# Patient Record
Sex: FEMALE | Race: WHITE | Employment: OTHER | URBAN - NONMETROPOLITAN AREA
[De-identification: names, ages, dates, MRNs, and addresses within clinical notes are randomized per-mention and may not be internally consistent; named-entity substitution may affect disease eponyms.]

---

## 2017-01-23 DIAGNOSIS — I10 ESSENTIAL HYPERTENSION: ICD-10-CM

## 2017-01-23 RX ORDER — LISINOPRIL 20 MG/1
20 TABLET ORAL DAILY
Qty: 30 TABLET | Refills: 5 | Status: SHIPPED | OUTPATIENT
Start: 2017-01-23 | End: 2017-07-16 | Stop reason: SDUPTHER

## 2017-01-23 RX ORDER — METOPROLOL TARTRATE 50 MG/1
50 TABLET, FILM COATED ORAL 2 TIMES DAILY
Qty: 60 TABLET | Refills: 5 | Status: SHIPPED | OUTPATIENT
Start: 2017-01-23 | End: 2017-07-16 | Stop reason: SDUPTHER

## 2017-02-01 DIAGNOSIS — R19.7 DIARRHEA OF PRESUMED INFECTIOUS ORIGIN: Primary | ICD-10-CM

## 2017-02-01 RX ORDER — DIPHENOXYLATE HYDROCHLORIDE AND ATROPINE SULFATE 2.5; .025 MG/1; MG/1
1 TABLET ORAL 4 TIMES DAILY PRN
Qty: 20 TABLET | Refills: 0 | Status: SHIPPED | OUTPATIENT
Start: 2017-02-01 | End: 2017-02-11

## 2017-03-08 ENCOUNTER — OFFICE VISIT (OUTPATIENT)
Dept: FAMILY MEDICINE CLINIC | Age: 77
End: 2017-03-08

## 2017-03-08 VITALS
DIASTOLIC BLOOD PRESSURE: 60 MMHG | HEART RATE: 58 BPM | WEIGHT: 181 LBS | SYSTOLIC BLOOD PRESSURE: 124 MMHG | BODY MASS INDEX: 30.9 KG/M2 | HEIGHT: 64 IN

## 2017-03-08 DIAGNOSIS — I10 ESSENTIAL HYPERTENSION: Primary | ICD-10-CM

## 2017-03-08 PROCEDURE — 99213 OFFICE O/P EST LOW 20 MIN: CPT | Performed by: FAMILY MEDICINE

## 2017-03-08 PROCEDURE — G8400 PT W/DXA NO RESULTS DOC: HCPCS | Performed by: FAMILY MEDICINE

## 2017-03-08 PROCEDURE — 1123F ACP DISCUSS/DSCN MKR DOCD: CPT | Performed by: FAMILY MEDICINE

## 2017-03-08 PROCEDURE — G8419 CALC BMI OUT NRM PARAM NOF/U: HCPCS | Performed by: FAMILY MEDICINE

## 2017-03-08 PROCEDURE — 1036F TOBACCO NON-USER: CPT | Performed by: FAMILY MEDICINE

## 2017-03-08 PROCEDURE — 1090F PRES/ABSN URINE INCON ASSESS: CPT | Performed by: FAMILY MEDICINE

## 2017-03-08 PROCEDURE — G8427 DOCREV CUR MEDS BY ELIG CLIN: HCPCS | Performed by: FAMILY MEDICINE

## 2017-03-08 PROCEDURE — 4040F PNEUMOC VAC/ADMIN/RCVD: CPT | Performed by: FAMILY MEDICINE

## 2017-03-08 PROCEDURE — G8484 FLU IMMUNIZE NO ADMIN: HCPCS | Performed by: FAMILY MEDICINE

## 2017-03-08 ASSESSMENT — ENCOUNTER SYMPTOMS
BLURRED VISION: 0
SHORTNESS OF BREATH: 0

## 2017-07-16 DIAGNOSIS — I10 ESSENTIAL HYPERTENSION: ICD-10-CM

## 2017-07-17 RX ORDER — METOPROLOL TARTRATE 50 MG/1
TABLET, FILM COATED ORAL
Qty: 60 TABLET | Refills: 2 | Status: SHIPPED | OUTPATIENT
Start: 2017-07-17 | End: 2017-09-22 | Stop reason: SDUPTHER

## 2017-07-17 RX ORDER — LISINOPRIL 20 MG/1
TABLET ORAL
Qty: 30 TABLET | Refills: 2 | Status: SHIPPED | OUTPATIENT
Start: 2017-07-17 | End: 2017-09-22 | Stop reason: SDUPTHER

## 2017-09-22 DIAGNOSIS — I10 ESSENTIAL HYPERTENSION: ICD-10-CM

## 2017-09-22 RX ORDER — METOPROLOL TARTRATE 50 MG/1
TABLET, FILM COATED ORAL
Qty: 60 TABLET | Refills: 5 | Status: SHIPPED | OUTPATIENT
Start: 2017-09-22 | End: 2018-04-25 | Stop reason: SDUPTHER

## 2017-09-22 RX ORDER — LISINOPRIL 20 MG/1
TABLET ORAL
Qty: 30 TABLET | Refills: 5 | Status: SHIPPED | OUTPATIENT
Start: 2017-09-22 | End: 2018-03-07 | Stop reason: SDUPTHER

## 2017-11-22 ENCOUNTER — HOSPITAL ENCOUNTER (OUTPATIENT)
Dept: MAMMOGRAPHY | Age: 77
Discharge: HOME | End: 2017-11-22
Payer: MEDICARE

## 2017-11-22 DIAGNOSIS — Z12.39 BREAST SCREENING: ICD-10-CM

## 2017-11-22 PROCEDURE — 77063 BREAST TOMOSYNTHESIS BI: CPT

## 2017-12-21 DIAGNOSIS — M25.512 ACUTE PAIN OF LEFT SHOULDER: Primary | ICD-10-CM

## 2017-12-21 DIAGNOSIS — M25.612 DECREASED RANGE OF MOTION OF LEFT SHOULDER: ICD-10-CM

## 2017-12-22 ENCOUNTER — HOSPITAL ENCOUNTER (OUTPATIENT)
Dept: GENERAL RADIOLOGY | Age: 77
Discharge: HOME | End: 2017-12-22
Payer: MEDICARE

## 2017-12-22 ENCOUNTER — HOSPITAL ENCOUNTER (OUTPATIENT)
Age: 77
Discharge: HOME | End: 2017-12-22
Payer: MEDICARE

## 2017-12-22 DIAGNOSIS — R52 BODY ACHES: ICD-10-CM

## 2017-12-22 DIAGNOSIS — M25.512 ACUTE PAIN OF LEFT SHOULDER: ICD-10-CM

## 2017-12-22 DIAGNOSIS — R05.9 COUGH: ICD-10-CM

## 2017-12-22 DIAGNOSIS — R06.2 WHEEZING: ICD-10-CM

## 2017-12-22 DIAGNOSIS — Z20.828 EXPOSURE TO INFLUENZA: ICD-10-CM

## 2017-12-22 DIAGNOSIS — R05.9 COUGH: Primary | ICD-10-CM

## 2017-12-22 DIAGNOSIS — M25.612 DECREASED RANGE OF MOTION OF LEFT SHOULDER: ICD-10-CM

## 2017-12-22 DIAGNOSIS — M25.512 ACUTE PAIN OF LEFT SHOULDER: Primary | ICD-10-CM

## 2017-12-22 LAB
FLU A ANTIGEN: NEGATIVE
FLU B ANTIGEN: NEGATIVE

## 2017-12-22 PROCEDURE — 87804 INFLUENZA ASSAY W/OPTIC: CPT

## 2017-12-22 PROCEDURE — 71020 XR CHEST STANDARD TWO VW: CPT

## 2017-12-22 PROCEDURE — 73030 X-RAY EXAM OF SHOULDER: CPT

## 2017-12-22 RX ORDER — PREDNISONE 20 MG/1
40 TABLET ORAL DAILY
Qty: 10 TABLET | Refills: 0 | Status: SHIPPED | OUTPATIENT
Start: 2017-12-22 | End: 2017-12-27 | Stop reason: ALTCHOICE

## 2017-12-27 ENCOUNTER — OFFICE VISIT (OUTPATIENT)
Dept: CARDIOLOGY CLINIC | Age: 77
End: 2017-12-27
Payer: MEDICARE

## 2017-12-27 VITALS
HEART RATE: 54 BPM | DIASTOLIC BLOOD PRESSURE: 70 MMHG | BODY MASS INDEX: 32.1 KG/M2 | SYSTOLIC BLOOD PRESSURE: 138 MMHG | WEIGHT: 188 LBS | HEIGHT: 64 IN

## 2017-12-27 DIAGNOSIS — I10 ESSENTIAL HYPERTENSION: ICD-10-CM

## 2017-12-27 DIAGNOSIS — R55 VASOVAGAL SYNCOPE: ICD-10-CM

## 2017-12-27 DIAGNOSIS — E78.9 LIPID DISORDER: Primary | ICD-10-CM

## 2017-12-27 PROCEDURE — 1090F PRES/ABSN URINE INCON ASSESS: CPT | Performed by: INTERNAL MEDICINE

## 2017-12-27 PROCEDURE — 1036F TOBACCO NON-USER: CPT | Performed by: INTERNAL MEDICINE

## 2017-12-27 PROCEDURE — G8482 FLU IMMUNIZE ORDER/ADMIN: HCPCS | Performed by: INTERNAL MEDICINE

## 2017-12-27 PROCEDURE — G8400 PT W/DXA NO RESULTS DOC: HCPCS | Performed by: INTERNAL MEDICINE

## 2017-12-27 PROCEDURE — 4040F PNEUMOC VAC/ADMIN/RCVD: CPT | Performed by: INTERNAL MEDICINE

## 2017-12-27 PROCEDURE — G8427 DOCREV CUR MEDS BY ELIG CLIN: HCPCS | Performed by: INTERNAL MEDICINE

## 2017-12-27 PROCEDURE — 1123F ACP DISCUSS/DSCN MKR DOCD: CPT | Performed by: INTERNAL MEDICINE

## 2017-12-27 PROCEDURE — 99213 OFFICE O/P EST LOW 20 MIN: CPT | Performed by: INTERNAL MEDICINE

## 2017-12-27 PROCEDURE — G8417 CALC BMI ABV UP PARAM F/U: HCPCS | Performed by: INTERNAL MEDICINE

## 2017-12-27 NOTE — PROGRESS NOTES
Pt here for 1 year check up     Denies chest pain, SOB, palpitations or FRANKIE    C/O having a cold for the past week. Asking for ATB.     Doing well today

## 2017-12-27 NOTE — PROGRESS NOTES
Desi Toney is a 76 y.o. female is here for  Risk for cad and prior syncopal from  dehydration doing well  and has had no chest pains . Intensity of the pain none , provocation of the pain none , what relieves the pain none  where does  the pain migrates to none and no nausea no fever and chills and no sob with and without activity. No evidence of swelling in legs no palpitations and no syncopal episodes and no dizziness ,no bleeding ,or urination  Problems ,no double visions ,no speech problems and no bowel problems or diarrhea and tolerating medications     Patient Active Problem List   Diagnosis    Hypertension    Syncopal episodes    Primary osteoarthritis of right knee     Past Medical History:   Diagnosis Date    Arthritis     Hypertension      Social History   Substance Use Topics    Smoking status: Never Smoker    Smokeless tobacco: Never Used    Alcohol use Yes      Comment: very very rare     Allergies   Allergen Reactions    Pcn [Penicillins] Itching     redness @ IM site     Current Outpatient Prescriptions   Medication Sig Dispense Refill    lisinopril (PRINIVIL;ZESTRIL) 20 MG tablet TAKE ONE TABLET BY MOUTH ONCE DAILY 30 tablet 5    metoprolol tartrate (LOPRESSOR) 50 MG tablet TAKE ONE TABLET BY MOUTH TWICE DAILY 60 tablet 5    aspirin 81 MG tablet Take 81 mg by mouth daily.  ascorbic acid (VITAMIN C) 500 MG tablet Take 500 mg by mouth daily.  Calcium Carb-Cholecalciferol (CALCIUM PLUS VITAMIN D) 600-100 MG-UNIT CAPS Take  by mouth daily. 2 tablet in morning  /  1 tablets in evening       No current facility-administered medications for this visit.         REVIEW OF SYSTEM  Constitutional  symptoms such as fever chills and malaise and weakness  Are negative   HE ENT negative  HEART all negative  LUNGS all negative   ABDOMEN all negative  GENITAL URINARY all within normal limits  NEUROLOGY all negative  NECK all negative   SKIN all negative  MUSCULOSKELETAL negative  HEMATOLOGICAL negative  PSYCHIATRIC  negative    Vitals:    12/27/17 1628   BP: (!) 142/72   Pulse: 54   Weight: 188 lb (85.3 kg)   Height: 5' 4\" (1.626 m)       EXAMINATION   Gen.  Appearance is reflective of nutritional normal nutrition and well-groomed   Appears  stated age    Carotid the amplitude of  carotid artery  And up stroke are present or diminished and bruits are absent   Thyroid palpation appears to be  Normal    HERMINIA  And evaluated and within normal limits  And size of pupils is normal  HEENT  teeth appears to be symmetrical without poor dentition and gums  and palate appears to be healthy and  head is normal cephalic  Without signs of trauma and eyes are without trauma no conjunctiva and Iids retracting and not retracted ptosis and no ptosis and without  erythematous changes and  Nose is symmetrical  without drainage  and ears are  without tinnitus  and throat is clear   JUGULAR VEINS  are not elevated and tongue is mid line no masses presence and no pride A waves present   LYMPHATICS are without adenopathies at least on exam   CHEST  No biventricular heaves and thrills and no right ventricular heaves and examination without signs of trauma and lesions  HEART not distant and regular rate and rhythm without   gallop signs and and no murmurs and systolic crescendo  Decrescendo  normal s1 and s2 sounds and no s3 and s4 split   Point of maximum intensity of the heartbeat  is at or displaced from the fifth intercostal space  LUNGS no   presence of intercostal retractions and no  use of the accessory muscles with a diaphragmatic movement present and not present pectus and pigeon chest and without wheezes and rhonchi and non diminished in all posterior lungs  and without rales  ABDOMEN abdominal bruit not present  And  No  Presency of  morbid obesity and with no    non distended without organomegaly and no rebound tenderness and bowel sound are present  all quadrants   NEUROLOGY all the cranial nerves are intact and no motor deficits  and no sensory deficits  MUSCULAR SKELETAL without signs of trauma and kyphosis and scoliosis and normal range of motion for all extremities  INTEGUMENTARY without tenting and skin rashes indentation and  dryness  NECK without lymph adenopathy   NEUROPSYCHIATRIC has no anxiety, no mood swings and depression  VASCULAR EXAM for carotid ,radial femoral arteries are  steady  and not diminished   Extremities no evidence of peripheral edema  And pulses are  normal    Assessment and plans  1. Lipid disorder  Lipid Panel    Hepatic Function Panel   2. Vasovagal syncope     3. Essential hypertension       Patient is a advised to have their lipids and liver panel and TSH checked through their family doctors and the therapeutic values that need to be met are also presented to the patient and need to achieve them is emphasized and patient agrees and accepts. patient was advised of the side effects of the medications and watch for any change in medical status if any of those side effects develop to call immediately and may consider  discontinuing the medicine after talking to us and  depending on the clinical scenario      We Re assured  And educated the  patient  On their  medical status  And the treatment plans  And the patient  is willing  to be complaint with care  And follow up and  All their question  answered to their  Satisfaction    Patient was advised to return in 6 to 12  months for follow up or sooner if there is any change in care.     Electronically signed by Zuri Boss DO on 12/27/2017 at 4:40 PM

## 2017-12-30 DIAGNOSIS — B96.89 ACUTE BACTERIAL SINUSITIS: Primary | ICD-10-CM

## 2017-12-30 DIAGNOSIS — J01.90 ACUTE BACTERIAL SINUSITIS: Primary | ICD-10-CM

## 2017-12-30 RX ORDER — AZITHROMYCIN 250 MG/1
TABLET, FILM COATED ORAL
Qty: 6 TABLET | Refills: 0 | Status: SHIPPED | OUTPATIENT
Start: 2017-12-30 | End: 2018-02-13

## 2018-02-13 ENCOUNTER — OFFICE VISIT (OUTPATIENT)
Dept: FAMILY MEDICINE CLINIC | Age: 78
End: 2018-02-13
Payer: MEDICARE

## 2018-02-13 VITALS
SYSTOLIC BLOOD PRESSURE: 134 MMHG | WEIGHT: 187 LBS | DIASTOLIC BLOOD PRESSURE: 62 MMHG | BODY MASS INDEX: 31.92 KG/M2 | HEIGHT: 64 IN | HEART RATE: 70 BPM

## 2018-02-13 DIAGNOSIS — M25.512 LOCALIZED PAIN OF LEFT SHOULDER JOINT: Primary | ICD-10-CM

## 2018-02-13 PROCEDURE — G8417 CALC BMI ABV UP PARAM F/U: HCPCS | Performed by: FAMILY MEDICINE

## 2018-02-13 PROCEDURE — 99213 OFFICE O/P EST LOW 20 MIN: CPT | Performed by: FAMILY MEDICINE

## 2018-02-13 PROCEDURE — 4040F PNEUMOC VAC/ADMIN/RCVD: CPT | Performed by: FAMILY MEDICINE

## 2018-02-13 PROCEDURE — 20610 DRAIN/INJ JOINT/BURSA W/O US: CPT | Performed by: FAMILY MEDICINE

## 2018-02-13 PROCEDURE — G8482 FLU IMMUNIZE ORDER/ADMIN: HCPCS | Performed by: FAMILY MEDICINE

## 2018-02-13 PROCEDURE — G8400 PT W/DXA NO RESULTS DOC: HCPCS | Performed by: FAMILY MEDICINE

## 2018-02-13 PROCEDURE — 1090F PRES/ABSN URINE INCON ASSESS: CPT | Performed by: FAMILY MEDICINE

## 2018-02-13 PROCEDURE — 1036F TOBACCO NON-USER: CPT | Performed by: FAMILY MEDICINE

## 2018-02-13 PROCEDURE — G8427 DOCREV CUR MEDS BY ELIG CLIN: HCPCS | Performed by: FAMILY MEDICINE

## 2018-02-13 PROCEDURE — 1123F ACP DISCUSS/DSCN MKR DOCD: CPT | Performed by: FAMILY MEDICINE

## 2018-02-13 RX ORDER — TRIAMCINOLONE ACETONIDE 40 MG/ML
40 INJECTION, SUSPENSION INTRA-ARTICULAR; INTRAMUSCULAR ONCE
Status: COMPLETED | OUTPATIENT
Start: 2018-02-13 | End: 2018-02-13

## 2018-02-13 RX ORDER — LIDOCAINE HYDROCHLORIDE 10 MG/ML
5 INJECTION, SOLUTION INFILTRATION; PERINEURAL ONCE
Status: COMPLETED | OUTPATIENT
Start: 2018-02-13 | End: 2018-02-13

## 2018-02-13 RX ADMIN — LIDOCAINE HYDROCHLORIDE 5 ML: 10 INJECTION, SOLUTION INFILTRATION; PERINEURAL at 12:01

## 2018-02-13 RX ADMIN — TRIAMCINOLONE ACETONIDE 40 MG: 40 INJECTION, SUSPENSION INTRA-ARTICULAR; INTRAMUSCULAR at 11:54

## 2018-02-13 ASSESSMENT — ENCOUNTER SYMPTOMS
CHEST TIGHTNESS: 0
COLOR CHANGE: 0
SHORTNESS OF BREATH: 0

## 2018-02-13 ASSESSMENT — PATIENT HEALTH QUESTIONNAIRE - PHQ9
1. LITTLE INTEREST OR PLEASURE IN DOING THINGS: 0
SUM OF ALL RESPONSES TO PHQ9 QUESTIONS 1 & 2: 0
SUM OF ALL RESPONSES TO PHQ QUESTIONS 1-9: 0
2. FEELING DOWN, DEPRESSED OR HOPELESS: 0

## 2018-02-13 NOTE — PATIENT INSTRUCTIONS
exercise material, such as surgical tubing or Thera-Band. 1. Put the band around a solid object at about waist level. (A bedpost will work well.) Each hand should hold an end of the band. 2. With your elbows at your sides and bent to 90 degrees, pull the band back. Your shoulder blades should move toward each other. Return to the starting position. 3. Repeat 8 to 12 times. External rotator strengthening exercise    1. Start by tying a piece of elastic exercise material to a doorknob. You can use surgical tubing or Thera-Band. (You may also hold one end of the band in each hand.)  2. Stand or sit with your shoulder relaxed and your elbow bent 90 degrees. Your upper arm should rest comfortably against your side. Squeeze a rolled towel between your elbow and your body for comfort. This will help keep your arm at your side. 3. Hold one end of the elastic band with the hand of the painful arm. 4. Start with your forearm across your belly. Slowly rotate the forearm out away from your body. Keep your elbow and upper arm tucked against the towel roll or the side of your body until you begin to feel tightness in your shoulder. Slowly move your arm back to where you started. 5. Repeat 8 to 12 times. Internal rotator strengthening exercise    1. Start by tying a piece of elastic exercise material to a doorknob. You can use surgical tubing or Thera-Band. 2. Stand or sit with your shoulder relaxed and your elbow bent 90 degrees. Your upper arm should rest comfortably against your side. Squeeze a rolled towel between your elbow and your body for comfort. This will help keep your arm at your side. 3. Hold one end of the elastic band in the hand of the painful arm. 4. Slowly rotate your forearm toward your body until it touches your belly. Slowly move it back to where you started. 5. Keep your elbow and upper arm firmly tucked against the towel roll or at your side. 6. Repeat 8 to 12 times.   Pendulum swing    If you have pain in your back, do not do this exercise. 1. Hold on to a table or the back of a chair with your good arm. Then bend forward a little and let your sore arm hang straight down. This exercise does not use the arm muscles. Rather, use your legs and your hips to create movement that makes your arm swing freely. 2. Use the movement from your hips and legs to guide the slightly swinging arm back and forth like a pendulum (or elephant trunk). Then guide it in circles that start small (about the size of a dinner plate). Make the circles a bit larger each day, as your pain allows. 3. Do this exercise for 5 minutes, 5 to 7 times each day. 4. As you have less pain, try bending over a little farther to do this exercise. This will increase the amount of movement at your shoulder. Follow-up care is a key part of your treatment and safety. Be sure to make and go to all appointments, and call your doctor if you are having problems. It's also a good idea to know your test results and keep a list of the medicines you take. Where can you learn more? Go to https://SelligypepicdINK.bettermarks. org and sign in to your [employer] account. Enter H562 in the Daily Secret box to learn more about \"Shoulder Arthritis: Exercises. \"     If you do not have an account, please click on the \"Sign Up Now\" link. Current as of: March 21, 2017  Content Version: 11.5  © 7997-7421 Healthwise, [employer]. Care instructions adapted under license by [facility] ([facility]). If you have questions about a medical condition or this instruction, always ask your healthcare professional. [place] [address] any warranty or liability for your use of this information.

## 2018-02-13 NOTE — PROGRESS NOTES
was injected with 5 mL 1% lidocaine and 1 mL of Kenalog (40 mg/ml) using a posterior approach to the shoulder joint space. The injection site was cleansed and a dressing was applied. Patient was instructed on post procedure shoulder care. Complications: There were no complications and the patient tolerated the procedure well. Assessment/Plan:     Millie Lam was seen today for shoulder pain. Diagnoses and all orders for this visit:    Localized pain of left shoulder joint  -     triamcinolone acetonide (KENALOG-40) injection 40 mg; Inject 1 mL into the articular space once  -     lidocaine 1 % injection 5 mL; Inject 5 mLs into the articular space once  -     33038 - [place] DRAIN/INJECT LARGE JOINT/BURSA    Left shoulder was injected for symptomatic relief and the patient tolerated the procedure well. She was advised on aftercare instructions and given a handout with shoulder exercises. Return in about 2 months (around 4/13/2018) for hypertension.     Electronically signed by Rossana Byrd MD on 2/13/2018 at 10:45 AM

## 2018-03-07 DIAGNOSIS — I10 ESSENTIAL HYPERTENSION: ICD-10-CM

## 2018-03-07 RX ORDER — LISINOPRIL 20 MG/1
TABLET ORAL
Qty: 90 TABLET | Refills: 1 | Status: SHIPPED | OUTPATIENT
Start: 2018-03-07 | End: 2018-09-24 | Stop reason: SDUPTHER

## 2018-03-08 DIAGNOSIS — J01.90 ACUTE BACTERIAL SINUSITIS: Primary | ICD-10-CM

## 2018-03-08 DIAGNOSIS — B96.89 ACUTE BACTERIAL SINUSITIS: Primary | ICD-10-CM

## 2018-03-08 RX ORDER — AZITHROMYCIN 250 MG/1
TABLET, FILM COATED ORAL
Qty: 6 TABLET | Refills: 0 | Status: SHIPPED | OUTPATIENT
Start: 2018-03-08 | End: 2018-04-25

## 2018-04-25 ENCOUNTER — TELEPHONE (OUTPATIENT)
Dept: FAMILY MEDICINE CLINIC | Age: 78
End: 2018-04-25

## 2018-04-25 ENCOUNTER — OFFICE VISIT (OUTPATIENT)
Dept: FAMILY MEDICINE CLINIC | Age: 78
End: 2018-04-25
Payer: MEDICARE

## 2018-04-25 VITALS
WEIGHT: 180 LBS | HEART RATE: 58 BPM | SYSTOLIC BLOOD PRESSURE: 124 MMHG | HEIGHT: 64 IN | DIASTOLIC BLOOD PRESSURE: 72 MMHG | BODY MASS INDEX: 30.73 KG/M2

## 2018-04-25 DIAGNOSIS — I10 ESSENTIAL HYPERTENSION: ICD-10-CM

## 2018-04-25 DIAGNOSIS — Z13.220 SCREENING FOR HYPERLIPIDEMIA: ICD-10-CM

## 2018-04-25 DIAGNOSIS — M62.542 ATROPHY OF LEFT HAND MUSCLES: Primary | ICD-10-CM

## 2018-04-25 DIAGNOSIS — E78.9 LIPID DISORDER: ICD-10-CM

## 2018-04-25 LAB
ALBUMIN SERPL-MCNC: 4.3 G/DL (ref 3.5–5.1)
ALP BLD-CCNC: 64 U/L (ref 38–126)
ALT SERPL-CCNC: 14 U/L (ref 11–66)
ANION GAP SERPL CALCULATED.3IONS-SCNC: 10 MEQ/L (ref 8–16)
AST SERPL-CCNC: 20 U/L (ref 5–40)
BILIRUB SERPL-MCNC: 0.4 MG/DL (ref 0.3–1.2)
BILIRUBIN DIRECT: < 0.2 MG/DL (ref 0–0.3)
BUN BLDV-MCNC: 19 MG/DL (ref 7–22)
CALCIUM SERPL-MCNC: 9 MG/DL (ref 8.5–10.5)
CHLORIDE BLD-SCNC: 102 MEQ/L (ref 98–111)
CHOLESTEROL, TOTAL: 206 MG/DL (ref 100–199)
CO2: 26 MEQ/L (ref 23–33)
CREAT SERPL-MCNC: 0.7 MG/DL (ref 0.4–1.2)
GFR SERPL CREATININE-BSD FRML MDRD: 81 ML/MIN/1.73M2
GLUCOSE BLD-MCNC: 94 MG/DL (ref 70–108)
HDLC SERPL-MCNC: 65 MG/DL
LDL CHOLESTEROL CALCULATED: 119 MG/DL
POTASSIUM SERPL-SCNC: 4.7 MEQ/L (ref 3.5–5.2)
SODIUM BLD-SCNC: 138 MEQ/L (ref 135–145)
TOTAL PROTEIN: 6.8 G/DL (ref 6.1–8)
TRIGL SERPL-MCNC: 108 MG/DL (ref 0–199)

## 2018-04-25 PROCEDURE — 1036F TOBACCO NON-USER: CPT | Performed by: FAMILY MEDICINE

## 2018-04-25 PROCEDURE — 1123F ACP DISCUSS/DSCN MKR DOCD: CPT | Performed by: FAMILY MEDICINE

## 2018-04-25 PROCEDURE — G8427 DOCREV CUR MEDS BY ELIG CLIN: HCPCS | Performed by: FAMILY MEDICINE

## 2018-04-25 PROCEDURE — 36415 COLL VENOUS BLD VENIPUNCTURE: CPT | Performed by: FAMILY MEDICINE

## 2018-04-25 PROCEDURE — 1090F PRES/ABSN URINE INCON ASSESS: CPT | Performed by: FAMILY MEDICINE

## 2018-04-25 PROCEDURE — 99214 OFFICE O/P EST MOD 30 MIN: CPT | Performed by: FAMILY MEDICINE

## 2018-04-25 PROCEDURE — G8417 CALC BMI ABV UP PARAM F/U: HCPCS | Performed by: FAMILY MEDICINE

## 2018-04-25 PROCEDURE — G8400 PT W/DXA NO RESULTS DOC: HCPCS | Performed by: FAMILY MEDICINE

## 2018-04-25 PROCEDURE — 4040F PNEUMOC VAC/ADMIN/RCVD: CPT | Performed by: FAMILY MEDICINE

## 2018-04-25 RX ORDER — METOPROLOL TARTRATE 50 MG/1
TABLET, FILM COATED ORAL
Qty: 180 TABLET | Refills: 1 | Status: SHIPPED | OUTPATIENT
Start: 2018-04-25 | End: 2018-11-06 | Stop reason: SDUPTHER

## 2018-04-25 ASSESSMENT — ENCOUNTER SYMPTOMS
COLOR CHANGE: 0
EYE REDNESS: 0
EYE DISCHARGE: 0
NAUSEA: 0
CHEST TIGHTNESS: 0
BLOOD IN STOOL: 0
VOMITING: 0
SHORTNESS OF BREATH: 0

## 2018-05-27 ENCOUNTER — HOSPITAL ENCOUNTER (OUTPATIENT)
Age: 78
Discharge: HOME | End: 2018-05-27
Payer: MEDICARE

## 2018-05-27 ENCOUNTER — HOSPITAL ENCOUNTER (OUTPATIENT)
Dept: GENERAL RADIOLOGY | Age: 78
Discharge: HOME | End: 2018-05-27
Payer: MEDICARE

## 2018-05-27 DIAGNOSIS — M25.551 ACUTE RIGHT HIP PAIN: ICD-10-CM

## 2018-05-27 DIAGNOSIS — M25.551 ACUTE RIGHT HIP PAIN: Primary | ICD-10-CM
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
## 2018-05-27 PROCEDURE — 73502 X-RAY EXAM HIP UNI 2-3 VIEWS: CPT

## 2018-06-01 ENCOUNTER — TELEPHONE (OUTPATIENT)
Dept: FAMILY MEDICINE CLINIC | Age: 78
End: 2018-06-01

## 2018-06-01 ENCOUNTER — HOSPITAL ENCOUNTER (OUTPATIENT)
Dept: GENERAL RADIOLOGY | Age: 78
Discharge: HOME | End: 2018-06-01
Payer: MEDICARE

## 2018-06-01 ENCOUNTER — HOSPITAL ENCOUNTER (OUTPATIENT)
Age: 78
Discharge: HOME | End: 2018-06-01
Payer: MEDICARE

## 2018-06-01 ENCOUNTER — OFFICE VISIT (OUTPATIENT)
Dept: FAMILY MEDICINE CLINIC | Age: 78
End: 2018-06-01
Payer: MEDICARE

## 2018-06-01 VITALS
HEIGHT: 64 IN | SYSTOLIC BLOOD PRESSURE: 160 MMHG | DIASTOLIC BLOOD PRESSURE: 70 MMHG | HEART RATE: 68 BPM | BODY MASS INDEX: 30.9 KG/M2 | WEIGHT: 181 LBS
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
## 2018-06-01 DIAGNOSIS — M25.551 RIGHT HIP PAIN: ICD-10-CM

## 2018-06-01 DIAGNOSIS — M25.551 RIGHT HIP PAIN: Primary | ICD-10-CM

## 2018-06-01 DIAGNOSIS — M79.651 RIGHT THIGH PAIN: ICD-10-CM

## 2018-06-01 DIAGNOSIS — M79.604 RIGHT LEG PAIN: Primary | ICD-10-CM

## 2018-06-01 PROCEDURE — 1123F ACP DISCUSS/DSCN MKR DOCD: CPT | Performed by: FAMILY MEDICINE

## 2018-06-01 PROCEDURE — 1036F TOBACCO NON-USER: CPT | Performed by: FAMILY MEDICINE

## 2018-06-01 PROCEDURE — 99213 OFFICE O/P EST LOW 20 MIN: CPT | Performed by: FAMILY MEDICINE

## 2018-06-01 PROCEDURE — G8400 PT W/DXA NO RESULTS DOC: HCPCS | Performed by: FAMILY MEDICINE

## 2018-06-01 PROCEDURE — G8427 DOCREV CUR MEDS BY ELIG CLIN: HCPCS | Performed by: FAMILY MEDICINE

## 2018-06-01 PROCEDURE — 1090F PRES/ABSN URINE INCON ASSESS: CPT | Performed by: FAMILY MEDICINE

## 2018-06-01 PROCEDURE — G8417 CALC BMI ABV UP PARAM F/U: HCPCS | Performed by: FAMILY MEDICINE

## 2018-06-01 PROCEDURE — 4040F PNEUMOC VAC/ADMIN/RCVD: CPT | Performed by: FAMILY MEDICINE

## 2018-06-01 PROCEDURE — 73552 X-RAY EXAM OF FEMUR 2/>: CPT

## 2018-06-01 RX ORDER — TRAMADOL HYDROCHLORIDE 50 MG/1
50 TABLET ORAL EVERY 6 HOURS PRN
Qty: 28 TABLET | Refills: 0 | Status: SHIPPED | OUTPATIENT
Start: 2018-06-01 | End: 2018-06-08

## 2018-06-01 RX ORDER — PREDNISONE 20 MG/1
40 TABLET ORAL DAILY
Qty: 10 TABLET | Refills: 0 | Status: SHIPPED | OUTPATIENT
Start: 2018-06-01 | End: 2018-06-30 | Stop reason: ALTCHOICE

## 2018-06-01 ASSESSMENT — ENCOUNTER SYMPTOMS: COLOR CHANGE: 0

## 2018-06-04 ENCOUNTER — HOSPITAL ENCOUNTER (OUTPATIENT)
Dept: CT IMAGING | Age: 78
Discharge: HOME | End: 2018-06-04
Payer: MEDICARE

## 2018-06-04 DIAGNOSIS — M79.604 RIGHT LEG PAIN: ICD-10-CM

## 2018-06-04 PROCEDURE — 73700 CT LOWER EXTREMITY W/O DYE: CPT

## 2018-06-05 ENCOUNTER — TELEPHONE (OUTPATIENT)
Dept: FAMILY MEDICINE CLINIC | Age: 78
End: 2018-06-05

## 2018-06-05 DIAGNOSIS — M79.651 ACUTE PAIN OF RIGHT THIGH: Primary | ICD-10-CM

## 2018-06-06 ENCOUNTER — TELEPHONE (OUTPATIENT)
Dept: FAMILY MEDICINE CLINIC | Age: 78
End: 2018-06-06

## 2018-06-06 ENCOUNTER — HOSPITAL ENCOUNTER (OUTPATIENT)
Dept: INTERVENTIONAL RADIOLOGY/VASCULAR | Age: 78
Discharge: HOME | End: 2018-06-06
Payer: MEDICARE

## 2018-06-06 DIAGNOSIS — R52 PAIN: ICD-10-CM

## 2018-06-06 PROCEDURE — 93971 EXTREMITY STUDY: CPT

## 2018-06-07 DIAGNOSIS — M25.551 RIGHT HIP PAIN: Primary | ICD-10-CM

## 2018-06-12 ENCOUNTER — HOSPITAL ENCOUNTER (OUTPATIENT)
Dept: PHYSICAL THERAPY | Age: 78
Setting detail: THERAPIES SERIES
Discharge: HOME | End: 2018-06-12
Payer: MEDICARE

## 2018-06-12 PROCEDURE — 97110 THERAPEUTIC EXERCISES: CPT

## 2018-06-12 PROCEDURE — 97035 APP MDLTY 1+ULTRASOUND EA 15: CPT

## 2018-06-12 ASSESSMENT — PAIN SCALES - GENERAL: PAINLEVEL_OUTOF10: 2

## 2018-06-14 ENCOUNTER — HOSPITAL ENCOUNTER (OUTPATIENT)
Dept: PHYSICAL THERAPY | Age: 78
Setting detail: THERAPIES SERIES
Discharge: HOME | End: 2018-06-14
Payer: MEDICARE

## 2018-06-14 PROCEDURE — 97110 THERAPEUTIC EXERCISES: CPT

## 2018-06-14 PROCEDURE — 97035 APP MDLTY 1+ULTRASOUND EA 15: CPT

## 2018-06-14 ASSESSMENT — PAIN DESCRIPTION - LOCATION: LOCATION: LEG

## 2018-06-14 ASSESSMENT — PAIN DESCRIPTION - ORIENTATION: ORIENTATION: RIGHT

## 2018-06-14 ASSESSMENT — PAIN SCALES - GENERAL: PAINLEVEL_OUTOF10: 1

## 2018-06-14 ASSESSMENT — PAIN DESCRIPTION - PAIN TYPE: TYPE: ACUTE PAIN

## 2018-06-15 ENCOUNTER — APPOINTMENT (OUTPATIENT)
Dept: PHYSICAL THERAPY | Age: 78
End: 2018-06-15
Payer: MEDICARE

## 2018-06-18 ENCOUNTER — HOSPITAL ENCOUNTER (OUTPATIENT)
Dept: PHYSICAL THERAPY | Age: 78
Setting detail: THERAPIES SERIES
Discharge: HOME | End: 2018-06-18
Payer: MEDICARE

## 2018-06-18 PROCEDURE — 97110 THERAPEUTIC EXERCISES: CPT

## 2018-06-19 ENCOUNTER — APPOINTMENT (OUTPATIENT)
Dept: PHYSICAL THERAPY | Age: 78
End: 2018-06-19
Payer: MEDICARE

## 2018-06-21 ENCOUNTER — HOSPITAL ENCOUNTER (OUTPATIENT)
Dept: PHYSICAL THERAPY | Age: 78
Setting detail: THERAPIES SERIES
Discharge: HOME | End: 2018-06-21
Payer: MEDICARE

## 2018-06-21 PROCEDURE — 97110 THERAPEUTIC EXERCISES: CPT

## 2018-06-26 ENCOUNTER — HOSPITAL ENCOUNTER (OUTPATIENT)
Dept: PHYSICAL THERAPY | Age: 78
Setting detail: THERAPIES SERIES
Discharge: HOME | End: 2018-06-26
Payer: MEDICARE

## 2018-06-26 PROCEDURE — 97110 THERAPEUTIC EXERCISES: CPT

## 2018-06-29 ENCOUNTER — HOSPITAL ENCOUNTER (OUTPATIENT)
Dept: PHYSICAL THERAPY | Age: 78
Setting detail: THERAPIES SERIES
Discharge: HOME | End: 2018-06-29
Payer: MEDICARE

## 2018-06-29 PROCEDURE — G8992 OTHER PT/OT  D/C STATUS: HCPCS

## 2018-06-29 PROCEDURE — G8991 OTHER PT/OT GOAL STATUS: HCPCS

## 2018-06-29 PROCEDURE — 97530 THERAPEUTIC ACTIVITIES: CPT

## 2018-06-30 ENCOUNTER — HOSPITAL ENCOUNTER (EMERGENCY)
Age: 78
Discharge: HOME | End: 2018-06-30
Attending: EMERGENCY MEDICINE
Payer: MEDICARE

## 2018-06-30 ENCOUNTER — APPOINTMENT (OUTPATIENT)
Dept: GENERAL RADIOLOGY | Age: 78
End: 2018-06-30
Payer: MEDICARE

## 2018-06-30 VITALS
OXYGEN SATURATION: 93 % | DIASTOLIC BLOOD PRESSURE: 79 MMHG | SYSTOLIC BLOOD PRESSURE: 192 MMHG | BODY MASS INDEX: 28.25 KG/M2 | WEIGHT: 180 LBS | RESPIRATION RATE: 18 BRPM | TEMPERATURE: 99.1 F | HEIGHT: 67 IN | HEART RATE: 75 BPM

## 2018-06-30 DIAGNOSIS — S42.402A CLOSED FRACTURE OF LEFT ELBOW, INITIAL ENCOUNTER: ICD-10-CM
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
## 2018-06-30 DIAGNOSIS — S01.01XA LACERATION OF SCALP, INITIAL ENCOUNTER: ICD-10-CM

## 2018-06-30 PROCEDURE — 12001 RPR S/N/AX/GEN/TRNK 2.5CM/<: CPT

## 2018-06-30 PROCEDURE — 90471 IMMUNIZATION ADMIN: CPT | Performed by: EMERGENCY MEDICINE

## 2018-06-30 PROCEDURE — 29105 APPLICATION LONG ARM SPLINT: CPT

## 2018-06-30 PROCEDURE — A4565 SLINGS: HCPCS

## 2018-06-30 PROCEDURE — 2500000003 HC RX 250 WO HCPCS: Performed by: EMERGENCY MEDICINE

## 2018-06-30 PROCEDURE — 6360000002 HC RX W HCPCS: Performed by: EMERGENCY MEDICINE

## 2018-06-30 PROCEDURE — 90715 TDAP VACCINE 7 YRS/> IM: CPT | Performed by: EMERGENCY MEDICINE

## 2018-06-30 PROCEDURE — 99284 EMERGENCY DEPT VISIT MOD MDM: CPT

## 2018-06-30 PROCEDURE — 73080 X-RAY EXAM OF ELBOW: CPT

## 2018-06-30 PROCEDURE — 6370000000 HC RX 637 (ALT 250 FOR IP): Performed by: EMERGENCY MEDICINE

## 2018-06-30 RX ORDER — LIDOCAINE HYDROCHLORIDE 10 MG/ML
5 INJECTION, SOLUTION INFILTRATION; PERINEURAL ONCE
Status: COMPLETED | OUTPATIENT
Start: 2018-06-30 | End: 2018-06-30

## 2018-06-30 RX ORDER — HYDROCODONE BITARTRATE AND ACETAMINOPHEN 5; 325 MG/1; MG/1
1 TABLET ORAL ONCE
Status: COMPLETED | OUTPATIENT
Start: 2018-06-30 | End: 2018-06-30

## 2018-06-30 RX ORDER — HYDROCODONE BITARTRATE AND ACETAMINOPHEN 5; 325 MG/1; MG/1
1 TABLET ORAL EVERY 6 HOURS PRN
Qty: 12 TABLET | Refills: 0 | Status: SHIPPED | OUTPATIENT
Start: 2018-06-30 | End: 2018-07-03

## 2018-06-30 RX ADMIN — HYDROCODONE BITARTRATE AND ACETAMINOPHEN 1 TABLET: 5; 325 TABLET ORAL at 15:40

## 2018-06-30 RX ADMIN — TETANUS TOXOID, REDUCED DIPHTHERIA TOXOID AND ACELLULAR PERTUSSIS VACCINE, ADSORBED 0.5 ML: 5; 2.5; 8; 8; 2.5 SUSPENSION INTRAMUSCULAR at 16:19

## 2018-06-30 RX ADMIN — LIDOCAINE HYDROCHLORIDE 5 ML: 10 INJECTION, SOLUTION INFILTRATION; PERINEURAL at 16:18

## 2018-06-30 ASSESSMENT — PAIN DESCRIPTION - ORIENTATION
ORIENTATION: LEFT
ORIENTATION: LEFT

## 2018-06-30 ASSESSMENT — PAIN DESCRIPTION - LOCATION
LOCATION: ARM
LOCATION_2: HEAD
LOCATION_2: HEAD
LOCATION: ARM

## 2018-06-30 ASSESSMENT — PAIN DESCRIPTION - INTENSITY
RATING_2: 6
RATING_2: 4

## 2018-06-30 ASSESSMENT — PAIN DESCRIPTION - PAIN TYPE
TYPE: ACUTE PAIN
TYPE: ACUTE PAIN

## 2018-06-30 ASSESSMENT — PAIN SCALES - GENERAL
PAINLEVEL_OUTOF10: 4
PAINLEVEL_OUTOF10: 4
PAINLEVEL_OUTOF10: 6

## 2018-06-30 NOTE — ED PROVIDER NOTES
normal. There is no tenderness. Musculoskeletal:   Swollen, tender left elbow in the olecranon area, held in flexion at about 90 degrees . No tenderness hand, wrist, humerus, forearm, shoulder. No injury to other joints. No sine tenderness. Neurological: She is alert and oriented to person, place, and time. She exhibits normal muscle tone. Coordination normal.   Skin: Skin is warm and dry. She is not diaphoretic. Psychiatric: Her behavior is normal.   Nursing note and vitals reviewed. RADIOLOGY:    XR ELBOW LEFT (MIN 3 VIEWS)   Final Result   1. Comminuted intra-articular displaced fracture through the proximal left ulna extending into the ulnar trochlear joint space. 2. There appears to be a comminuted impacted fracture through the trochlea as well, best seen on the oblique projection. **This report has been created using voice recognition software. It may contain minor errors which are inherent in voice recognition technology. **      Final report electronically signed by Dr. Barbi Alcantar on 6/30/2018 4:37 PM          Vitals:    Vitals:    06/30/18 1525 06/30/18 1603   BP: (!) 190/69 (!) 192/79   Pulse: 71 75   Resp: 18    Temp: 99.1 °F (37.3 °C)    TempSrc: Temporal    SpO2: 94% 93%   Weight: 180 lb (81.6 kg)    Height: 5' 7\" (1.702 m)        EMERGENCY DEPARTMENT COURSE:    Boostrix, Norco 5/325 given. Wound of scalp repaired as below. No CT done due to no LOC or neurologic changes, no anticoagulant therapy. Discussed with the patient. Fracture of the left elbow discussed. After call to the on-call orthopedist, well-padded long arm splint applied to the left arm, sling left arm, applied by the nurse, confirmed appropriate by me, normal neurovascular status before and after placement. She plans to go to [facility] Monday. PROCEDURES:    Consent: verbal, patient   Wound Location:  Right posterior parietal area  Wound Description: partial thickness, V-shaped, gaping, oozing.    Wound

## 2018-06-30 NOTE — ED NOTES
6760 Reviewed discharge instructions with patient and family. All verbalize understanding. All needs addressed and questions answered before patient discharged.       Nena Cunha RN  06/30/18 4558

## 2018-06-30 NOTE — ED NOTES
Patient presents with complaint of fall that occurred about 30min ago at home. States she fell down two steps. Patient presents with cold compress and wrap from home to left arm. Upon removal of bandage abrasion noted to left posterior forearm. Laceration of about 1 cm by 1 cm noted to posterior scalp as well. Minor bleeding noted to head laceration. No active bleeding noted to forearm.       Jeff Jackson, RN  06/30/18 [facility] [address], RN  06/30/18 0712

## 2018-07-01 ASSESSMENT — ENCOUNTER SYMPTOMS
BACK PAIN: 0
ABDOMINAL PAIN: 0
NAUSEA: 0
BLURRED VISION: 0
SHORTNESS OF BREATH: 0
DOUBLE VISION: 0

## 2018-07-02 ENCOUNTER — HOSPITAL ENCOUNTER (OUTPATIENT)
Dept: GENERAL RADIOLOGY | Age: 78
Discharge: HOME | End: 2018-07-02
Payer: MEDICARE

## 2018-07-02 ENCOUNTER — HOSPITAL ENCOUNTER (OUTPATIENT)
Age: 78
Discharge: HOME | End: 2018-07-02
Payer: MEDICARE

## 2018-07-02 DIAGNOSIS — Z01.818 PREOPERATIVE CLEARANCE: Primary | ICD-10-CM

## 2018-07-02 DIAGNOSIS — S52.252P: ICD-10-CM

## 2018-07-02 DIAGNOSIS — Z01.818 PREOPERATIVE CLEARANCE: ICD-10-CM

## 2018-07-02 DIAGNOSIS — N30.00 ACUTE CYSTITIS WITHOUT HEMATURIA: Primary | ICD-10-CM

## 2018-07-02 LAB
ANION GAP SERPL CALCULATED.3IONS-SCNC: 14 MEQ/L (ref 8–16)
BACTERIA: ABNORMAL /HPF
BASOPHILS # BLD: 1 %
BASOPHILS ABSOLUTE: 0.1 THOU/MM3 (ref 0–0.1)
BILIRUBIN URINE: NEGATIVE
BLOOD, URINE: NEGATIVE
BUN BLDV-MCNC: 18 MG/DL (ref 7–22)
CALCIUM SERPL-MCNC: 9.2 MG/DL (ref 8.5–10.5)
CASTS 2: ABNORMAL /LPF
CASTS UA: ABNORMAL /LPF
CHARACTER, URINE: CLEAR
CHLORIDE BLD-SCNC: 104 MEQ/L (ref 98–111)
CO2: 22 MEQ/L (ref 23–33)
COLOR: YELLOW
CREAT SERPL-MCNC: 0.9 MG/DL (ref 0.4–1.2)
CRYSTALS, UA: ABNORMAL
EKG ATRIAL RATE: 58 BPM
EKG P AXIS: 41 DEGREES
EKG P-R INTERVAL: 168 MS
EKG Q-T INTERVAL: 456 MS
EKG QRS DURATION: 84 MS
EKG QTC CALCULATION (BAZETT): 447 MS
EKG R AXIS: -26 DEGREES
EKG T AXIS: 8 DEGREES
EKG VENTRICULAR RATE: 58 BPM
EOSINOPHIL # BLD: 4.2 %
EOSINOPHILS ABSOLUTE: 0.2 THOU/MM3 (ref 0–0.4)
EPITHELIAL CELLS, UA: ABNORMAL /HPF
ERYTHROCYTE [DISTWIDTH] IN BLOOD BY AUTOMATED COUNT: 12.5 % (ref 11.5–14.5)
ERYTHROCYTE [DISTWIDTH] IN BLOOD BY AUTOMATED COUNT: 41.3 FL (ref 35–45)
GFR SERPL CREATININE-BSD FRML MDRD: 60 ML/MIN/1.73M2
GLUCOSE BLD-MCNC: 101 MG/DL (ref 70–108)
GLUCOSE URINE: NEGATIVE MG/DL
HCT VFR BLD CALC: 32.6 % (ref 37–47)
HEMOGLOBIN: 10.8 GM/DL (ref 12–16)
IMMATURE GRANS (ABS): 0.01 THOU/MM3 (ref 0–0.07)
IMMATURE GRANULOCYTES: 0.2 %
KETONES, URINE: NEGATIVE
LEUKOCYTE ESTERASE, URINE: ABNORMAL
LYMPHOCYTES # BLD: 23.8 %
LYMPHOCYTES ABSOLUTE: 1.4 THOU/MM3 (ref 1–4.8)
MCH RBC QN AUTO: 29.8 PG (ref 26–33)
MCHC RBC AUTO-ENTMCNC: 33.1 GM/DL (ref 32.2–35.5)
MCV RBC AUTO: 89.8 FL (ref 81–99)
MISCELLANEOUS 2: ABNORMAL
MONOCYTES # BLD: 12.1 %
MONOCYTES ABSOLUTE: 0.7 THOU/MM3 (ref 0.4–1.3)
NITRITE, URINE: POSITIVE
NUCLEATED RED BLOOD CELLS: 0 /100 WBC
PH UA: 5.5
PLATELET # BLD: 248 THOU/MM3 (ref 130–400)
PMV BLD AUTO: 11.5 FL (ref 9.4–12.4)
POTASSIUM SERPL-SCNC: 4.4 MEQ/L (ref 3.5–5.2)
PROTEIN UA: NEGATIVE
RBC # BLD: 3.63 MILL/MM3 (ref 4.2–5.4)
RBC URINE: ABNORMAL /HPF
RENAL EPITHELIAL, UA: ABNORMAL
SEG NEUTROPHILS: 58.7 %
SEGMENTED NEUTROPHILS ABSOLUTE COUNT: 3.3 THOU/MM3 (ref 1.8–7.7)
SODIUM BLD-SCNC: 140 MEQ/L (ref 135–145)
SPECIFIC GRAVITY, URINE: 1.01 (ref 1–1.03)
UROBILINOGEN, URINE: 0.2 EU/DL
WBC # BLD: 5.7 THOU/MM3 (ref 4.8–10.8)
WBC UA: ABNORMAL /HPF
YEAST: ABNORMAL

## 2018-07-02 PROCEDURE — 87077 CULTURE AEROBIC IDENTIFY: CPT

## 2018-07-02 PROCEDURE — 80048 BASIC METABOLIC PNL TOTAL CA: CPT

## 2018-07-02 PROCEDURE — 87081 CULTURE SCREEN ONLY: CPT

## 2018-07-02 PROCEDURE — 85025 COMPLETE CBC W/AUTO DIFF WBC: CPT

## 2018-07-02 PROCEDURE — 87086 URINE CULTURE/COLONY COUNT: CPT

## 2018-07-02 PROCEDURE — 81001 URINALYSIS AUTO W/SCOPE: CPT

## 2018-07-02 PROCEDURE — 93005 ELECTROCARDIOGRAM TRACING: CPT

## 2018-07-02 PROCEDURE — 87184 SC STD DISK METHOD PER PLATE: CPT

## 2018-07-02 PROCEDURE — 36415 COLL VENOUS BLD VENIPUNCTURE: CPT

## 2018-07-02 PROCEDURE — 87186 SC STD MICRODIL/AGAR DIL: CPT

## 2018-07-02 PROCEDURE — 71046 X-RAY EXAM CHEST 2 VIEWS: CPT

## 2018-07-02 PROCEDURE — 87147 CULTURE TYPE IMMUNOLOGIC: CPT

## 2018-07-02 RX ORDER — CEPHALEXIN 500 MG/1
500 CAPSULE ORAL 2 TIMES DAILY
Qty: 6 CAPSULE | Refills: 0 | Status: SHIPPED | OUTPATIENT
Start: 2018-07-02 | End: 2018-07-05

## 2018-07-03 ENCOUNTER — OFFICE VISIT (OUTPATIENT)
Dept: ORTHOPEDIC SURGERY | Age: 78
End: 2018-07-03
Payer: MEDICARE

## 2018-07-03 ENCOUNTER — PROCEDURE VISIT (OUTPATIENT)
Dept: FAMILY MEDICINE CLINIC | Age: 78
End: 2018-07-03
Payer: MEDICARE

## 2018-07-03 VITALS
DIASTOLIC BLOOD PRESSURE: 78 MMHG | HEIGHT: 67 IN | HEART RATE: 62 BPM | BODY MASS INDEX: 28.24 KG/M2 | SYSTOLIC BLOOD PRESSURE: 122 MMHG | WEIGHT: 179.9 LBS

## 2018-07-03 VITALS
WEIGHT: 185 LBS | HEIGHT: 67 IN | HEART RATE: 62 BPM | SYSTOLIC BLOOD PRESSURE: 116 MMHG | DIASTOLIC BLOOD PRESSURE: 80 MMHG | BODY MASS INDEX: 29.03 KG/M2

## 2018-07-03 DIAGNOSIS — Z01.818 PREOPERATIVE CLEARANCE: Primary | ICD-10-CM

## 2018-07-03 DIAGNOSIS — S52.092K: ICD-10-CM

## 2018-07-03 DIAGNOSIS — S52.022A CLOSED FRACTURE OF LEFT OLECRANON PROCESS, INITIAL ENCOUNTER: Primary | ICD-10-CM

## 2018-07-03 PROCEDURE — 4040F PNEUMOC VAC/ADMIN/RCVD: CPT | Performed by: PHYSICIAN ASSISTANT

## 2018-07-03 PROCEDURE — 1123F ACP DISCUSS/DSCN MKR DOCD: CPT | Performed by: PHYSICIAN ASSISTANT

## 2018-07-03 PROCEDURE — 99444 PR PHYSICIAN ONLINE EVALUATION & MANAGEMENT SERVICE: CPT | Performed by: FAMILY MEDICINE

## 2018-07-03 PROCEDURE — G8400 PT W/DXA NO RESULTS DOC: HCPCS | Performed by: PHYSICIAN ASSISTANT

## 2018-07-03 PROCEDURE — G8417 CALC BMI ABV UP PARAM F/U: HCPCS | Performed by: PHYSICIAN ASSISTANT

## 2018-07-03 PROCEDURE — 1036F TOBACCO NON-USER: CPT | Performed by: PHYSICIAN ASSISTANT

## 2018-07-03 PROCEDURE — G8427 DOCREV CUR MEDS BY ELIG CLIN: HCPCS | Performed by: PHYSICIAN ASSISTANT

## 2018-07-03 PROCEDURE — 99203 OFFICE O/P NEW LOW 30 MIN: CPT | Performed by: PHYSICIAN ASSISTANT

## 2018-07-03 PROCEDURE — 1090F PRES/ABSN URINE INCON ASSESS: CPT | Performed by: PHYSICIAN ASSISTANT

## 2018-07-03 ASSESSMENT — ENCOUNTER SYMPTOMS
NAUSEA: 0
VOMITING: 0
SHORTNESS OF BREATH: 0
EYE DISCHARGE: 0
COLOR CHANGE: 1
CHEST TIGHTNESS: 0
EYE REDNESS: 0

## 2018-07-03 NOTE — PROGRESS NOTES
2012    KNEE SURGERY Right 7-9-14    total        Family History:     Family History   Problem Relation Age of Onset   [facility] Cancer Mother         lymphoma    Cancer Father         prostate and bone    Heart Disease Sister     Heart Disease Brother     Atrial Fibrillation Daughter     Hypertension Daughter     Elevated Lipids Daughter        Social History:     Social:    Social History     Social History    Marital status:      Spouse name: N/A    Number of children: 5    Years of education: N/A     Occupational History    Not on file. Social History Main Topics    Smoking status: Never Smoker    Smokeless tobacco: Never Used    Alcohol use Yes      Comment: very very rare    Drug use: No    Sexual activity: Not on file     Other Topics Concern    Not on file     Social History Narrative    No narrative on file       Allergies:        Pcn [penicillins]      Medications:       Current Outpatient Prescriptions   Medication Sig Dispense Refill    cephALEXin (KEFLEX) 500 MG capsule Take 1 capsule by mouth 2 times daily for 3 days 6 capsule 0    metoprolol tartrate (LOPRESSOR) 50 MG tablet TAKE ONE TABLET BY MOUTH TWICE DAILY 180 tablet 1    lisinopril (PRINIVIL;ZESTRIL) 20 MG tablet TAKE ONE TABLET BY MOUTH ONCE DAILY 90 tablet 1    ascorbic acid (VITAMIN C) 500 MG tablet Take 500 mg by mouth daily.  Calcium Carb-Cholecalciferol (CALCIUM PLUS VITAMIN D) 600-100 MG-UNIT CAPS Take  by mouth daily. 2 tablet in morning  /  1 tablets in evening      aspirin 81 MG tablet Take 81 mg by mouth daily. No current facility-administered medications for this visit. Review of Systems:      Review of Systems   Constitutional: Negative for chills and fever. Eyes: Negative for discharge and redness. Respiratory: Negative for chest tightness and shortness of breath. Cardiovascular: Negative for chest pain and palpitations. Gastrointestinal: Negative for nausea and vomiting. Genitourinary: Negative for difficulty urinating, dysuria, frequency and hematuria. Musculoskeletal: Positive for arthralgias and myalgias. Skin: Positive for color change and wound. Neurological: Negative for dizziness and headaches. Hematological: Negative for adenopathy. Does not bruise/bleed easily. Physical Exam:     Vitals:  Blood pressure 116/80, pulse 62, height 5' 7\" (1.702 m), weight 185 lb (83.9 kg). Physical Exam   Constitutional: She is oriented to person, place, and time. She appears well-developed and well-nourished. No distress. HENT:   Head: Normocephalic and atraumatic. Nose: Nose normal.   Eyes: Conjunctivae and EOM are normal.   Neck: Normal range of motion. Neck supple. Cardiovascular: Normal rate and regular rhythm. Pulmonary/Chest: Effort normal and breath sounds normal. No respiratory distress. She has no wheezes. Abdominal: Soft. Bowel sounds are normal.   Musculoskeletal:   Left arm in sling. Neurological: She is alert and oriented to person, place, and time. Skin: Skin is warm and dry. Ecchymosis (large area of ecchymosis on left hip) noted. Psychiatric: She has a normal mood and affect. Her behavior is normal.   Vitals reviewed. Assesment:         Diagnosis Orders   1. Preoperative clearance     2. 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 Preoperative forms were completed and faxed to [facility] as well as a copy provided to the patient. I reviewed the above assessment and plan with Stan Hernández. Questions were answered and it appears that the patient has a good understanding of the visit today. The patient was advised that failure to complete any requested testing in a timely fashion could result in delayed diagnosis and/or treatment.   Thus, any decision to defer recommended testing is done so at [facility]'s own risk. I would like to see Sal Haydee back in Return in about 3 months (around 10/3/2018) for hypertension. or sooner if any new issues occur.     Electronically signed by Lorenzo Lynch MD on 7/3/2018 at 4:11 PM

## 2018-07-04 LAB
MRSA SCREEN: NORMAL
ORGANISM: ABNORMAL
URINE CULTURE REFLEX: ABNORMAL

## 2018-07-05 ENCOUNTER — APPOINTMENT (OUTPATIENT)
Dept: GENERAL RADIOLOGY | Age: 78
End: 2018-07-05
Attending: ORTHOPAEDIC SURGERY
Payer: MEDICARE

## 2018-07-05 ENCOUNTER — HOSPITAL ENCOUNTER (OUTPATIENT)
Age: 78
Setting detail: OUTPATIENT SURGERY
Discharge: HOME | End: 2018-07-05
Attending: ORTHOPAEDIC SURGERY | Admitting: ORTHOPAEDIC SURGERY
Payer: MEDICARE

## 2018-07-05 ENCOUNTER — ANESTHESIA (OUTPATIENT)
Dept: OPERATING ROOM | Age: 78
End: 2018-07-05
Payer: MEDICARE

## 2018-07-05 ENCOUNTER — ANESTHESIA EVENT (OUTPATIENT)
Dept: OPERATING ROOM | Age: 78
End: 2018-07-05
Payer: MEDICARE

## 2018-07-05 VITALS
SYSTOLIC BLOOD PRESSURE: 123 MMHG | DIASTOLIC BLOOD PRESSURE: 62 MMHG | OXYGEN SATURATION: 100 % | RESPIRATION RATE: 17 BRPM

## 2018-07-05 VITALS
HEIGHT: 66 IN | BODY MASS INDEX: 29.51 KG/M2 | HEART RATE: 68 BPM | RESPIRATION RATE: 16 BRPM | DIASTOLIC BLOOD PRESSURE: 60 MMHG | OXYGEN SATURATION: 91 % | WEIGHT: 183.6 LBS | TEMPERATURE: 97.6 F | SYSTOLIC BLOOD PRESSURE: 138 MMHG

## 2018-07-05 DIAGNOSIS — S52.022A: Primary | ICD-10-CM

## 2018-07-05 PROCEDURE — 6360000002 HC RX W HCPCS: Performed by: ANESTHESIOLOGY

## 2018-07-05 PROCEDURE — 3700000000 HC ANESTHESIA ATTENDED CARE: Performed by: ORTHOPAEDIC SURGERY

## 2018-07-05 PROCEDURE — 7100000011 HC PHASE II RECOVERY - ADDTL 15 MIN: Performed by: ORTHOPAEDIC SURGERY

## 2018-07-05 PROCEDURE — 2580000003 HC RX 258: Performed by: ORTHOPAEDIC SURGERY

## 2018-07-05 PROCEDURE — 7100000001 HC PACU RECOVERY - ADDTL 15 MIN: Performed by: ORTHOPAEDIC SURGERY

## 2018-07-05 PROCEDURE — 7100000000 HC PACU RECOVERY - FIRST 15 MIN: Performed by: ORTHOPAEDIC SURGERY

## 2018-07-05 PROCEDURE — 3600000004 HC SURGERY LEVEL 4 BASE: Performed by: ORTHOPAEDIC SURGERY

## 2018-07-05 PROCEDURE — 73070 X-RAY EXAM OF ELBOW: CPT

## 2018-07-05 PROCEDURE — 2500000003 HC RX 250 WO HCPCS: Performed by: NURSE ANESTHETIST, CERTIFIED REGISTERED

## 2018-07-05 PROCEDURE — 6360000002 HC RX W HCPCS: Performed by: NURSE ANESTHETIST, CERTIFIED REGISTERED

## 2018-07-05 PROCEDURE — 3209999900 FLUORO FOR SURGICAL PROCEDURES

## 2018-07-05 PROCEDURE — C1713 ANCHOR/SCREW BN/BN,TIS/BN: HCPCS | Performed by: ORTHOPAEDIC SURGERY

## 2018-07-05 PROCEDURE — 6370000000 HC RX 637 (ALT 250 FOR IP): Performed by: PHYSICIAN ASSISTANT

## 2018-07-05 PROCEDURE — 6360000002 HC RX W HCPCS: Performed by: ORTHOPAEDIC SURGERY

## 2018-07-05 PROCEDURE — 3700000001 HC ADD 15 MINUTES (ANESTHESIA): Performed by: ORTHOPAEDIC SURGERY

## 2018-07-05 PROCEDURE — 2500000003 HC RX 250 WO HCPCS: Performed by: ORTHOPAEDIC SURGERY

## 2018-07-05 PROCEDURE — 3600000014 HC SURGERY LEVEL 4 ADDTL 15MIN: Performed by: ORTHOPAEDIC SURGERY

## 2018-07-05 PROCEDURE — A6454 SELF-ADHER BAND W>=3" <5"/YD: HCPCS | Performed by: ORTHOPAEDIC SURGERY

## 2018-07-05 PROCEDURE — 7100000010 HC PHASE II RECOVERY - FIRST 15 MIN: Performed by: ORTHOPAEDIC SURGERY

## 2018-07-05 DEVICE — PERI-LOC 3.5MM T20 CORTEX SCREW                                    20MM SELF-TAPPING
Type: IMPLANTABLE DEVICE | Site: ELBOW | Status: FUNCTIONAL
Brand: PERI-LOC

## 2018-07-05 DEVICE — PERI-LOC 3.5MM T20 CORTEX SCREW                                    26MM SELF-TAPPING
Type: IMPLANTABLE DEVICE | Site: ELBOW | Status: FUNCTIONAL
Brand: PERI-LOC

## 2018-07-05 DEVICE — PERI-LOC 3.5MM T20 LOCKING SCREW                                    26MM SELF-TAPPING
Type: IMPLANTABLE DEVICE | Site: ELBOW | Status: FUNCTIONAL
Brand: PERI-LOC

## 2018-07-05 DEVICE — PERI-LOC OLECRANON LOCKING PLATE 6                                    HOLE LEFT 81MM
Type: IMPLANTABLE DEVICE | Site: ELBOW | Status: FUNCTIONAL
Brand: PERI-LOC

## 2018-07-05 DEVICE — PERI-LOC 2.7MM T15 LOCKING SCREW                                    50MM SELF-TAPPING
Type: IMPLANTABLE DEVICE | Site: ELBOW | Status: FUNCTIONAL
Brand: PERI-LOC

## 2018-07-05 DEVICE — PERI-LOC 2.7MM T15 LOCKING SCREW                                    44MM SELF-TAPPING
Type: IMPLANTABLE DEVICE | Site: ELBOW | Status: FUNCTIONAL
Brand: PERI-LOC

## 2018-07-05 DEVICE — PERI-LOC 3.5MM T20 LOCKING SCREW                                    18MM SELF-TAPPING
Type: IMPLANTABLE DEVICE | Site: ELBOW | Status: FUNCTIONAL
Brand: PERI-LOC

## 2018-07-05 RX ORDER — MORPHINE SULFATE 2 MG/ML
2 INJECTION, SOLUTION INTRAMUSCULAR; INTRAVENOUS
Status: DISCONTINUED | OUTPATIENT
Start: 2018-07-05 | End: 2018-07-05 | Stop reason: HOSPADM

## 2018-07-05 RX ORDER — ROCURONIUM BROMIDE 10 MG/ML
INJECTION, SOLUTION INTRAVENOUS PRN
Status: DISCONTINUED | OUTPATIENT
Start: 2018-07-05 | End: 2018-07-05 | Stop reason: SDUPTHER

## 2018-07-05 RX ORDER — PROPOFOL 10 MG/ML
INJECTION, EMULSION INTRAVENOUS PRN
Status: DISCONTINUED | OUTPATIENT
Start: 2018-07-05 | End: 2018-07-05 | Stop reason: SDUPTHER

## 2018-07-05 RX ORDER — FENTANYL CITRATE 50 UG/ML
25 INJECTION, SOLUTION INTRAMUSCULAR; INTRAVENOUS EVERY 5 MIN PRN
Status: DISCONTINUED | OUTPATIENT
Start: 2018-07-05 | End: 2018-07-05 | Stop reason: HOSPADM

## 2018-07-05 RX ORDER — FENTANYL CITRATE 50 UG/ML
50 INJECTION, SOLUTION INTRAMUSCULAR; INTRAVENOUS EVERY 5 MIN PRN
Status: DISCONTINUED | OUTPATIENT
Start: 2018-07-05 | End: 2018-07-05 | Stop reason: HOSPADM

## 2018-07-05 RX ORDER — DEXAMETHASONE SODIUM PHOSPHATE 4 MG/ML
INJECTION, SOLUTION INTRA-ARTICULAR; INTRALESIONAL; INTRAMUSCULAR; INTRAVENOUS; SOFT TISSUE PRN
Status: DISCONTINUED | OUTPATIENT
Start: 2018-07-05 | End: 2018-07-05 | Stop reason: SDUPTHER

## 2018-07-05 RX ORDER — HYDROCODONE BITARTRATE AND ACETAMINOPHEN 5; 325 MG/1; MG/1
2 TABLET ORAL EVERY 4 HOURS PRN
Status: DISCONTINUED | OUTPATIENT
Start: 2018-07-05 | End: 2018-07-05 | Stop reason: HOSPADM

## 2018-07-05 RX ORDER — ONDANSETRON 2 MG/ML
4 INJECTION INTRAMUSCULAR; INTRAVENOUS EVERY 6 HOURS PRN
Status: DISCONTINUED | OUTPATIENT
Start: 2018-07-05 | End: 2018-07-05 | Stop reason: HOSPADM

## 2018-07-05 RX ORDER — BUPIVACAINE HYDROCHLORIDE 5 MG/ML
INJECTION, SOLUTION PERINEURAL PRN
Status: DISCONTINUED | OUTPATIENT
Start: 2018-07-05 | End: 2018-07-05 | Stop reason: HOSPADM

## 2018-07-05 RX ORDER — MEPERIDINE HYDROCHLORIDE 25 MG/ML
12.5 INJECTION INTRAMUSCULAR; INTRAVENOUS; SUBCUTANEOUS EVERY 5 MIN PRN
Status: DISCONTINUED | OUTPATIENT
Start: 2018-07-05 | End: 2018-07-05 | Stop reason: HOSPADM

## 2018-07-05 RX ORDER — LABETALOL HYDROCHLORIDE 5 MG/ML
5 INJECTION, SOLUTION INTRAVENOUS EVERY 10 MIN PRN
Status: DISCONTINUED | OUTPATIENT
Start: 2018-07-05 | End: 2018-07-05 | Stop reason: HOSPADM

## 2018-07-05 RX ORDER — ONDANSETRON 2 MG/ML
4 INJECTION INTRAMUSCULAR; INTRAVENOUS
Status: DISCONTINUED | OUTPATIENT
Start: 2018-07-05 | End: 2018-07-05 | Stop reason: HOSPADM

## 2018-07-05 RX ORDER — HYDRALAZINE HYDROCHLORIDE 20 MG/ML
5 INJECTION INTRAMUSCULAR; INTRAVENOUS EVERY 10 MIN PRN
Status: DISCONTINUED | OUTPATIENT
Start: 2018-07-05 | End: 2018-07-05 | Stop reason: HOSPADM

## 2018-07-05 RX ORDER — ONDANSETRON 2 MG/ML
INJECTION INTRAMUSCULAR; INTRAVENOUS PRN
Status: DISCONTINUED | OUTPATIENT
Start: 2018-07-05 | End: 2018-07-05 | Stop reason: SDUPTHER

## 2018-07-05 RX ORDER — PROMETHAZINE HYDROCHLORIDE 25 MG/ML
6.25 INJECTION, SOLUTION INTRAMUSCULAR; INTRAVENOUS
Status: DISCONTINUED | OUTPATIENT
Start: 2018-07-05 | End: 2018-07-05 | Stop reason: HOSPADM

## 2018-07-05 RX ORDER — HYDROCODONE BITARTRATE AND ACETAMINOPHEN 5; 325 MG/1; MG/1
1 TABLET ORAL EVERY 4 HOURS PRN
Status: DISCONTINUED | OUTPATIENT
Start: 2018-07-05 | End: 2018-07-05 | Stop reason: HOSPADM

## 2018-07-05 RX ORDER — SODIUM CHLORIDE 9 MG/ML
INJECTION, SOLUTION INTRAVENOUS CONTINUOUS
Status: DISCONTINUED | OUTPATIENT
Start: 2018-07-05 | End: 2018-07-05 | Stop reason: HOSPADM

## 2018-07-05 RX ORDER — LIDOCAINE HYDROCHLORIDE 20 MG/ML
INJECTION, SOLUTION INFILTRATION; PERINEURAL PRN
Status: DISCONTINUED | OUTPATIENT
Start: 2018-07-05 | End: 2018-07-05 | Stop reason: SDUPTHER

## 2018-07-05 RX ORDER — FENTANYL CITRATE 50 UG/ML
INJECTION, SOLUTION INTRAMUSCULAR; INTRAVENOUS PRN
Status: DISCONTINUED | OUTPATIENT
Start: 2018-07-05 | End: 2018-07-05 | Stop reason: SDUPTHER

## 2018-07-05 RX ORDER — HYDROCODONE BITARTRATE AND ACETAMINOPHEN 5; 325 MG/1; MG/1
1-2 TABLET ORAL EVERY 4 HOURS PRN
Qty: 80 TABLET | Refills: 0 | Status: SHIPPED | OUTPATIENT
Start: 2018-07-05 | End: 2018-07-12

## 2018-07-05 RX ADMIN — SUGAMMADEX 160 MG: 100 INJECTION, SOLUTION INTRAVENOUS at 08:15

## 2018-07-05 RX ADMIN — ROCURONIUM BROMIDE 30 MG: 10 INJECTION, SOLUTION INTRAVENOUS at 07:28

## 2018-07-05 RX ADMIN — HYDROMORPHONE HYDROCHLORIDE 0.5 MG: 1 INJECTION, SOLUTION INTRAMUSCULAR; INTRAVENOUS; SUBCUTANEOUS at 08:42

## 2018-07-05 RX ADMIN — PROPOFOL 150 MG: 10 INJECTION, EMULSION INTRAVENOUS at 07:28

## 2018-07-05 RX ADMIN — LIDOCAINE HYDROCHLORIDE 80 MG: 20 INJECTION, SOLUTION INFILTRATION; PERINEURAL at 07:28

## 2018-07-05 RX ADMIN — DEXAMETHASONE SODIUM PHOSPHATE 8 MG: 4 INJECTION, SOLUTION INTRAMUSCULAR; INTRAVENOUS at 07:50

## 2018-07-05 RX ADMIN — SODIUM CHLORIDE: 9 INJECTION, SOLUTION INTRAVENOUS at 07:18

## 2018-07-05 RX ADMIN — HYDRALAZINE HYDROCHLORIDE 5 MG: 20 INJECTION INTRAMUSCULAR; INTRAVENOUS at 09:00

## 2018-07-05 RX ADMIN — HYDROMORPHONE HYDROCHLORIDE 0.5 MG: 1 INJECTION, SOLUTION INTRAMUSCULAR; INTRAVENOUS; SUBCUTANEOUS at 08:33

## 2018-07-05 RX ADMIN — FENTANYL CITRATE 100 MCG: 50 INJECTION INTRAMUSCULAR; INTRAVENOUS at 07:28

## 2018-07-05 RX ADMIN — HYDROCODONE BITARTRATE AND ACETAMINOPHEN 2 TABLET: 5; 325 TABLET ORAL at 09:56

## 2018-07-05 RX ADMIN — Medication 2 G: at 07:31

## 2018-07-05 RX ADMIN — ONDANSETRON HYDROCHLORIDE 4 MG: 4 INJECTION, SOLUTION INTRAMUSCULAR; INTRAVENOUS at 07:50

## 2018-07-05 RX ADMIN — FENTANYL CITRATE 50 MCG: 50 INJECTION, SOLUTION INTRAMUSCULAR; INTRAVENOUS at 08:59

## 2018-07-05 ASSESSMENT — PULMONARY FUNCTION TESTS
PIF_VALUE: 20
PIF_VALUE: 17
PIF_VALUE: 20
PIF_VALUE: 20
PIF_VALUE: 17
PIF_VALUE: 18
PIF_VALUE: 17
PIF_VALUE: 18
PIF_VALUE: 17
PIF_VALUE: 19
PIF_VALUE: 18
PIF_VALUE: 17
PIF_VALUE: 1
PIF_VALUE: 20
PIF_VALUE: 20
PIF_VALUE: 18
PIF_VALUE: 15
PIF_VALUE: 17
PIF_VALUE: 19
PIF_VALUE: 18
PIF_VALUE: 20
PIF_VALUE: 28
PIF_VALUE: 20
PIF_VALUE: 2
PIF_VALUE: 20
PIF_VALUE: 17
PIF_VALUE: 20
PIF_VALUE: 20
PIF_VALUE: 19
PIF_VALUE: 19
PIF_VALUE: 0
PIF_VALUE: 17
PIF_VALUE: 20
PIF_VALUE: 2
PIF_VALUE: 20
PIF_VALUE: 3
PIF_VALUE: 18
PIF_VALUE: 20
PIF_VALUE: 20
PIF_VALUE: 18
PIF_VALUE: 7
PIF_VALUE: 20
PIF_VALUE: 20
PIF_VALUE: 3
PIF_VALUE: 0
PIF_VALUE: 20
PIF_VALUE: 18
PIF_VALUE: 20
PIF_VALUE: 17
PIF_VALUE: 20
PIF_VALUE: 18
PIF_VALUE: 20
PIF_VALUE: 20

## 2018-07-05 ASSESSMENT — PAIN SCALES - GENERAL
PAINLEVEL_OUTOF10: 8
PAINLEVEL_OUTOF10: 7
PAINLEVEL_OUTOF10: 8
PAINLEVEL_OUTOF10: 10
PAINLEVEL_OUTOF10: 7
PAINLEVEL_OUTOF10: 10
PAINLEVEL_OUTOF10: 0

## 2018-07-05 NOTE — BRIEF OP NOTE
Brief Postoperative Note  ______________________________________________________________    Patient: Leann Obando  YOB: 1940  MRN: [id number]  Date of Procedure: 7/5/2018    Pre-Op Diagnosis: LEFT ELBOW OLECRANON PROCESS FX    Post-Op Diagnosis: Same       Procedure(s):  ORIF LEFT ELBOW    Anesthesia: General    Surgeon(s):  Janita Goltz, MD    Staff:  Scrub Person First: Jack Bryan  Scrub Person Second: Alma Rosa Mancilla  Physician Assistant: Marija Bolaños PA-C     Estimated Blood Loss: 50 mL    Complications: None    Specimens:   * No specimens in log *    Implants:    Implant Name Type Inv.  Item Serial No.  Lot No. LRB No. Used   PLATE OLECRANON LK 6 HL LT 81MM Screw/Plate/Nail/Ronn PLATE OLECRANON LK 6 HL LT 81MM  KEY   Left 1   SCREW 3.5 NON-LK 26MM Screw/Plate/Nail/Ronn SCREW 3.5 NON-LK 26MM  KEY   Left 1   SCREW LK 3.5X18MM Screw/Plate/Nail/Ronn SCREW LK 3.5X18MM  KEY   Left 1   SCREW LK 3.5X26MM Screw/Plate/Nail/Ronn SCREW LK 3.5X26MM  SMITH   Left 1   SCREW LK 2.7X44MM Screw/Plate/Nail/Ronn SCREW LK 2.7X44MM  KEY   Left 1   SCREW NON LK 3.5X20MM Screw/Plate/Nail/Ronn SCREW NON LK 3.5X20MM  KEY   Left 1   SCREW LK 2.7X50MM Screw/Plate/Nail/Ronn SCREW LK 2.7X50MM   KEY    Left 1         Drains:      Findings: as expected    JESUS ALBERTO Gerber  Date: 7/5/2018  Time: 8:35 AM

## 2018-07-05 NOTE — ANESTHESIA PRE PROCEDURE
Department of Anesthesiology  Preprocedure Note       Name:  Debora Costa   Age:  76 y.o.  :  1940                                          MRN:  [id number]         Date:  2018      Surgeon: Janay Cabrera):  Em Guardado MD    Procedure: Procedure(s):  ORIF LEFT ELBOW    Medications prior to admission:   Prior to Admission medications    Medication Sig Start Date End Date Taking? Authorizing Provider   cephALEXin (KEFLEX) 500 MG capsule Take 1 capsule by mouth 2 times daily for 3 days 18  Aj Cortes MD   metoprolol tartrate (LOPRESSOR) 50 MG tablet TAKE ONE TABLET BY MOUTH TWICE DAILY 18   Aj Cortes MD   lisinopril (PRINIVIL;ZESTRIL) 20 MG tablet TAKE ONE TABLET BY MOUTH ONCE DAILY 3/7/18   Aj Cortes MD   aspirin 81 MG tablet Take 81 mg by mouth daily. Historical Provider, MD   ascorbic acid (VITAMIN C) 500 MG tablet Take 500 mg by mouth daily. Historical Provider, MD   Calcium Carb-Cholecalciferol (CALCIUM PLUS VITAMIN D) 600-100 MG-UNIT CAPS Take  by mouth daily. 2 tablet in morning  /  1 tablets in evening    Historical Provider, MD       Current medications:    Current Facility-Administered Medications   Medication Dose Route Frequency Provider Last Rate Last Dose    0.9 % sodium chloride infusion   Intravenous Continuous Em Guardado MD        ceFAZolin (ANCEF) 2 g in dextrose 5 % 50 mL IVPB  2 g Intravenous On Call to Filomena Alicia MD           Allergies:     Allergies   Allergen Reactions    Pcn [Penicillins] Itching     redness @ IM site       Problem List:    Patient Active Problem List   Diagnosis Code    Hypertension I10    Syncopal episodes R55    Primary osteoarthritis of right knee M17.11       Past Medical History:        Diagnosis Date    Arthritis     Hypertension        Past Surgical History:        Procedure Laterality Date    CATHETER REMOVAL Bilateral     FOOT SURGERY Left 2012    KNEE SURGERY Right

## 2018-07-05 NOTE — OP NOTE
[address] [place] [address]                                 OPERATIVE REPORT    PATIENT NAME: Tl Flores                    :        1940  MED REC NO:   [id number]                           ROOM:  ACCOUNT NO:   [de-identified]                           ADMIT DATE: 2018  PROVIDER:     Josefina Cáhvez. NIKOLAI Hoffman Hands:  2018    PREOPERATIVE DIAGNOSIS:  Left olecranon fracture; closed, displaced,  comminuted. POSTOPERATIVE DIAGNOSIS:  Left olecranon fracture; closed, displaced,  comminuted. SURGEON:  Russell Joshua MD    ASSISTANT:  SHELLEY Fritz. ANESTHESIA:  General.    COMPLICATIONS:  None. PROCEDURE:  Open treatment left olecranon fracture. INDICATIONS:  The patient is a 64-year-old with history of fall a few days  ago. Felt she would benefit from operative intervention for early  mobilization with fracture reduction and maintenance. The patient agreed. NARRATIVE:  The patient was taken to the operating room and underwent  general anesthetic. The patient was placed in the prone position. Care  was taken to pad all bony prominences. Time-out was taken. Consent was  confirmed. Did receive preoperative antibiotics in the form of 2 gm of  Ancef. Started with a posterior based incision. Skin knife followed by  electrocautery down to the extensor mechanism. We identified the fracture  very comminuted fracture, lot of bone loss. We removed out some loose bony  fragments to try to get a reasonable reduction . We then took a Smith and  Nephew 5-hole olecranon plates, put in position one nonlocking screw,  compressed the plate to bone and series of locking screws throughout the  construct and then 22.7 mm screws across the fracture site. X-rays  demonstrated the fracture to be in satisfactory alignment, hardware in  satisfactory position.   Wounds were all irrigated, closed with 2-0 Vicryl,  3-0 nylon, Dermabond, dry dressings, and posterior splint. The patient was  then awakened, returned to recovery room in good condition. POSTOPERATIVE PLAN:  To be nonweightbearing on that side. Dry dressings as  necessary. She denies splint. First postop visit will be in three weeks. Removal of sutures and probably back in a splint. Marcell Goins, PAC, assisted throughout the procedure with positioning,  draping, retraction, wound closure, dressing, and splint application.         Kin Mcneal M.D.    D: 07/05/2018 8:20:03       T: 07/05/2018 12:03:26     [email or web address]  Job#: [id number]     Doc#: [id number]    CC:

## 2018-07-26 DIAGNOSIS — S52.022S: Primary | ICD-10-CM

## 2018-07-31 ENCOUNTER — HOSPITAL ENCOUNTER (OUTPATIENT)
Dept: GENERAL RADIOLOGY | Age: 78
Discharge: HOME | End: 2018-08-02
Payer: MEDICARE

## 2018-07-31 ENCOUNTER — OFFICE VISIT (OUTPATIENT)
Dept: ORTHOPEDIC SURGERY | Age: 78
End: 2018-07-31

## 2018-07-31 VITALS
HEIGHT: 67 IN | BODY MASS INDEX: 28.09 KG/M2 | HEART RATE: 60 BPM | SYSTOLIC BLOOD PRESSURE: 134 MMHG | WEIGHT: 179 LBS | DIASTOLIC BLOOD PRESSURE: 72 MMHG

## 2018-07-31 DIAGNOSIS — S52.022S: ICD-10-CM

## 2018-07-31 DIAGNOSIS — S52.022S: Primary | ICD-10-CM

## 2018-07-31 PROCEDURE — 99024 POSTOP FOLLOW-UP VISIT: CPT | Performed by: PHYSICIAN ASSISTANT

## 2018-07-31 PROCEDURE — 73080 X-RAY EXAM OF ELBOW: CPT

## 2018-08-23 DIAGNOSIS — S42.402S CLOSED FRACTURE OF LEFT ELBOW, SEQUELA: Primary | ICD-10-CM

## 2018-08-28 ENCOUNTER — OFFICE VISIT (OUTPATIENT)
Dept: ORTHOPEDIC SURGERY | Age: 78
End: 2018-08-28

## 2018-08-28 ENCOUNTER — HOSPITAL ENCOUNTER (OUTPATIENT)
Dept: GENERAL RADIOLOGY | Age: 78
Discharge: HOME | End: 2018-08-30
Payer: MEDICARE

## 2018-08-28 VITALS
HEART RATE: 53 BPM | WEIGHT: 179 LBS | DIASTOLIC BLOOD PRESSURE: 66 MMHG | HEIGHT: 67 IN | SYSTOLIC BLOOD PRESSURE: 132 MMHG | BODY MASS INDEX: 28.09 KG/M2

## 2018-08-28 DIAGNOSIS — S42.402S CLOSED FRACTURE OF LEFT ELBOW, SEQUELA: ICD-10-CM

## 2018-08-28 DIAGNOSIS — S42.402S CLOSED FRACTURE OF LEFT ELBOW, SEQUELA: Primary | ICD-10-CM

## 2018-08-28 PROCEDURE — 99024 POSTOP FOLLOW-UP VISIT: CPT | Performed by: PHYSICIAN ASSISTANT

## 2018-08-28 PROCEDURE — 73080 X-RAY EXAM OF ELBOW: CPT

## 2018-09-06 ENCOUNTER — HOSPITAL ENCOUNTER (OUTPATIENT)
Dept: OCCUPATIONAL THERAPY | Age: 78
Setting detail: THERAPIES SERIES
Discharge: HOME | End: 2018-09-06
Payer: MEDICARE

## 2018-09-06 PROCEDURE — G8984 CARRY CURRENT STATUS: HCPCS

## 2018-09-06 PROCEDURE — 97110 THERAPEUTIC EXERCISES: CPT

## 2018-09-06 PROCEDURE — 97165 OT EVAL LOW COMPLEX 30 MIN: CPT

## 2018-09-06 PROCEDURE — G8985 CARRY GOAL STATUS: HCPCS

## 2018-09-06 NOTE — FLOWSHEET NOTE
** PLEASE SIGN, DATE AND TIME CERTIFICATION BELOW AND RETURN TO [facility] OUTPATIENT REHABILITATION (FAX #: [phone]). ATTEST/CO-SIGN IF ACCESSING VIA [employer]. THANK YOU.**    I certify that I have examined the patient below and determined that Physical Medicine and Rehabilitation service is necessary and that I approve the established plan of care for up to 90 days or as specifically noted. Attestation, signature or co-signature of physician indicates approval of certification requirements.    ________________________ ____________ __________  Physician Signature   Date   Time     Jakob Bojorquez Md, Dr    Time In: 90  Time Out: 1620  Minutes: 50  Timed Code Treatment Minutes: 15 Minutes     Date: 2018  Patient Name: Simon Isaac        CSN: [id number]     : 1940  (76 y.o.)  Gender: female   Referring Practitioner: Duc Ayala PA-C  Diagnosis: S42.402S Closed fracture of left elbow  Treatment Diagnosis: left elbow stiffness, left shoulder pain  Additional Pertinent Hx: Patient fell on  at home on cement steps in garage. Went to [facility] and was referred to Dr. Roosevelt Santana in [facility]. Had left elbow ORIF on  at [address] [address] as an outpatient. Patient was in half-cast for about 2 weeks and was then in just a sling a few more weeks. Went back to [place] last week and was referred to therapy with no restrictions. Referred medications and allergies with patient - correct in EMR. Comorbidities include HTN that could influence rehab process. Simon Isaac  has a past medical history of Arthritis and Hypertension. Simon Isaac  has a past surgical history that includes Foot surgery (Left, ); knee surgery (Right, 14); Catheter Removal (Bilateral, -); and pr office/outpt visit,procedure only (Left, 2018).     See Medical History Questionnaire for information related to allergies and term goal 1: Be independent with HEP as instructed to increase her ability to reach overhead. Long term goal 2: Increase active left shoulder flexion to 150, abduction to 135, get left thumb 2\" 'above waistband behind back, and ER to 75 to increase her ability to reach into upper cupboard with left hand. Long term goal 3: Report being able to put earrings in without any difficulty. Long term goal 4: Increase active left elbow motion to (-)10 -145 to increase her ability to fasten necklace. OT G-codes  Functional Assessment Tool Used: Upper Extremity Functional Scale  Score: 66  Functional Limitation: Carrying, moving and handling objects  Carrying, Moving and Handling Objects Current Status (): At least 1 percent but less than 20 percent impaired, limited or restricted  Carrying, Moving and Handling Objects Goal Status (): At least 1 percent but less than 20 percent impaired, limited or restricted       Evaluation Complexity: Based on the findings of patient history, examination, clinical presentation, and decision making during this evaluation, this patient is of low complexity.     Alessandro Farfan, OTR/L #[id number]

## 2018-09-12 ENCOUNTER — HOSPITAL ENCOUNTER (OUTPATIENT)
Dept: OCCUPATIONAL THERAPY | Age: 78
Setting detail: THERAPIES SERIES
Discharge: HOME | End: 2018-09-12
Payer: MEDICARE

## 2018-09-12 PROCEDURE — 97110 THERAPEUTIC EXERCISES: CPT

## 2018-09-12 NOTE — PROGRESS NOTES
[facility]  OUTPATIENT OCCUPATIONAL THERAPY  Daily Note  [facility]    Time In: 830  Time Out: 0900  Minutes: 30  Timed Code Treatment Minutes: 30 Minutes     Date: 2018  Patient Name: Rod Steiner        CSN: [id number]   : 1940  (76 y.o.)  Gender: female   Referring Practitioner: Cecilia Guerrero PA-C  Diagnosis: S42.402S Closed fracture of left elbow          General:  OT Visit Information  Onset Date: 18  OT Insurance Information: Medicare - no ionto/no hot or cold packs  Total # of Visits to Date: 2  Certification Period Expiration Date: 10/04/18  Progress Note Counter: 2/10 for PN  Comments: no follow up with referring provider       Restrictions/Precautions:       Position Activity Restriction  Other position/activity restrictions: HTN,          Subjective:  Subjective: States that she has been doing her exercises and has a slight increase in pain after completion. States that she believes that her shoulder is moving a little bit more. Pain:  Patient Currently in Pain: No       Objective:     Upper Extremity Function  UE AROM: scapular retraction x 15 reps; biodex at 120 speed x 3 minutes forward and 3 minutes backward - completed for left shoulder and elbow ROM purposes only  UE AAROM: seated dowel for shoulder flexion x 15 reps; seated saeboglide for left shoulder flexion and scaption x 10 reps each  UE Stretching: seated left scapular mobilizations completed with tightness present along medial border of left scapula                                                Activity Tolerance: Additional Comments:  Tolerated treatment well    Assessment:  Assessment: Progressing toward goals    Patient Education:     No new patient education completed this date          Plan:  Plan Comment: Continue per established POC  Specific instructions for Next Treatment: ROM to left elbow and shoulder                      Nolberto Granado OTR/L #[id number]

## 2018-09-14 ENCOUNTER — HOSPITAL ENCOUNTER (OUTPATIENT)
Dept: OCCUPATIONAL THERAPY | Age: 78
Setting detail: THERAPIES SERIES
Discharge: HOME | End: 2018-09-14
Payer: MEDICARE

## 2018-09-14 PROCEDURE — 97110 THERAPEUTIC EXERCISES: CPT

## 2018-09-14 NOTE — PROGRESS NOTES
[facility]  OUTPATIENT OCCUPATIONAL THERAPY  Daily Note  [facility]    Time In: 09  Time Out: 930  Minutes: 30  Timed Code Treatment Minutes: 30 Minutes     Date: 2018  Patient Name: Ulises Castillo        CSN: [id number]   : 1940  (76 y.o.)  Gender: female   Referring Practitioner: Charmayne Plunk, PA-C  Diagnosis: S42.402S Closed fracture of left elbow          General:  OT Visit Information  Onset Date: 18  OT Insurance Information: Medicare - no ionto/no hot or cold packs  Total # of Visits to Date: 3  Certification Period Expiration Date: 10/04/18  Progress Note Counter: 3/10 for PN  Comments: no follow up with referring provider       Restrictions/Precautions:       Position Activity Restriction  Other position/activity restrictions: HTN,          Subjective:  Subjective: States that she still has tenderness on her left elbow. States that she can sleep on her left shoulder without pain now. Pain:  Patient Currently in Pain: No       Objective:     Upper Extremity Function  UE AROM: scapular retraction x 15 reps; active reaching activity - putting all yellow, red, green, and 3 blue pins onto vertical post with left UE  UE PROM: PROM completed to left elbow for flexion and extension  UE AAROM: seated saeboglide for left shoulder flexion and scaption x 15 reps each  UE Stretching: deep massage to anterior left elbow with tightness present in distal bicep region - redness occurred  UE Strengthing: biodex at 100 speed x 3 minutes forward and 3 minutes backward - positioned the handles at shoulder level to increase demand of shoulder motion as well as elbow motion                                                Activity Tolerance: Additional Comments:  Tolerated treatment well    Assessment:  Assessment: Progressing toward goals    Patient Education:  Patient Education: suggested that patient might need to use ice on anterior left elbow due to deep massage being

## 2018-09-19 ENCOUNTER — HOSPITAL ENCOUNTER (OUTPATIENT)
Dept: OCCUPATIONAL THERAPY | Age: 78
Setting detail: THERAPIES SERIES
Discharge: HOME | End: 2018-09-19
Payer: MEDICARE

## 2018-09-19 PROCEDURE — 97110 THERAPEUTIC EXERCISES: CPT

## 2018-09-21 ENCOUNTER — HOSPITAL ENCOUNTER (OUTPATIENT)
Dept: OCCUPATIONAL THERAPY | Age: 78
Setting detail: THERAPIES SERIES
Discharge: HOME | End: 2018-09-21
Payer: MEDICARE

## 2018-09-21 PROCEDURE — 97110 THERAPEUTIC EXERCISES: CPT

## 2018-09-21 NOTE — PROGRESS NOTES
[address] [address]  OUTPATIENT OCCUPATIONAL THERAPY  Daily Note  [facility]    Time In: 0900  Time Out: 930  Minutes: 30  Timed Code Treatment Minutes: 30 Minutes     Date: 2018  Patient Name: Buzz Muhammad        CSN: [id number]   : 1940  (76 y.o.)  Gender: female   Referring Practitioner: Zan Barragan PA-C  Diagnosis: S42.402S Closed fracture of left elbow          General:  OT Visit Information  Onset Date: 18  OT Insurance Information: Medicare - no ionto/no hot or cold packs  Total # of Visits to Date: 5  Certification Period Expiration Date: 10/04/18  Progress Note Counter: 5/10 for PN  Comments: no follow up with referring provider       Restrictions/Precautions:       Position Activity Restriction  Other position/activity restrictions: HTN,          Subjective:  Subjective: States that she thinks that her arm is quite a bit better. States that she tries to make sure that she is using it throughout the day. Pain:  Patient Currently in Pain: No       Objective:     Upper Extremity Function  UE AROM: scapular retraction x 15 reps  UE PROM: PROM completed to left elbow for flexion and extension  UE AAROM: seated saeboglide for left shoulder flexion and scaption x 15 reps each  UE Strengthing: biodex at 90 speed x 3 minutes forward and 3 minutes backward to address shoulder and elbow ROM                                                Activity Tolerance: Additional Comments:  Tolerated treatment well    Assessment:  Assessment: Progressing toward goals    Patient Education:     No new patient education completed this date          Plan:  Plan Comment: Continue per established POC  Specific instructions for Next Treatment: ROM to left elbow and shoulder                      Ramy Cevallos, OTR/L #[id number]

## 2018-09-24 DIAGNOSIS — I10 ESSENTIAL HYPERTENSION: ICD-10-CM

## 2018-09-24 RX ORDER — LISINOPRIL 20 MG/1
TABLET ORAL
Qty: 90 TABLET | Refills: 1 | Status: SHIPPED | OUTPATIENT
Start: 2018-09-24 | End: 2018-10-01 | Stop reason: SDUPTHER

## 2018-09-26 ENCOUNTER — HOSPITAL ENCOUNTER (OUTPATIENT)
Dept: OCCUPATIONAL THERAPY | Age: 78
Setting detail: THERAPIES SERIES
Discharge: HOME | End: 2018-09-26
Payer: MEDICARE

## 2018-09-26 PROCEDURE — 97110 THERAPEUTIC EXERCISES: CPT

## 2018-09-28 ENCOUNTER — HOSPITAL ENCOUNTER (OUTPATIENT)
Dept: OCCUPATIONAL THERAPY | Age: 78
Setting detail: THERAPIES SERIES
Discharge: HOME | End: 2018-09-28
Payer: MEDICARE

## 2018-09-28 PROCEDURE — 97110 THERAPEUTIC EXERCISES: CPT

## 2018-10-01 DIAGNOSIS — I10 ESSENTIAL HYPERTENSION: ICD-10-CM

## 2018-10-01 RX ORDER — LISINOPRIL 20 MG/1
TABLET ORAL
Qty: 90 TABLET | Refills: 1 | Status: SHIPPED | OUTPATIENT
Start: 2018-10-01 | End: 2019-02-27 | Stop reason: SDUPTHER

## 2018-10-04 ENCOUNTER — HOSPITAL ENCOUNTER (OUTPATIENT)
Dept: OCCUPATIONAL THERAPY | Age: 78
Setting detail: THERAPIES SERIES
Discharge: HOME | End: 2018-10-04
Payer: MEDICARE

## 2018-10-04 PROCEDURE — G8986 CARRY D/C STATUS: HCPCS

## 2018-10-04 PROCEDURE — 97110 THERAPEUTIC EXERCISES: CPT

## 2018-10-04 PROCEDURE — G8985 CARRY GOAL STATUS: HCPCS

## 2018-10-04 NOTE — DISCHARGE SUMMARY
[address] [address] [facility]  Discharge Note  [facility]    Time In: 0800  Time Out: 0830  Minutes: 30  Timed Code Treatment Minutes: 30 Minutes     Date: 10/4/2018  Patient Name: Darylene Blanc        CSN: [id number]   : 1940  (76 y.o.)  Gender: female   Referring Practitioner: Gabriela Oakley PA-C  Diagnosis: S42.402S Closed fracture of left elbow          General:  OT Visit Information  Onset Date: 18  OT Insurance Information: Medicare - no ionto/no hot or cold packs  Total # of Visits to Date: 8  Certification Period Expiration Date: 10/04/18  Comments: no follow up with referring provider       Restrictions/Precautions:       Position Activity Restriction  Other position/activity restrictions: HTN,          Subjective:  Subjective: States that things are going well and her arm is [place] lot better. \" States that she has been able to get back to her normal sleeping position. Pain:  Patient Currently in Pain: No       Objective:     Upper Extremity Function  UE AROM: active left shoulder flexion = 135, abduction = 140, IR = can get left thumb 1\" above waistband, ER = 82, elbow : (-)18 - 142  UE AAROM: seated saeboglide for left shoulder flexion and scaption x 15 reps each  UE Strengthing: reaching activity with left UE - placing clothespins onto vertical post; red theraband - left elbow flexion x 15 reps, chest pull aparts x 15 reps; biodex at 60 speed x 3 minutes forward and 3 minutes backward                                                Activity Tolerance: Additional Comments: Tolerated treatment well    Assessment:  Assessment: Patient has made nice progress toward goals. Patient's AROM continues to be limited slightly, but patient's functional status has improved greatly. Patient is able to complete HEP without difficulty. Patient has been instructed to continue with HEP and was shown additional elbow extension stretching.      Patient

## 2018-11-06 DIAGNOSIS — I10 ESSENTIAL HYPERTENSION: ICD-10-CM

## 2018-11-07 RX ORDER — METOPROLOL TARTRATE 50 MG/1
TABLET, FILM COATED ORAL
Qty: 180 TABLET | Refills: 1 | Status: SHIPPED | OUTPATIENT
Start: 2018-11-07 | End: 2019-02-27 | Stop reason: SDUPTHER

## 2018-12-13 DIAGNOSIS — B96.89 ACUTE BACTERIAL SINUSITIS: Primary | ICD-10-CM

## 2018-12-13 DIAGNOSIS — J01.90 ACUTE BACTERIAL SINUSITIS: Primary | ICD-10-CM

## 2018-12-13 RX ORDER — AZITHROMYCIN 250 MG/1
TABLET, FILM COATED ORAL
Qty: 6 TABLET | Refills: 0 | Status: SHIPPED | OUTPATIENT
Start: 2018-12-13 | End: 2019-01-09

## 2019-01-09 ENCOUNTER — OFFICE VISIT (OUTPATIENT)
Dept: CARDIOLOGY CLINIC | Age: 79
End: 2019-01-09
Payer: MEDICARE

## 2019-01-09 VITALS
HEIGHT: 64 IN | BODY MASS INDEX: 31.58 KG/M2 | SYSTOLIC BLOOD PRESSURE: 138 MMHG | HEART RATE: 80 BPM | WEIGHT: 185 LBS | DIASTOLIC BLOOD PRESSURE: 70 MMHG

## 2019-01-09 DIAGNOSIS — I25.10 ASCVD (ARTERIOSCLEROTIC CARDIOVASCULAR DISEASE): Primary | ICD-10-CM

## 2019-01-09 PROCEDURE — G8417 CALC BMI ABV UP PARAM F/U: HCPCS | Performed by: INTERNAL MEDICINE

## 2019-01-09 PROCEDURE — G8598 ASA/ANTIPLAT THER USED: HCPCS | Performed by: INTERNAL MEDICINE

## 2019-01-09 PROCEDURE — 1123F ACP DISCUSS/DSCN MKR DOCD: CPT | Performed by: INTERNAL MEDICINE

## 2019-01-09 PROCEDURE — 99213 OFFICE O/P EST LOW 20 MIN: CPT | Performed by: INTERNAL MEDICINE

## 2019-01-09 PROCEDURE — 1036F TOBACCO NON-USER: CPT | Performed by: INTERNAL MEDICINE

## 2019-01-09 PROCEDURE — G8400 PT W/DXA NO RESULTS DOC: HCPCS | Performed by: INTERNAL MEDICINE

## 2019-01-09 PROCEDURE — G8427 DOCREV CUR MEDS BY ELIG CLIN: HCPCS | Performed by: INTERNAL MEDICINE

## 2019-01-09 PROCEDURE — 1090F PRES/ABSN URINE INCON ASSESS: CPT | Performed by: INTERNAL MEDICINE

## 2019-01-09 PROCEDURE — 1101F PT FALLS ASSESS-DOCD LE1/YR: CPT | Performed by: INTERNAL MEDICINE

## 2019-01-09 PROCEDURE — G8484 FLU IMMUNIZE NO ADMIN: HCPCS | Performed by: INTERNAL MEDICINE

## 2019-01-09 PROCEDURE — 4040F PNEUMOC VAC/ADMIN/RCVD: CPT | Performed by: INTERNAL MEDICINE

## 2019-01-09 ASSESSMENT — ENCOUNTER SYMPTOMS
EYE DISCHARGE: 0
ORTHOPNEA: 0
SHORTNESS OF BREATH: 0
HEMOPTYSIS: 0
ABDOMINAL PAIN: 0
CHANGE IN BOWEL HABIT: 0
HOARSE VOICE: 0
BLURRED VISION: 0
DOUBLE VISION: 0
BACK PAIN: 0
BOWEL INCONTINENCE: 0
COUGH: 0
CONSTIPATION: 0
EYE PAIN: 0
SPUTUM PRODUCTION: 0
BLOATING: 0
DIARRHEA: 0

## 2019-02-26 ASSESSMENT — ENCOUNTER SYMPTOMS
RECTAL PAIN: 1
EYE DISCHARGE: 0
EYE REDNESS: 0
SHORTNESS OF BREATH: 0
COLOR CHANGE: 0
VOMITING: 0
CONSTIPATION: 0
DIARRHEA: 0
NAUSEA: 0

## 2019-02-27 ENCOUNTER — OFFICE VISIT (OUTPATIENT)
Dept: FAMILY MEDICINE CLINIC | Age: 79
End: 2019-02-27
Payer: MEDICARE

## 2019-02-27 VITALS
WEIGHT: 187 LBS | SYSTOLIC BLOOD PRESSURE: 136 MMHG | BODY MASS INDEX: 30.05 KG/M2 | HEIGHT: 66 IN | DIASTOLIC BLOOD PRESSURE: 76 MMHG | HEART RATE: 60 BPM

## 2019-02-27 DIAGNOSIS — I10 ESSENTIAL HYPERTENSION: Primary | ICD-10-CM

## 2019-02-27 DIAGNOSIS — K64.4 EXTERNAL HEMORRHOIDS: ICD-10-CM

## 2019-02-27 DIAGNOSIS — M72.0 DUPUYTREN'S CONTRACTURE OF LEFT HAND: ICD-10-CM

## 2019-02-27 PROCEDURE — 1101F PT FALLS ASSESS-DOCD LE1/YR: CPT | Performed by: FAMILY MEDICINE

## 2019-02-27 PROCEDURE — 1123F ACP DISCUSS/DSCN MKR DOCD: CPT | Performed by: FAMILY MEDICINE

## 2019-02-27 PROCEDURE — G8417 CALC BMI ABV UP PARAM F/U: HCPCS | Performed by: FAMILY MEDICINE

## 2019-02-27 PROCEDURE — 99214 OFFICE O/P EST MOD 30 MIN: CPT | Performed by: FAMILY MEDICINE

## 2019-02-27 PROCEDURE — G8484 FLU IMMUNIZE NO ADMIN: HCPCS | Performed by: FAMILY MEDICINE

## 2019-02-27 PROCEDURE — 4040F PNEUMOC VAC/ADMIN/RCVD: CPT | Performed by: FAMILY MEDICINE

## 2019-02-27 PROCEDURE — 1036F TOBACCO NON-USER: CPT | Performed by: FAMILY MEDICINE

## 2019-02-27 PROCEDURE — G8400 PT W/DXA NO RESULTS DOC: HCPCS | Performed by: FAMILY MEDICINE

## 2019-02-27 PROCEDURE — G8598 ASA/ANTIPLAT THER USED: HCPCS | Performed by: FAMILY MEDICINE

## 2019-02-27 PROCEDURE — G8427 DOCREV CUR MEDS BY ELIG CLIN: HCPCS | Performed by: FAMILY MEDICINE

## 2019-02-27 PROCEDURE — 1090F PRES/ABSN URINE INCON ASSESS: CPT | Performed by: FAMILY MEDICINE

## 2019-02-27 RX ORDER — LISINOPRIL 20 MG/1
TABLET ORAL
Qty: 90 TABLET | Refills: 1 | Status: SHIPPED | OUTPATIENT
Start: 2019-02-27 | End: 2019-10-16 | Stop reason: SDUPTHER

## 2019-02-27 RX ORDER — METOPROLOL TARTRATE 50 MG/1
TABLET, FILM COATED ORAL
Qty: 180 TABLET | Refills: 1 | Status: SHIPPED | OUTPATIENT
Start: 2019-02-27 | End: 2019-11-04 | Stop reason: SDUPTHER

## 2019-02-27 ASSESSMENT — PATIENT HEALTH QUESTIONNAIRE - PHQ9
2. FEELING DOWN, DEPRESSED OR HOPELESS: 0
SUM OF ALL RESPONSES TO PHQ QUESTIONS 1-9: 0
SUM OF ALL RESPONSES TO PHQ QUESTIONS 1-9: 0
1. LITTLE INTEREST OR PLEASURE IN DOING THINGS: 0
SUM OF ALL RESPONSES TO PHQ9 QUESTIONS 1 & 2: 0

## 2019-04-30 DIAGNOSIS — R11.0 NAUSEA: Primary | ICD-10-CM

## 2019-04-30 RX ORDER — ONDANSETRON 4 MG/1
4 TABLET, ORALLY DISINTEGRATING ORAL 3 TIMES DAILY PRN
Qty: 21 TABLET | Refills: 0 | Status: SHIPPED | OUTPATIENT
Start: 2019-04-30 | End: 2020-03-13 | Stop reason: ALTCHOICE

## 2019-10-16 DIAGNOSIS — I10 ESSENTIAL HYPERTENSION: ICD-10-CM

## 2019-10-16 DIAGNOSIS — Z80.3 FAMILY HISTORY OF BREAST CANCER IN SISTER: Primary | ICD-10-CM

## 2019-10-16 RX ORDER — LISINOPRIL 20 MG/1
TABLET ORAL
Qty: 90 TABLET | Refills: 1 | Status: SHIPPED | OUTPATIENT
Start: 2019-10-16 | End: 2020-03-13 | Stop reason: SDUPTHER

## 2019-10-23 ENCOUNTER — HOSPITAL ENCOUNTER (OUTPATIENT)
Dept: MAMMOGRAPHY | Age: 79
Discharge: HOME | End: 2019-10-23
Payer: MEDICARE

## 2019-10-23 DIAGNOSIS — Z80.3 FAMILY HISTORY OF BREAST CANCER IN SISTER: ICD-10-CM

## 2019-10-23 PROCEDURE — 77063 BREAST TOMOSYNTHESIS BI: CPT

## 2019-11-04 DIAGNOSIS — I10 ESSENTIAL HYPERTENSION: ICD-10-CM

## 2019-11-04 RX ORDER — METOPROLOL TARTRATE 50 MG/1
TABLET, FILM COATED ORAL
Qty: 180 TABLET | Refills: 1 | Status: SHIPPED | OUTPATIENT
Start: 2019-11-04 | End: 2020-05-04 | Stop reason: SDUPTHER

## 2019-11-19 DIAGNOSIS — B96.89 ACUTE BACTERIAL SINUSITIS: Primary | ICD-10-CM

## 2019-11-19 DIAGNOSIS — J01.90 ACUTE BACTERIAL SINUSITIS: Primary | ICD-10-CM

## 2019-11-19 RX ORDER — BENZONATATE 100 MG/1
100 CAPSULE ORAL 3 TIMES DAILY PRN
Qty: 20 CAPSULE | Refills: 0 | Status: SHIPPED | OUTPATIENT
Start: 2019-11-19 | End: 2019-11-26

## 2019-11-19 RX ORDER — AZITHROMYCIN 250 MG/1
TABLET, FILM COATED ORAL
Qty: 6 TABLET | Refills: 0 | Status: SHIPPED | OUTPATIENT
Start: 2019-11-19 | End: 2020-02-11 | Stop reason: SDUPTHER

## 2020-02-11 RX ORDER — AZITHROMYCIN 250 MG/1
TABLET, FILM COATED ORAL
Qty: 6 TABLET | Refills: 0 | Status: SHIPPED | OUTPATIENT
Start: 2020-02-11 | End: 2020-03-13 | Stop reason: ALTCHOICE

## 2020-02-11 RX ORDER — OSELTAMIVIR PHOSPHATE 75 MG/1
75 CAPSULE ORAL 2 TIMES DAILY
Qty: 10 CAPSULE | Refills: 0 | Status: SHIPPED | OUTPATIENT
Start: 2020-02-11 | End: 2020-02-16

## 2020-03-12 ASSESSMENT — ENCOUNTER SYMPTOMS
NAUSEA: 0
EYE REDNESS: 0
SHORTNESS OF BREATH: 0
COLOR CHANGE: 0
CONSTIPATION: 0
VOMITING: 0
EYE DISCHARGE: 0
DIARRHEA: 0

## 2020-03-12 NOTE — PROGRESS NOTES
[address] [place] [address] [address], [address], [place]  Phone:  [phone]  OFN:[phone]       Name: Adryan Crockett  : 1940    Chief Complaint   Patient presents with    Hypertension     BP running high         HPI:     Adryan Crockett is a 78 y.o. female who presents today for follow-up of hypertension. She has checked her BGs a few times recently and it's been elevated to the 005J systolic. The lowest she's seen recently is the 150s. She denies chest pain, palpitations, SOB, stress, pain, etc.    Hypertension   This is a chronic problem. The current episode started more than 1 year ago. The problem has been gradually worsening since onset. The problem is uncontrolled. Pertinent negatives include no chest pain, headaches, palpitations, peripheral edema or shortness of breath. There are no associated agents to hypertension. Risk factors for coronary artery disease include post-menopausal state. Past treatments include beta blockers and ACE inhibitors. The current treatment provides moderate improvement. There are no compliance problems. Current Outpatient Medications:     lisinopril (PRINIVIL;ZESTRIL) 20 MG tablet, Take 2 tablets by mouth daily TAKE ONE TABLET BY MOUTH ONCE DAILY, Disp: 90 tablet, Rfl: 0    metoprolol tartrate (LOPRESSOR) 50 MG tablet, TAKE 1 TABLET BY MOUTH TWICE DAILY, Disp: 180 tablet, Rfl: 1    aspirin 81 MG tablet, Take 81 mg by mouth daily. , Disp: , Rfl:     ascorbic acid (VITAMIN C) 500 MG tablet, Take 500 mg by mouth daily. , Disp: , Rfl:     Calcium Carb-Cholecalciferol (CALCIUM PLUS VITAMIN D) 600-100 MG-UNIT CAPS, Take  by mouth daily. 2 tablet in morning  /  1 tablets in evening, Disp: , Rfl:     Allergies   Allergen Reactions    Pcn [Penicillins] Itching     redness @ IM site       Subjective:      Review of Systems   Constitutional: Negative for chills and fever. Eyes: Negative for discharge and redness.    Respiratory: Negative for shortness of breath. Cardiovascular: Negative for chest pain and palpitations. Gastrointestinal: Negative for constipation, diarrhea, nausea and vomiting. Musculoskeletal: Positive for arthralgias and myalgias. Skin: Negative for color change and rash. Allergic/Immunologic: Negative for environmental allergies and food allergies. Neurological: Negative for dizziness and headaches. Hematological: Negative for adenopathy. Does not bruise/bleed easily. Objective:     BP (!) 170/82 (Site: Left Upper Arm, Position: Sitting, Cuff Size: Large Adult)   Pulse 52   Ht 5' 6\" (1.676 m)   Wt 187 lb (84.8 kg)   SpO2 99%   BMI 30.18 kg/m²     Physical Exam  Vitals signs and nursing note reviewed. Constitutional:       General: She is not in acute distress. Appearance: She is well-developed. HENT:      Head: Normocephalic and atraumatic. Nose: Nose normal.   Eyes:      Conjunctiva/sclera: Conjunctivae normal.   Neck:      Musculoskeletal: Normal range of motion and neck supple. Cardiovascular:      Rate and Rhythm: Normal rate and regular rhythm. Pulmonary:      Effort: Pulmonary effort is normal. No respiratory distress. Breath sounds: Normal breath sounds. No wheezing. Abdominal:      General: Bowel sounds are normal. There is no distension. Palpations: Abdomen is soft. Tenderness: There is no abdominal tenderness. Skin:     General: Skin is warm and dry. Neurological:      Mental Status: She is alert and oriented to person, place, and time. Assessment/Plan:     Cecilia Barry was seen today for hypertension. Diagnoses and all orders for this visit:    Essential hypertension  -     Blood pressure is elevated so will increase Lisinopril from 20 mg to 40 mg daily. She will return for labs and a nurse BP check next week. -     Lipid Panel; Future  -     Basic Metabolic Panel; Future  -     lisinopril (PRINIVIL;ZESTRIL) 20 MG tablet;  Take 2 tablets by mouth daily TAKE ONE TABLET BY MOUTH ONCE DAILY      Return in about 1 week (around 3/20/2020) for labs and nurse BP check.     Electronically signed by Delma Retana MD on 3/13/2020 at 8:51 AM

## 2020-03-13 ENCOUNTER — OFFICE VISIT (OUTPATIENT)
Dept: FAMILY MEDICINE CLINIC | Age: 80
End: 2020-03-13
Payer: MEDICARE

## 2020-03-13 VITALS
WEIGHT: 187 LBS | SYSTOLIC BLOOD PRESSURE: 158 MMHG | HEIGHT: 66 IN | OXYGEN SATURATION: 99 % | HEART RATE: 52 BPM | DIASTOLIC BLOOD PRESSURE: 70 MMHG | BODY MASS INDEX: 30.05 KG/M2

## 2020-03-13 PROCEDURE — 1090F PRES/ABSN URINE INCON ASSESS: CPT | Performed by: FAMILY MEDICINE

## 2020-03-13 PROCEDURE — G8484 FLU IMMUNIZE NO ADMIN: HCPCS | Performed by: FAMILY MEDICINE

## 2020-03-13 PROCEDURE — 1123F ACP DISCUSS/DSCN MKR DOCD: CPT | Performed by: FAMILY MEDICINE

## 2020-03-13 PROCEDURE — 99213 OFFICE O/P EST LOW 20 MIN: CPT | Performed by: FAMILY MEDICINE

## 2020-03-13 PROCEDURE — 3288F FALL RISK ASSESSMENT DOCD: CPT | Performed by: FAMILY MEDICINE

## 2020-03-13 PROCEDURE — G8400 PT W/DXA NO RESULTS DOC: HCPCS | Performed by: FAMILY MEDICINE

## 2020-03-13 PROCEDURE — 1036F TOBACCO NON-USER: CPT | Performed by: FAMILY MEDICINE

## 2020-03-13 PROCEDURE — G8417 CALC BMI ABV UP PARAM F/U: HCPCS | Performed by: FAMILY MEDICINE

## 2020-03-13 PROCEDURE — G8427 DOCREV CUR MEDS BY ELIG CLIN: HCPCS | Performed by: FAMILY MEDICINE

## 2020-03-13 PROCEDURE — G8510 SCR DEP NEG, NO PLAN REQD: HCPCS | Performed by: FAMILY MEDICINE

## 2020-03-13 PROCEDURE — 4040F PNEUMOC VAC/ADMIN/RCVD: CPT | Performed by: FAMILY MEDICINE

## 2020-03-13 RX ORDER — LISINOPRIL 20 MG/1
40 TABLET ORAL DAILY
Qty: 90 TABLET | Refills: 0
Start: 2020-03-13 | End: 2020-03-18 | Stop reason: SDUPTHER

## 2020-03-13 RX ORDER — BLOOD PRESSURE TEST KIT
KIT MISCELLANEOUS
Qty: 1 KIT | Refills: 0 | Status: SHIPPED | OUTPATIENT
Start: 2020-03-13 | End: 2021-04-27

## 2020-03-13 ASSESSMENT — PATIENT HEALTH QUESTIONNAIRE - PHQ9
SUM OF ALL RESPONSES TO PHQ9 QUESTIONS 1 & 2: 0
2. FEELING DOWN, DEPRESSED OR HOPELESS: 0
1. LITTLE INTEREST OR PLEASURE IN DOING THINGS: 0
SUM OF ALL RESPONSES TO PHQ QUESTIONS 1-9: 0
SUM OF ALL RESPONSES TO PHQ QUESTIONS 1-9: 0

## 2020-03-18 RX ORDER — LISINOPRIL 20 MG/1
TABLET ORAL
Qty: 90 TABLET | Refills: 0 | OUTPATIENT
Start: 2020-03-18

## 2020-03-18 RX ORDER — LISINOPRIL 40 MG/1
40 TABLET ORAL DAILY
Qty: 90 TABLET | Refills: 1 | Status: SHIPPED | OUTPATIENT
Start: 2020-03-18 | End: 2020-09-21 | Stop reason: SDUPTHER

## 2020-04-09 ENCOUNTER — VIRTUAL VISIT (OUTPATIENT)
Dept: FAMILY MEDICINE CLINIC | Age: 80
End: 2020-04-09
Payer: MEDICARE

## 2020-04-09 PROCEDURE — 4040F PNEUMOC VAC/ADMIN/RCVD: CPT | Performed by: FAMILY MEDICINE

## 2020-04-09 PROCEDURE — G0438 PPPS, INITIAL VISIT: HCPCS | Performed by: FAMILY MEDICINE

## 2020-04-09 PROCEDURE — 1123F ACP DISCUSS/DSCN MKR DOCD: CPT | Performed by: FAMILY MEDICINE

## 2020-04-09 ASSESSMENT — PATIENT HEALTH QUESTIONNAIRE - PHQ9
SUM OF ALL RESPONSES TO PHQ QUESTIONS 1-9: 0
SUM OF ALL RESPONSES TO PHQ QUESTIONS 1-9: 0

## 2020-04-09 ASSESSMENT — LIFESTYLE VARIABLES
HOW MANY STANDARD DRINKS CONTAINING ALCOHOL DO YOU HAVE ON A TYPICAL DAY: 0
AUDIT-C TOTAL SCORE: 2
HOW OFTEN DO YOU HAVE SIX OR MORE DRINKS ON ONE OCCASION: 0
HOW OFTEN DO YOU HAVE A DRINK CONTAINING ALCOHOL: 2

## 2020-04-09 NOTE — PROGRESS NOTES
Onset    Cancer Mother         lymphoma    Cancer Father         prostate and bone    Heart Disease Sister     Heart Disease Brother     Atrial Fibrillation Daughter     Hypertension Daughter     Elevated Lipids Daughter        CareTeam (Including outside providers/suppliers regularly involved in providing care):   Patient Care Team:  Raine Vallecillo MD as PCP - Wero Mena MD as PCP - [facility] Empaneled Provider    Wt Readings from Last 3 Encounters:   03/13/20 187 lb (84.8 kg)   02/27/19 187 lb (84.8 kg)   01/09/19 185 lb (83.9 kg)     BP 120s/80s. There is no height or weight on file to calculate BMI secondary to patient not having the equipment in her home. Based upon direct observation of the patient, evaluation of cognition reveals recent and remote memory intact. Patient's complete Health Risk Assessment and screening values have been reviewed and are found in Flowsheets. The following problems were reviewed today and where indicated follow up appointments were made and/or referrals ordered. Positive Risk Factor Screenings with Interventions:     General Health:  General  In the past 7 days, have you experienced any of the following? New or Increased Pain, New or Increased Fatigue, Loneliness, Social Isolation, Stress or Anger?: (!) Social Isolation(COVID-19)  Do you get the social and emotional support that you need?: Yes  Do you have a Living Will?: Yes  General Health Risk Interventions:  · No intervention is needed. 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     Health Habits/Nutrition:  Health Habits/Nutrition  Do you exercise for at least 20 minutes 2-3 times per week?: Yes  Have you lost any weight without trying in the past 3 months?: No  Do you eat fewer than 2 meals per day?: No  Have you seen a dentist within the past year?: Yes  There is no height or weight on file to calculate BMI.  Health Habits/Nutrition Interventions:  · No interventions needed. Personalized Preventive Plan   Current Health Maintenance Status  Immunization History   Administered Date(s) Administered    Influenza A (C8E0-56) Vaccine PF IM 01/19/2010    Influenza Virus Vaccine 10/01/2013, 09/22/2017, 10/09/2018, 10/17/2019    Influenza Whole 11/07/2008, 10/16/2009    Influenza, High Dose (Fluzone 65 yrs and older) 09/30/2015, 10/26/2016    Pneumococcal Conjugate 13-valent (Dadsnli83) 10/17/2019    Pneumococcal Polysaccharide (Orayhnlys19) 10/01/2011    Tdap (Boostrix, Adacel) 06/30/2018    Zoster Live (Zostavax) 10/01/2011    Zoster Recombinant (Shingrix) 11/20/2019, 03/13/2020        Health Maintenance   Topic Date Due    DEXA (modify frequency per FRAX score)  10/13/1993    Annual Wellness Visit (AWV)  05/29/2019    Potassium monitoring  07/02/2019    Creatinine monitoring  07/02/2019    DTaP/Tdap/Td vaccine (2 - Td) 06/30/2028    Flu vaccine  Completed    Shingles Vaccine  Completed    Pneumococcal 65+ years Vaccine  Completed    Hepatitis A vaccine  Aged Out    Hepatitis B vaccine  Aged Out    Hib vaccine  Aged Out    Meningococcal (ACWY) vaccine  Aged Out     Recommendations for [employer] Due: see orders and patient instructions/AVS.    Recommended screening schedule for the next 5-10 years is provided to the patient in written form: see Patient Instructions/AVS.    Bertin Miner was seen today for medicare awv. Diagnoses and all orders for this visit:    Encounter for Medicare annual wellness exam        -     Routine health screenings were reviewed as above. Yonathan Chou is a 78 y.o. female being evaluated by a Virtual Visit (video visit) encounter to address concerns as mentioned above. A caregiver was present when appropriate.  Due to this being a TeleHealth encounter (During [facility]- public health emergency), evaluation of the following organ systems was limited: Vitals/Constitutional/EENT/Resp/CV/GI//MS/Neuro/Skin/Heme-Lymph-Imm. Pursuant to the emergency declaration under the [address] [place] [address] and the [employer] Act, this Virtual Visit was conducted with patient's (and/or legal guardian's) consent, to reduce the patient's risk of exposure to COVID-19 and provide necessary medical care. The patient (and/or legal guardian) has also been advised to contact this office for worsening conditions or problems, and seek emergency medical treatment and/or call 911 if deemed necessary. Services were provided through a video synchronous discussion virtually to substitute for in-person clinic visit. Patient and provider were located at their individual homes. --Sander Manjarrez MD on 4/9/2020 at 9:45 AM    An electronic signature was used to authenticate this note.

## 2020-04-09 NOTE — PATIENT INSTRUCTIONS
Personalized Preventive Plan for Qian Padilla - 4/9/2020  Medicare offers a range of preventive health benefits. Some of the tests and screenings are paid in full while other may be subject to a deductible, co-insurance, and/or copay. Some of these benefits include a comprehensive review of your medical history including lifestyle, illnesses that may run in your family, and various assessments and screenings as appropriate. After reviewing your medical record and screening and assessments performed today your provider may have ordered immunizations, labs, imaging, and/or referrals for you. A list of these orders (if applicable) as well as your Preventive Care list are included within your After Visit Summary for your review. Other Preventive Recommendations:    · A preventive eye exam performed by an eye specialist is recommended every 1-2 years to screen for glaucoma; cataracts, macular degeneration, and other eye disorders. · A preventive dental visit is recommended every 6 months. · Try to get at least 150 minutes of exercise per week or 10,000 steps per day on a pedometer . · Order or download the FREE \"Exercise & Physical Activity: Your Everyday Guide\" from [employer]. Call 2-614.291.8588 or search [employer] online. · You need 0852-7030 mg of calcium and 4944-3918 IU of vitamin D per day. It is possible to meet your calcium requirement with diet alone, but a vitamin D supplement is usually necessary to meet this goal.  · When exposed to the sun, use a sunscreen that protects against both UVA and UVB radiation with an SPF of 30 or greater. Reapply every 2 to 3 hours or after sweating, drying off with a towel, or swimming. · Always wear a seat belt when traveling in a car. Always wear a helmet when riding a bicycle or motorcycle.

## 2020-05-04 RX ORDER — METOPROLOL TARTRATE 50 MG/1
TABLET, FILM COATED ORAL
Qty: 180 TABLET | Refills: 1 | Status: SHIPPED | OUTPATIENT
Start: 2020-05-04 | End: 2020-10-08 | Stop reason: SDUPTHER

## 2020-07-02 RX ORDER — HYDROXYZINE HYDROCHLORIDE 25 MG/1
25 TABLET, FILM COATED ORAL EVERY 8 HOURS PRN
Qty: 30 TABLET | Refills: 0 | Status: SHIPPED | OUTPATIENT
Start: 2020-07-02 | End: 2020-07-12

## 2020-09-21 RX ORDER — LISINOPRIL 40 MG/1
40 TABLET ORAL DAILY
Qty: 90 TABLET | Refills: 1 | Status: SHIPPED | OUTPATIENT
Start: 2020-09-21 | End: 2021-03-21 | Stop reason: SDUPTHER

## 2020-10-06 ASSESSMENT — ENCOUNTER SYMPTOMS
SHORTNESS OF BREATH: 0
EYE DISCHARGE: 0
COLOR CHANGE: 0
VOMITING: 0
CONSTIPATION: 0
DIARRHEA: 0
NAUSEA: 0
EYE REDNESS: 0

## 2020-10-06 NOTE — PROGRESS NOTES
Systems   Constitutional: Negative for chills and fever. Eyes: Negative for discharge and redness. Respiratory: Negative for shortness of breath. Cardiovascular: Negative for chest pain and palpitations. Gastrointestinal: Negative for constipation, diarrhea, nausea and vomiting. Musculoskeletal: Positive for arthralgias and myalgias. Skin: Negative for color change and rash. Allergic/Immunologic: Negative for environmental allergies and food allergies. Neurological: Negative for dizziness and headaches. Hematological: Negative for adenopathy. Does not bruise/bleed easily. Objective:     /82 (Site: Left Upper Arm, Position: Sitting, Cuff Size: Large Adult)   Pulse 58   Temp 97.2 °F (36.2 °C) (Temporal)   Resp 14   Wt 185 lb (83.9 kg)   SpO2 98%   BMI 29.86 kg/m²     Physical Exam  Vitals signs and nursing note reviewed. Constitutional:       General: She is not in acute distress. Appearance: She is well-developed. HENT:      Head: Normocephalic and atraumatic. Nose: Nose normal.   Eyes:      Conjunctiva/sclera: Conjunctivae normal.   Neck:      Musculoskeletal: Normal range of motion and neck supple. Cardiovascular:      Rate and Rhythm: Normal rate and regular rhythm. Pulmonary:      Effort: Pulmonary effort is normal. No respiratory distress. Breath sounds: Normal breath sounds. No wheezing. Abdominal:      General: Bowel sounds are normal. There is no distension. Palpations: Abdomen is soft. Tenderness: There is no abdominal tenderness. Skin:     General: Skin is warm and dry. Neurological:      Mental Status: She is alert and oriented to person, place, and time. Assessment/Plan:     Floyce Kocher was seen today for hypertension. Diagnoses and all orders for this visit:    Essential hypertension  -     Blood pressure is controlled so will continue on current medications. Will check routine labs today. -     Lipid Panel;  Future  - Basic Metabolic Panel; Future      Return in 6 months (on 4/8/2021) for Medicare AWV.     Electronically signed by Dawson Hutchinson MD on 10/8/2020 at 8:10 AM

## 2020-10-08 ENCOUNTER — OFFICE VISIT (OUTPATIENT)
Dept: FAMILY MEDICINE CLINIC | Age: 80
End: 2020-10-08
Payer: MEDICARE

## 2020-10-08 VITALS
HEART RATE: 58 BPM | WEIGHT: 185 LBS | OXYGEN SATURATION: 98 % | TEMPERATURE: 97.2 F | BODY MASS INDEX: 29.86 KG/M2 | SYSTOLIC BLOOD PRESSURE: 138 MMHG | DIASTOLIC BLOOD PRESSURE: 82 MMHG | RESPIRATION RATE: 14 BRPM

## 2020-10-08 LAB
ANION GAP SERPL CALCULATED.3IONS-SCNC: 11 MEQ/L (ref 8–16)
BUN BLDV-MCNC: 16 MG/DL (ref 7–22)
CALCIUM SERPL-MCNC: 9.8 MG/DL (ref 8.5–10.5)
CHLORIDE BLD-SCNC: 104 MEQ/L (ref 98–111)
CHOLESTEROL, TOTAL: 203 MG/DL (ref 100–199)
CO2: 23 MEQ/L (ref 23–33)
CREAT SERPL-MCNC: 0.9 MG/DL (ref 0.4–1.2)
GFR SERPL CREATININE-BSD FRML MDRD: 60 ML/MIN/1.73M2
GLUCOSE BLD-MCNC: 91 MG/DL (ref 70–108)
HDLC SERPL-MCNC: 66 MG/DL
LDL CHOLESTEROL CALCULATED: 119 MG/DL
POTASSIUM SERPL-SCNC: 4.5 MEQ/L (ref 3.5–5.2)
SODIUM BLD-SCNC: 138 MEQ/L (ref 135–145)
TRIGL SERPL-MCNC: 89 MG/DL (ref 0–199)

## 2020-10-08 PROCEDURE — 1036F TOBACCO NON-USER: CPT | Performed by: FAMILY MEDICINE

## 2020-10-08 PROCEDURE — G8482 FLU IMMUNIZE ORDER/ADMIN: HCPCS | Performed by: FAMILY MEDICINE

## 2020-10-08 PROCEDURE — 1123F ACP DISCUSS/DSCN MKR DOCD: CPT | Performed by: FAMILY MEDICINE

## 2020-10-08 PROCEDURE — G8427 DOCREV CUR MEDS BY ELIG CLIN: HCPCS | Performed by: FAMILY MEDICINE

## 2020-10-08 PROCEDURE — G8400 PT W/DXA NO RESULTS DOC: HCPCS | Performed by: FAMILY MEDICINE

## 2020-10-08 PROCEDURE — 99213 OFFICE O/P EST LOW 20 MIN: CPT | Performed by: FAMILY MEDICINE

## 2020-10-08 PROCEDURE — G8417 CALC BMI ABV UP PARAM F/U: HCPCS | Performed by: FAMILY MEDICINE

## 2020-10-08 PROCEDURE — 1090F PRES/ABSN URINE INCON ASSESS: CPT | Performed by: FAMILY MEDICINE

## 2020-10-08 PROCEDURE — 4040F PNEUMOC VAC/ADMIN/RCVD: CPT | Performed by: FAMILY MEDICINE

## 2020-10-08 RX ORDER — METOPROLOL TARTRATE 50 MG/1
TABLET, FILM COATED ORAL
Qty: 180 TABLET | Refills: 1 | Status: SHIPPED | OUTPATIENT
Start: 2020-10-08 | End: 2021-04-27 | Stop reason: SDUPTHER

## 2020-10-08 SDOH — ECONOMIC STABILITY: INCOME INSECURITY: HOW HARD IS IT FOR YOU TO PAY FOR THE VERY BASICS LIKE FOOD, HOUSING, MEDICAL CARE, AND HEATING?: NOT HARD AT ALL

## 2020-10-08 SDOH — ECONOMIC STABILITY: TRANSPORTATION INSECURITY
IN THE PAST 12 MONTHS, HAS THE LACK OF TRANSPORTATION KEPT YOU FROM MEDICAL APPOINTMENTS OR FROM GETTING MEDICATIONS?: NO

## 2020-10-08 SDOH — ECONOMIC STABILITY: FOOD INSECURITY: WITHIN THE PAST 12 MONTHS, THE FOOD YOU BOUGHT JUST DIDN'T LAST AND YOU DIDN'T HAVE MONEY TO GET MORE.: NEVER TRUE

## 2020-10-08 SDOH — ECONOMIC STABILITY: FOOD INSECURITY: WITHIN THE PAST 12 MONTHS, YOU WORRIED THAT YOUR FOOD WOULD RUN OUT BEFORE YOU GOT MONEY TO BUY MORE.: NEVER TRUE

## 2020-10-08 SDOH — ECONOMIC STABILITY: TRANSPORTATION INSECURITY
IN THE PAST 12 MONTHS, HAS LACK OF TRANSPORTATION KEPT YOU FROM MEETINGS, WORK, OR FROM GETTING THINGS NEEDED FOR DAILY LIVING?: NO

## 2021-01-21 ENCOUNTER — IMMUNIZATION (OUTPATIENT)
Dept: PRIMARY CARE CLINIC | Age: 81
End: 2021-01-21
Payer: MEDICARE

## 2021-01-21 PROCEDURE — 0011A COVID-19, MODERNA VACCINE 100MCG/0.5ML DOSE: CPT | Performed by: FAMILY MEDICINE

## 2021-01-21 PROCEDURE — 91301 COVID-19, MODERNA VACCINE 100MCG/0.5ML DOSE: CPT | Performed by: FAMILY MEDICINE

## 2021-02-18 ENCOUNTER — IMMUNIZATION (OUTPATIENT)
Dept: PRIMARY CARE CLINIC | Age: 81
End: 2021-02-18
Payer: MEDICARE

## 2021-02-18 PROCEDURE — 0012A COVID-19, MODERNA VACCINE 100MCG/0.5ML DOSE: CPT | Performed by: FAMILY MEDICINE

## 2021-02-18 PROCEDURE — 91301 COVID-19, MODERNA VACCINE 100MCG/0.5ML DOSE: CPT | Performed by: FAMILY MEDICINE

## 2021-03-21 DIAGNOSIS — I10 ESSENTIAL HYPERTENSION: ICD-10-CM

## 2021-03-21 RX ORDER — LISINOPRIL 40 MG/1
40 TABLET ORAL DAILY
Qty: 90 TABLET | Refills: 1 | Status: SHIPPED | OUTPATIENT
Start: 2021-03-21 | End: 2021-09-20 | Stop reason: SDUPTHER

## 2021-04-23 NOTE — PROGRESS NOTES
Fibrillation Daughter     Hypertension Daughter     Elevated Lipids Daughter        CareTeam (Including outside providers/suppliers regularly involved in providing care):   Patient Care Team:  Francee Goldberg, MD as PCP - Surendra Dixon MD as PCP - Estefanía Dominguez Provider    Wt Readings from Last 3 Encounters:   04/27/21 183 lb (83 kg)   10/08/20 185 lb (83.9 kg)   03/13/20 187 lb (84.8 kg)     Vitals:    04/27/21 0807   BP: 134/80   Site: Left Upper Arm   Position: Sitting   Cuff Size: Large Adult   Pulse: 58   Temp: 98.4 °F (36.9 °C)   SpO2: 99%   Weight: 183 lb (83 kg)   Height: 5' 6\" (1.676 m)     Body mass index is 29.54 kg/m². Based upon direct observation of the patient, evaluation of cognition reveals recent and remote memory intact. General Appearance: alert and oriented to person, place and time, well developed and well- nourished, in no acute distress  Skin: warm and dry, no rash or erythema  Head: normocephalic and atraumatic  Eyes: extraocular eye movements intact, conjunctivae normal  ENT: tympanic membrane, external ear and ear canal normal bilaterally, nose without deformity, nasal mucosa and turbinates normal without polyps  Neck: supple and non-tender without mass, no thyromegaly or thyroid nodules, no cervical lymphadenopathy  Pulmonary/Chest: clear to auscultation bilaterally- no wheezes, rales or rhonchi, normal air movement, no respiratory distress  Cardiovascular: normal rate, regular rhythm, normal S1 and S2, no murmurs, rubs, clicks, or gallops, distal pulses intact, no carotid bruits  Abdomen: soft, non-tender, non-distended, normal bowel sounds, no masses or organomegaly    Patient's complete Health Risk Assessment and screening values have been reviewed and are found in Flowsheets. The following problems were reviewed today and where indicated follow up appointments were made and/or referrals ordered.     Positive Risk Factor Screenings with Interventions: No Positive Risk Factors identified today. Personalized Preventive Plan   Current Health Maintenance Status  Immunization History   Administered Date(s) Administered    COVID-19, Moderna, PF, 100mcg/0.5mL 01/21/2021, 02/18/2021    Influenza A (Y8E1-96) Vaccine PF IM 01/19/2010    Influenza Virus Vaccine 10/01/2013, 09/22/2017, 10/09/2018, 10/17/2019    Influenza Whole 11/07/2008, 10/16/2009    Influenza, High Dose (Fluzone 65 yrs and older) 09/30/2015, 10/26/2016, 09/28/2020    Pneumococcal Conjugate 13-valent ([id number]) 10/17/2019    Pneumococcal Polysaccharide ([id number]) 10/01/2011    Tdap (Boostrix, Adacel) 06/30/2018    Zoster Live (Zostavax) 10/01/2011    Zoster Recombinant (Shingrix) 11/20/2019, 03/13/2020        Health Maintenance   Topic Date Due    DEXA (modify frequency per FRAX score)  Never done    Annual Wellness Visit (AWV)  04/10/2021    Potassium monitoring  10/08/2021    Creatinine monitoring  10/08/2021    DTaP/Tdap/Td vaccine (2 - Td) 06/30/2028    Flu vaccine  Completed    Shingles Vaccine  Completed    Pneumococcal 65+ years Vaccine  Completed    COVID-19 Vaccine  Completed    Hepatitis A vaccine  Aged Out    Hepatitis B vaccine  Aged Out    Hib vaccine  Aged Out    Meningococcal (ACWY) vaccine  Aged Out     Recommendations for [employer] Due: see orders and patient instructions/AVS.    Recommended screening schedule for the next 5-10 years is provided to the patient in written form: see Patient Instructions/AVS.    Raji Varner was seen today for medicare awv. Diagnoses and all orders for this visit:    Encounter for Medicare annual wellness exam        -     Routine healthcare maintenance was reviewed as above. Family history of breast cancer  Encounter for screening mammogram for malignant neoplasm of breast  -     GREY DIGITAL SCREEN W OR WO CAD BILATERAL;  Future    Essential hypertension  -     BP is controlled so will continue on current medications. -     metoprolol tartrate (LOPRESSOR) 50 MG tablet; TAKE 1 TABLET BY MOUTH TWICE DAILY    Screening for osteoporosis  Postmenopausal  -     DEXA BONE DENSITY 2 SITES; Future         Electronically signed by Kassadnra Martinez MD on 4/27/21.

## 2021-04-27 ENCOUNTER — OFFICE VISIT (OUTPATIENT)
Dept: FAMILY MEDICINE CLINIC | Age: 81
End: 2021-04-27
Payer: MEDICARE

## 2021-04-27 VITALS
SYSTOLIC BLOOD PRESSURE: 134 MMHG | OXYGEN SATURATION: 99 % | BODY MASS INDEX: 29.41 KG/M2 | HEIGHT: 66 IN | DIASTOLIC BLOOD PRESSURE: 80 MMHG | TEMPERATURE: 98.4 F | WEIGHT: 183 LBS | HEART RATE: 58 BPM

## 2021-04-27 DIAGNOSIS — Z80.3 FAMILY HISTORY OF BREAST CANCER: ICD-10-CM

## 2021-04-27 DIAGNOSIS — Z78.0 POSTMENOPAUSAL: ICD-10-CM

## 2021-04-27 DIAGNOSIS — Z12.31 ENCOUNTER FOR SCREENING MAMMOGRAM FOR MALIGNANT NEOPLASM OF BREAST: ICD-10-CM

## 2021-04-27 DIAGNOSIS — Z13.820 SCREENING FOR OSTEOPOROSIS: ICD-10-CM

## 2021-04-27 DIAGNOSIS — Z00.00 ENCOUNTER FOR MEDICARE ANNUAL WELLNESS EXAM: Primary | ICD-10-CM

## 2021-04-27 DIAGNOSIS — I10 ESSENTIAL HYPERTENSION: ICD-10-CM

## 2021-04-27 PROCEDURE — 1123F ACP DISCUSS/DSCN MKR DOCD: CPT | Performed by: FAMILY MEDICINE

## 2021-04-27 PROCEDURE — 4040F PNEUMOC VAC/ADMIN/RCVD: CPT | Performed by: FAMILY MEDICINE

## 2021-04-27 PROCEDURE — G0439 PPPS, SUBSEQ VISIT: HCPCS | Performed by: FAMILY MEDICINE

## 2021-04-27 RX ORDER — METOPROLOL TARTRATE 50 MG/1
TABLET, FILM COATED ORAL
Qty: 180 TABLET | Refills: 1 | Status: SHIPPED | OUTPATIENT
Start: 2021-04-27 | End: 2021-10-29 | Stop reason: SDUPTHER

## 2021-04-27 ASSESSMENT — PATIENT HEALTH QUESTIONNAIRE - PHQ9
SUM OF ALL RESPONSES TO PHQ QUESTIONS 1-9: 0

## 2021-04-28 ENCOUNTER — HOSPITAL ENCOUNTER (OUTPATIENT)
Dept: MAMMOGRAPHY | Age: 81
Discharge: HOME | End: 2021-04-28
Payer: MEDICARE

## 2021-04-28 DIAGNOSIS — Z12.31 ENCOUNTER FOR SCREENING MAMMOGRAM FOR MALIGNANT NEOPLASM OF BREAST: ICD-10-CM

## 2021-04-28 PROCEDURE — 77067 SCR MAMMO BI INCL CAD: CPT

## 2021-05-10 RX ORDER — PREDNISONE 20 MG/1
40 TABLET ORAL DAILY
Qty: 10 TABLET | Refills: 0 | Status: SHIPPED | OUTPATIENT
Start: 2021-05-10 | End: 2021-05-19 | Stop reason: SDUPTHER

## 2021-05-10 RX ORDER — TERBINAFINE HYDROCHLORIDE 250 MG/1
250 TABLET ORAL DAILY
Qty: 14 TABLET | Refills: 0 | Status: SHIPPED | OUTPATIENT
Start: 2021-05-10 | End: 2021-05-24

## 2021-05-19 RX ORDER — PREDNISONE 20 MG/1
40 TABLET ORAL DAILY
Qty: 10 TABLET | Refills: 0 | Status: SHIPPED | OUTPATIENT
Start: 2021-05-19 | End: 2021-11-08

## 2021-05-20 ENCOUNTER — HOSPITAL ENCOUNTER (OUTPATIENT)
Age: 81
Discharge: HOME | End: 2021-05-20
Payer: MEDICARE

## 2021-05-20 DIAGNOSIS — M79.675 PAIN OF LEFT GREAT TOE: Primary | ICD-10-CM

## 2021-05-20 DIAGNOSIS — M79.675 PAIN OF LEFT GREAT TOE: ICD-10-CM

## 2021-05-20 PROCEDURE — 36415 COLL VENOUS BLD VENIPUNCTURE: CPT

## 2021-05-20 PROCEDURE — 84550 ASSAY OF BLOOD/URIC ACID: CPT

## 2021-05-21 ENCOUNTER — HOSPITAL ENCOUNTER (OUTPATIENT)
Dept: WOMENS IMAGING | Age: 81
Discharge: HOME | End: 2021-05-21
Payer: MEDICARE

## 2021-05-21 DIAGNOSIS — Z78.0 POSTMENOPAUSAL: ICD-10-CM

## 2021-05-21 DIAGNOSIS — Z13.820 SCREENING FOR OSTEOPOROSIS: ICD-10-CM

## 2021-05-21 LAB — URIC ACID: 7.2 MG/DL (ref 2.4–5.7)

## 2021-05-21 PROCEDURE — 77080 DXA BONE DENSITY AXIAL: CPT

## 2021-09-20 DIAGNOSIS — I10 ESSENTIAL HYPERTENSION: ICD-10-CM

## 2021-09-20 RX ORDER — LISINOPRIL 40 MG/1
40 TABLET ORAL DAILY
Qty: 90 TABLET | Refills: 1 | Status: SHIPPED | OUTPATIENT
Start: 2021-09-20 | End: 2022-03-14 | Stop reason: SDUPTHER

## 2021-10-29 ENCOUNTER — TELEPHONE (OUTPATIENT)
Dept: FAMILY MEDICINE CLINIC | Age: 81
End: 2021-10-29

## 2021-10-29 DIAGNOSIS — I10 ESSENTIAL HYPERTENSION: ICD-10-CM

## 2021-10-29 RX ORDER — METOPROLOL TARTRATE 50 MG/1
TABLET, FILM COATED ORAL
Qty: 180 TABLET | Refills: 1 | Status: SHIPPED | OUTPATIENT
Start: 2021-10-29 | End: 2022-05-09 | Stop reason: SDUPTHER

## 2021-11-03 ASSESSMENT — ENCOUNTER SYMPTOMS
CONSTIPATION: 0
COLOR CHANGE: 0
DIARRHEA: 0
EYE REDNESS: 0
VOMITING: 0
EYE DISCHARGE: 0
NAUSEA: 0
SHORTNESS OF BREATH: 0

## 2021-11-03 NOTE — PROGRESS NOTES
[address]  [address] [place] [address]  Phone:  [phone]          Name: Adriel Denise  : 1940    Chief Complaint   Patient presents with    6 Month Follow-Up       HPI:     Adriel Denise is a 78 y.o. female who presents today for follow-up of hypertension. Sometimes her BP gets to the 140s-150s at home. She denies chest pain, palpitations, SOB, stress, pain, etc.    Hypertension  This is a chronic problem. The current episode started more than 1 year ago. The problem has been gradually worsening since onset. The problem is uncontrolled. Pertinent negatives include no chest pain, headaches, palpitations, peripheral edema or shortness of breath. There are no associated agents to hypertension. Risk factors for coronary artery disease include post-menopausal state. Past treatments include beta blockers and ACE inhibitors. The current treatment provides moderate improvement. There are no compliance problems. Lab Results   Component Value Date    CHOL 203 (H) 10/08/2020    TRIG 89 10/08/2020    HDL 66 10/08/2020    LDLCALC 119 10/08/2020     Lab Results   Component Value Date    ALT 14 2018    AST 20 2018        Lab Results   Component Value Date     10/08/2020    K 4.5 10/08/2020     10/08/2020    CO2 23 10/08/2020    BUN 16 10/08/2020    CREATININE 0.9 10/08/2020    GLUCOSE 91 10/08/2020    GLUCOSE 89 2016    CALCIUM 9.8 10/08/2020      She has a small hemorrhoid. She had some banded in the past.  No blood in or on her stool. Current Outpatient Medications:     hydrocortisone (ANUSOL-HC) 2.5 % CREA rectal cream, Use 2-3 times per day as needed. , Disp: 28 g, Rfl: 0    metoprolol tartrate (LOPRESSOR) 50 MG tablet, TAKE 1 TABLET BY MOUTH TWICE DAILY, Disp: 180 tablet, Rfl: 1    lisinopril (PRINIVIL;ZESTRIL) 40 MG tablet, Take 1 tablet by mouth daily, Disp: 90 tablet, Rfl: 1    aspirin 81 MG tablet, Take 81 mg by mouth daily. , Disp: , Rfl:     ascorbic acid (VITAMIN C) 500 MG tablet, Take 500 mg by mouth daily. , Disp: , Rfl:     Calcium Carb-Cholecalciferol (CALCIUM PLUS VITAMIN D) 600-100 MG-UNIT CAPS, Take  by mouth daily. 2 tablet in morning  /  1 tablets in evening, Disp: , Rfl:     Allergies   Allergen Reactions    Pcn [Penicillins] Itching     redness @ IM site       Subjective:      Review of Systems   Constitutional: Negative for chills and fever. Eyes: Negative for discharge and redness. Respiratory: Negative for shortness of breath. Cardiovascular: Negative for chest pain and palpitations. Gastrointestinal: Negative for constipation, diarrhea, nausea and vomiting. Musculoskeletal: Positive for arthralgias and myalgias. Skin: Negative for color change and rash. Allergic/Immunologic: Negative for environmental allergies and food allergies. Neurological: Negative for dizziness and headaches. Hematological: Negative for adenopathy. Does not bruise/bleed easily. Objective:     /72 (Site: Left Upper Arm, Position: Sitting, Cuff Size: Medium Adult)   Pulse 61   Temp 97 °F (36.1 °C) (Temporal)   Resp 14   Wt 177 lb (80.3 kg)   LMP  (LMP Unknown)   SpO2 97%   BMI 28.57 kg/m²     Physical Exam  Vitals and nursing note reviewed. Constitutional:       General: She is not in acute distress. Appearance: She is well-developed. HENT:      Head: Normocephalic and atraumatic. Nose: Nose normal.   Eyes:      Conjunctiva/sclera: Conjunctivae normal.   Cardiovascular:      Rate and Rhythm: Normal rate and regular rhythm. Pulmonary:      Effort: Pulmonary effort is normal. No respiratory distress. Breath sounds: Normal breath sounds. No wheezing. Abdominal:      General: Bowel sounds are normal. There is no distension. Palpations: Abdomen is soft. Tenderness: There is no abdominal tenderness. Musculoskeletal:      Cervical back: Normal range of motion and neck supple. Skin:     General: Skin is warm and dry. Neurological:      Mental Status: She is alert and oriented to person, place, and time. Assessment/Plan:     Buzz Hannon was seen today for 6 month follow-up. Diagnoses and all orders for this visit:    Essential hypertension  -     Blood pressure has been controlled so will continue on current medication.  -     Lipid Panel; Future  -     Basic Metabolic Panel; Future    External hemorrhoid  -     hydrocortisone (ANUSOL-HC) 2.5 % CREA rectal cream; Use 2-3 times per day as needed. Return in about 6 months (around 5/8/2022) for Medicare AWV, hypertension.     Electronically signed by Raymona Merlin, MD on 11/8/2021 at 9:00 AM

## 2021-11-08 ENCOUNTER — OFFICE VISIT (OUTPATIENT)
Dept: FAMILY MEDICINE CLINIC | Age: 81
End: 2021-11-08
Payer: MEDICARE

## 2021-11-08 VITALS
HEART RATE: 61 BPM | RESPIRATION RATE: 14 BRPM | SYSTOLIC BLOOD PRESSURE: 128 MMHG | WEIGHT: 177 LBS | BODY MASS INDEX: 28.57 KG/M2 | DIASTOLIC BLOOD PRESSURE: 72 MMHG | TEMPERATURE: 97 F | OXYGEN SATURATION: 97 %

## 2021-11-08 DIAGNOSIS — I10 ESSENTIAL HYPERTENSION: Primary | ICD-10-CM

## 2021-11-08 DIAGNOSIS — K64.4 EXTERNAL HEMORRHOID: ICD-10-CM

## 2021-11-08 PROCEDURE — G8417 CALC BMI ABV UP PARAM F/U: HCPCS | Performed by: FAMILY MEDICINE

## 2021-11-08 PROCEDURE — 4040F PNEUMOC VAC/ADMIN/RCVD: CPT | Performed by: FAMILY MEDICINE

## 2021-11-08 PROCEDURE — 99213 OFFICE O/P EST LOW 20 MIN: CPT | Performed by: FAMILY MEDICINE

## 2021-11-08 PROCEDURE — G8399 PT W/DXA RESULTS DOCUMENT: HCPCS | Performed by: FAMILY MEDICINE

## 2021-11-08 PROCEDURE — 1036F TOBACCO NON-USER: CPT | Performed by: FAMILY MEDICINE

## 2021-11-08 PROCEDURE — G8427 DOCREV CUR MEDS BY ELIG CLIN: HCPCS | Performed by: FAMILY MEDICINE

## 2021-11-08 PROCEDURE — 1090F PRES/ABSN URINE INCON ASSESS: CPT | Performed by: FAMILY MEDICINE

## 2021-11-08 PROCEDURE — 1123F ACP DISCUSS/DSCN MKR DOCD: CPT | Performed by: FAMILY MEDICINE

## 2021-11-08 PROCEDURE — G8482 FLU IMMUNIZE ORDER/ADMIN: HCPCS | Performed by: FAMILY MEDICINE

## 2021-11-08 RX ORDER — HYDROCORTISONE 25 MG/G
CREAM TOPICAL
Qty: 28 G | Refills: 0 | Status: ON HOLD | OUTPATIENT
Start: 2021-11-08 | End: 2022-01-21

## 2021-11-08 SDOH — ECONOMIC STABILITY: FOOD INSECURITY: WITHIN THE PAST 12 MONTHS, YOU WORRIED THAT YOUR FOOD WOULD RUN OUT BEFORE YOU GOT MONEY TO BUY MORE.: NEVER TRUE

## 2021-11-08 SDOH — ECONOMIC STABILITY: FOOD INSECURITY: WITHIN THE PAST 12 MONTHS, THE FOOD YOU BOUGHT JUST DIDN'T LAST AND YOU DIDN'T HAVE MONEY TO GET MORE.: NEVER TRUE

## 2021-11-08 ASSESSMENT — SOCIAL DETERMINANTS OF HEALTH (SDOH): HOW HARD IS IT FOR YOU TO PAY FOR THE VERY BASICS LIKE FOOD, HOUSING, MEDICAL CARE, AND HEATING?: NOT HARD AT ALL

## 2021-11-23 ENCOUNTER — NURSE ONLY (OUTPATIENT)
Dept: LAB | Age: 81
End: 2021-11-23

## 2021-11-23 DIAGNOSIS — I10 ESSENTIAL HYPERTENSION: ICD-10-CM

## 2021-11-23 LAB
ANION GAP SERPL CALCULATED.3IONS-SCNC: 13 MEQ/L (ref 8–16)
BUN BLDV-MCNC: 16 MG/DL (ref 7–22)
CALCIUM SERPL-MCNC: 9.5 MG/DL (ref 8.5–10.5)
CHLORIDE BLD-SCNC: 103 MEQ/L (ref 98–111)
CHOLESTEROL, TOTAL: 210 MG/DL (ref 100–199)
CO2: 24 MEQ/L (ref 23–33)
CREAT SERPL-MCNC: 1 MG/DL (ref 0.4–1.2)
GFR SERPL CREATININE-BSD FRML MDRD: 53 ML/MIN/1.73M2
GLUCOSE BLD-MCNC: 97 MG/DL (ref 70–108)
HDLC SERPL-MCNC: 67 MG/DL
LDL CHOLESTEROL CALCULATED: 123 MG/DL
POTASSIUM SERPL-SCNC: 4.3 MEQ/L (ref 3.5–5.2)
SODIUM BLD-SCNC: 140 MEQ/L (ref 135–145)
TRIGL SERPL-MCNC: 100 MG/DL (ref 0–199)

## 2022-01-03 ASSESSMENT — ENCOUNTER SYMPTOMS
SHORTNESS OF BREATH: 0
EYE REDNESS: 0
VOMITING: 0
NAUSEA: 0
COLOR CHANGE: 0
CONSTIPATION: 0
EYE DISCHARGE: 0
DIARRHEA: 0

## 2022-01-03 NOTE — PROGRESS NOTES
[address]  [address] [place] [address]  Phone:  [phone]          Name: Karen Frias  : 1940    Chief Complaint   Patient presents with    Hypertension       HPI:     Karen Frias is a 78 y.o. female who presents today for follow-up of hypertension. Recently her BP has been elevated to 690I systolic and she can tell as she just doesn't feel right. She is on Metoprolol 50 mg BID and Lisinopril 40 mg daily. Hypertension  This is a chronic problem. The current episode started more than 1 year ago. The problem has been gradually worsening since onset. The problem is uncontrolled. Pertinent negatives include no chest pain, headaches, palpitations, peripheral edema or shortness of breath. There are no associated agents to hypertension. Risk factors for coronary artery disease include post-menopausal state. Past treatments include beta blockers and ACE inhibitors. The current treatment provides moderate improvement. There are no compliance problems. Lab Results   Component Value Date    CHOL 210 (H) 2021    TRIG 100 2021    HDL 67 2021    LDLCALC 123 2021     Lab Results   Component Value Date    ALT 14 2018    AST 20 2018        Lab Results   Component Value Date     2021    K 4.3 2021     2021    CO2 24 2021    BUN 16 2021    CREATININE 1.0 2021    GLUCOSE 97 2021    GLUCOSE 89 2016    CALCIUM 9.5 2021      She is UTD on her COVID vaccines. She's had her influenza vaccine. She had a DEXA in May 2021 that was WNL. Current Outpatient Medications:     hydroCHLOROthiazide (HYDRODIURIL) 12.5 MG tablet, Take 1 tablet by mouth daily, Disp: 90 tablet, Rfl: 0    hydrocortisone (ANUSOL-HC) 2.5 % CREA rectal cream, Use 2-3 times per day as needed. , Disp: 28 g, Rfl: 0    metoprolol tartrate (LOPRESSOR) 50 MG tablet, TAKE 1 TABLET BY MOUTH TWICE DAILY, Disp: 180 normal. There is no distension. Palpations: Abdomen is soft. Tenderness: There is no abdominal tenderness. Musculoskeletal:      Cervical back: Normal range of motion and neck supple. Skin:     General: Skin is warm and dry. Neurological:      Mental Status: She is alert and oriented to person, place, and time. Assessment/Plan:     Raji Varner was seen today for hypertension. Diagnoses and all orders for this visit:    Essential hypertension  -     Blood pressure has been elevated so will add HCTZ to her current regimen. She will check her BP at home and contact our office if elevated. -     hydroCHLOROthiazide (HYDRODIURIL) 12.5 MG tablet; Take 1 tablet by mouth daily        Return in about 6 months (around 7/4/2022) for Medicare AWV, hypertension.     Electronically signed by Stacy Barriga MD on 1/4/2022 at 8:35 AM

## 2022-01-04 ENCOUNTER — OFFICE VISIT (OUTPATIENT)
Dept: FAMILY MEDICINE CLINIC | Age: 82
End: 2022-01-04
Payer: MEDICARE

## 2022-01-04 VITALS
WEIGHT: 178 LBS | DIASTOLIC BLOOD PRESSURE: 95 MMHG | HEIGHT: 66 IN | HEART RATE: 64 BPM | RESPIRATION RATE: 18 BRPM | TEMPERATURE: 96.9 F | OXYGEN SATURATION: 98 % | SYSTOLIC BLOOD PRESSURE: 164 MMHG | BODY MASS INDEX: 28.61 KG/M2

## 2022-01-04 DIAGNOSIS — I10 ESSENTIAL HYPERTENSION: Primary | ICD-10-CM

## 2022-01-04 PROCEDURE — 1036F TOBACCO NON-USER: CPT | Performed by: FAMILY MEDICINE

## 2022-01-04 PROCEDURE — G8417 CALC BMI ABV UP PARAM F/U: HCPCS | Performed by: FAMILY MEDICINE

## 2022-01-04 PROCEDURE — G8427 DOCREV CUR MEDS BY ELIG CLIN: HCPCS | Performed by: FAMILY MEDICINE

## 2022-01-04 PROCEDURE — G8482 FLU IMMUNIZE ORDER/ADMIN: HCPCS | Performed by: FAMILY MEDICINE

## 2022-01-04 PROCEDURE — G8399 PT W/DXA RESULTS DOCUMENT: HCPCS | Performed by: FAMILY MEDICINE

## 2022-01-04 PROCEDURE — 1090F PRES/ABSN URINE INCON ASSESS: CPT | Performed by: FAMILY MEDICINE

## 2022-01-04 PROCEDURE — 99213 OFFICE O/P EST LOW 20 MIN: CPT | Performed by: FAMILY MEDICINE

## 2022-01-04 PROCEDURE — 4040F PNEUMOC VAC/ADMIN/RCVD: CPT | Performed by: FAMILY MEDICINE

## 2022-01-04 PROCEDURE — 1123F ACP DISCUSS/DSCN MKR DOCD: CPT | Performed by: FAMILY MEDICINE

## 2022-01-04 RX ORDER — HYDROCHLOROTHIAZIDE 12.5 MG/1
12.5 TABLET ORAL DAILY
Qty: 90 TABLET | Refills: 0 | Status: SHIPPED | OUTPATIENT
Start: 2022-01-04 | End: 2022-03-14 | Stop reason: SDUPTHER

## 2022-01-18 ENCOUNTER — HOSPITAL ENCOUNTER (INPATIENT)
Age: 82
LOS: 6 days | Discharge: HOME HEALTH | DRG: 025 | End: 2022-01-24
Attending: EMERGENCY MEDICINE | Admitting: SURGERY
Payer: MEDICARE

## 2022-01-18 ENCOUNTER — APPOINTMENT (OUTPATIENT)
Dept: CT IMAGING | Age: 82
DRG: 025 | End: 2022-01-18
Payer: MEDICARE

## 2022-01-18 DIAGNOSIS — N39.0 ACUTE UTI: ICD-10-CM

## 2022-01-18 DIAGNOSIS — S06.5XAA SUBDURAL HEMATOMA: Primary | ICD-10-CM

## 2022-01-18 DIAGNOSIS — S01.01XA LACERATION OF SCALP, INITIAL ENCOUNTER: ICD-10-CM

## 2022-01-18 DIAGNOSIS — I10 ESSENTIAL HYPERTENSION: ICD-10-CM

## 2022-01-18 LAB
ACT TEG: 121 SECONDS (ref 86–118)
ALBUMIN SERPL-MCNC: 4.2 GM/DL (ref 3.4–5)
ALP BLD-CCNC: 73 U/L (ref 46–116)
ALT SERPL-CCNC: 25 U/L (ref 14–63)
AMORPHOUS: ABNORMAL
ANGLE, RAPID TEG: 74.6 DEG (ref 64–80)
ANION GAP: 11 MEQ/L (ref 8–16)
AST SERPL-CCNC: 27 U/L (ref 15–37)
BACTERIA: ABNORMAL
BASOPHILS # BLD: 0.6 % (ref 0–3)
BILIRUB SERPL-MCNC: 0.6 MG/DL (ref 0.2–1)
BILIRUBIN URINE: NEGATIVE
BLOOD, URINE: ABNORMAL
BUN BLDV-MCNC: 27 MG/DL (ref 7–18)
CASTS UA: ABNORMAL /LPF
CHARACTER, URINE: ABNORMAL
CHLORIDE BLD-SCNC: 104 MEQ/L (ref 98–107)
CO2: 27 MEQ/L (ref 21–32)
COLOR: YELLOW
CREAT SERPL-MCNC: 1.2 MG/DL (ref 0.6–1.3)
CRYSTALS, UA: ABNORMAL
EKG ATRIAL RATE: 63 BPM
EKG P AXIS: 26 DEGREES
EKG P-R INTERVAL: 162 MS
EKG Q-T INTERVAL: 428 MS
EKG QRS DURATION: 94 MS
EKG QTC CALCULATION (BAZETT): 437 MS
EKG R AXIS: -23 DEGREES
EKG T AXIS: 27 DEGREES
EKG VENTRICULAR RATE: 63 BPM
EOSINOPHILS RELATIVE PERCENT: 2.6 % (ref 0–4)
EPITHELIAL CELLS, UA: ABNORMAL /HPF
EPL-TEG: 0 % (ref 0–15)
GFR, ESTIMATED: 46 ML/MIN/1.73M2
GLUCOSE BLD-MCNC: 115 MG/DL (ref 74–106)
GLUCOSE, URINE: NEGATIVE MG/DL
HCT VFR BLD CALC: 41.9 % (ref 37–47)
HEMOGLOBIN: 13.7 GM/DL (ref 12–16)
HEPARIN THERAPY: NO
KETONES, URINE: NEGATIVE
KINETICS RAPID TEG: 1.1 MINUTES (ref 0.5–2.3)
LEUKOCYTE ESTERASE, URINE: ABNORMAL
LY30 (LYSIS) TEG: 0 % (ref 0–7.5)
LYMPHOCYTES # BLD: 16.8 % (ref 15–47)
MA(MAX CLOT) RAPID TEG: 69.3 MM (ref 52–71)
MCH RBC QN AUTO: 30.5 PG (ref 27–31)
MCHC RBC AUTO-ENTMCNC: 32.7 GM/DL (ref 33–37)
MCV RBC AUTO: 93.5 FL (ref 81–99)
MONOCYTES: 8 % (ref 0–12)
MUCUS: ABNORMAL
NITRITE, URINE: POSITIVE
PDW BLD-RTO: 12.5 % (ref 11.5–14.5)
PH UA: 6.5 (ref 5–9)
PLATELET # BLD: 182 THOU/MM3 (ref 130–400)
PMV BLD AUTO: 8.1 FL (ref 7.4–10.4)
POC CALCIUM: 9.3 MG/DL (ref 8.5–10.1)
POTASSIUM SERPL-SCNC: 4.4 MEQ/L (ref 3.5–5.1)
PROTEIN UA: NEGATIVE MG/DL
RBC # BLD: 4.48 MILL/MM3 (ref 4.2–5.4)
RBC UA: ABNORMAL /HPF
REACTION TIME RAPID TEG: 0.8 MINUTES (ref 0.4–1)
REFLEX TO URINE C & S: ABNORMAL
SEGS: 72 % (ref 43–75)
SODIUM BLD-SCNC: 142 MEQ/L (ref 136–145)
SPECIFIC GRAVITY UA: 1.01 (ref 1–1.03)
TOTAL PROTEIN: 7.6 GM/DL (ref 6.4–8.2)
TROPONIN I: < 0.017 NG/ML (ref 0.02–0.06)
UROBILINOGEN, URINE: 0.2 EU/DL (ref 0–1)
WBC # BLD: 8.2 THOU/MM3 (ref 4.8–10.8)
WBC UA: ABNORMAL /HPF

## 2022-01-18 PROCEDURE — 2500000003 HC RX 250 WO HCPCS: Performed by: PHYSICIAN ASSISTANT

## 2022-01-18 PROCEDURE — 2500000003 HC RX 250 WO HCPCS: Performed by: EMERGENCY MEDICINE

## 2022-01-18 PROCEDURE — 72125 CT NECK SPINE W/O DYE: CPT

## 2022-01-18 PROCEDURE — 87077 CULTURE AEROBIC IDENTIFY: CPT

## 2022-01-18 PROCEDURE — 6360000002 HC RX W HCPCS: Performed by: STUDENT IN AN ORGANIZED HEALTH CARE EDUCATION/TRAINING PROGRAM

## 2022-01-18 PROCEDURE — 36415 COLL VENOUS BLD VENIPUNCTURE: CPT

## 2022-01-18 PROCEDURE — 99222 1ST HOSP IP/OBS MODERATE 55: CPT | Performed by: SURGERY

## 2022-01-18 PROCEDURE — 99223 1ST HOSP IP/OBS HIGH 75: CPT | Performed by: NEUROLOGICAL SURGERY

## 2022-01-18 PROCEDURE — 80053 COMPREHEN METABOLIC PANEL: CPT

## 2022-01-18 PROCEDURE — 81001 URINALYSIS AUTO W/SCOPE: CPT

## 2022-01-18 PROCEDURE — 93010 ELECTROCARDIOGRAM REPORT: CPT | Performed by: NUCLEAR MEDICINE

## 2022-01-18 PROCEDURE — 85025 COMPLETE CBC W/AUTO DIFF WBC: CPT

## 2022-01-18 PROCEDURE — 2580000003 HC RX 258: Performed by: PHYSICIAN ASSISTANT

## 2022-01-18 PROCEDURE — 90715 TDAP VACCINE 7 YRS/> IM: CPT | Performed by: STUDENT IN AN ORGANIZED HEALTH CARE EDUCATION/TRAINING PROGRAM

## 2022-01-18 PROCEDURE — 2580000003 HC RX 258: Performed by: NURSE PRACTITIONER

## 2022-01-18 PROCEDURE — 2500000003 HC RX 250 WO HCPCS: Performed by: STUDENT IN AN ORGANIZED HEALTH CARE EDUCATION/TRAINING PROGRAM

## 2022-01-18 PROCEDURE — 99285 EMERGENCY DEPT VISIT HI MDM: CPT

## 2022-01-18 PROCEDURE — APPSS180 APP SPLIT SHARED TIME > 60 MINUTES: Performed by: PHYSICIAN ASSISTANT

## 2022-01-18 PROCEDURE — 6370000000 HC RX 637 (ALT 250 FOR IP): Performed by: PHYSICIAN ASSISTANT

## 2022-01-18 PROCEDURE — 90471 IMMUNIZATION ADMIN: CPT | Performed by: STUDENT IN AN ORGANIZED HEALTH CARE EDUCATION/TRAINING PROGRAM

## 2022-01-18 PROCEDURE — 2580000003 HC RX 258: Performed by: STUDENT IN AN ORGANIZED HEALTH CARE EDUCATION/TRAINING PROGRAM

## 2022-01-18 PROCEDURE — 87186 SC STD MICRODIL/AGAR DIL: CPT

## 2022-01-18 PROCEDURE — 6370000000 HC RX 637 (ALT 250 FOR IP): Performed by: NURSE PRACTITIONER

## 2022-01-18 PROCEDURE — 70450 CT HEAD/BRAIN W/O DYE: CPT

## 2022-01-18 PROCEDURE — 2580000003 HC RX 258: Performed by: NEUROLOGICAL SURGERY

## 2022-01-18 PROCEDURE — 6820000002 HC L2 INJURY CALL ACTIVATION: Performed by: SURGERY

## 2022-01-18 PROCEDURE — 84484 ASSAY OF TROPONIN QUANT: CPT

## 2022-01-18 PROCEDURE — 99232 SBSQ HOSP IP/OBS MODERATE 35: CPT | Performed by: INTERNAL MEDICINE

## 2022-01-18 PROCEDURE — 93005 ELECTROCARDIOGRAM TRACING: CPT | Performed by: EMERGENCY MEDICINE

## 2022-01-18 PROCEDURE — 87086 URINE CULTURE/COLONY COUNT: CPT

## 2022-01-18 PROCEDURE — 61312 CRNEC/CRNOT STTL XDRL/SDRL: CPT | Performed by: PHYSICIAN ASSISTANT

## 2022-01-18 PROCEDURE — 2000000000 HC ICU R&B

## 2022-01-18 PROCEDURE — 2500000003 HC RX 250 WO HCPCS: Performed by: NEUROLOGICAL SURGERY

## 2022-01-18 RX ORDER — SODIUM CHLORIDE 0.9 % (FLUSH) 0.9 %
10 SYRINGE (ML) INJECTION PRN
Status: DISCONTINUED | OUTPATIENT
Start: 2022-01-18 | End: 2022-01-24 | Stop reason: HOSPADM

## 2022-01-18 RX ORDER — SODIUM CHLORIDE 9 MG/ML
INJECTION, SOLUTION INTRAVENOUS CONTINUOUS
Status: DISCONTINUED | OUTPATIENT
Start: 2022-01-18 | End: 2022-01-20

## 2022-01-18 RX ORDER — POLYETHYLENE GLYCOL 3350 17 G/17G
17 POWDER, FOR SOLUTION ORAL DAILY PRN
Status: DISCONTINUED | OUTPATIENT
Start: 2022-01-18 | End: 2022-01-24 | Stop reason: HOSPADM

## 2022-01-18 RX ORDER — LIDOCAINE HYDROCHLORIDE 10 MG/ML
5 INJECTION, SOLUTION INFILTRATION; PERINEURAL ONCE
Status: COMPLETED | OUTPATIENT
Start: 2022-01-18 | End: 2022-01-18

## 2022-01-18 RX ORDER — SODIUM CHLORIDE 9 MG/ML
25 INJECTION, SOLUTION INTRAVENOUS PRN
Status: DISCONTINUED | OUTPATIENT
Start: 2022-01-18 | End: 2022-01-21 | Stop reason: SDUPTHER

## 2022-01-18 RX ORDER — ONDANSETRON 2 MG/ML
4 INJECTION INTRAMUSCULAR; INTRAVENOUS EVERY 6 HOURS PRN
Status: DISCONTINUED | OUTPATIENT
Start: 2022-01-18 | End: 2022-01-21 | Stop reason: SDUPTHER

## 2022-01-18 RX ORDER — SODIUM CHLORIDE 0.9 % (FLUSH) 0.9 %
10 SYRINGE (ML) INJECTION EVERY 12 HOURS SCHEDULED
Status: DISCONTINUED | OUTPATIENT
Start: 2022-01-18 | End: 2022-01-24 | Stop reason: HOSPADM

## 2022-01-18 RX ORDER — ONDANSETRON 4 MG/1
4 TABLET, ORALLY DISINTEGRATING ORAL EVERY 8 HOURS PRN
Status: DISCONTINUED | OUTPATIENT
Start: 2022-01-18 | End: 2022-01-21 | Stop reason: SDUPTHER

## 2022-01-18 RX ORDER — FAMOTIDINE 20 MG/1
20 TABLET, FILM COATED ORAL DAILY
Status: DISCONTINUED | OUTPATIENT
Start: 2022-01-18 | End: 2022-01-24 | Stop reason: HOSPADM

## 2022-01-18 RX ORDER — DOCUSATE SODIUM 100 MG/1
100 CAPSULE, LIQUID FILLED ORAL DAILY
Status: DISCONTINUED | OUTPATIENT
Start: 2022-01-18 | End: 2022-01-24 | Stop reason: HOSPADM

## 2022-01-18 RX ORDER — ONDANSETRON 2 MG/ML
4 INJECTION INTRAMUSCULAR; INTRAVENOUS EVERY 6 HOURS PRN
Status: DISCONTINUED | OUTPATIENT
Start: 2022-01-18 | End: 2022-01-18 | Stop reason: SDUPTHER

## 2022-01-18 RX ORDER — ACETAMINOPHEN 325 MG/1
650 TABLET ORAL EVERY 4 HOURS PRN
Status: DISCONTINUED | OUTPATIENT
Start: 2022-01-18 | End: 2022-01-21

## 2022-01-18 RX ADMIN — DOCUSATE SODIUM 100 MG: 100 CAPSULE ORAL at 16:46

## 2022-01-18 RX ADMIN — ACETAMINOPHEN 650 MG: 325 TABLET ORAL at 16:38

## 2022-01-18 RX ADMIN — TETANUS TOXOID, REDUCED DIPHTHERIA TOXOID AND ACELLULAR PERTUSSIS VACCINE, ADSORBED 0.5 ML: 5; 2.5; 8; 8; 2.5 SUSPENSION INTRAMUSCULAR at 12:43

## 2022-01-18 RX ADMIN — SODIUM CHLORIDE, PRESERVATIVE FREE 10 ML: 5 INJECTION INTRAVENOUS at 22:27

## 2022-01-18 RX ADMIN — FAMOTIDINE 20 MG: 20 TABLET ORAL at 16:46

## 2022-01-18 RX ADMIN — CEFTRIAXONE SODIUM 1000 MG: 1 INJECTION, POWDER, FOR SOLUTION INTRAMUSCULAR; INTRAVENOUS at 13:04

## 2022-01-18 RX ADMIN — TRANEXAMIC ACID 1000 MG: 1 INJECTION, SOLUTION INTRAVENOUS at 15:45

## 2022-01-18 RX ADMIN — LIDOCAINE HYDROCHLORIDE 5 ML: 10 INJECTION, SOLUTION INFILTRATION; PERINEURAL at 09:28

## 2022-01-18 RX ADMIN — ACETAMINOPHEN 650 MG: 325 TABLET ORAL at 22:28

## 2022-01-18 RX ADMIN — SODIUM CHLORIDE: 9 INJECTION, SOLUTION INTRAVENOUS at 15:40

## 2022-01-18 RX ADMIN — TRANEXAMIC ACID 1000 MG: 100 INJECTION, SOLUTION INTRAVENOUS at 16:42

## 2022-01-18 RX ADMIN — DEXTROSE MONOHYDRATE 5 MG/HR: 50 INJECTION, SOLUTION INTRAVENOUS at 12:43

## 2022-01-18 ASSESSMENT — PAIN SCALES - GENERAL
PAINLEVEL_OUTOF10: 4
PAINLEVEL_OUTOF10: 3
PAINLEVEL_OUTOF10: 0
PAINLEVEL_OUTOF10: 0

## 2022-01-18 ASSESSMENT — PAIN DESCRIPTION - ORIENTATION: ORIENTATION: ANTERIOR

## 2022-01-18 ASSESSMENT — ENCOUNTER SYMPTOMS
CHEST TIGHTNESS: 0
SORE THROAT: 0
PHOTOPHOBIA: 0
RHINORRHEA: 0
NAUSEA: 0
ROS SKIN COMMENTS: SCALP LACERATION
EYE PAIN: 0
DIARRHEA: 0
WHEEZING: 0
STRIDOR: 0
SHORTNESS OF BREATH: 0
ABDOMINAL PAIN: 0
EYE REDNESS: 0
COUGH: 0
COLOR CHANGE: 0
FACIAL SWELLING: 0
VOMITING: 0
BLURRED VISION: 0
SINUS PAIN: 0
BACK PAIN: 0
TROUBLE SWALLOWING: 0

## 2022-01-18 ASSESSMENT — PAIN DESCRIPTION - LOCATION: LOCATION: HEAD

## 2022-01-18 NOTE — ED NOTES
Pt arrived to [address] [place] [address] at this time in stable condition with daughter at bedside.       Ezekiel Harris RN  01/18/22 1502

## 2022-01-18 NOTE — CONSULTS
CRITICAL CARE PROGRESS NOTE      Patient:  Evie Manuel    Unit/Bed:E2-04/E2-04  YOB: 1940  MRN: [id number]   PCP: Grady Mitchell MD  Date of Admission: 1/18/2022  Chief Complaint:-Headache    Assessment and Plan:      Acute intracranial hemorrhage: Status post TXA. TEG pending. Frequent neurochecks. Seizure precautions. Neurosurgery following. Mechanical fall: Denies loss of consciousness. Denies dizziness prior to fall. PT/OT  Urinary tract infection present on arrival: Culture pending. Rocephin initiated day #1. Hypertension: Noted to be hypertensive on arrival.  Patient started on Cardene drip. Maintain systolic blood pressure less than 160. Arthritis: Noted. INITIAL H AND P AND ICU COURSE:  Jesus Christensen is an 75-year-old white female who presented to [facility] on 1/18/2022 as a transfer from [facility] ambulatory care for subdural hematoma after suffering a fall at home. She has a past medical history of lifetime non-smoker, hypertension, arthritis. Per report patient states that she fell this morning while she was trying to change a light bulb in her bathroom. She stated she was standing on a stool and she ended up falling backwards and hitting her head on the toilet. She denies any loss of consciousness. She stated she was able to get up and called her family to go to the emergency department. She does take a baby aspirin daily. She did receive 11 staples to her scalp at [facility] facility. She did states she has a dull headache but denies any other pain. CT head and neck were completed. CT head did show him a intracranial hemorrhage on the right frontal.  CT cervical spine negative. She was noted to have a laceration on her lower occipital scalp which was stapled at [facility] facility. She was started on a Cardene drip in the emergency department for accelerated hypertension. Past Medical History: Per HPI  Family History:  Mother: Lymphoma Father: Prostate  Social History: Lifetime non-smoker, social alcohol use, denies drug use    Review of Systems   Constitutional: Negative for fatigue and fever. HENT: Negative for sore throat and trouble swallowing. Eyes: Negative for photophobia and visual disturbance. Respiratory: Negative for chest tightness, shortness of breath and wheezing. Cardiovascular: Negative for chest pain and leg swelling. Gastrointestinal: Negative for abdominal pain, nausea and vomiting. Endocrine: Negative for polydipsia and polyphagia. Genitourinary: Negative for decreased urine volume and flank pain. Musculoskeletal: Negative for back pain and neck pain. Skin: Negative for color change, pallor and rash. Allergic/Immunologic: Negative for food allergies. Neurological: Positive for headaches. Negative for dizziness, weakness and light-headedness. Hematological: Negative for adenopathy. Psychiatric/Behavioral: Negative for agitation and confusion. The patient is not nervous/anxious. Scheduled Meds:   sodium chloride flush  10 mL IntraVENous 2 times per day    docusate sodium  100 mg Oral Daily    famotidine  20 mg Oral Daily    [START ON 1/19/2022] cefTRIAXone (ROCEPHIN) IV  1,000 mg IntraVENous Q24H     Continuous Infusions:   niCARdipine Stopped (01/18/22 1545)    sodium chloride      sodium chloride 50 mL/hr at 01/18/22 1540       PHYSICAL EXAMINATION:  T:  97.6. P:  71. RR:  25. B/P:  132/67. FiO2:  0. O2 Sat:  96.  I/O:  0  Body mass index is 31.53 kg/m². GCS: 15  General:   Acute on chronically ill-appearing white female  HEENT:  normocephalic and + traumatic. No scleral icterus. PERR  Neck: supple. No Thyromegaly. Lungs: clear to auscultation. No retractions  Cardiac: RRR. No JVD. Abdomen: soft. Nontender. Extremities:  No clubbing, cyanosis, or edema x 4. Vasculature: capillary refill < 3 seconds. Palpable dorsalis pedis pulses. Skin:  warm and dry.   Psych:  Alert and oriented x3. Affect appropriate  Lymph:  No supraclavicular adenopathy. Neurologic:  No focal deficit. No seizures. Data: (All radiographs, tracings, PFTs, and imaging are personally viewed and interpreted unless otherwise noted). Sodium 140, chloride 104, creatinine 1.2, anion gap 9.0, glucose 115. WBC 8.2, hemoglobin 13.7, hematocrit 41.9, platelet count 396  Telemetry shows NSR   CT cervical spine negative for acute fracture  CT head 10/18/2022 reports acute hemorrhage over the right frontal and temporal lobes which has mass-effect on the right frontal lobe. Echocardiogram 9/7/2016 reports ejection fraction 55%, normal LV function, grade 1 diastolic dysfunction       Meets Continued ICU Level Care Criteria:    [x] Yes   [] No - Transfer Planned to listed location:  [] HOSPITALIST CONTACTED-      Case and plan discussed with Dr. Walter White, trauma service and Dr. Lisha Aguirre. Electronically signed by Rosa Larios. LEONEL Plunkett - CNP  CRITICAL CARE SPECIALIST  Patient seen by me. Case discussed with NP. Continue with BP control and monitor CT. Italicized font represents changes to the note made by me. CC time 35 minutes. Time was discontiguous. Time does not include procedures. Time does include my direct assessment of the patient and coordination of care.   Electronically signed by Trude Bence, MD on 1/19/2022 at 4:57 PM

## 2022-01-18 NOTE — ED PROVIDER NOTES
[facility] ENCOUNTER          Pt Name: Carmen Acosta  MRN: [id number]  [place] 1940  Date of evaluation: 1/18/2022  Treating Resident Physician: Maria Guadalupe Lopes MD  Supervising Physician: Dr Mohinder Alvarado       Chief Complaint   Patient presents with   Deb Larger Fall    Laceration     History obtained from the patient. HISTORY OF PRESENT ILLNESS    HPI  Carmen Acosta is a 78 y.o. female with past medical history of arthritis and hypertension who takes a baby aspirin daily [facility] she was brought in for a fall and head injury. CT imaging over there of the head and neck showed a subdural hematoma was in here for further evaluation. She describes sitting on a children stool fixing a light bulb earlier today when she fell backwards hitting her head on the toilet, there is no loss of conscious she did states she was slightly lightheaded when the incident occurred per denies any other complaints of pain. Denies any chest pain shortness of breath, nausea or vomiting. She had an occipital scalp laceration and hematoma repaired with 11 staples. No active bleeding upon arrival    The patient has no other acute complaints at this time. REVIEW OF SYSTEMS   Review of Systems   Constitutional: Negative for chills and fever. HENT: Negative for rhinorrhea, sinus pain and sore throat. Eyes: Negative for redness. Respiratory: Negative for cough and shortness of breath. Cardiovascular: Negative for chest pain. Gastrointestinal: Negative for abdominal pain, diarrhea, nausea and vomiting. Genitourinary: Negative for dysuria. Musculoskeletal: Negative for back pain. Skin: Negative for rash. Neurological: Positive for headaches. Negative for light-headedness. Psychiatric/Behavioral: Negative for agitation.          PAST MEDICAL AND SURGICAL HISTORY     Past Medical History:   Diagnosis Date    Arthritis     Hypertension      Past Surgical History:   Procedure Laterality Date    CATHETER REMOVAL Bilateral     ELBOW SURGERY  07/04/2018    FOOT SURGERY Left 2012    KNEE SURGERY Right 7-9-14    total    NE BX OF BREAST, NEEDLE CORE, IMAGE GUIDE Left 2016    benign    NE OFFICE/OUTPT VISIT,PROCEDURE ONLY Left 7/5/2018    ORIF LEFT ELBOW performed by Viktor Briggs MD at [address]     Current Facility-Administered Medications:     niCARdipine (CARDENE) 25 mg in dextrose 5 % 250 mL infusion, 3-15 mg/hr, IntraVENous, Continuous, Yonatan Cardozo MD, Stopped at 01/18/22 1541    sodium chloride flush 0.9 % injection 10 mL, 10 mL, IntraVENous, 2 times per day, Calib Carvalho, PA-C    sodium chloride flush 0.9 % injection 10 mL, 10 mL, IntraVENous, PRN, Calib Carvalho, PA-C    0.9 % sodium chloride infusion, 25 mL, IntraVENous, PRN, Calib Carvalho, PA-C    ondansetron (ZOFRAN-ODT) disintegrating tablet 4 mg, 4 mg, Oral, Q8H PRN **OR** ondansetron (ZOFRAN) injection 4 mg, 4 mg, IntraVENous, Q6H PRN, Calib Carvalho, PA-C    0.9 % sodium chloride infusion, , IntraVENous, Continuous, Nasreen Vela APRN - CNP, Last Rate: 50 mL/hr at 01/18/22 1540, New Bag at 01/18/22 1540    docusate sodium (COLACE) capsule 100 mg, 100 mg, Oral, Daily, Calib Carvalho, PA-C, 100 mg at 01/18/22 1646    polyethylene glycol (GLYCOLAX) packet 17 g, 17 g, Oral, Daily PRN, Calib Carvalho, PA-C    famotidine (PEPCID) tablet 20 mg, 20 mg, Oral, Daily, Calib Carvalho, PA-C, 20 mg at 01/18/22 1646    [START ON 1/19/2022] cefTRIAXone (ROCEPHIN) 1000 mg IVPB in 50 mL D5W minibag, 1,000 mg, IntraVENous, Q24H, Nasreen Vela APRN - CNP    acetaminophen (TYLENOL) tablet 650 mg, 650 mg, Oral, Q4H PRN, LEONEL Johnson - CNP, 650 mg at 01/18/22 1638    tranexamic acid (CYKLOKAPRON) 1,000 mg in sodium chloride 0.9 % 100 mL IVPB, 1,000 mg, IntraVENous, Once, Yasmany Rodriguez MD, Last Rate: 12.5 mL/hr at 01/18/22 1900, Rate Verify at 01/18/22 1900      SOCIAL HISTORY     Social History     Social History Narrative    Not on file     Social History     Tobacco Use    Smoking status: Never Smoker    Smokeless tobacco: Never Used   Substance Use Topics    Alcohol use: Yes     Comment: very very rare    Drug use: No         ALLERGIES     Allergies   Allergen Reactions    Pcn [Penicillins] Itching     redness @ IM site         FAMILY HISTORY     Family History   Problem Relation Age of Onset   [facility] Cancer Mother         lymphoma    Cancer Father         prostate and bone    Heart Disease Sister     Heart Disease Brother     Atrial Fibrillation Daughter     Hypertension Daughter     Elevated Lipids Daughter     Breast Cancer Sister 78         PREVIOUS RECORDS   Previous records reviewed: Patient was seen here on 7/5/2018 for closed traumatic fracture of olecranon. PHYSICAL EXAM     ED Triage Vitals   BP Temp Temp Source Pulse Resp SpO2 Height Weight   01/18/22 0907 01/18/22 1211 01/18/22 1211 01/18/22 0907 01/18/22 0907 01/18/22 0907 01/18/22 1214 01/18/22 1214   (!) 146/75 98 °F (36.7 °C) Oral 71 18 98 % 5' 3\" (1.6 m) 178 lb (80.7 kg)     Initial vital signs and nursing assessment reviewed and normal. Pulsoximetry is normal per my interpretation. Additional Vital Signs:  Vitals:    01/18/22 1930   BP: (!) 119/57   Pulse: 67   Resp: (!) 31   Temp:    SpO2: 97%       Physical Exam  Vitals and nursing note reviewed. Constitutional:       General: She is in acute distress. Appearance: Normal appearance. She is not toxic-appearing. HENT:      Head:      Comments: Occipital hematoma with 11 staples, no active bleeding     Right Ear: Tympanic membrane, ear canal and external ear normal.      Left Ear: Tympanic membrane, ear canal and external ear normal.      Nose: Nose normal.      Mouth/Throat:      Mouth: Mucous membranes are moist.      Pharynx: Oropharynx is clear.    Eyes:      General: No scleral icterus. Extraocular Movements: Extraocular movements intact. Conjunctiva/sclera: Conjunctivae normal.      Pupils: Pupils are equal, round, and reactive to light. Neck:      Comments: No C-spine midline tenderness palpation no step-offs or deformities noted. Cardiovascular:      Rate and Rhythm: Normal rate and regular rhythm. Pulses: Normal pulses. Heart sounds: Normal heart sounds. No murmur heard. Pulmonary:      Effort: Pulmonary effort is normal. No respiratory distress. Breath sounds: Normal breath sounds. No wheezing or rales. Comments: No chest wall bruising no chest wall crepitus  Chest:      Chest wall: No tenderness. Abdominal:      General: Abdomen is flat. There is no distension. Palpations: Abdomen is soft. Tenderness: There is no abdominal tenderness. There is no guarding or rebound. Comments: Pelvis is stable no abdominal bruising noted   Musculoskeletal:         General: Normal range of motion. Cervical back: Normal range of motion and neck supple. No rigidity or tenderness. No muscular tenderness. Comments: No T-spine or L-spine tenderness palpation midline no step-offs or deformities noted no flank bruising    Lymphadenopathy:      Cervical: No cervical adenopathy. Skin:     General: Skin is warm and dry. Capillary Refill: Capillary refill takes less than 2 seconds. Coloration: Skin is not jaundiced. Neurological:      General: No focal deficit present. Mental Status: She is alert and oriented to person, place, and time. GCS: GCS eye subscore is 4. GCS verbal subscore is 5. GCS motor subscore is 6. Cranial Nerves: No cranial nerve deficit. Sensory: No sensory deficit. Motor: No weakness. Psychiatric:         Mood and Affect: Mood normal.         Behavior: Behavior normal.           MEDICAL DECISION MAKING   Initial Assessment:  This is an 76-year-old female who takes a baby aspirin daily who fell off of a 1 foot tall child still hitting her head on a toy with no loss of conscious. Was seen in outlying emergency department underwent CT head and CT C-spine CT head showed a subdural hematoma. She was sent here for further evaluation and management. She was a GCS 15 upon arrival primary survey is intact. Secondary survey shows occipital hematoma with laceration repair with 11 staples. Not actively bleeding. She is slightly hypertensive and nicardipine infusion was ordered. Neurosurgery will be contacted. Trauma was at the bedside. MDM:   Neurosurgery was consulted by trauma, she will be admitted for further management. She is on nicardipine infusion to control blood pressure within normal parameters. Head of the bed was elevated. Nausea medications ordered as needed. She continues to have a GCS 15 with no focal neurological deficits at this time.         ED RESULTS   Laboratory results:  Labs Reviewed   CBC WITH AUTO DIFFERENTIAL - Abnormal; Notable for the following components:       Result Value    MCHC 32.7 (*)     All other components within normal limits   COMPREHENSIVE METABOLIC PANEL - Abnormal; Notable for the following components:    Glucose 115 (*)     BUN 27 (*)     All other components within normal limits   URINE RT REFLEX TO CULTURE - Abnormal; Notable for the following components:    Blood, Urine TRACE (*)     Nitrite, Urine POSITIVE (*)     Leukocyte Esterase, Urine SMALL (*)     All other components within normal limits   GLOMERULAR FILTRATION RATE, ESTIMATED - Abnormal; Notable for the following components:    GFR, Estimated 46 (*)     All other components within normal limits   TEG, RAPID CITRATED - Abnormal; Notable for the following components:    ACT .0 (*)     All other components within normal limits   CULTURE, REFLEXED, URINE    Narrative:     Source: Specimen not received       Site:           Current Antibiotics:   TROPONIN   ANION [facility]   BASIC METABOLIC PANEL W/ REFLEX TO MG FOR LOW K   CBC       Radiologic studies results:  CT CERVICAL SPINE WO CONTRAST   Final Result   Multilevel degenerative changes with no acute fracture. **This report has been created using voice recognition software. It may contain minor errors which are inherent in voice recognition technology. **      Final report electronically signed by Dr Naa Shea on 1/18/2022 10:05 AM      CT HEAD WO CONTRAST   Final Result       1. Acute extra-axial hemorrhage over the right frontal and temporal lobes with mild mass effect upon the right frontal lobe. 2. Mild atrophy and dilatation of the lateral ventricles. 3. Probable ischemic changes in the white matter. 4. Results called to Dr. Bairon Pineda at 10.11 AM.. **This report has been created using voice recognition software. It may contain minor errors which are inherent in voice recognition technology. **      Final report electronically signed by DR Elsa Ochoa on 1/18/2022 10:12 AM      CT HEAD WO CONTRAST    (Results Pending)       ED Medications administered this visit:   Medications   niCARdipine (CARDENE) 25 mg in dextrose 5 % 250 mL infusion (0 mg/hr IntraVENous Stopped 1/18/22 1545)   sodium chloride flush 0.9 % injection 10 mL (has no administration in time range)   sodium chloride flush 0.9 % injection 10 mL (has no administration in time range)   0.9 % sodium chloride infusion (has no administration in time range)   ondansetron (ZOFRAN-ODT) disintegrating tablet 4 mg (has no administration in time range)     Or   ondansetron (ZOFRAN) injection 4 mg (has no administration in time range)   0.9 % sodium chloride infusion ( IntraVENous New Bag 1/18/22 1540)   docusate sodium (COLACE) capsule 100 mg (100 mg Oral Given 1/18/22 1646)   polyethylene glycol (GLYCOLAX) packet 17 g (has no administration in time range)   famotidine (PEPCID) tablet 20 mg (20 mg Oral Given 1/18/22 1646)   cefTRIAXone (ROCEPHIN) 1000 mg IVPB in 50 mL D5W minibag (has no administration in time range)   acetaminophen (TYLENOL) tablet 650 mg (650 mg Oral Given 1/18/22 1638)   tranexamic acid (CYKLOKAPRON) 1,000 mg in sodium chloride 0.9 % 100 mL IVPB ( IntraVENous Rate/Dose Verify 1/18/22 1900)   lidocaine 1 % injection 5 mL (5 mLs IntraDERmal Given 1/18/22 0928)   Tetanus-Diphth-Acell Pertussis (BOOSTRIX) injection 0.5 mL (0.5 mLs IntraMUSCular Given 1/18/22 1243)   cefTRIAXone (ROCEPHIN) 1000 mg IVPB in 50 mL D5W minibag (0 mg IntraVENous Stopped 1/18/22 1335)   tranexamic acid (CYKLOKAPRON) 1,000 mg in sodium chloride 0.9 % 100 mL IVPB (0 mg IntraVENous Stopped 1/18/22 1640)         ED COURSE     ED Course as of 01/18/22 1953 Tue Jan 18, 2022   1221 WBC: 8.2 [AL]   1222 Hemoglobin Quant: 13.7 [AL]   1222 Hematocrit: 41.9 [AL]   1222 Platelet Count: 756 [AL]   1222 Troponin I: < 0.017 [AL]   1222 Anion Gap: 11.0 [AL]   1222 GFR Est(!): 46 [AL]   1222 Glucose(!): 115 [AL]   1222 BUN(!): 27 [AL]   1222 Nitrite, Urine(!): POSITIVE [AL]   1222 Leukocyte Esterase, Urine(!): SMALL [AL]   1222 Bacteria, UA: MANY [AL]      ED Course User Index  [AL] Beba Allen MD       MEDICATION CHANGES     Current Discharge Medication List            FINAL DISPOSITION     Final diagnoses:   Subdural hematoma ([address]   Laceration of scalp, initial encounter   Acute UTI     Condition: condition: stable  Dispo: Admit to CCU/ICU      This transcription was electronically signed. Parts of this transcriptions may have been dictated by use of voice recognition software and electronically transcribed, and parts may have been transcribed with the assistance of an ED scribe. The transcription may contain errors not detected in proofreading. Please refer to my supervising physician's documentation if my documentation differs.     Electronically Signed: Beba Allen MD, 01/18/22, 7:53 PM          Beba Allen MD  Resident  01/18/22 6348

## 2022-01-18 NOTE — ED NOTES
Pt tearful, states \"I just feel so silly about this whole situation\". Pt given TLC. Vitals stable. Neurologically intact.       Em Traore RN  01/18/22 4882

## 2022-01-18 NOTE — ED PROVIDER NOTES
[facility]  eMERGENCY dEPARTMENT eNCOUnter             [address] COMPLAINT    Chief Complaint   Patient presents with    Fall    Laceration       Nurses Notes reviewed and I agree except as noted in the HPI. HPI    Cl Mcgowan is a 78 y.o. female who presents for evaluation and treatment of injury to the occipital scalp. She states that just prior to arrival, she was standing on a stool, changing a light bulb in her bathroom, lost her balance, and fell backward, striking her occiput against the edge of the toilet. She sustained a 2.5 cm laceration to the occipital area. She states that she \"saw stars\", but did not pass out. She was able to get herself up. Her only complaint is pain in the back of her head. She denies any injury to her back. She denies any neck pain. She denies any extremity injury. Current pain level is estimated 3/10. She declines any pain medication. Last tetanus shot was in 2018, when she fell and had a scalp laceration and a complex left elbow fracture. REVIEW OF SYSTEMS      Review of Systems   Constitutional: Negative. HENT:        Occipital pain and laceration   Eyes: Negative for blurred vision. Respiratory: Negative for shortness of breath. Cardiovascular: Negative for chest pain and palpitations. Gastrointestinal: Negative for nausea and vomiting. Musculoskeletal: Positive for falls. Negative for back pain and neck pain. Neurological: Positive for headaches. Negative for dizziness, focal weakness and loss of consciousness. Endo/Heme/Allergies: Does not bruise/bleed easily. PAST MEDICAL HISTORY     has a past medical history of Arthritis and Hypertension. SURGICAL HISTORY     has a past surgical history that includes Foot surgery (Left, 2012); knee surgery (Right, 7-9-14); Catheter Removal (Bilateral, ); pr office/outpt visit,procedure only (Left, 7/5/2018);  Elbow surgery (07/04/2018); and pr bx of breast, needle core, image guide (Left, 2016). CURRENT MEDICATIONS    Previous Medications    ASCORBIC ACID (VITAMIN C) 500 MG TABLET    Take 500 mg by mouth daily. ASPIRIN 81 MG TABLET    Take 81 mg by mouth daily. CALCIUM CARB-CHOLECALCIFEROL (CALCIUM PLUS VITAMIN D) 600-100 MG-UNIT CAPS    Take  by mouth daily. 2 tablet in morning  /  1 tablets in evening    HYDROCHLOROTHIAZIDE (HYDRODIURIL) 12.5 MG TABLET    Take 1 tablet by mouth daily    HYDROCORTISONE (ANUSOL-HC) 2.5 % CREA RECTAL CREAM    Use 2-3 times per day as needed. LISINOPRIL (PRINIVIL;ZESTRIL) 40 MG TABLET    Take 1 tablet by mouth daily    METOPROLOL TARTRATE (LOPRESSOR) 50 MG TABLET    TAKE 1 TABLET BY MOUTH TWICE DAILY       ALLERGIES    is allergic to pcn [penicillins]. FAMILY HISTORY    She indicated that her mother is . She indicated that her father is . She indicated that only one of her two sisters is alive. She indicated that her brother is alive. She indicated that her daughter is alive. family history includes Atrial Fibrillation in her daughter; Breast Cancer (age of onset: 78) in her sister; Cancer in her father and mother; Elevated Lipids in her daughter; Heart Disease in her brother and sister; Hypertension in her daughter. SOCIAL HISTORY     reports that she has never smoked. She has never used smokeless tobacco. She reports current alcohol use. She reports that she does not use drugs. PHYSICAL EXAM       INITIAL VITALS: BP (!) 152/74   Pulse 70   Resp 16   LMP  (LMP Unknown)   SpO2 99%      Physical Exam  Vitals and nursing note reviewed. Exam conducted with a chaperone present. Constitutional:       General: She is not in acute distress. HENT:      Head:      Comments: 2. 5.cm laceration horizontally oriented on the occipital scalp, no active bleeding. Eyes:      Pupils: Pupils are equal, round, and reactive to light. Musculoskeletal:         General: No signs of injury. Cervical back: Neck supple. No tenderness. Skin:     General: Skin is warm and dry. Neurological:      General: No focal deficit present. Mental Status: She is alert and oriented to person, place, and time. Psychiatric:         Behavior: Behavior normal.         RADIOLOGY:    CT CERVICAL SPINE WO CONTRAST   Final Result   Multilevel degenerative changes with no acute fracture. **This report has been created using voice recognition software. It may contain minor errors which are inherent in voice recognition technology. **      Final report electronically signed by Dr Beronica Ley on 1/18/2022 10:05 AM      CT HEAD WO CONTRAST   Final Result       1. Acute extra-axial hemorrhage over the right frontal and temporal lobes with mild mass effect upon the right frontal lobe. 2. Mild atrophy and dilatation of the lateral ventricles. 3. Probable ischemic changes in the white matter. 4. Results called to Dr. Antonio Hernandez at 10.11 AM.. **This report has been created using voice recognition software. It may contain minor errors which are inherent in voice recognition technology. **      Final report electronically signed by DR Martha Blevins on 1/18/2022 10:12 AM        LAB:    Labs Reviewed   CBC WITH AUTO DIFFERENTIAL - Abnormal; Notable for the following components:       Result Value    MCHC 32.7 (*)     All other components within normal limits   COMPREHENSIVE METABOLIC PANEL - Abnormal; Notable for the following components:    Glucose 115 (*)     BUN 27 (*)     All other components within normal limits   URINE RT REFLEX TO CULTURE - Abnormal; Notable for the following components:    Blood, Urine TRACE (*)     Nitrite, Urine POSITIVE (*)     Leukocyte Esterase, Urine SMALL (*)     All other components within normal limits   GLOMERULAR FILTRATION RATE, ESTIMATED - Abnormal; Notable for the following components:    GFR, Estimated 46 (*)     All other components within normal limits TROPONIN   ANION GAP       Vitals:    Vitals:    01/18/22 0907 01/18/22 1109 01/18/22 1115 01/18/22 1130   BP: (!) 146/75 (!) 166/62 (!) 166/62 (!) 152/74   Pulse: 71 67 68 70   Resp: 18 18 16 16   SpO2: 98% 100% 99% 99%       EMERGENCY DEPARTMENT COURSE:    Saline lock initiated, wound repaired as below. She remains neurologically stable, NIH stroke score 0,GCS 15. I spoke with Dr. Sulma Bishop, on-call for trauma, staff responding for Dr. Emilie Chao, on-call for neurosurgery, and Dr. Moy Barrientos in the emergency department. The patient is being sent to the [facility] emergency department for trauma evaluation. Ambulance transport arranged. CRITICAL CARE:     30 min        PROCEDURES:    Consent: verbal patient  Wound Location: occipital scalp  Wound Description: linear,gaping,horizontal  Wound Length: 3. 5.cm  Antiseptic prep solution: wound wash  Sterile drapes use, aseptic technique used throughout. Local anesthetic: 1% Lidocaine 7 ml  Debridement: none  Staples # 11  Dressing: none  Adacel: given 2018  Antibiotic: none        FINAL IMPRESSION      1. Subdural hematoma ([address]    2. Laceration of scalp, initial encounter        DISPOSITION/PLAN    DISPOSITION Decision To Transfer 01/18/2022 11:02:44 AM to [facility] emergency department, care of Dr. Moy Barrientos.     (Please note that portions of this note were completed with a voice recognition program.  Efforts were made to edit the dictations but occasionally words are mis-transcribed.)      Wily Kumari MD  01/18/22 2861

## 2022-01-18 NOTE — ED NOTES
Transported to [facility] per [facility] (to go directly to the ER) in stable condition. Report to EMS. INT intact to the right antecubital, no redness or swelling at site. Copy of chart sent with squad. Patient alert and cooperative. Skin warm and dry, color normal for ethnicity. Family enroute to [facility] with patient in squad. Report called to ED.      Yessy Verdin RN  01/18/22 8320

## 2022-01-18 NOTE — CONSULTS
[facility] [address]                                          NEUROSURGICAL CONSULTATION NOTE       Guilherme Brumfield   YOB: 1940  Account Number: [de-identified]   Date of Examination: 1/18/2022    ASSESSMENT:    -This is an 78-year-old female s/p fall off a stool who underwent a brain CT that showed acute extra-axial hemorrhage over the right frontal and temporal lobes with mild mass effect upon the right frontal lobe    PLAN:  -Admit to the ICU. -Medical management's patient to primary/ICU team.  -A dose of TXA to be given to the patient.  -Seizure precaution.  -Coagulation profile. - Keep systolic blood pressure between 160 and 140.  -Stop any anticoagulation medication.  -New brain CT tomorrow morning ([facility] protocol). -Neurosurgery to follow. - The case was discussed in detail with patient, her family, with trauma team and the ICU team.    HISTORY OF PRESENT ILLNESS:  Guilherme Brumfield is a 78 y.o. female, admitted on :1/18/2022  9:02 AM  This is an 78-year-old female who was brought to the ER after patient had a fall. Patient was standing on a stool. Changing a light bulb in her bathroom when she lost her balance and fell backward. There is no obvious history of loss of consciousness. There is no history of new focal weakness or numbness. Upon patient arrival to the ER, patient underwent brain CT that was significant for:    Acute extra-axial hemorrhage over the right frontal and temporal lobes with mild mass effect upon the right frontal lobe. For this reason neurosurgery team was consulted. ROS:    Review of Systems   Constitutional: Negative for fever. HENT: Negative for congestion. Eyes: Negative for visual disturbance. Respiratory: Negative for chest tightness. Cardiovascular: Negative for chest pain. Gastrointestinal: Negative for abdominal pain. Genitourinary: Negative for difficulty urinating.    Skin: Positive for wound. Neurological: Negative for weakness. Psychiatric/Behavioral: Negative for confusion. PROBLEM LIST:  Patient Active Problem List   Diagnosis    Hypertension    Syncopal episodes    Primary osteoarthritis of right knee    Subdural hematoma (HCC)       MEDICATIONS:   Prior to Admission medications    Medication Sig Start Date End Date Taking? Authorizing Provider   hydroCHLOROthiazide (HYDRODIURIL) 12.5 MG tablet Take 1 tablet by mouth daily 1/4/22   Gustavo Alvarado MD   hydrocortisone (ANUSOL-HC) 2.5 % CREA rectal cream Use 2-3 times per day as needed. 11/8/21   Gustavo Alvarado MD   metoprolol tartrate (LOPRESSOR) 50 MG tablet TAKE 1 TABLET BY MOUTH TWICE DAILY 10/29/21   Gustavo Alvarado MD   lisinopril (PRINIVIL;ZESTRIL) 40 MG tablet Take 1 tablet by mouth daily 9/20/21 1/4/22  Gustavo Alvarado MD   aspirin 81 MG tablet Take 81 mg by mouth daily. Historical Provider, MD   ascorbic acid (VITAMIN C) 500 MG tablet Take 500 mg by mouth daily. Historical Provider, MD   Calcium Carb-Cholecalciferol (CALCIUM PLUS VITAMIN D) 600-100 MG-UNIT CAPS Take  by mouth daily.  2 tablet in morning  /  1 tablets in evening    Historical Provider, MD       Current Facility-Administered Medications   Medication Dose Route Frequency Provider Last Rate Last Admin    niCARdipine (CARDENE) 25 mg in dextrose 5 % 250 mL infusion  3-15 mg/hr IntraVENous Continuous Harpal MD Pepito 65 mL/hr at 01/18/22 1333 6.5 mg/hr at 01/18/22 1333    sodium chloride flush 0.9 % injection 10 mL  10 mL IntraVENous 2 times per day Calib Carvalho, PA-C        sodium chloride flush 0.9 % injection 10 mL  10 mL IntraVENous PRN Calib Carvalho, PA-C        0.9 % sodium chloride infusion  25 mL IntraVENous PRN Calib Caravlho, PA-C        ondansetron (ZOFRAN-ODT) disintegrating tablet 4 mg  4 mg Oral Q8H PRN Calib Carvalho, PA-C        Or    ondansetron (ZOFRAN) injection 4 mg  4 mg IntraVENous Q6H PRN Calib Carvalho, PA-C        0.9 % sodium chloride infusion   IntraVENous Continuous Nasreen Vela APRN - CNP 50 mL/hr at 01/18/22 1540 New Bag at 01/18/22 1540    docusate sodium (COLACE) capsule 100 mg  100 mg Oral Daily Calib Carvalho, PA-C        polyethylene glycol (GLYCOLAX) packet 17 g  17 g Oral Daily PRN Calib Carvalho, PA-C        famotidine (PEPCID) tablet 20 mg  20 mg Oral Daily Calib Carvalho, PA-C        tranexamic acid (CYKLOKAPRON) 1,000 mg in sodium chloride 0.9 % 100 mL IVPB  1,000 mg IntraVENous Once Calib Carvalho, PA-C 600 mL/hr at 01/18/22 1545 1,000 mg at 01/18/22 1545    [START ON 1/19/2022] cefTRIAXone (ROCEPHIN) 1000 mg IVPB in 50 mL D5W minibag  1,000 mg IntraVENous Q24H Nasreen Vela, APRN - CNP            sodium chloride flush, 10 mL, PRN  sodium chloride, 25 mL, PRN  ondansetron, 4 mg, Q8H PRN   Or  ondansetron, 4 mg, Q6H PRN  polyethylene glycol, 17 g, Daily PRN         niCARdipine 6.5 mg/hr (01/18/22 1333)    sodium chloride      sodium chloride 50 mL/hr at 01/18/22 1540         ALLERGIES:   Pcn [penicillins]    PAST MEDICAL  HISTORY:    has a past medical history of Arthritis and Hypertension. PAST SURGICAL  HISTORY:    has a past surgical history that includes Foot surgery (Left, 2012); knee surgery (Right, 7-9-14); Catheter Removal (Bilateral, ); pr office/outpt visit,procedure only (Left, 7/5/2018); Elbow surgery (07/04/2018); and pr bx of breast, needle core, image guide (Left, 2016).     SOCIAL HISTORY:   Social History     Tobacco Use    Smoking status: Never Smoker    Smokeless tobacco: Never Used   Substance Use Topics    Alcohol use: Yes     Comment: very very rare       FAMILY HISTORY:  Family History   Problem Relation Age of Onset   Tomced Grater Cancer Mother         lymphoma    Cancer Father         prostate and bone    Heart Disease Sister     Heart Disease Brother     Atrial Fibrillation Daughter     Hypertension Daughter     Elevated Lipids Daughter    Aaliyah Evens Sister 78 LABS  CBC:   Lab Results   Component Value Date    WBC 8.2 01/18/2022    RBC 4.48 01/18/2022    RBC 4.73 04/23/2012    HGB 13.7 01/18/2022    HCT 41.9 01/18/2022    MCV 93.5 01/18/2022    MCH 30.5 01/18/2022    MCHC 32.7 01/18/2022    RDW 12.5 01/18/2022     01/18/2022    MPV 8.1 01/18/2022     CMP:    Lab Results   Component Value Date     01/18/2022    K 4.4 01/18/2022     01/18/2022    CO2 27 01/18/2022    BUN 27 01/18/2022    CREATININE 1.2 01/18/2022    LABGLOM 53 11/23/2021    GLUCOSE 115 01/18/2022    GLUCOSE 89 08/18/2016    PROT 7.6 01/18/2022    LABALBU 4.2 01/18/2022    LABALBU 4.2 04/23/2012    CALCIUM 9.5 11/23/2021    BILITOT 0.6 01/18/2022    BILITOT NEGATIVE 04/23/2012    ALKPHOS 73 01/18/2022    AST 27 01/18/2022    ALT 25 01/18/2022     Warfarin PT/INR:  No components found for: PTPATWAR, PTINRWAR  PTT:  No results found for: APTT, PTT[APTT}    RADIOLOGY:  Pertinent images have been reviewed. 1. Acute extra-axial hemorrhage over the right frontal and temporal lobes with mild mass effect upon the right frontal lobe. 2. Mild atrophy and dilatation of the lateral ventricles. 3. Probable ischemic changes in the white matter. EXAMINATION:    Has laceration in th posterior aspect of her head. Patient awake alert and oriented x3  GCS: 15/15   Pupils: equal and reactive   FCx4 with good strength through out   Sensory: grossly intact  Reflexes: 2+ through out. Planter reflex: Down response.       *I spend a total of 70 minutes with greater than 60% of time spent face to face, counseling, coordinating care, examining patient, reviewing images and labs personally, and speaking with team.      Maya Lopez MD,MD  Electronically signed 1/18/2022

## 2022-01-18 NOTE — H&P
Trauma H&P     Patient:  Hodan Hassan  Admit date: 1/18/2022   YOB: 1940 Date of Evaluation: 1/18/2022  MRN: [id number]  Acct: [de-identified]    Injury Date:1/18/22  Injury time:morning  PCP: Yohannes Kraft MD   Referring physician: Dr. Moriah Rubalcava    Time of Trauma Surgeon Notification:  11:59 1/18/22  Time of ALEX Arrival:  12:10 1/18/22  Time of Trauma Surgeon Arrival: 7960 January 18, 2022    Assessment:    Active Problems:    Subdural hematoma ([facility] [address]  Resolved Problems:    * No resolved hospital problems.  *  Plan:    Patient admitted under Trauma Services w/ tele to ICU    Intracranial hemorrhage   - Neurosurgery consulted   - TXA per NSx   - Pain control   - Hold anticoagulation and antiplatelets   - Maintain systolic blood pressure 196-095   - Neuro checks   - Coagulation profile   - Seizure precaution   - Repeat CT head tomorrow morning    Closed head injury   - SLP cog eval   - Pain control   - Monitor for postconcussive symptoms    Scalp laceration   - Closed at outside facility via staples   - Pain control   - Booster ordered   - Local wound care    UTI   -UA positive for leukocytes and nitrite, many bacteria   -Urine culture pending   -Intensivist consulted, Rocephin initiated     Accelerated HTN   - Cardene gtt started in ED    - Intensivist consulted for management   - Maintain -160 for head bleed    Fall    - PT/OT   - Fall risk precautions    Chronic Issues: Arthritis and hypertension   -Intensivist consult for assistance medical management   -Resume home medication on admission    Consults: NSx, Intensivist     Pain Management   - Tylenol    Prophylaxis: SCD's, Incentive Spirometry, Colace, Pepcid, Zofran    General Diet    IVF Management  Regular Neurovascular Checks  Repeat Labs Tomorrow AM  PT/OT/SLP Eval and Treat  Activity as tolerated, pt up with assistance    Planned Discharge to pending clinical course       Activation: []Level I (Trauma Alert) [x]Level II (Injury Call) []Level III (Trauma Consult) [] Downgraded (Time:  Mode of Arrival: EMS transportation  Referring Facility: [facility]  Loss of Consciousness [x]No []Yes[]Unknown  Duration(min)  Mechanism of Injury:  []Motor Vehicle crash   []Single Vehicle [] []Passenger []Scene Fatality []Front Seat  []Restrained   []Air Bag Deployed   []Ejected []Rollover []Pedestrian []Trapped   Type of vehicle:   Protective Devices:   []Motorcycle  Wearing Helmet []Yes []No  []Bicycle  Wearing Helmet []Yes []No  [x]Fall   Distance - stool  []Assault    Abuse Reported []Yes []No  []Gunshot  []Stabbing  []Work Related  []Burn: []Flame []Scald []Electrical []Chemical []Contact []Inhalation []House Fire  []Other:   Patient Active Problem List   Diagnosis    Hypertension    Syncopal episodes    Primary osteoarthritis of right knee     Subjective   Chief Complaint: Intracranial hemorrhage    History of Present Illness: Patient is an 77-year-old female who presents to [facility] as a activation of a level two trauma following a fall. Patient transferred in from [facility] after CT head showed acute extra-axial hemorrhage. Patient reported she fell this morning try to change a light bulb in the bathroom when she was standing on a stool she ended up falling backwards hitting her head on the toilet. She denied loss of consciousness. Patient stated she was able to get up and called family to go to the emergency department. She denies taking anticoagulation but endorses baby aspirin. Patient stated she received 11 staples at outside facility. She denied receiving tetanus shot and stated she is unsure if last tetanus shot was within 10 years. Tetanus ordered here. Upon assessment patient endorsed having a slight dull headache but denied any other acute pain or complaints.   She denies any lightheadedness, dizziness, neck pain, back pain, chest pain, shortness of breath, abdominal pain, nausea/vomiting, pain in extremities, and paresthesias. On exam patient with horizontal laceration noted over lower occipital scalp with staples intact. No bleeding or drainage noted. No other signs of trauma noted on exam.  No midline cervical, thoracic, or lumbar tenderness to palpation. Chest and abdomen nontender. No extremity tenderness to palpation and range of motion intact. CT head and cervical spine reviewed. CT head reviewed acute extra-axial hemorrhage over the right frontal and temporal lobes with mild mass-effect upon the right frontal lobe. Hemorrhage measuring 6.5 x 2.9 x 0.8 cm in size. CT cervical spine negative for any acute fracture. Patient noted to have accelerated hypertension the emergency department and placement was placed on Cardizem drip. Plan for patient admitted under trauma surgery to the ICU with consults placed to intensivist and neurosurgery. Case discussed with neurosurgeon, Dr. Violet Campos. Dr. Violet Campos recommending TXA and repeat CT head tomorrow morning. Labs reviewed. No leukocytosis. Hemoglobin 13.7. Troponin normal.  UA positive for leukocytes and nitrites with many bacteria. Code status reviewed with patient prior admission, endorsed full code status and plans to discuss further discuss with daughter who is a nurse when she arrives. Case discussed with trauma surgeon, Dr. Walker Taveras. Review of Systems:   Review of Systems   Constitutional: Negative for chills, diaphoresis and fever. HENT: Negative for facial swelling, mouth sores and nosebleeds. Eyes: Negative for photophobia, pain and visual disturbance. Respiratory: Negative for shortness of breath, wheezing and stridor. Cardiovascular: Negative for chest pain and palpitations. Gastrointestinal: Negative for abdominal pain, nausea and vomiting. Musculoskeletal: Negative for arthralgias, back pain and neck pain. Skin: Positive for wound. Negative for pallor and rash.         Scalp laceration   Neurological: Positive for headaches. Negative for dizziness, light-headedness and numbness. Hematological: Does not bruise/bleed easily. Denies anticoagulation   Psychiatric/Behavioral: Negative for agitation, behavioral problems and confusion. Pcn [penicillins]  Past Surgical History:   Procedure Laterality Date    CATHETER REMOVAL Bilateral     ELBOW SURGERY  07/04/2018    FOOT SURGERY Left 2012    KNEE SURGERY Right 7-9-14    total    FL BX OF BREAST, NEEDLE CORE, IMAGE GUIDE Left 2016    benign    FL OFFICE/OUTPT VISIT,PROCEDURE ONLY Left 7/5/2018    ORIF LEFT ELBOW performed by Morenita Arana MD at [facility]     Past Medical History:   Diagnosis Date    Arthritis     Hypertension      Past Surgical History:   Procedure Laterality Date    CATHETER REMOVAL Bilateral     ELBOW SURGERY  07/04/2018    FOOT SURGERY Left 2012    KNEE SURGERY Right 7-9-14    total    FL BX OF BREAST, NEEDLE CORE, IMAGE GUIDE Left 2016    benign    FL OFFICE/OUTPT VISIT,PROCEDURE ONLY Left 7/5/2018    ORIF LEFT ELBOW performed by Morenita Arana MD at [address] [place], [place] [address] Marital status:       Spouse name: Not on file    Number of children: 11    Years of education: Not on file    Highest education level: Not on file   Occupational History    Not on file   Tobacco Use    Smoking status: Never Smoker    Smokeless tobacco: Never Used   Substance and Sexual Activity    Alcohol use: Yes     Comment: very very rare    Drug use: No    Sexual activity: Not on file   Other Topics Concern    Not on file   Social History Narrative    Not on file     Social Determinants of Health     Financial Resource Strain: Low Risk     Difficulty of Paying Living Expenses: Not hard at all   Food Insecurity: No Food Insecurity    Worried About [address] in the Last Year: Never true    [facility] in the Last Year: Never true Transportation Needs:     Lack of Transportation (Medical): Not on file    Lack of Transportation (Non-Medical): Not on file   Physical Activity:     Days of Exercise per Week: Not on file    Minutes of Exercise per Session: Not on file   Stress:     Feeling of Stress : Not on file   Social Connections:     Frequency of Communication with Friends and Family: Not on file    Frequency of Social Gatherings with Friends and Family: Not on file    Attends [employer] Services: Not on file    Active Member of [address] [place] [address] [employer] or Organizations: Not on file    Attends Club or Organization Meetings: Not on file    Marital Status: Not on file   Intimate Partner Violence:     Fear of Current or Ex-Partner: Not on file    Emotionally Abused: Not on file    Physically Abused: Not on file    Sexually Abused: Not on file   Housing Stability:     Unable to Pay for Housing in the Last Year: Not on file    Number of [place] in the Last Year: Not on file    Unstable Housing in the Last Year: Not on file     Family History   Problem Relation Age of Onset    Cancer Mother         lymphoma    Cancer Father         prostate and bone    Heart Disease Sister     Heart Disease Brother     Atrial Fibrillation Daughter     Hypertension Daughter     Elevated Lipids Daughter     Breast Cancer Sister 78       Home medications:    Previous Medications    ASCORBIC ACID (VITAMIN C) 500 MG TABLET    Take 500 mg by mouth daily. ASPIRIN 81 MG TABLET    Take 81 mg by mouth daily. CALCIUM CARB-CHOLECALCIFEROL (CALCIUM PLUS VITAMIN D) 600-100 MG-UNIT CAPS    Take  by mouth daily. 2 tablet in morning  /  1 tablets in evening    HYDROCHLOROTHIAZIDE (HYDRODIURIL) 12.5 MG TABLET    Take 1 tablet by mouth daily    HYDROCORTISONE (ANUSOL-HC) 2.5 % CREA RECTAL CREAM    Use 2-3 times per day as needed.     LISINOPRIL (PRINIVIL;ZESTRIL) 40 MG TABLET    Take 1 tablet by mouth daily    METOPROLOL TARTRATE (LOPRESSOR) 50 MG TABLET    TAKE 1 TABLET BY MOUTH TWICE DAILY       Hospital medications:  Scheduled Meds:   Tetanus-Diphth-Acell Pertussis  0.5 mL IntraMUSCular Once     Continuous Infusions:   niCARdipine       PRN Meds:ondansetron  Objective   ED TRIAGE VITALS  BP: (!) 167/78, Temp: 98 °F (36.7 °C), Pulse: 69, Resp: 18, SpO2: 98 %  [place] Coma Scale  Eye Opening: Spontaneous  Best Verbal Response: Oriented  Best Motor Response: Obeys commands  [place] Coma Scale Score: 15  Results for orders placed or performed during the hospital encounter of 01/18/22   CBC auto differential   Result Value Ref Range    WBC 8.2 4.8 - 10.8 thou/mm3    RBC 4.48 4.20 - 5.40 mill/mm3    Hemoglobin 13.7 12.0 - 16.0 gm/dl    Hematocrit 41.9 37.0 - 47.0 %    MCV 93.5 81.0 - 99.0 fL    MCH 30.5 27.0 - 31.0 pg    MCHC 32.7 (L) 33.0 - 37.0 gm/dl    RDW 12.5 11.5 - 14.5 %    Platelets 213 937 - 303 thou/mm3    MPV 8.1 7.4 - 10.4 fL    SEGS 72.0 43.0 - 75.0 %    Lymphocytes 16.8 15.0 - 47.0 %    Monocytes 8.0 0.0 - 12.0 %    Eosinophils % 2.6 0.0 - 4.0 %    Basophils 0.6 0.0 - 3.0 %   Comprehensive metabolic panel   Result Value Ref Range    Glucose 115 (H) 74 - 106 mg/dl    CREATININE 1.2 0.6 - 1.3 mg/dl    BUN 27 (H) 7 - 18 mg/dl    Sodium 142 136 - 145 meq/l    Potassium 4.4 3.5 - 5.1 meq/l    Chloride 104 98 - 107 meq/l    CO2 27 21 - 32 meq/l    POC CALCIUM 9.3 8.5 - 10.1 mg/dl    AST 27 15 - 37 U/L    Alkaline Phosphatase 73 46 - 116 U/L    Total Protein 7.6 6.4 - 8.2 gm/dl    Albumin 4.2 3.4 - 5.0 gm/dl    Total Bilirubin 0.6 0.2 - 1.0 mg/dl    ALT 25 14 - 63 U/L   Troponin   Result Value Ref Range    Troponin I < 0.017 0.017 - 0.056 ng/ml   Urinalysis Reflex to Culture    Specimen: Urine, clean catch   Result Value Ref Range    Glucose, Urine NEGATIVE NEGATIVE mg/dl    Bilirubin Urine NEGATIVE     Ketones, Urine NEGATIVE NEGATIVE    Specific Gravity, UA 1.010 1.002 - 1.030    Blood, Urine TRACE (A) NEGATIVE    pH, UA 6.5 5.0 - 9.0    Protein, UA NEGATIVE NEGATIVE mg/dl    Urobilinogen, Urine 0.2 0.0 - 1.0 eu/dl    Nitrite, Urine POSITIVE (A) NEGATIVE    Leukocyte Esterase, Urine SMALL (A) NEGATIVE    Color, UA YELLOW STRAW-YELLOW    Character, Urine SL CLOUDY CLEAR-SL CLOUD    RBC, UA 0-2 0-2/hpf /hpf    WBC, UA 10-15W/CLUMPS 0-4/hpf /hpf    Epithelial Cells, UA 3-5 3-5/hpf /hpf    Amorphous, UA NONE SEEN none seen    Mucus, UA THREADS none seen    Bacteria, UA MANY few/none seen    Casts UA NONE SEEN none seen /lpf    Crystals, UA NONE SEEN none seen    REFLEX TO URINE C & S INDICATED    Glomerular Filtration Rate, Estimated   Result Value Ref Range    GFR, Estimated 46 (A) ml/min/1.73m2   ANION GAP   Result Value Ref Range    Anion Gap 11.0 8.0 - 16.0 meq/l   EKG Fall   Result Value Ref Range    Ventricular Rate 63 BPM    Atrial Rate 63 BPM    P-R Interval 162 ms    QRS Duration 94 ms    Q-T Interval 428 ms    QTc Calculation (Bazett) 437 ms    P Axis 26 degrees    R Axis -23 degrees    T Axis 27 degrees       Physical Exam:  Patient Vitals for the past 24 hrs:   BP Temp Temp src Pulse Resp SpO2 Height Weight   01/18/22 1225 (!) 167/78   69 18 98 %     01/18/22 1214       5' 3\" (1.6 m) 178 lb (80.7 kg)   01/18/22 1211 (!) 178/68 98 °F (36.7 °C) Oral 69 18 99 %     01/18/22 1210 (!) 178/68 98 °F (36.7 °C) Oral 69 16 99 %     01/18/22 1130 (!) 152/74   70 16 99 %     01/18/22 1115 (!) 166/62   68 16 99 %     01/18/22 1109 (!) 166/62   67 18 100 %     01/18/22 0907 (!) 146/75   71 18 98 %       Primary Assessment:  Airway: Patent, trachea midline  Breathing: Breath sounds present and equal bilaterally, spontaneous, and unlabored  Circulation: Hemodynamically stable, 2+ central and peripheral pulses. Disability: CHERRY x 4, following commands. GCS =15    Secondary Assessment:  General: Alert, NAD, very pleasant and cooperative elderly female  Head: Normocephalic, mid face stable, Nares patent bilaterally, no epistaxis.   Mouth clear of foreign bodies, no lacerations or abrasions. Horizontal, linear laceration noted to occipital scalp with staples intact. No bleeding or drainage. Eyes: PERRL, EOMI, Nontraumatic  Neurologic: A & O x3. Following commands. CN 2-12 grossly intact. PMS intact in all 4 extremities. Strength 5/5 with  strength and plantar/dorsiflexion. No signs of focal neurological deficits. Neck: Trachea midline. Cervical spines NTTP midline, without step-offs, crepitus or deformity. Back:TL spines are NTTP midline, without step-offs, crepitus or deformity. No abrasions, contusions, or ecchymosis noted. Lungs: Clear to auscultation bilaterally. Chest Wall: Chest rise symmetrical.  Chest wall without tenderness to palpation. No crepitus, deformities, lacerations, or abrasions. Heart: RRR. Normal S1/S2. No obvious M/G/R. Abdomen:  Soft, NTTP. No guarding. Non-peritoneal.  Pelvis:  NTTP, stable to compression. Extremities: No gross deformities. PMS intact in all 4 extremities. No extremity tenderness to palpation. Range of motion intact. Radial /DP/PT pulses 2+ bilaterally. Skin: Skin warm and dry. Normal for ethnicity. Radiology:     CT CERVICAL SPINE WO CONTRAST   Final Result   Multilevel degenerative changes with no acute fracture. **This report has been created using voice recognition software. It may contain minor errors which are inherent in voice recognition technology. **      Final report electronically signed by Dr Ilan Coates on 1/18/2022 10:05 AM      CT HEAD WO CONTRAST   Final Result       1. Acute extra-axial hemorrhage over the right frontal and temporal lobes with mild mass effect upon the right frontal lobe. 2. Mild atrophy and dilatation of the lateral ventricles. 3. Probable ischemic changes in the white matter. 4. Results called to Dr. Jackson Narvaez at 10.11 AM.. **This report has been created using voice recognition software.  It may contain minor errors which are inherent in voice recognition technology. **      Final report electronically signed by DR Rafael Mason on 1/18/2022 10:12 AM        Fast Exam: No    Electronically signed by Criss Falcon PA-C on 1/18/2022 at 12:41 PM    Patient seen and examined independently by me early today. Above discussed and I agree with JESUS ALBERTO Correa. See my additional comments below for updated orders and plan. Labs, cultures, and radiographs where available were reviewed. I discussed patient concerns with the patient's nurse and instructions were given. Please see our orders for the updated patient care plan. -Admit for further care. Neurosurgical consultation. TXA per neurosurgery. Holding anticoagulation/antiplatelets. Neurovascular checks. A.m. labs. Seizure precautions. Repeat CT head in AM.  Speech therapy  for cognition evaluation. SCDs for DVT prophylaxis. Control hypertension with Cardene drip as needed. Intensivist consultation. Antibiotics for possible UTI. IV fluid hydration. Pain and nausea control. PT/OT when appropriate.   Electronically signed by Ferd Severance, MD on 1/18/22 at 5:27 PM EST

## 2022-01-19 ENCOUNTER — APPOINTMENT (OUTPATIENT)
Dept: CT IMAGING | Age: 82
DRG: 025 | End: 2022-01-19
Payer: MEDICARE

## 2022-01-19 ENCOUNTER — APPOINTMENT (OUTPATIENT)
Dept: GENERAL RADIOLOGY | Age: 82
DRG: 025 | End: 2022-01-19
Payer: MEDICARE

## 2022-01-19 LAB
ANION GAP SERPL CALCULATED.3IONS-SCNC: 10 MEQ/L (ref 8–16)
BUN BLDV-MCNC: 18 MG/DL (ref 7–22)
CALCIUM SERPL-MCNC: 8.8 MG/DL (ref 8.5–10.5)
CHLORIDE BLD-SCNC: 107 MEQ/L (ref 98–111)
CO2: 21 MEQ/L (ref 23–33)
CREAT SERPL-MCNC: 0.8 MG/DL (ref 0.4–1.2)
ERYTHROCYTE [DISTWIDTH] IN BLOOD BY AUTOMATED COUNT: 12.7 % (ref 11.5–14.5)
ERYTHROCYTE [DISTWIDTH] IN BLOOD BY AUTOMATED COUNT: 43.2 FL (ref 35–45)
GFR SERPL CREATININE-BSD FRML MDRD: 68 ML/MIN/1.73M2
GLUCOSE BLD-MCNC: 108 MG/DL (ref 70–108)
HCT VFR BLD CALC: 37.2 % (ref 37–47)
HEMOGLOBIN: 12.3 GM/DL (ref 12–16)
MCH RBC QN AUTO: 30.6 PG (ref 26–33)
MCHC RBC AUTO-ENTMCNC: 33.1 GM/DL (ref 32.2–35.5)
MCV RBC AUTO: 92.5 FL (ref 81–99)
PLATELET # BLD: 163 THOU/MM3 (ref 130–400)
PMV BLD AUTO: 10.7 FL (ref 9.4–12.4)
POTASSIUM REFLEX MAGNESIUM: 4.1 MEQ/L (ref 3.5–5.2)
RBC # BLD: 4.02 MILL/MM3 (ref 4.2–5.4)
SODIUM BLD-SCNC: 138 MEQ/L (ref 135–145)
WBC # BLD: 7 THOU/MM3 (ref 4.8–10.8)

## 2022-01-19 PROCEDURE — 2580000003 HC RX 258: Performed by: PHYSICIAN ASSISTANT

## 2022-01-19 PROCEDURE — 6360000002 HC RX W HCPCS: Performed by: NURSE PRACTITIONER

## 2022-01-19 PROCEDURE — 70450 CT HEAD/BRAIN W/O DYE: CPT

## 2022-01-19 PROCEDURE — 85027 COMPLETE CBC AUTOMATED: CPT

## 2022-01-19 PROCEDURE — 97129 THER IVNTJ 1ST 15 MIN: CPT

## 2022-01-19 PROCEDURE — 99232 SBSQ HOSP IP/OBS MODERATE 35: CPT | Performed by: SURGERY

## 2022-01-19 PROCEDURE — 97530 THERAPEUTIC ACTIVITIES: CPT

## 2022-01-19 PROCEDURE — APPSS30 APP SPLIT SHARED TIME 16-30 MINUTES: Performed by: PHYSICIAN ASSISTANT

## 2022-01-19 PROCEDURE — 99233 SBSQ HOSP IP/OBS HIGH 50: CPT | Performed by: INTERNAL MEDICINE

## 2022-01-19 PROCEDURE — 6370000000 HC RX 637 (ALT 250 FOR IP): Performed by: PHYSICIAN ASSISTANT

## 2022-01-19 PROCEDURE — 6370000000 HC RX 637 (ALT 250 FOR IP): Performed by: NURSE PRACTITIONER

## 2022-01-19 PROCEDURE — 36415 COLL VENOUS BLD VENIPUNCTURE: CPT

## 2022-01-19 PROCEDURE — 92523 SPEECH SOUND LANG COMPREHEN: CPT

## 2022-01-19 PROCEDURE — 2580000003 HC RX 258: Performed by: NURSE PRACTITIONER

## 2022-01-19 PROCEDURE — 97162 PT EVAL MOD COMPLEX 30 MIN: CPT

## 2022-01-19 PROCEDURE — 80048 BASIC METABOLIC PNL TOTAL CA: CPT

## 2022-01-19 PROCEDURE — 97166 OT EVAL MOD COMPLEX 45 MIN: CPT

## 2022-01-19 PROCEDURE — 2000000000 HC ICU R&B

## 2022-01-19 RX ORDER — LISINOPRIL 40 MG/1
40 TABLET ORAL DAILY
Status: DISCONTINUED | OUTPATIENT
Start: 2022-01-19 | End: 2022-01-24 | Stop reason: HOSPADM

## 2022-01-19 RX ORDER — METOPROLOL TARTRATE 50 MG/1
50 TABLET, FILM COATED ORAL 2 TIMES DAILY
Status: DISCONTINUED | OUTPATIENT
Start: 2022-01-19 | End: 2022-01-24 | Stop reason: HOSPADM

## 2022-01-19 RX ORDER — HYDROCHLOROTHIAZIDE 25 MG/1
12.5 TABLET ORAL DAILY
Status: DISCONTINUED | OUTPATIENT
Start: 2022-01-19 | End: 2022-01-24 | Stop reason: HOSPADM

## 2022-01-19 RX ADMIN — SODIUM CHLORIDE, PRESERVATIVE FREE 10 ML: 5 INJECTION INTRAVENOUS at 08:06

## 2022-01-19 RX ADMIN — CEFTRIAXONE SODIUM 1000 MG: 1 INJECTION, POWDER, FOR SOLUTION INTRAMUSCULAR; INTRAVENOUS at 12:37

## 2022-01-19 RX ADMIN — DOCUSATE SODIUM 100 MG: 100 CAPSULE ORAL at 08:06

## 2022-01-19 RX ADMIN — FAMOTIDINE 20 MG: 20 TABLET ORAL at 08:06

## 2022-01-19 RX ADMIN — ACETAMINOPHEN 650 MG: 325 TABLET ORAL at 20:31

## 2022-01-19 RX ADMIN — LISINOPRIL 40 MG: 40 TABLET ORAL at 20:24

## 2022-01-19 RX ADMIN — HYDROCHLOROTHIAZIDE 12.5 MG: 25 TABLET ORAL at 20:24

## 2022-01-19 RX ADMIN — METOPROLOL TARTRATE 50 MG: 50 TABLET, FILM COATED ORAL at 20:24

## 2022-01-19 RX ADMIN — ACETAMINOPHEN 650 MG: 325 TABLET ORAL at 06:11

## 2022-01-19 ASSESSMENT — ENCOUNTER SYMPTOMS
NAUSEA: 0
COLOR CHANGE: 0
SORE THROAT: 0
BACK PAIN: 0
ABDOMINAL PAIN: 0
VOMITING: 0
CHEST TIGHTNESS: 0
TROUBLE SWALLOWING: 0
SHORTNESS OF BREATH: 0
WHEEZING: 0
PHOTOPHOBIA: 0

## 2022-01-19 ASSESSMENT — PAIN DESCRIPTION - LOCATION: LOCATION: HEAD

## 2022-01-19 ASSESSMENT — PAIN SCALES - GENERAL
PAINLEVEL_OUTOF10: 2
PAINLEVEL_OUTOF10: 2
PAINLEVEL_OUTOF10: 0

## 2022-01-19 ASSESSMENT — PAIN DESCRIPTION - ORIENTATION: ORIENTATION: POSTERIOR

## 2022-01-19 NOTE — PROGRESS NOTES
CRITICAL CARE PROGRESS NOTE      Patient:  Guilherme Brumfield    Unit/Bed:E2-04/E2-04  YOB: 1940  MRN: [id number]   PCP: Christy Bui MD  Date of Admission: 1/18/2022  Chief Complaint:-Headache     Assessment and Plan:       Acute intracranial hemorrhage: Status post TXA. TEG normal.  Frequent neurochecks. Seizure precautions. Neurosurgery following. CT 1/19/2022 increasing subdural.  Repeat 1/20 for possible need for surgical intervention. Mechanical fall: Denies loss of consciousness. Denies dizziness prior to fall. PT/OT  Urinary tract infection present on arrival: Culture pending. Rocephin initiated day #2. Urine culture positive for greater than 100,000 gram-negative bacilli  Hypertension:  Resume home medications for hypertension. Arthritis: Noted. INITIAL H AND P AND ICU COURSE:  Sylwia Ace is an 22-year-old white female who presented to [facility] on 1/18/2022 as a transfer from [facility] ambulatory care for subdural hematoma after suffering a fall at home. She has a past medical history of lifetime non-smoker, hypertension, arthritis. Per report patient states that she fell this morning while she was trying to change a light bulb in her bathroom. She stated she was standing on a stool and she ended up falling backwards and hitting her head on the toilet. She denies any loss of consciousness. She stated she was able to get up and called her family to go to the emergency department. She does take a baby aspirin daily. She did receive 11 staples to her scalp at [facility] facility. She did states she has a dull headache but denies any other pain. CT head and neck were completed. CT head did show him a intracranial hemorrhage on the right frontal.  CT cervical spine negative. She was noted to have a laceration on her lower occipital scalp which was stapled at [facility] facility.   She was started on a Cardene drip in the emergency department for accelerated hypertension. 1/19 patient had repeat CT which was positive for increased bleeding. This was discussed with Dr. Willetta Sicard. Will repeat tomorrow for possible need for surgical intervention. Family was updated extensively at the bedside. Past Medical History: Per HPI  Family History: Mother: Lymphoma Father: Prostate  Social History: Lifetime non-smoker, social alcohol use, denies drug use    Review of Systems   Constitutional: Negative for fatigue and fever. HENT: Negative for sore throat and trouble swallowing. Eyes: Negative for photophobia and visual disturbance. Respiratory: Negative for chest tightness, shortness of breath and wheezing. Cardiovascular: Negative for chest pain and leg swelling. Gastrointestinal: Negative for abdominal pain, nausea and vomiting. Endocrine: Negative for polydipsia and polyphagia. Genitourinary: Negative for decreased urine volume and flank pain. Musculoskeletal: Negative for back pain and neck pain. Skin: Negative for color change, pallor and rash. Allergic/Immunologic: Negative for food allergies. Neurological: Negative for dizziness, weakness and numbness. Hematological: Negative for adenopathy. Psychiatric/Behavioral: Negative for agitation and confusion. The patient is not nervous/anxious. Scheduled Meds:   sodium chloride flush  10 mL IntraVENous 2 times per day    docusate sodium  100 mg Oral Daily    famotidine  20 mg Oral Daily    cefTRIAXone (ROCEPHIN) IV  1,000 mg IntraVENous Q24H     Continuous Infusions:   niCARdipine Stopped (01/18/22 1541)    sodium chloride      sodium chloride 50 mL/hr at 01/18/22 1540       PHYSICAL EXAMINATION:  T:  98.  P:  67. RR:  15. B/P:  141/63. FiO2:  0. O2 Sat:  94.  I/O:  769/1250  Body mass index is 31.53 kg/m². GCS: 15  General: Acute on chronically ill-appearing white female  HEENT:  normocephalic and +traumatic. No scleral icterus. PERR  Neck: supple. No Thyromegaly.   Lungs: clear to auscultation. No retractions  Cardiac: RRR. No JVD. Abdomen: soft. Nontender. Extremities:  No clubbing, cyanosis, or edema x 4. Vasculature: capillary refill < 3 seconds. Palpable dorsalis pedis pulses. Skin:  warm and dry. Psych:  Alert and oriented x3. Affect appropriate  Lymph:  No supraclavicular adenopathy. Neurologic:  No focal deficit. No seizures. Data: (All radiographs, tracings, PFTs, and imaging are personally viewed and interpreted unless otherwise noted). Sodium 138, potassium 4.1, chloride 107, CO2 21, BUN 18, creatinine 0.8, anion gap 10.0, glucose 108. WBC 7.0, hemoglobin 12.3, hematocrit 37.2, platelet count 020  Telemetry shows NSR   CT cervical spine negative for acute fracture  CT head 10/18/2022 reports acute hemorrhage over the right frontal and temporal lobes which has mass-effect on the right frontal lobe. Echocardiogram 9/7/2016 reports ejection fraction 55%, normal LV function, grade 1 diastolic dysfunction  CT head 1/19/2022 reports moderate right subdural hematoma with approximately 4 mm leftward shift. Meets Continued ICU Level Care Criteria:    [x] Yes   [] No - Transfer Planned to listed location:  [] HOSPITALIST CONTACTED-      Case and plan discussed with Dr. Amaris Carter and Dr. Jesus Leonardo        Electronically signed by Rolly Perez. LEONEL Lewis - [facility]  CRITICAL CARE SPECIALIST  Patient seen by me. Case discussed with nurse practitioner. Repeat CT scan head in the morning. Case discussed with Dr. Jesus Leonardo. Possible bur hole placement. Assess subdural size in the morning. .  Italicized font represents changes to the note made by me.     Electronically signed by Booker Hayward MD on 1/19/2022 at 5:23 PM

## 2022-01-19 NOTE — CARE COORDINATION
1/19/22, 7:29 AM EST  DISCHARGE PLANNING EVALUATION:    Jamin Gong       Admitted: 1/18/2022/ [facility] day: 1   Location: E2-04/E2-04 Reason for admit: Subdural hematoma ([address] [S06.5X9A]  Acute UTI [N39.0]  Laceration of scalp, initial encounter [S01.01XA]   PMH:  has a past medical history of Arthritis and Hypertension. Procedure: scalp lac stapled 1/18. CT head shows moderate right subdural hematoma w/ 4 mm leftward subfalcine herniation  Barriers to Discharge:  UA+.  IVF, IV roephin, tranexamic acid x 2 doses. Neurosurgery consult pending. PT/OT/SLP eval.   PCP: Justina Martinez MD  Readmission Risk Score: 10.1 ( )%    Patient Goals/Plan/Treatment Preferences: Met with Martha Dai and her son, they confirm she will return home independently. She does not use DME or have [facility] services. She denies needs for such and states her son lives across the road from her and can assist whenever needed. Will continue to follow for plan and possible needs. Transportation/Food Security/Housekeeping Addressed:  No issues identified.

## 2022-01-19 NOTE — PROGRESS NOTES
Patient arrived to unit from ER via ER RN. Patient transferred to ICU bed and placed on continuous ICU bedside monitor. Patient admitted for Subdural hematoma ([facility] [address] [S06.5X9A]  Acute UTI [N39.0]  Laceration of scalp, initial encounter [S01.01XA]. Vitals obtained. See flowsheets. Patient's IV access includes 20G R AC. Current infusions and rates of infusion include cardene 6.5. Assessment completed by Kasey Wagner. Two nurse skin assessment completed by Kasey Wagner and Shantal Ag RN. See flowsheets for assessment details. Policies and procedures of ICU able to be explained to patient at this time. Family member(s)/representative(s) present at time of admission include daughters. Patient rights explained to family member(s)/representatives and patient, as able. Patient/patient's family member(s)/representative(s) Requested to have physician notified of their admission. All questions posed by patient's family member(s)/representative(s) and patient answered at this time.

## 2022-01-19 NOTE — PROGRESS NOTES
Reviewed chart for SBIRT per trauma service. Attempted, pt currently with staff. Unable to complete.

## 2022-01-19 NOTE — PROGRESS NOTES
[address] [address]  INPATIENT PHYSICAL THERAPY  EVALUATION  STRZ SURGE OVERFLOW - E2-04/E2-04    Time In: 3395  Time Out: 5255  Timed Code Treatment Minutes: 15 Minutes  Minutes: 24          Date: 2022  Patient Name: Jesus Douglas,  Gender:  female        MRN: [id number]  : 1940  (78 y.o.)      Referring Practitioner: Joe Keller PA-C  Diagnosis: Subdural hematoma  Additional Pertinent Hx: 47-year-old white female who presented to [facility] on 2022 as a transfer from [facility] ambulatory care for subdural hematoma after suffering a fall at home. She has a past medical history of lifetime non-smoker, hypertension, arthritis. Per report patient states that she fell this morning while she was trying to change a light bulb in her bathroom. She stated she was standing on a stool and she ended up falling backwards and hitting her head on the toilet. She denies any loss of consciousness. She stated she was able to get up and called her family to go to the emergency department. She does take a baby aspirin daily. She did receive 11 staples to her scalp at [facility]. She did states she has a dull headache but denies any other pain. CT head and neck were completed. CT head did show him a intracranial hemorrhage on the right frontal.  CT cervical spine negative. She was noted to have a laceration on her lower occipital scalp which was stapled at [facility] facility. She was started on a Cardene drip in the emergency department for accelerated hypertension. Restrictions/Precautions:  Restrictions/Precautions: Fall Risk,General Precautions  Position Activity Restriction  Other position/activity restrictions: low stimulation guidelines    Subjective:  Chart Reviewed: Yes  Patient assessed for rehabilitation services?: Yes  Subjective: RN approved session. Pt resting in bed and agrees to therapy and ambulating.     General:  Overall Orientation Status: Within Functional Limits    Vision: Within Functional Limits    Hearing: Within functional limits         Pain: denies      Vitals: Nurse checked vitals prior to session    Social/Functional History:    Lives With: Alone  Type of Home: House  Home Layout: Two level,Performs ADL's on one level,Able to Live on Main level with bedroom/bathroom (Pt stating she occasionally goes to 2nd floor or her basement)  Home Access: Stairs to enter with rails  Entrance Stairs - Number of Steps: 3  Home Equipment:  (may be able to borrow cane from daughter)     Bathroom Shower/Tub: Tub/Shower unit,Walk-in shower  Bathroom Toilet: Handicap height  Bathroom Accessibility: Accessible  IADL Comments: Pt stating she was indep with all her homemaking tasks. 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 Pt stating she has family members that are retired who could assist her as able    OBJECTIVE:  Range of Motion:  Bilateral Lower Extremity: WNL    Strength:  Bilateral Lower Extremity: grossly 4/5    Balance:  Static Sitting Balance:  Supervision  Dynamic Sitting Balance: Supervision, Stand By Assistance  Static Standing Balance: Stand By Assistance  Dynamic Standing Balance: Contact Guard Assistance    Bed Mobility:  Supine to Sit: Stand By Assistance  Sit to Supine: Stand By Assistance     Transfers:  Sit to Stand: Stand By Assistance  Stand to Sit:Stand By Assistance    Ambulation:  Contact Guard Assistance  Distance: 240 ft  Surface: Level Tile  Device:Hand-Held Assist  Gait Deviations:   Forward Flexed Posture, Decreased Step Length Bilaterally, Decreased Gait Speed, Decreased Heel Strike Bilaterally, Unsteady Gait and did ambulate about 40 ft with no device or handhold and was much more unsteady than with Handhold. Recommend trial of cane for ambulation. Exercise:  Patient was guided in 1 set(s) 10 reps of exercise to both lower extremities. Ankle pumps, Glut sets, Quad sets, Heelslides, Hip abduction/adduction, Seated marches and Long arc quads. Exercises were completed for increased independence with functional mobility. Functional Outcome Measures: Completed  AM-PAC Inpatient Mobility Raw Score : 18  AM-PAC Inpatient T-Scale Score : 43.63    ASSESSMENT:  Activity Tolerance:  Patient tolerance of  treatment: good. Treatment Initiated: Treatment and education initiated within context of evaluation. Evaluation time included review of current medical information, gathering information related to past medical, social and functional history, completion of standardized testing, formal and informal observation of tasks, assessment of data and development of plan of care and goals. Treatment time included skilled education and facilitation of tasks to increase safety and independence with functional mobility for improved independence and quality of life. Assessment: Body structures, Functions, Activity limitations: Decreased functional mobility ,Decreased endurance,Decreased strength,Decreased balance  Assessment: Pt is a pleasant 79 yo female that fell and sustained a subdural hematoma and scalp laceration. Pt participated well with therapy and is at [facility] for safety with transfers and ambulation with hand-hold. Pt was Independent in OF and lived alone. Pt would benefit from continued skilled PT to address strengthening, balance, and safety with functional mobility.   Prognosis: Good,Excellent    REQUIRES PT FOLLOW UP: Yes    Discharge Recommendations:  Discharge Recommendations: Continue to assess pending progress,Patient would benefit from continued therapy after discharge    Patient Education:  PT Education: Nicolette Cagle of Care,Home Exercise Program    Equipment Recommendations: Other: monitor for needs    Plan:  Times per week: 6x N/T  Current Treatment Recommendations: Strengthening,Gait Training,Stair training,Balance Training,Functional Mobility Training,Endurance Training,Transfer Training,Safety Education & [employer] Exercise Program,Patient/Caregiver Education & Training,Equipment Evaluation, Education, & procurement    Goals:  Patient goals : go home  Short term goals  Time Frame for Short term goals: at discharge  Short term goal 1: Pt to be Mod I for supine <> sit to get in/out of bed  Short term goal 2: Pt to be Mod I for sit <> stand to get up to ambulate  Short term goal 3: Pt to ambulate > 450 ft with/without LRAD with Supervision for household and community distances  Short term goal 4: Pt to negotiate 3 steps with 1 rail with Supervision for home access  Long term goals  Time Frame for Long term goals : not set due to short ELOS    Following session, patient left in safe position with all fall risk precautions in place. Jessica Clay.  Jackeline Zacarias, [facility]

## 2022-01-19 NOTE — PROGRESS NOTES
Dipika Jeffries  Daily Progress Note  Pt Name: Radha Walters  Medical Record Number: [id number]  Date of Birth 1940   Today's Date: 1/19/2022    HD: # 1    CC:\" I feel good\"    ASSESSMENT  1. Active Hospital Problems    Diagnosis Date Noted    Subdural hematoma [facility]) [S06.5X9A] 01/18/2022         PLAN  Patient admitted under Trauma Services w/ tele to ICU     Intracranial hemorrhage              - Neurosurgery consulted              - TXA per NSx              - Pain control              - Hold anticoagulation and antiplatelets              - Maintain systolic blood pressure [id number]              - Neuro checks              - Coagulation profile              - Seizure precaution   - 1/19 repeat head CT shows moderate right subdural hematoma with 4 mm leftward midline shift.   Repeat head CT tomorrow     Closed head injury              - SLP cog eval              - Pain control              - Monitor for postconcussive symptoms     Scalp laceration              - Closed at outside facility via staples to be removed in 7-10 days (1/25 28)              - Pain control              - Boostrix given              - Local wound care     UTI              -UA positive for leukocytes and nitrite, many bacteria              -Urine culture grows E. coli > 100,000 colony count              -Intensivist consulted, Rocephin initiated      Accelerated HTN              - Cardene gtt started in ED                   - Intensivist consulted for management              - Maintain -160 for head bleed     Fall               - PT/OT              - Fall risk precautions     Chronic Issues: Arthritis and hypertension              -Intensivist consult for assistance medical management              -Resume home medication on admission     Consults: NSx, Intensivist      Pain Management              - Tylenol     Prophylaxis: SCD's, Incentive Spirometry, Colace, Pepcid, Zofran     General Diet     IVF Management  Regular Neurovascular Checks  Repeat Labs Tomorrow AM  PT/OT/SLP Eval and Treat  Activity as tolerated, pt up with assistance     Planned Discharge to pending clinical course      SUBJECTIVE  She is a 78 y.o. female who continues to present at [address] [place] [address] on E2/PACU following transfer from [facility] ambulatory care for confirmed acute cranial hemorrhage following fall. Patient reports no complaints on exam. Patient denies chest pain, shortness of breath, cough, headache, dizziness, lightheadedness, numbness, paraesthesias, weakness, chills, fevers, abdominal pain, nausea, vomiting,neck pain, or back pain. Nursing staff states neurologically stable overnight. Plan to continue neurochecks and repeat head CT in a.m. Hemoglobin stable, WBC stable, no electrolyte abnormality, CT head studies reviewed, vital signs stable on exam. Care in coordination with trauma surgeon Dr. Kika Ferguson. Wt Readings from Last 3 Encounters:   01/18/22 178 lb (80.7 kg)   01/04/22 178 lb (80.7 kg)   11/08/21 177 lb (80.3 kg)     Temp Readings from Last 3 Encounters:   01/19/22 98.1 °F (36.7 °C) (Oral)   01/04/22 96.9 °F (36.1 °C) (Temporal)   11/08/21 97 °F (36.1 °C) (Temporal)     BP Readings from Last 3 Encounters:   01/19/22 (!) 116/56   01/04/22 (!) 164/95   11/08/21 128/72     Pulse Readings from Last 3 Encounters:   01/19/22 64   01/04/22 64   11/08/21 61       24 HR INTAKE/OUTPUT :     Intake/Output Summary (Last 24 hours) at 1/19/2022 1154  Last data filed at 1/19/2022 5961  Gross per 24 hour   Intake 769.42 ml   Output 1250 ml   Net -480.58 ml     ADULT DIET; Regular    OBJECTIVE  CURRENT VITALS BP (!) 116/56   Pulse 64   Temp 98.1 °F (36.7 °C) (Oral)   Resp 14   Ht 5' 3\" (1.6 m)   Wt 178 lb (80.7 kg)   LMP  (LMP Unknown)   SpO2 95%   BMI 31.53 kg/m²   GENERAL: Presents sitting upright at bedside unassisted, Awake and alert;  In no acute distress and well nourished. SKIN: Appropriate for ethnicity, warm and dry. ENT: No apparent trauma, discharge, or hematoma bilaterally. PERRL at 3mm. Nares patient, membranes moist  CARDIO: No visible chest wall deformity. Strong/regula S1/S2. 2+ radial and DP pulses bilaterally. Capillary refill <2 sec. No extremity edema noted. PULMONARY:  Trachea midline, no increased respiratory effort, or paradoxical chest movement. Lung sounds are clear to ascultation in all fields without adventitious sounds. ABDOMEN: Abdomen is soft, non distended. Bowel sounds present in all four quadrants. NTTP in all quadrants   NEURO: GCS 15. PMS intact, moves limbs freely. No focal neurological deficits  MSK: Extremities intact and present. No new bruising, swelling, deformity, discoloration or bleeding. NTTP over major muscle groups.  strength 5/5 and equal bilaterally. WOUND: No surrounding erythema, edema, purulent discharge or bleeding        LABS  CBC :   Recent Labs     01/18/22  1109 01/19/22  0701   WBC 8.2 7.0   HGB 13.7 12.3   HCT 41.9 37.2   MCV 93.5 92.5    163     BMP:   Recent Labs     01/18/22  1109 01/19/22  0701    138   K 4.4 4.1    107   CO2 27 21*   BUN 27* 18   CREATININE 1.2 0.8     COAGS:   Recent Labs     01/18/22  1109   PROT 7.6     Pancreas/HFP:  No results for input(s): LIPASE, AMYLASE in the last 72 hours. Recent Labs     01/18/22  1109   AST 27   ALT 25   BILITOT 0.6   ALKPHOS 73           Electronically signed by Myke Polo PA-C on 1/19/2022 at 11:54 AM     Patient seen and examined independently by me earlier this AM. Above discussed and I agree with JESUS ALBERTO Lutz. See my additional comments below for updated orders and plan. Labs, cultures, and radiographs where available were reviewed. I discussed patient concerns with the patient's nurse and instructions were given. Please see our orders for the updated patient care plan. -CT head this morning. Repeating CT head tomorrow. Neurosurgery following. Neurovascular checks. Seizure precautions. Speech therapy with cognition evaluation. Monitor for postconcussive symptoms. Laceration/wound care. Intensivist following. PT/OT when appropriate. Fall precautions. SCDs for DVT prophylaxis. Regular diet. A.m. labs.     Electronically signed by Samantha Mejia MD on 1/19/22 at 12:16 PM EST

## 2022-01-19 NOTE — PROGRESS NOTES
[address]  INPATIENT OCCUPATIONAL THERAPY  STRZ SURGE OVERFLOW  EVALUATION    Time:    Time In: 4606  Time Out: 1503  Timed Code Treatment Minutes: 15 Minutes  Minutes: 28          Date: 2022  Patient Name: Wily Pitt,   Gender: female      MRN: [id number]  : 1940  (78 y.o.)  Referring Practitioner: GABRIELA Carvalho PA-C  Diagnosis: subdural hematoma  Additional Pertinent Hx: 22-year-old white female who presented to [facility] on 2022 as a transfer from [facility] ambulatory care for subdural hematoma after suffering a fall at home. She has a past medical history of lifetime non-smoker, hypertension, arthritis. Per report patient states that she fell this morning while she was trying to change a light bulb in her bathroom. She stated she was standing on a stool and she ended up falling backwards and hitting her head on the toilet. She denies any loss of consciousness. She stated she was able to get up and called her family to go to the emergency department. She does take a baby aspirin daily. She did receive 11 staples to her scalp at [facility]. She did states she has a dull headache but denies any other pain. CT head and neck were completed. CT head did show him a intracranial hemorrhage on the right frontal.  CT cervical spine negative. She was noted to have a laceration on her lower occipital scalp which was stapled at [facility] facility. She was started on a Cardene drip in the emergency department for accelerated hypertension. Restrictions/Precautions:  Restrictions/Precautions: Fall Risk,General Precautions  Position Activity Restriction  Other position/activity restrictions: low stimulation guidelines    Subjective  Chart Reviewed: Yes,Orders,Progress Notes  Patient assessed for rehabilitation services?: Yes    Subjective: Pt lying in bed with family present. Pt very eager to participate in OT and get OOB.     Pain:  Pain Assessment  Patient Currently in Pain: Denies    Vitals: Vitals not assessed per clinical judgement, see nursing flowsheet    Social/Functional History:  Lives With: Alone  Type of Home: House  Home Layout: Two level,Performs ADL's on one level,Able to Live on Main level with bedroom/bathroom (Pt stating she occasionally goes to 2nd floor or her basement)  Home Access: Stairs to enter with rails  Entrance Stairs - Number of Steps: 3   Bathroom Shower/Tub: Tub/Shower unit,Walk-in shower  Bathroom Toilet: Handicap height  Bathroom Accessibility: Accessible  IADL Comments: Pt stating she was indep with all her homemaking tasks. 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 Pt stating she has family members that are retired who could assist her as able    VISION:WNL    HEARING:  WNL    COGNITION: WNL    RANGE OF MOTION:  Bilateral Upper Extremity:  WNL    STRENGTH:  Bilateral Upper Extremity:  WNL    SENSATION:   WFL    ADL:   Lower Extremity Dressing: Stand By Assistance. doffing/donning socks sitting EOB . BALANCE:  Sitting Balance:  Stand By Assistance. Standing Balance: Contact Guard Assistance. BED MOBILITY:  Supine to Sit: Stand By Assistance    Sit to Supine: Stand By Assistance      TRANSFERS:  Sit to Stand:  [employer]. from eOB   Stand to Sit: [employer]. onto eOB     FUNCTIONAL MOBILITY:  Assistive Device: hand held assist with pt holding onto therapist with her left hand   Assist Level:  Contact Guard Assistance. Distance: into hallway  Pt with slow gait and slight limp noted.  Pt stating she has had that since she had her knee replaced       Activity Tolerance:  Patient tolerance of treatment: good. Pt stating she felt good to get up and moving again       Assessment:  Assessment: Pt s/p fall resulting in subdural hematoma. Pt decreased ability to complete her ADL tasks and higher level IADL tasks at [facility]. Pt requiring further skilled OT services to increase her ability to complete her IADL tasks especially those requiring multiple steps. Performance deficits / Impairments: Decreased high-level IADLs,Decreased safe awareness,Decreased cognition  Prognosis: Good  REQUIRES OT FOLLOW UP: Yes  Decision Making: Medium Complexity    Treatment Initiated: Treatment and education initiated within context of evaluation. Evaluation time included review of current medical information, gathering information related to past medical, social and functional history, completion of standardized testing, formal and informal observation of tasks, assessment of data and development of plan of care and goals. Treatment time included skilled education and facilitation of tasks to increase safety and independence with ADL's for improved functional independence and quality of life. Discharge Recommendations:  Patient would benefit from continued therapy after discharge,Continue to assess pending progress    Patient Education:  OT Education: Yakov Able of Care  Patient Education: safety with mobility    Equipment Recommendations: Other: continue to assess    Plan:  Times per week: 6x  Current Treatment Recommendations: Strengthening,Endurance Training,Patient/Caregiver Education & Training,Self-Care / [employer] Mobility Training,Safety Education & Training. See long-term goal time frame for expected duration of plan of care. If no long-term goals established, a short length of stay is anticipated.     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

## 2022-01-19 NOTE — PROGRESS NOTES
Hypertension        Pain: 1/10 - Pain location: headache     Subjective:  Patient seen upright in bed for cog evaluation. The pt's daughter was present for the duration of the evaluation. 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 Hobbies: Likes to travel and play cards   Vision Status: no glasses required at this time   Hearing: WFL in quiet setting        ORAL MOTOR:  Facial / Labial WFL    Lingual WFL    Dentition WFL    Velum WFL    Vocal Quality WFL    Sensation WFL    Cough WFL      SPEECH / VOICE:  Speech and Voice appear to be grossly intact for basic and complex daily communication    LANGUAGE:  Receptive:  Receptive language skills appear to be grossly intact for basic and complex daily communication. Expressive:  Expressive language skills appear to be grossly intact for basic and complex daily communication. COGNITION:  [employer] Cognitive Assessment [facility]) version 7.3 completed. Pt scored 15/30. Normal is greater than or equal to 26/30.   Inclusion of +1 point given highest level of education achieved less than/equal to 12th grade or GED with limited-0 post-secondary schooling   Orientation:  indep *incorrect date   Immediate Recall: Trial 1: , Trial 2:    Short-Term Recall: 2/5 indep, 2/5 min A, 1/5 MAX FO3  Divergent Naming: 15 WPM   Problem Solving: good   Reasoning: verbal reasoning /   Sequencing: good   Thought Organization: mild disorganization at the complex conversation level   Insight: good   Attention: good alternating, sustained, mild decreased selective and divided attention   Math Computation: 2/3 on [address]   Executive Functionin/5 on MOCA     SWALLOWING:  Current Diet: regular solids, thin liquids   WNL RECOMMENDATIONS/ASSESSMENT:  DIAGNOSTIC IMPRESSIONS:  Patient presented with a mild cognitive linguistic impairment in the domains of higher level attention, mild and moderately complex STM (immediate, delayed, and working memory), and complex executive functioning. 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 Recommended continued skilled ST in the [facility] setting and/or OP setting following d/c from inpatient. Receptive and expressive language remain intact for basic and complex communication of wants/needs. No dysarthria or dysphonia. **Education: Completed comprehensive review of cognitive linguistic evaluation completed this date. The pt was show errors, how her errors correlate to each domain of cognition, as well as, recommendations for continued skilled ST during and after inpatient stay. Patient would benefit from [facility] vs OP skilled ST pending if she has transportation to OP facility. Reviewed recommendations to have close SUP for the first 1-2 weeks upon return home and return to work (farm). The pt and her daughter were very receptive and verbalized agreement with recommendations. The pt demonstrated good insight into her mild deficits in complex executive functioning tasks this date. Rehabilitation Potential: good    EDUCATION:  Learner: Patient and Family  Education:  Reviewed results and recommendations of this evaluation, Reviewed ST goals and Plan of Care and Reviewed recommendations for follow-up  Evaluation of Education: Verbalizes understanding    PLAN:  Skilled SLP intervention on acute care 3-5 x per week or until goals met and/or pt plateaus in function. Specific interventions for next session may include: higher level executive funcitoning . PATIENT GOAL:    Return to prior level of function.     SHORT TERM GOALS:  Short-term Goals  Timeframe for Short-term Goals: 2 weeks  Goal 1: Patient will complete mild to moderately complex STM (immediate, delayed, working memory) with 80% accurayc given MIN A to improve retention of personal and newly learned information. Goal 2: Patient will complete higher level complex organization, reeasoning, adn executive functioning tasks (meds, finances, managing farm books) with 80% accuracy given MIN A to work toward independence with return to ADLs/iADLs. LONG TERM GOALS:  No LTM Goals recommended, due to anticipated short ELOS. Li Rome MA., Nasima Guzmán / IBIS#.[id number]

## 2022-01-20 ENCOUNTER — APPOINTMENT (OUTPATIENT)
Dept: CT IMAGING | Age: 82
DRG: 025 | End: 2022-01-20
Payer: MEDICARE

## 2022-01-20 ENCOUNTER — ANESTHESIA EVENT (OUTPATIENT)
Dept: OPERATING ROOM | Age: 82
DRG: 025 | End: 2022-01-20
Payer: MEDICARE

## 2022-01-20 LAB
ANGLE TEG: 69 DEG (ref 53–72)
ANION GAP SERPL CALCULATED.3IONS-SCNC: 10 MEQ/L (ref 8–16)
APTT: 35.9 SECONDS (ref 22–38)
BASOPHILS # BLD: 0.7 %
BASOPHILS ABSOLUTE: 0.1 THOU/MM3 (ref 0–0.1)
BUN BLDV-MCNC: 13 MG/DL (ref 7–22)
CALCIUM SERPL-MCNC: 9.1 MG/DL (ref 8.5–10.5)
CHLORIDE BLD-SCNC: 103 MEQ/L (ref 98–111)
CO2: 22 MEQ/L (ref 23–33)
CREAT SERPL-MCNC: 0.7 MG/DL (ref 0.4–1.2)
EOSINOPHIL # BLD: 2.1 %
EOSINOPHILS ABSOLUTE: 0.2 THOU/MM3 (ref 0–0.4)
EPL-TEG: 0.2 %
ERYTHROCYTE [DISTWIDTH] IN BLOOD BY AUTOMATED COUNT: 12.4 % (ref 11.5–14.5)
ERYTHROCYTE [DISTWIDTH] IN BLOOD BY AUTOMATED COUNT: 41.1 FL (ref 35–45)
GFR SERPL CREATININE-BSD FRML MDRD: 80 ML/MIN/1.73M2
GLUCOSE BLD-MCNC: 115 MG/DL (ref 70–108)
HCT VFR BLD CALC: 39.1 % (ref 37–47)
HEMOGLOBIN: 13.3 GM/DL (ref 12–16)
HEPARIN THERAPY: NORMAL
IMMATURE GRANS (ABS): 0.03 THOU/MM3 (ref 0–0.07)
IMMATURE GRANULOCYTES: 0.3 %
INHIBITION AA TEG: 61.9 %
INHIBITION ADP TEG: 12.2 %
KINETICS TEG: 1.4 MINUTES (ref 1–3)
LY30 (LYSIS) TEG: 0.2 % (ref 0–7.5)
LYMPHOCYTES # BLD: 14.6 %
LYMPHOCYTES ABSOLUTE: 1.3 THOU/MM3 (ref 1–4.8)
MA (MAX CLOT) TEG: 67.1 MM (ref 50–70)
MA(AA) TEG: 35.1 MM
MA(ACTIVATED) TEG: 15.4 MM
MA(ADP) TEG: 60.8 MM
MCH RBC QN AUTO: 30.8 PG (ref 26–33)
MCHC RBC AUTO-ENTMCNC: 34 GM/DL (ref 32.2–35.5)
MCV RBC AUTO: 90.5 FL (ref 81–99)
MONOCYTES # BLD: 10.9 %
MONOCYTES ABSOLUTE: 0.9 THOU/MM3 (ref 0.4–1.3)
NUCLEATED RED BLOOD CELLS: 0 /100 WBC
ORGANISM: ABNORMAL
PLATELET # BLD: 196 THOU/MM3 (ref 130–400)
PMV BLD AUTO: 10.8 FL (ref 9.4–12.4)
POTASSIUM SERPL-SCNC: 3.9 MEQ/L (ref 3.5–5.2)
RBC # BLD: 4.32 MILL/MM3 (ref 4.2–5.4)
REACTION TIME TEG: 5.6 MINUTES (ref 5–10)
SEG NEUTROPHILS: 71.4 %
SEGMENTED NEUTROPHILS ABSOLUTE COUNT: 6.1 THOU/MM3 (ref 1.8–7.7)
SODIUM BLD-SCNC: 135 MEQ/L (ref 135–145)
URINE CULTURE REFLEX: ABNORMAL
WBC # BLD: 8.6 THOU/MM3 (ref 4.8–10.8)

## 2022-01-20 PROCEDURE — 2580000003 HC RX 258: Performed by: STUDENT IN AN ORGANIZED HEALTH CARE EDUCATION/TRAINING PROGRAM

## 2022-01-20 PROCEDURE — 2580000003 HC RX 258: Performed by: PHYSICIAN ASSISTANT

## 2022-01-20 PROCEDURE — 36415 COLL VENOUS BLD VENIPUNCTURE: CPT

## 2022-01-20 PROCEDURE — 85576 BLOOD PLATELET AGGREGATION: CPT

## 2022-01-20 PROCEDURE — 2580000003 HC RX 258: Performed by: NURSE PRACTITIONER

## 2022-01-20 PROCEDURE — 99233 SBSQ HOSP IP/OBS HIGH 50: CPT | Performed by: INTERNAL MEDICINE

## 2022-01-20 PROCEDURE — 6370000000 HC RX 637 (ALT 250 FOR IP): Performed by: PHYSICIAN ASSISTANT

## 2022-01-20 PROCEDURE — 70450 CT HEAD/BRAIN W/O DYE: CPT

## 2022-01-20 PROCEDURE — 2000000000 HC ICU R&B

## 2022-01-20 PROCEDURE — 80048 BASIC METABOLIC PNL TOTAL CA: CPT

## 2022-01-20 PROCEDURE — 6370000000 HC RX 637 (ALT 250 FOR IP): Performed by: NURSE PRACTITIONER

## 2022-01-20 PROCEDURE — APPSS30 APP SPLIT SHARED TIME 16-30 MINUTES: Performed by: PHYSICIAN ASSISTANT

## 2022-01-20 PROCEDURE — 99233 SBSQ HOSP IP/OBS HIGH 50: CPT | Performed by: NEUROLOGICAL SURGERY

## 2022-01-20 PROCEDURE — 97110 THERAPEUTIC EXERCISES: CPT

## 2022-01-20 PROCEDURE — 97116 GAIT TRAINING THERAPY: CPT

## 2022-01-20 PROCEDURE — 85025 COMPLETE CBC W/AUTO DIFF WBC: CPT

## 2022-01-20 PROCEDURE — 97535 SELF CARE MNGMENT TRAINING: CPT

## 2022-01-20 PROCEDURE — APPSS60 APP SPLIT SHARED TIME 46-60 MINUTES: Performed by: NURSE PRACTITIONER

## 2022-01-20 PROCEDURE — 6360000002 HC RX W HCPCS: Performed by: NURSE PRACTITIONER

## 2022-01-20 PROCEDURE — 2500000003 HC RX 250 WO HCPCS: Performed by: STUDENT IN AN ORGANIZED HEALTH CARE EDUCATION/TRAINING PROGRAM

## 2022-01-20 PROCEDURE — 99232 SBSQ HOSP IP/OBS MODERATE 35: CPT | Performed by: SURGERY

## 2022-01-20 PROCEDURE — 85730 THROMBOPLASTIN TIME PARTIAL: CPT

## 2022-01-20 RX ADMIN — CEFTRIAXONE SODIUM 1000 MG: 1 INJECTION, POWDER, FOR SOLUTION INTRAMUSCULAR; INTRAVENOUS at 13:33

## 2022-01-20 RX ADMIN — ACETAMINOPHEN 650 MG: 325 TABLET ORAL at 04:24

## 2022-01-20 RX ADMIN — DEXTROSE MONOHYDRATE 5 MG/HR: 50 INJECTION, SOLUTION INTRAVENOUS at 07:59

## 2022-01-20 RX ADMIN — SODIUM CHLORIDE, PRESERVATIVE FREE 10 ML: 5 INJECTION INTRAVENOUS at 10:21

## 2022-01-20 RX ADMIN — METOPROLOL TARTRATE 50 MG: 50 TABLET, FILM COATED ORAL at 09:50

## 2022-01-20 RX ADMIN — HYDROCHLOROTHIAZIDE 12.5 MG: 25 TABLET ORAL at 08:07

## 2022-01-20 RX ADMIN — DEXTROSE MONOHYDRATE 5 MG/HR: 50 INJECTION, SOLUTION INTRAVENOUS at 05:25

## 2022-01-20 RX ADMIN — FAMOTIDINE 20 MG: 20 TABLET ORAL at 08:08

## 2022-01-20 RX ADMIN — ACETAMINOPHEN 650 MG: 325 TABLET ORAL at 21:29

## 2022-01-20 RX ADMIN — LISINOPRIL 40 MG: 40 TABLET ORAL at 08:07

## 2022-01-20 RX ADMIN — DOCUSATE SODIUM 100 MG: 100 CAPSULE ORAL at 08:08

## 2022-01-20 ASSESSMENT — ENCOUNTER SYMPTOMS
SORE THROAT: 0
NAUSEA: 0
TROUBLE SWALLOWING: 0
COLOR CHANGE: 0
PHOTOPHOBIA: 0
WHEEZING: 0
CHEST TIGHTNESS: 0
BACK PAIN: 0
SHORTNESS OF BREATH: 0
VOMITING: 0
ABDOMINAL PAIN: 0

## 2022-01-20 ASSESSMENT — PAIN SCALES - GENERAL
PAINLEVEL_OUTOF10: 1
PAINLEVEL_OUTOF10: 3
PAINLEVEL_OUTOF10: 1
PAINLEVEL_OUTOF10: 0

## 2022-01-20 NOTE — SIGNIFICANT EVENT
Sukhjinder Patel conversation with family about surgical management versus nonsurgical management. I did explain the risk and benefits. Family would like time to discuss with the patient. I did review scans at the bedside. Update Dr. Jonh Wooten. Updated primary nurse. Electronically signed by Kassi Ojeda.  LEONEL Munoz CNP on 1/20/2022 at 11:42 AM

## 2022-01-20 NOTE — PROGRESS NOTES
[facility]  Daily Progress Note  Pt Name: Karen Frias  Medical Record Number: [id number]  Date of Birth 1940   Today's Date: 1/20/2022    HD: # 2    CC: Headache    ASSESSMENT  1. Active Hospital Problems    Diagnosis Date Noted    Fall [W19. XXXA]     Subdural hematoma ([address] [P76.2W2Z] 01/18/2022         PLAN  Patient admitted under Trauma Services w/ tele to ICU   - Trauma signed off to Intensivist services     Intracranial hemorrhage              - Neurosurgery managing              - TXA per NSx              - Pain control              - Hold anticoagulation and antiplatelets              - Maintain systolic blood pressure [phone]              - Neuro checks              - Coagulation profile              - Seizure precaution   - 1/19 repeat head CT shows moderate right subdural hematoma with 4 mm leftward midline shift.   Repeat head CT today with no significant change.       Closed head injury              - SLP cog eval              - Pain control              - Monitor for postconcussive symptoms     Scalp laceration              - Closed at outside facility via staples to be removed in 7-10 days (1/25 28)              - Pain control              - Boostrix given              - Local wound care     UTI              -UA positive for leukocytes and nitrite, many bacteria              -Urine culture grows E. coli > 100,000 colony count              -Intensivist consulted, Rocephin initiated      Accelerated HTN              - Cardene gtt started in ED                   - Intensivist managing              - Maintain -160 for head bleed     Fall               - PT/OT              - Fall risk precautions     Chronic Issues: Arthritis and hypertension              -Intensivist consult for assistance medical management              -Resume home medication on admission     Consults: NSx, Intensivist      Pain Management              - Tylenol     Prophylaxis: SCD's, Incentive Spirometry, Colace, Pepcid, Zofran     General Diet     IVF Management  Regular Neurovascular Checks  PT/OT/SLP continue to treat  Activity as tolerated, pt up with assistance     Planned Discharge to pending clinical course      John Whitten continues in [employer]. Nursing staff states neurologically stable overnight. Continues to have slight headache and taking Tylenol for it. Plan to continue neurochecks. Repeat CT this Am with persistent shift. Neurosurgery anticipates taking patient to surgery pending patient and family decision. Signed off to intensivist service. Care in coordination with trauma surgeon Dr. Nery Varma. Wt Readings from Last 3 Encounters:   01/18/22 178 lb (80.7 kg)   01/04/22 178 lb (80.7 kg)   11/08/21 177 lb (80.3 kg)     Temp Readings from Last 3 Encounters:   01/20/22 98.6 °F (37 °C) (Oral)   01/04/22 96.9 °F (36.1 °C) (Temporal)   11/08/21 97 °F (36.1 °C) (Temporal)     BP Readings from Last 3 Encounters:   01/20/22 (!) 109/55   01/04/22 (!) 164/95   11/08/21 128/72     Pulse Readings from Last 3 Encounters:   01/20/22 68   01/04/22 64   11/08/21 61       24 HR INTAKE/OUTPUT :     Intake/Output Summary (Last 24 hours) at 1/20/2022 1141  Last data filed at 1/20/2022 0933  Gross per 24 hour   Intake 1047.83 ml   Output 1300 ml   Net -252.17 ml     ADULT DIET; Regular    OBJECTIVE  CURRENT VITALS BP (!) 109/55   Pulse 68   Temp 98.6 °F (37 °C) (Oral)   Resp 16   Ht 5' 3\" (1.6 m)   Wt 178 lb (80.7 kg)   LMP  (LMP Unknown)   SpO2 94%   BMI 31.53 kg/m²   GENERAL: Awake and alert; In no acute distress and well nourished. SKIN: Appropriate for ethnicity, warm and dry. ENT: No apparent trauma, discharge, or hematoma bilaterally. PERRL at 3mm. Nares patient, membranes moist  CARDIO: No visible chest wall deformity. Strong/regular S1/S2. 2+ radial and DP pulses bilaterally. Capillary refill <2 sec. No extremity edema noted. PULMONARY:  Trachea midline, no increased respiratory effort, or paradoxical chest movement. Lung sounds are clear to ascultation in all fields without adventitious sounds. ABDOMEN: Abdomen is soft, non distended. Bowel sounds present in all four quadrants. NTTP in all quadrants   NEURO: GCS 15. PMS intact, moves limbs freely. No focal neurological deficits  MSK: No new bruising, swelling, deformity, discoloration or bleeding. NTTP over major muscle groups.  strength 5/5 and equal bilaterally. WOUND: Scalp with laceration that has staples that are intact. No drainage noted. LABS  CBC :   Recent Labs     01/18/22  1109 01/19/22  0701 01/20/22  0815   WBC 8.2 7.0 8.6   HGB 13.7 12.3 13.3   HCT 41.9 37.2 39.1   MCV 93.5 92.5 90.5    163 196     BMP:   Recent Labs     01/18/22  1109 01/19/22  0701 01/20/22  0815    138 135   K 4.4 4.1 3.9    107 103   CO2 27 21* 22*   BUN 27* 18 13   CREATININE 1.2 0.8 0.7     COAGS:   Recent Labs     01/18/22  1109   PROT 7.6     Pancreas/HFP:  No results for input(s): LIPASE, AMYLASE in the last 72 hours. Recent Labs     01/18/22  1109   AST 27   ALT 25   BILITOT 0.6   ALKPHOS 73       RADIOLOGY:  Narrative   Exam: CT head without contrast       Comparison: 01/19/2022 05:36 AM EST (04:36 AM CST) :       Findings:       Stable moderate subdural hematoma overlying the lateral right frontal    parietal and temporal lobes measuring up to 1.3 cm in transverse    dimension. Small volume subdural hematoma layering oval along the cerebral falx,    stable. Stable approximately 4 mm leftward subfalcine herniation. Right lateral ventricular effacement, stable. No hydrocephalus or axial herniation. Basilar cisterns patent. No anoxic brain changes. No sinus fluid levels are mastoid effusions.    No acute fracture.           Impression   Impression:   No significant change in right subdural hematoma with associated midline    shift and ventricular effacement. No axial herniation or hydrocephalus.       This document has been electronically signed by: Bhavna Caballero MD on    01/20/2022 07:08 AM       All CTs at this facility use dose modulation techniques and iterative    reconstructions, and/or weight-based dosing   when appropriate to reduce radiation to a low as reasonably achievable. Electronically signed by LEONEL Ramírez CNP on 1/20/2022 at 11:41 AM     Patient seen and examined independently by me early this AM. Above discussed and I agree with Lorena Montanez CNP. See my additional comments below for updated orders and plan. Labs, cultures, and radiographs where available were reviewed. I discussed patient concerns with the patient's nurse and instructions were given. Please see our orders for the updated patient care plan. -Repeat CT imaging demonstrating at least moderate subdural hematoma. Discussed case with Dr. Hardy Sierra earlier today. He is recommending mini craniotomy for clot evacuation. Family to discuss and decide surgery versus conservative management. Continue with seizure precautions. Neurovascular checks. Speech therapy. Fall precautions. PT/OT when appropriate. Intensivist following. No other traumatic injuries. Intensivist in agreement to take over as primary. Trauma services signing off. Cough need to reevaluate while patient still in house.     Electronically signed by Eric Sales MD on 1/20/22 at 3:24 PM EST

## 2022-01-20 NOTE — PROGRESS NOTES
Addendum by Dr. Kush Sylvester MD:  I have seen and examined the patient independently. Face to face evaluation and examination was performed. The below evaluation and note have been reviewed. Labs and radiographs were reviewed. I Have discussed with Neurosurgery PA about this patient in detail. The below assessment and plan have been reviewed. Please see my modifications mentioned below. My additional comments and modifications:      ·  This is a 78-year old male who developed right sided subdural hematoma (13 mm in thickness and associated with about 4 mm midline shift) secondary to fall of stool. Patient's serial brain CTs showed slight worsening in the patient's subdural hematoma. · Decision making:    - Giving the result radiological features of patient subdural hematoma,  a surgical intervention (in the form of right sided mini craniotomy and placement of subdural drain connected to a closed draining system) to evacuate patient hematoma is indicated.  This surgical intervention was discussed in details with patient and her family ,Stevenson Pedersen as the associated risk and benefit.  The alternative treatment options were discussed also (mainly continue observation).     - All questions and concerns were addressed and answered.   -  Patient and her family elected to proceed with the the surgical option and signed the surgical consent.  -Plan for surgery tomorrow. - Keep the patient n.p.o. after midnight  -The case was also discussed in detail with the ICU team and trauma team.    Kush Sylvester MD        Neurosurgery Progress Note    Patient:  Meghan Stark      Unit/Bed:E2-04/E2-04    YOB: 1940    MRN: [id number]     Acct: [de-identified]     Admit date: 1/18/2022    Chief Complaint   Patient presents with   [employer] Fall    Laceration       Patient Seen, Chart, Physician notes, Labs, Radiology studies reviewed.     Subjective: Patient is seen and evaluated in the PACU setting with evaluation exam shortness of breath. 24 hour intake/output:    Intake/Output Summary (Last 24 hours) at 1/20/2022 1043  Last data filed at 1/20/2022 0933  Gross per 24 hour   Intake 1047.83 ml   Output 1300 ml   Net -252.17 ml     Last 3 weights: Wt Readings from Last 3 Encounters:   01/18/22 178 lb (80.7 kg)   01/04/22 178 lb (80.7 kg)   11/08/21 177 lb (80.3 kg)         CBC:   Recent Labs     01/18/22  1109 01/19/22  0701 01/20/22  0815   WBC 8.2 7.0 8.6   HGB 13.7 12.3 13.3    163 196     BMP:    Recent Labs     01/18/22  1109 01/19/22  0701 01/20/22  0815    138 135   K 4.4 4.1 3.9    107 103   CO2 27 21* 22*   BUN 27* 18 13   CREATININE 1.2 0.8 0.7   GLUCOSE 115* 108 115*     Calcium:  Recent Labs     01/20/22  0815   CALCIUM 9.1     Magnesium:No results for input(s): MG in the last 72 hours. Glucose:No results for input(s): POCGLU in the last 72 hours. HgbA1C: No results for input(s): LABA1C in the last 72 hours. Lipids: No results for input(s): CHOL, TRIG, HDL, LDL, LDLCALC in the last 72 hours. Radiology reports as per the Radiologist  Radiology: CT HEAD WO CONTRAST    Result Date: 1/19/2022   Exam: CT head without contrast Comparison: None Findings: Moderate acute subdural hematoma overlying the lateral right frontal parietal and temporal lobes measuring up to 1.3 cm in transverse dimension. Small volume subdural hematoma layering oval along the cerebral falx. 4 mm of leftward midline shift. Right lateral ventricular effacement. No hydrocephalus or axial herniation. Basilar cisterns patent. No anoxic brain changes. No significant white matter disease. No sinus fluid levels are mastoid effusions. No acute fracture. Impression: Moderate right subdural hematoma with approximately 4 mm leftward subfalcine herniation. No axial herniation or hydrocephalus. This document has been electronically signed by:  Preet Posadas MD on 01/19/2022 07:20 AM All CTs at this facility use dose modulation techniques and iterative reconstructions, and/or weight-based dosing when appropriate to reduce radiation to a low as reasonably achievable. CT HEAD WO CONTRAST    Result Date: 1/18/2022  PROCEDURE: CT HEAD WO CONTRAST CLINICAL INFORMATION: fell from height, struck occiput on toilet,occipital laceration. COMPARISON: CT scan of the brain dated 4 February 2014. . TECHNIQUE: Noncontrast 5 mm axial images were obtained through the brain. Sagittal and coronal reconstructions were obtained. All CT scans at this facility use dose modulation, iterative reconstruction, and/or weight-based dosing when appropriate to reduce radiation dose to as low as reasonably achievable. FINDINGS: There is an acute extra-axial hemorrhage over the right frontal and temporal lobes measuring 6.5 x 2.9 x 0.8 cm in size. There is mild mass effect upon the right frontal lobe. There is mild atrophy and dilatation of the lateral ventricles. There is diminished attenuation in the white matter most likely representing ischemic changes There is calcification the cavernous segments of both internal carotid arteries. . There  is no hydrocephalus or midline shift . The paranasal sinuses and mastoid air cells are normally aerated. There is no suspicious calvarial abnormality. 1. Acute extra-axial hemorrhage over the right frontal and temporal lobes with mild mass effect upon the right frontal lobe. 2. Mild atrophy and dilatation of the lateral ventricles. 3. Probable ischemic changes in the white matter. 4. Results called to Dr. Komal Murguia at 10.11 AM.. **This report has been created using voice recognition software. It may contain minor errors which are inherent in voice recognition technology. ** Final report electronically signed by DR Bin Mcdowell on 1/18/2022 10:12 AM    CT CERVICAL SPINE WO CONTRAST    Result Date: 1/18/2022  PROCEDURE: CT CERVICAL SPINE WO CONTRAST CLINICAL INFORMATION: 78-year-old female who fell. Injury to the occiput. COMPARISON: CT scan 5/21/2009. TECHNIQUE: 3 mm noncontrast axial images were obtained through the cervical spine with sagittal and coronal reconstructions. All CT scans at this facility use dose modulation, iterative reconstruction, and/or weight-based dosing when appropriate to reduce radiation dose to as low as reasonably achievable. FINDINGS: The cervical vertebral body heights are maintained. There is disc space narrowing at C3-C4, C4-C5, C5-C6 and C6-C7. There is no acute compression fracture. There is no prevertebral soft tissue swelling. There is diffuse facet arthropathy. There is mild grade 1 retrolisthesis of C3 over C4. There is a disc osteophyte complex at C5-6 resulting in severe spinal canal stenosis. Disc osteophyte complexes are also noted at C3-C4 and C4-C5 resulting in moderate spinal canal stenosis. There are no suspicious findings in the visualized lung apices. Multilevel degenerative changes with no acute fracture. **This report has been created using voice recognition software. It may contain minor errors which are inherent in voice recognition technology. ** Final report electronically signed by Dr Talat Mcfarlane on 1/18/2022 10:05 AM                   Assessment: Extra-axial hemorrhage right frontal and temporal lobes with mild mass-effect on the right frontal lobe    Active Problems:    Subdural hematoma ([address]    Fall  Resolved Problems:    * No resolved hospital problems. *        Plan: Patient is seen and evaluated in the PACU setting with evaluation exam findings reviewed and discussed with Dr. Sonia Powell, with nursing and with family. Patient is resting comfortably this morning and remained stable and intact neurologically to her baseline with no additional significant changes noted. She demonstrated clear appropriate speech and participated in discussions involving her care appropriately. No nystagmus or pronator drift nausea vomiting or visual changes were noted.   CT scan of the head imaged today without contrast was reviewed and reveals stable right subdural hematoma with associated midline shift and ventricular effacement measuring 1.3 cm. This CT was imaged utilizing Quentin protocol in anticipation of possible surgical intervention in the form of mini craniotomy evacuation of subdural hematoma. Procedure was discussed in brief along with expectations for recovery and the associated risks with a more detailed discussion to be held with Dr. Jennifer Santacruz. Neurosurgery recommends continued medical management per ICU team with seizure precautions in place and a coagulation profile ordered.   Neurosurgery to follow      Electronically signed by Connor South PA-C on 1/20/2022 at 10:43 AM    Neurosurgery

## 2022-01-20 NOTE — PROGRESS NOTES
CRITICAL CARE PROGRESS NOTE      Patient:  Raul Sharma    Unit/Bed:E2-04/E2-04  YOB: 1940  MRN: [id number]   PCP: Sundeep Pang MD  Date of Admission: 1/18/2022  Chief Complaint:-Headache     Assessment and Plan:       Acute intracranial hemorrhage: Status post TXA. TEG normal.  Frequent neurochecks. Seizure precautions. Neurosurgery following. CT 1/19/2022 increasing subdural.  Repeat 1/20 for possible need for surgical intervention. Spoke with family about possibility of surgical intervention. Awaiting decision. Updated Dr. Darren Amador  Mechanical fall: Denies loss of consciousness. Denies dizziness prior to fall. PT/OT  Urinary tract infection present on arrival: Culture pending. Rocephin initiated day #3. Urine culture positive for greater than 100,000 gram-negative bacilli, E. coli, sensitivity confirmed to Rocephin  Hypertension:  Resume home medications for hypertension. Arthritis: Noted. INITIAL H AND P AND ICU COURSE:  Mercy Hand is an 77-year-old white female who presented to [facility] on 1/18/2022 as a transfer from [facility] ambulatory care for subdural hematoma after suffering a fall at home. She has a past medical history of lifetime non-smoker, hypertension, arthritis. Per report patient states that she fell this morning while she was trying to change a light bulb in her bathroom. She stated she was standing on a stool and she ended up falling backwards and hitting her head on the toilet. She denies any loss of consciousness. She stated she was able to get up and called her family to go to the emergency department. She does take a baby aspirin daily. [facility] She did states she has a dull headache but denies any other pain. CT head and neck were completed. CT head did show him a intracranial hemorrhage on the right frontal.  CT cervical spine negative.   She was noted to have a laceration on her lower [facility]ital [facility] She was started on a Cardene drip in the emergency department for accelerated hypertension. 1/19 patient had repeat CT which was positive for increased bleeding. This was discussed with Dr. Leeanna Cardona. Will repeat tomorrow for possible need for surgical intervention. Family was updated extensively at the bedside. Past Medical History: Per HPI  Family History: Mother: Lymphoma Father: Prostate  Social History: Lifetime non-smoker, social alcohol use, denies drug use     Review of Systems   Constitutional: Negative for fatigue and fever. HENT: Negative for sore throat and trouble swallowing. Eyes: Negative for photophobia and visual disturbance. Respiratory: Negative for chest tightness, shortness of breath and wheezing. Cardiovascular: Negative for chest pain and leg swelling. Gastrointestinal: Negative for abdominal pain, nausea and vomiting. Endocrine: Negative for polydipsia and polyphagia. Genitourinary: Negative for decreased urine volume and flank pain. Musculoskeletal: Negative for back pain and neck pain. Skin: Negative for color change, pallor and rash. Allergic/Immunologic: Negative for food allergies. Neurological: Negative for dizziness, weakness and numbness. Hematological: Negative for adenopathy. Psychiatric/Behavioral: Negative for agitation and confusion. The patient is not nervous/anxious. Scheduled Meds:   hydroCHLOROthiazide  12.5 mg Oral Daily    lisinopril  40 mg Oral Daily    metoprolol tartrate  50 mg Oral BID    sodium chloride flush  10 mL IntraVENous 2 times per day    docusate sodium  100 mg Oral Daily    famotidine  20 mg Oral Daily    cefTRIAXone (ROCEPHIN) IV  1,000 mg IntraVENous Q24H     Continuous Infusions:   niCARdipine 5 mg/hr (01/20/22 0558)    sodium chloride      sodium chloride Stopped (01/19/22 0569)       PHYSICAL EXAMINATION:  T:  98.5.  P:  61. RR:  22. B/P:  175/76. FiO2:  0. O2 Sat:  95.  I/O:  1047/950  Body mass index is 31.53 kg/m². GCS: 15  General:   Acute on chronically ill-appearing white female  HEENT:  normocephalic and atraumatic. No scleral icterus. PERR  Neck: supple. No Thyromegaly. Lungs: clear to auscultation. No retractions  Cardiac: RRR. No JVD. Abdomen: soft. Nontender. Extremities:  No clubbing, cyanosis, or edema x 4. Vasculature: capillary refill < 3 seconds. Palpable dorsalis pedis pulses. Skin:  warm and dry. Psych:  Alert and oriented x3. Affect appropriate  Lymph:  No supraclavicular adenopathy. Neurologic:  No focal deficit. No seizures. Data: (All radiographs, tracings, PFTs, and imaging are personally viewed and interpreted unless otherwise noted). Sodium 138, potassium 4.1, chloride 107, CO2 21, BUN 18, creatinine 0.8, anion gap 10.0, glucose 108. WBC 7.0, hemoglobin 12.3, hematocrit 37.2, platelet count 290  Telemetry shows NSR   CT cervical spine negative for acute fracture  CT head 10/18/2022 reports acute hemorrhage over the right frontal and temporal lobes which has mass-effect on the right frontal lobe. Echocardiogram 9/7/2016 reports ejection fraction 55%, normal LV function, grade 1 diastolic dysfunction  CT head 1/19/2022 reports moderate right subdural hematoma with approximately 4 mm leftward shift. CT 1/20/2022 reports no significant change in right subdural hematoma. Midline shift and ventricular effacement. Meets Continued ICU Level Care Criteria:    [x] Yes   [] No - Transfer Planned to listed location:  [] HOSPITALIST CONTACTED-      Case and plan discussed with Dr. PARKSΑΤJORDI Π[place]∆Ι[place] and Dr. Miya Jackson. Electronically signed by Uma Vásquez. LEONEL Wilkes - CNP  CRITICAL CARE SPECIALIST  Patient seen by me. Case discussed with nurse practitioner. CT scan head shows slight increase in subdural hematoma. Radiology reads no change in hematoma. Patient would benefit from bur hole and drain placement. Case discussed with Dr. Stephania Bowles. .  Italicized font represents changes to the note made by me. Time was discontiguous. Time does not include procedures. Time does include my direct assessment of the patient and coordination of care.   Electronically signed by Misty Cazares MD on 1/20/2022 at 4:55 PM

## 2022-01-20 NOTE — PROGRESS NOTES
[facility]  INPATIENT PHYSICAL THERAPY  DAILY NOTE  STRZ SURGE OVERFLOW - E2-04/E2-04     Time In: 5090  Time Out: 1556  Timed Code Treatment Minutes: 28 Minutes  Minutes: 28          Date: 2022  Patient Name: Hodan Hassan,  Gender:  female        MRN: [id number]  : 1940  (78 y.o.)     Referring Practitioner: Jac Wahl PA-C  Diagnosis: Subdural hematoma  Additional Pertinent Hx: 76-year-old white female who presented to [facility] on 2022 as a transfer from [facility] ambulatory care for subdural hematoma after suffering a fall at home. She has a past medical history of lifetime non-smoker, hypertension, arthritis. Per report patient states that she fell this morning while she was trying to change a light bulb in her bathroom. She stated she was standing on a stool and she ended up falling backwards and hitting her head on the toilet. She denies any loss of consciousness. She stated she was able to get up and called her family to go to the emergency department. She does take a baby aspirin daily. She did receive 11 staples to her scalp at [facility]. She did states she has a dull headache but denies any other pain. CT head and neck were completed. CT head did show him a intracranial hemorrhage on the right frontal.  CT cervical spine negative. She was noted to have a laceration on her lower occipital scalp which was stapled at [facility] facility. She was started on a Cardene drip in the emergency department for accelerated hypertension.      Prior Level of Function:  Lives With: Alone  Type of Home: House  Home Layout: Two level,Performs ADL's on one level,Able to Live on Main level with bedroom/bathroom (Pt stating she occasionally goes to 2nd floor or her basement)  Home Access: Stairs to enter with rails  Entrance Stairs - Number of Steps: 3  Home Equipment:  (may be able to borrow cane from daughter)   Bathroom Shower/Tub: Tub/Shower unit,Walk-in shower  Bathroom Toilet: Handicap height  Bathroom Accessibility: Accessible    ADL Assistance: Independent  Homemaking Assistance: Independent  Ambulation Assistance: Independent  Transfer Assistance: Independent  Active : Yes  IADL Comments: Pt stating she was indep with all her homemaking tasks. 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 Pt stating she has family members that are retired who could assist her as able    Restrictions/Precautions:  Restrictions/Precautions: Fall Risk,General Precautions  Position Activity Restriction  Other position/activity restrictions: low stimulation guidelines      SUBJECTIVE: RN approved session. Pt resting in bed and agrees to therapy. Pt reports that she will be having surgery tomorrow to evacuation of the subdural hematoma. PAIN: not reported      Vitals: Vitals not assessed per clinical judgement, see nursing flowsheet    OBJECTIVE:  Bed Mobility:  Supine to Sit: Stand By Assistance    Transfers:  Sit to Stand: Stand By Assistance  Stand to Sit:Stand By Assistance    Ambulation:  Contact Guard Assistance  Distance: 35 ft and 120 ft  Surface: Level Tile  Device:Hand-Held Assist  Gait Deviations:  Decreased Step Length Bilaterally, Decreased Gait Speed and Decreased Heel Strike Bilaterally    Balance:  Static Sitting Balance:  Supervision  Dynamic Sitting Balance: Supervision  Static Standing Balance: Stand By Assistance  Dynamic Standing Balance: Stand By Assistance, Contact Guard Assistance    Exercise:  Patient was guided in 1 set(s) 12 reps of exercise to both lower extremities. Ankle pumps, Glut sets, Quad sets, Hip abduction/adduction, Seated marches and Long arc quads. Exercises were completed for increased independence with functional mobility.     Functional Outcome Measures: Completed  AM-PAC Inpatient Mobility Raw Score : 18  AM-PAC Inpatient T-Scale Score : 43.63    ASSESSMENT:  Assessment: Patient progressing toward established goals. Activity Tolerance:  Patient tolerance of  treatment: good. Equipment Recommendations: Other: monitor for needs  Discharge Recommendations: Continue to assess pending progress and pending reassessment following craniotomy scheduled for 1/21. Plan: Times per week: 6x N/T  Current Treatment Recommendations: Strengthening,Gait Training,Stair training,Balance Training,Functional Mobility Training,Endurance Training,Transfer Training,Safety Education & [employer] Exercise Program,Patient/Caregiver Education & Training,Equipment Evaluation, Education, & procurement    Patient Education  Patient Education: Plan of Care, Home Exercise Program    Goals:  Patient goals : go home  Short term goals  Time Frame for Short term goals: at discharge  Short term goal 1: Pt to be Mod I for supine <> sit to get in/out of bed  Short term goal 2: Pt to be Mod I for sit <> stand to get up to ambulate  Short term goal 3: Pt to ambulate > 450 ft with/without LRAD with Supervision for household and community distances  Short term goal 4: Pt to negotiate 3 steps with 1 rail with Supervision for home access  Long term goals  Time Frame for Long term goals : not set due to short ELOS    Following session, patient left in safe position with all fall risk precautions in place. Navjot Carvalho.  Awilda Seat, [place] [address]

## 2022-01-20 NOTE — PROGRESS NOTES
[facility]  Occupational Therapy  Daily Note  Time:   Time In: 1350  Time Out: 1415  Timed Code Treatment Minutes: 25 Minutes  Minutes: 25          Date: 2022  Patient Name: Karne Frias,   Gender: female      Room: E2/  MRN: [id number]  : 1940  (78 y.o.)  Referring Practitioner: GABRIELA Carvalho PA-C  Diagnosis: subdural hematoma  Additional Pertinent Hx: 38-year-old white female who presented to [facility] on 2022 as a transfer from [facility] ambulatory care for subdural hematoma after suffering a fall at home. She has a past medical history of lifetime non-smoker, hypertension, arthritis. Per report patient states that she fell this morning while she was trying to change a light bulb in her bathroom. She stated she was standing on a stool and she ended up falling backwards and hitting her head on the toilet. She denies any loss of consciousness. She stated she was able to get up and called her family to go to the emergency department. She does take a baby aspirin daily. She did receive 11 staples to her scalp at [facility]. She did states she has a dull headache but denies any other pain. CT head and neck were completed. CT head did show him a intracranial hemorrhage on the right frontal.  CT cervical spine negative. She was noted to have a laceration on her lower occipital scalp which was stapled at [facility]. She was started on a Cardene drip in the emergency department for accelerated hypertension. Restrictions/Precautions:  Restrictions/Precautions: Fall Risk,General Precautions  Position Activity Restriction  Other position/activity restrictions: low stimulation guidelines     SUBJECTIVE: Pt sleeping supine in bed upon arrival. Agreeable to OT session, pleasant throughout. PAIN: None stated/10.     Vitals: Vitals not assessed per clinical judgement, see nursing flowsheet    COGNITION: WNL    ADL: Grooming: Stand By Assistance. Standing sinkside, completed hand hygiene, oral hygiene, and hair grooming. Toileting: Supervision. Toilet Transfer: Contact Guard Assistance. For safety of descend . BALANCE:  Sitting Balance:  Supervision. On toilet  Standing Balance: [address] No LOB, pt steady throughout. BED MOBILITY:  Rolling to Right: Supervision. No cues for technique  Supine to Sit: Stand By Assistance. At EOB, pt denies dizziness or lightheadedness  Sit to Supine: Stand By Assistance. No cues for technique    TRANSFERS:  Sit to Stand:  Contact Guard Assistance. from bed and from toilet  Stand to Sit: [address] Onto toilet and onto bed    FUNCTIONAL MOBILITY:  Assistive Device: None. Pt did request to hold onto therapist's hand for safety if needed. Assist Level:  Contact Guard Assistance. Distance: To and from bathroom  Pt ambulated to and from bathroom in PACU. Pt also ambulating through halls, requesting to go further. No losses of balance. Conversational throughout. ASSESSMENT:     Activity Tolerance:  Patient tolerance of  treatment: good. 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

## 2022-01-21 ENCOUNTER — ANESTHESIA (OUTPATIENT)
Dept: OPERATING ROOM | Age: 82
DRG: 025 | End: 2022-01-21
Payer: MEDICARE

## 2022-01-21 ENCOUNTER — APPOINTMENT (OUTPATIENT)
Dept: CT IMAGING | Age: 82
DRG: 025 | End: 2022-01-21
Payer: MEDICARE

## 2022-01-21 VITALS — SYSTOLIC BLOOD PRESSURE: 115 MMHG | TEMPERATURE: 97.2 F | DIASTOLIC BLOOD PRESSURE: 56 MMHG | OXYGEN SATURATION: 99 %

## 2022-01-21 LAB
ANION GAP SERPL CALCULATED.3IONS-SCNC: 12 MEQ/L (ref 8–16)
BASOPHILS # BLD: 0.8 %
BASOPHILS ABSOLUTE: 0.1 THOU/MM3 (ref 0–0.1)
BUN BLDV-MCNC: 17 MG/DL (ref 7–22)
CALCIUM SERPL-MCNC: 9.2 MG/DL (ref 8.5–10.5)
CHLORIDE BLD-SCNC: 105 MEQ/L (ref 98–111)
CO2: 20 MEQ/L (ref 23–33)
CREAT SERPL-MCNC: 0.8 MG/DL (ref 0.4–1.2)
EOSINOPHIL # BLD: 3.1 %
EOSINOPHILS ABSOLUTE: 0.2 THOU/MM3 (ref 0–0.4)
ERYTHROCYTE [DISTWIDTH] IN BLOOD BY AUTOMATED COUNT: 12.8 % (ref 11.5–14.5)
ERYTHROCYTE [DISTWIDTH] IN BLOOD BY AUTOMATED COUNT: 43.6 FL (ref 35–45)
GFR SERPL CREATININE-BSD FRML MDRD: 68 ML/MIN/1.73M2
GLUCOSE BLD-MCNC: 105 MG/DL (ref 70–108)
HCT VFR BLD CALC: 39.9 % (ref 37–47)
HEMOGLOBIN: 13.4 GM/DL (ref 12–16)
IMMATURE GRANS (ABS): 0.02 THOU/MM3 (ref 0–0.07)
IMMATURE GRANULOCYTES: 0.3 %
LYMPHOCYTES # BLD: 20.1 %
LYMPHOCYTES ABSOLUTE: 1.5 THOU/MM3 (ref 1–4.8)
MCH RBC QN AUTO: 31.3 PG (ref 26–33)
MCHC RBC AUTO-ENTMCNC: 33.6 GM/DL (ref 32.2–35.5)
MCV RBC AUTO: 93.2 FL (ref 81–99)
MONOCYTES # BLD: 14.3 %
MONOCYTES ABSOLUTE: 1.1 THOU/MM3 (ref 0.4–1.3)
NUCLEATED RED BLOOD CELLS: 0 /100 WBC
PLATELET # BLD: 187 THOU/MM3 (ref 130–400)
PMV BLD AUTO: 11.3 FL (ref 9.4–12.4)
POTASSIUM SERPL-SCNC: 4.3 MEQ/L (ref 3.5–5.2)
RBC # BLD: 4.28 MILL/MM3 (ref 4.2–5.4)
SEG NEUTROPHILS: 61.4 %
SEGMENTED NEUTROPHILS ABSOLUTE COUNT: 4.6 THOU/MM3 (ref 1.8–7.7)
SODIUM BLD-SCNC: 137 MEQ/L (ref 135–145)
WBC # BLD: 7.5 THOU/MM3 (ref 4.8–10.8)

## 2022-01-21 PROCEDURE — 6360000002 HC RX W HCPCS: Performed by: NURSE PRACTITIONER

## 2022-01-21 PROCEDURE — C1729 CATH, DRAINAGE: HCPCS | Performed by: NEUROLOGICAL SURGERY

## 2022-01-21 PROCEDURE — 2580000003 HC RX 258: Performed by: NURSE PRACTITIONER

## 2022-01-21 PROCEDURE — 3600000006 HC SURGERY LEVEL 6 BASE: Performed by: NEUROLOGICAL SURGERY

## 2022-01-21 PROCEDURE — P9045 ALBUMIN (HUMAN), 5%, 250 ML: HCPCS | Performed by: NURSE ANESTHETIST, CERTIFIED REGISTERED

## 2022-01-21 PROCEDURE — C1713 ANCHOR/SCREW BN/BN,TIS/BN: HCPCS | Performed by: NEUROLOGICAL SURGERY

## 2022-01-21 PROCEDURE — 2709999900 HC NON-CHARGEABLE SUPPLY: Performed by: NEUROLOGICAL SURGERY

## 2022-01-21 PROCEDURE — 6370000000 HC RX 637 (ALT 250 FOR IP): Performed by: NURSE PRACTITIONER

## 2022-01-21 PROCEDURE — 00C40ZZ EXTIRPATION OF MATTER FROM INTRACRANIAL SUBDURAL SPACE, OPEN APPROACH: ICD-10-PCS | Performed by: NEUROLOGICAL SURGERY

## 2022-01-21 PROCEDURE — 70450 CT HEAD/BRAIN W/O DYE: CPT

## 2022-01-21 PROCEDURE — 61312 CRNEC/CRNOT STTL XDRL/SDRL: CPT | Performed by: NEUROLOGICAL SURGERY

## 2022-01-21 PROCEDURE — 2720000010 HC SURG SUPPLY STERILE: Performed by: NEUROLOGICAL SURGERY

## 2022-01-21 PROCEDURE — 3700000001 HC ADD 15 MINUTES (ANESTHESIA): Performed by: NEUROLOGICAL SURGERY

## 2022-01-21 PROCEDURE — 85025 COMPLETE CBC W/AUTO DIFF WBC: CPT

## 2022-01-21 PROCEDURE — 3700000000 HC ANESTHESIA ATTENDED CARE: Performed by: NEUROLOGICAL SURGERY

## 2022-01-21 PROCEDURE — 2500000003 HC RX 250 WO HCPCS: Performed by: NURSE ANESTHETIST, CERTIFIED REGISTERED

## 2022-01-21 PROCEDURE — 36415 COLL VENOUS BLD VENIPUNCTURE: CPT

## 2022-01-21 PROCEDURE — 3600000016 HC SURGERY LEVEL 6 ADDTL 15MIN: Performed by: NEUROLOGICAL SURGERY

## 2022-01-21 PROCEDURE — 6360000002 HC RX W HCPCS: Performed by: NURSE ANESTHETIST, CERTIFIED REGISTERED

## 2022-01-21 PROCEDURE — 2580000003 HC RX 258: Performed by: NURSE ANESTHETIST, CERTIFIED REGISTERED

## 2022-01-21 PROCEDURE — 2000000000 HC ICU R&B

## 2022-01-21 PROCEDURE — 2580000003 HC RX 258: Performed by: PHYSICIAN ASSISTANT

## 2022-01-21 PROCEDURE — 00R207Z REPLACEMENT OF DURA MATER WITH AUTOLOGOUS TISSUE SUBSTITUTE, OPEN APPROACH: ICD-10-PCS | Performed by: NEUROLOGICAL SURGERY

## 2022-01-21 PROCEDURE — 80048 BASIC METABOLIC PNL TOTAL CA: CPT

## 2022-01-21 PROCEDURE — 6370000000 HC RX 637 (ALT 250 FOR IP): Performed by: NEUROLOGICAL SURGERY

## 2022-01-21 DEVICE — PLATE BONE M L18.5MM THK0.3MM 5 H CRAN TI NONCOMPRESSION: Type: IMPLANTABLE DEVICE | Site: CRANIAL | Status: FUNCTIONAL

## 2022-01-21 DEVICE — DURAGEN® SUTURABLE DURAL REGENERATION MATRIX, 2 IN X 2 IN (5 CM X 5 CM)
Type: IMPLANTABLE DEVICE | Site: CRANIAL | Status: FUNCTIONAL
Brand: DURAGEN® SUTURABLE

## 2022-01-21 DEVICE — SCREW BNE L4MM DIA1.5MM CRAN SELF DRL CRSS DRV THINFLAP: Type: IMPLANTABLE DEVICE | Site: CRANIAL | Status: FUNCTIONAL

## 2022-01-21 DEVICE — IMPLANTABLE DEVICE: Type: IMPLANTABLE DEVICE | Status: FUNCTIONAL

## 2022-01-21 DEVICE — PLATE BUR H L DIA18.5MM 5 H TI BENT W/O TAB NONCOMPRESSION: Type: IMPLANTABLE DEVICE | Site: CRANIAL | Status: FUNCTIONAL

## 2022-01-21 DEVICE — DURASEAL® EXACT SPINAL SEALANT SYSTEM 3ML 5 PACK
Type: IMPLANTABLE DEVICE | Status: FUNCTIONAL
Brand: DURASEAL EXACT SPINAL SEALANT SYSTEM

## 2022-01-21 DEVICE — PLATE BNE L15MM THK0.3MM 2 H CRANIOMAXILLOFACIAL TI STR FOR: Type: IMPLANTABLE DEVICE | Site: CRANIAL | Status: FUNCTIONAL

## 2022-01-21 RX ORDER — ONDANSETRON 2 MG/ML
INJECTION INTRAMUSCULAR; INTRAVENOUS PRN
Status: DISCONTINUED | OUTPATIENT
Start: 2022-01-21 | End: 2022-01-21 | Stop reason: SDUPTHER

## 2022-01-21 RX ORDER — SODIUM CHLORIDE 9 MG/ML
INJECTION, SOLUTION INTRAVENOUS CONTINUOUS PRN
Status: DISCONTINUED | OUTPATIENT
Start: 2022-01-21 | End: 2022-01-21 | Stop reason: SDUPTHER

## 2022-01-21 RX ORDER — SUCCINYLCHOLINE/SOD CL,ISO/PF 200MG/10ML
SYRINGE (ML) INTRAVENOUS PRN
Status: DISCONTINUED | OUTPATIENT
Start: 2022-01-21 | End: 2022-01-21 | Stop reason: SDUPTHER

## 2022-01-21 RX ORDER — DEXAMETHASONE SODIUM PHOSPHATE 10 MG/ML
INJECTION, EMULSION INTRAMUSCULAR; INTRAVENOUS PRN
Status: DISCONTINUED | OUTPATIENT
Start: 2022-01-21 | End: 2022-01-21 | Stop reason: SDUPTHER

## 2022-01-21 RX ORDER — GINSENG 100 MG
CAPSULE ORAL PRN
Status: DISCONTINUED | OUTPATIENT
Start: 2022-01-21 | End: 2022-01-21 | Stop reason: ALTCHOICE

## 2022-01-21 RX ORDER — TRANEXAMIC ACID 100 MG/ML
INJECTION, SOLUTION INTRAVENOUS PRN
Status: DISCONTINUED | OUTPATIENT
Start: 2022-01-21 | End: 2022-01-21 | Stop reason: SDUPTHER

## 2022-01-21 RX ORDER — MANNITOL 20 G/100ML
INJECTION, SOLUTION INTRAVENOUS PRN
Status: DISCONTINUED | OUTPATIENT
Start: 2022-01-21 | End: 2022-01-21 | Stop reason: SDUPTHER

## 2022-01-21 RX ORDER — LABETALOL 20 MG/4 ML (5 MG/ML) INTRAVENOUS SYRINGE
PRN
Status: DISCONTINUED | OUTPATIENT
Start: 2022-01-21 | End: 2022-01-21 | Stop reason: SDUPTHER

## 2022-01-21 RX ORDER — ONDANSETRON 4 MG/1
4 TABLET, ORALLY DISINTEGRATING ORAL EVERY 8 HOURS PRN
Status: DISCONTINUED | OUTPATIENT
Start: 2022-01-21 | End: 2022-01-24 | Stop reason: HOSPADM

## 2022-01-21 RX ORDER — ALBUMIN, HUMAN INJ 5% 5 %
SOLUTION INTRAVENOUS PRN
Status: DISCONTINUED | OUTPATIENT
Start: 2022-01-21 | End: 2022-01-21 | Stop reason: SDUPTHER

## 2022-01-21 RX ORDER — ROCURONIUM BROMIDE 10 MG/ML
INJECTION, SOLUTION INTRAVENOUS PRN
Status: DISCONTINUED | OUTPATIENT
Start: 2022-01-21 | End: 2022-01-21 | Stop reason: SDUPTHER

## 2022-01-21 RX ORDER — CEFAZOLIN SODIUM 1 G/3ML
INJECTION, POWDER, FOR SOLUTION INTRAMUSCULAR; INTRAVENOUS PRN
Status: DISCONTINUED | OUTPATIENT
Start: 2022-01-21 | End: 2022-01-21 | Stop reason: SDUPTHER

## 2022-01-21 RX ORDER — SODIUM CHLORIDE 0.9 % (FLUSH) 0.9 %
5-40 SYRINGE (ML) INJECTION EVERY 12 HOURS SCHEDULED
Status: DISCONTINUED | OUTPATIENT
Start: 2022-01-22 | End: 2022-01-24 | Stop reason: HOSPADM

## 2022-01-21 RX ORDER — ONDANSETRON 2 MG/ML
4 INJECTION INTRAMUSCULAR; INTRAVENOUS EVERY 6 HOURS PRN
Status: DISCONTINUED | OUTPATIENT
Start: 2022-01-21 | End: 2022-01-24 | Stop reason: HOSPADM

## 2022-01-21 RX ORDER — ESMOLOL HYDROCHLORIDE 10 MG/ML
INJECTION INTRAVENOUS PRN
Status: DISCONTINUED | OUTPATIENT
Start: 2022-01-21 | End: 2022-01-21 | Stop reason: SDUPTHER

## 2022-01-21 RX ORDER — PROPOFOL 10 MG/ML
INJECTION, EMULSION INTRAVENOUS PRN
Status: DISCONTINUED | OUTPATIENT
Start: 2022-01-21 | End: 2022-01-21 | Stop reason: SDUPTHER

## 2022-01-21 RX ORDER — HYDROCODONE BITARTRATE AND ACETAMINOPHEN 5; 325 MG/1; MG/1
1 TABLET ORAL EVERY 6 HOURS PRN
Status: DISCONTINUED | OUTPATIENT
Start: 2022-01-21 | End: 2022-01-24 | Stop reason: HOSPADM

## 2022-01-21 RX ORDER — EPHEDRINE SULFATE/0.9% NACL/PF 50 MG/5 ML
SYRINGE (ML) INTRAVENOUS PRN
Status: DISCONTINUED | OUTPATIENT
Start: 2022-01-21 | End: 2022-01-21 | Stop reason: SDUPTHER

## 2022-01-21 RX ORDER — SODIUM CHLORIDE 9 MG/ML
25 INJECTION, SOLUTION INTRAVENOUS PRN
Status: DISCONTINUED | OUTPATIENT
Start: 2022-01-21 | End: 2022-01-24 | Stop reason: HOSPADM

## 2022-01-21 RX ORDER — FENTANYL CITRATE 50 UG/ML
INJECTION, SOLUTION INTRAMUSCULAR; INTRAVENOUS PRN
Status: DISCONTINUED | OUTPATIENT
Start: 2022-01-21 | End: 2022-01-21 | Stop reason: SDUPTHER

## 2022-01-21 RX ORDER — SODIUM CHLORIDE 0.9 % (FLUSH) 0.9 %
5-40 SYRINGE (ML) INJECTION PRN
Status: DISCONTINUED | OUTPATIENT
Start: 2022-01-21 | End: 2022-01-24 | Stop reason: HOSPADM

## 2022-01-21 RX ORDER — HYDRALAZINE HYDROCHLORIDE 20 MG/ML
INJECTION INTRAMUSCULAR; INTRAVENOUS PRN
Status: DISCONTINUED | OUTPATIENT
Start: 2022-01-21 | End: 2022-01-21 | Stop reason: SDUPTHER

## 2022-01-21 RX ORDER — PHENYLEPHRINE HYDROCHLORIDE 10 MG/ML
INJECTION INTRAVENOUS PRN
Status: DISCONTINUED | OUTPATIENT
Start: 2022-01-21 | End: 2022-01-21 | Stop reason: SDUPTHER

## 2022-01-21 RX ADMIN — LABETALOL 20 MG/4 ML (5 MG/ML) INTRAVENOUS SYRINGE 5 MG: at 11:35

## 2022-01-21 RX ADMIN — ACETAMINOPHEN 650 MG: 325 TABLET ORAL at 21:40

## 2022-01-21 RX ADMIN — Medication 140 MG: at 08:25

## 2022-01-21 RX ADMIN — DEXAMETHASONE SODIUM PHOSPHATE 10 MG: 10 INJECTION, EMULSION INTRAMUSCULAR; INTRAVENOUS at 08:40

## 2022-01-21 RX ADMIN — SODIUM CHLORIDE, PRESERVATIVE FREE 10 ML: 5 INJECTION INTRAVENOUS at 20:11

## 2022-01-21 RX ADMIN — ACETAMINOPHEN 650 MG: 325 TABLET ORAL at 12:30

## 2022-01-21 RX ADMIN — LABETALOL 20 MG/4 ML (5 MG/ML) INTRAVENOUS SYRINGE 5 MG: at 11:29

## 2022-01-21 RX ADMIN — PHENYLEPHRINE HYDROCHLORIDE 200 MCG: 10 INJECTION INTRAVENOUS at 08:34

## 2022-01-21 RX ADMIN — Medication 100 MG: at 08:25

## 2022-01-21 RX ADMIN — FENTANYL CITRATE 50 MCG: 50 INJECTION, SOLUTION INTRAMUSCULAR; INTRAVENOUS at 09:10

## 2022-01-21 RX ADMIN — ALBUMIN (HUMAN) 12.5 G: 12.5 SOLUTION INTRAVENOUS at 08:35

## 2022-01-21 RX ADMIN — Medication 10 MG: at 08:46

## 2022-01-21 RX ADMIN — MANNITOL 50 G: 20 INJECTION, SOLUTION INTRAVENOUS at 09:56

## 2022-01-21 RX ADMIN — Medication 10 MG: at 09:56

## 2022-01-21 RX ADMIN — HYDRALAZINE HYDROCHLORIDE 4 MG: 20 INJECTION INTRAMUSCULAR; INTRAVENOUS at 11:30

## 2022-01-21 RX ADMIN — ESMOLOL HYDROCHLORIDE 20 MG: 100 INJECTION, SOLUTION INTRAVENOUS at 09:06

## 2022-01-21 RX ADMIN — FENTANYL CITRATE 25 MCG: 50 INJECTION, SOLUTION INTRAMUSCULAR; INTRAVENOUS at 11:35

## 2022-01-21 RX ADMIN — PROPOFOL 50 MG: 10 INJECTION, EMULSION INTRAVENOUS at 09:07

## 2022-01-21 RX ADMIN — PHENYLEPHRINE HYDROCHLORIDE 100 MCG: 10 INJECTION INTRAVENOUS at 09:42

## 2022-01-21 RX ADMIN — CEFTRIAXONE SODIUM 1000 MG: 1 INJECTION, POWDER, FOR SOLUTION INTRAMUSCULAR; INTRAVENOUS at 14:03

## 2022-01-21 RX ADMIN — FENTANYL CITRATE 100 MCG: 50 INJECTION, SOLUTION INTRAMUSCULAR; INTRAVENOUS at 08:20

## 2022-01-21 RX ADMIN — PROPOFOL 125 MG: 10 INJECTION, EMULSION INTRAVENOUS at 08:25

## 2022-01-21 RX ADMIN — HYDRALAZINE HYDROCHLORIDE 4 MG: 20 INJECTION INTRAMUSCULAR; INTRAVENOUS at 11:38

## 2022-01-21 RX ADMIN — PHENYLEPHRINE HYDROCHLORIDE 200 MCG: 10 INJECTION INTRAVENOUS at 08:46

## 2022-01-21 RX ADMIN — ROCURONIUM BROMIDE 20 MG: 50 INJECTION, SOLUTION INTRAVENOUS at 08:35

## 2022-01-21 RX ADMIN — SODIUM CHLORIDE: 9 INJECTION, SOLUTION INTRAVENOUS at 08:16

## 2022-01-21 RX ADMIN — ROCURONIUM BROMIDE 20 MG: 50 INJECTION, SOLUTION INTRAVENOUS at 10:04

## 2022-01-21 RX ADMIN — SODIUM CHLORIDE: 9 INJECTION, SOLUTION INTRAVENOUS at 11:49

## 2022-01-21 RX ADMIN — PHENYLEPHRINE HYDROCHLORIDE 100 MCG: 10 INJECTION INTRAVENOUS at 08:32

## 2022-01-21 RX ADMIN — Medication 10 MG: at 09:17

## 2022-01-21 RX ADMIN — PROPOFOL 25 MG: 10 INJECTION, EMULSION INTRAVENOUS at 09:09

## 2022-01-21 RX ADMIN — Medication 10 MG: at 10:04

## 2022-01-21 RX ADMIN — FENTANYL CITRATE 25 MCG: 50 INJECTION, SOLUTION INTRAMUSCULAR; INTRAVENOUS at 11:11

## 2022-01-21 RX ADMIN — SUGAMMADEX 200 MG: 100 INJECTION, SOLUTION INTRAVENOUS at 11:35

## 2022-01-21 RX ADMIN — ROCURONIUM BROMIDE 30 MG: 50 INJECTION, SOLUTION INTRAVENOUS at 08:55

## 2022-01-21 RX ADMIN — PHENYLEPHRINE HYDROCHLORIDE 200 MCG: 10 INJECTION INTRAVENOUS at 08:56

## 2022-01-21 RX ADMIN — Medication 10 MG: at 08:57

## 2022-01-21 RX ADMIN — CEFAZOLIN 2000 MG: 1 INJECTION, POWDER, FOR SOLUTION INTRAMUSCULAR; INTRAVENOUS at 08:50

## 2022-01-21 RX ADMIN — METOPROLOL TARTRATE 50 MG: 50 TABLET, FILM COATED ORAL at 20:11

## 2022-01-21 RX ADMIN — ONDANSETRON HYDROCHLORIDE 4 MG: 4 INJECTION, SOLUTION INTRAMUSCULAR; INTRAVENOUS at 11:03

## 2022-01-21 RX ADMIN — PHENYLEPHRINE HYDROCHLORIDE 10 MCG/MIN: 10 INJECTION INTRAVENOUS at 09:40

## 2022-01-21 RX ADMIN — TRANEXAMIC ACID 1000 MG: 100 INJECTION, SOLUTION INTRAVENOUS at 08:50

## 2022-01-21 RX ADMIN — LABETALOL 20 MG/4 ML (5 MG/ML) INTRAVENOUS SYRINGE 10 MG: at 11:11

## 2022-01-21 RX ADMIN — SODIUM CHLORIDE: 9 INJECTION, SOLUTION INTRAVENOUS at 08:30

## 2022-01-21 RX ADMIN — PHENYLEPHRINE HYDROCHLORIDE 100 MCG: 10 INJECTION INTRAVENOUS at 09:56

## 2022-01-21 ASSESSMENT — PULMONARY FUNCTION TESTS
PIF_VALUE: 18
PIF_VALUE: 14
PIF_VALUE: 14
PIF_VALUE: 18
PIF_VALUE: 14
PIF_VALUE: 2
PIF_VALUE: 14
PIF_VALUE: 14
PIF_VALUE: 18
PIF_VALUE: 11
PIF_VALUE: 14
PIF_VALUE: 14
PIF_VALUE: 3
PIF_VALUE: 18
PIF_VALUE: 13
PIF_VALUE: 0
PIF_VALUE: 15
PIF_VALUE: 14
PIF_VALUE: 14
PIF_VALUE: 17
PIF_VALUE: 18
PIF_VALUE: 14
PIF_VALUE: 0
PIF_VALUE: 18
PIF_VALUE: 15
PIF_VALUE: 18
PIF_VALUE: 11
PIF_VALUE: 14
PIF_VALUE: 12
PIF_VALUE: 14
PIF_VALUE: 14
PIF_VALUE: 18
PIF_VALUE: 18
PIF_VALUE: 14
PIF_VALUE: 18
PIF_VALUE: 14
PIF_VALUE: 18
PIF_VALUE: 18
PIF_VALUE: 14
PIF_VALUE: 18
PIF_VALUE: 14
PIF_VALUE: 14
PIF_VALUE: 18
PIF_VALUE: 14
PIF_VALUE: 18
PIF_VALUE: 17
PIF_VALUE: 15
PIF_VALUE: 14
PIF_VALUE: 18
PIF_VALUE: 18
PIF_VALUE: 14
PIF_VALUE: 0
PIF_VALUE: 14
PIF_VALUE: 15
PIF_VALUE: 18
PIF_VALUE: 4
PIF_VALUE: 15
PIF_VALUE: 18
PIF_VALUE: 1
PIF_VALUE: 18
PIF_VALUE: 18
PIF_VALUE: 14
PIF_VALUE: 14
PIF_VALUE: 18
PIF_VALUE: 14
PIF_VALUE: 14
PIF_VALUE: 10
PIF_VALUE: 17
PIF_VALUE: 15
PIF_VALUE: 14
PIF_VALUE: 17
PIF_VALUE: 14
PIF_VALUE: 14
PIF_VALUE: 18
PIF_VALUE: 15
PIF_VALUE: 18
PIF_VALUE: 14
PIF_VALUE: 19
PIF_VALUE: 14
PIF_VALUE: 18
PIF_VALUE: 0
PIF_VALUE: 17
PIF_VALUE: 18
PIF_VALUE: 0
PIF_VALUE: 18
PIF_VALUE: 15
PIF_VALUE: 18
PIF_VALUE: 18
PIF_VALUE: 14
PIF_VALUE: 11
PIF_VALUE: 14
PIF_VALUE: 18
PIF_VALUE: 11
PIF_VALUE: 18
PIF_VALUE: 14
PIF_VALUE: 14
PIF_VALUE: 8
PIF_VALUE: 15
PIF_VALUE: 4
PIF_VALUE: 15
PIF_VALUE: 14
PIF_VALUE: 14
PIF_VALUE: 17
PIF_VALUE: 14
PIF_VALUE: 18
PIF_VALUE: 1
PIF_VALUE: 14
PIF_VALUE: 15
PIF_VALUE: 18
PIF_VALUE: 3
PIF_VALUE: 15
PIF_VALUE: 14
PIF_VALUE: 18
PIF_VALUE: 14
PIF_VALUE: 17
PIF_VALUE: 18
PIF_VALUE: 14
PIF_VALUE: 10
PIF_VALUE: 12
PIF_VALUE: 14
PIF_VALUE: 18
PIF_VALUE: 14
PIF_VALUE: 18
PIF_VALUE: 14
PIF_VALUE: 11
PIF_VALUE: 18
PIF_VALUE: 17
PIF_VALUE: 14
PIF_VALUE: 15
PIF_VALUE: 1
PIF_VALUE: 17
PIF_VALUE: 14
PIF_VALUE: 14
PIF_VALUE: 17
PIF_VALUE: 18
PIF_VALUE: 18
PIF_VALUE: 14
PIF_VALUE: 17
PIF_VALUE: 18
PIF_VALUE: 18
PIF_VALUE: 14
PIF_VALUE: 14
PIF_VALUE: 13
PIF_VALUE: 18
PIF_VALUE: 18
PIF_VALUE: 3
PIF_VALUE: 12
PIF_VALUE: 11
PIF_VALUE: 14
PIF_VALUE: 14
PIF_VALUE: 1
PIF_VALUE: 15
PIF_VALUE: 18
PIF_VALUE: 18
PIF_VALUE: 14
PIF_VALUE: 14
PIF_VALUE: 17
PIF_VALUE: 18
PIF_VALUE: 14
PIF_VALUE: 14
PIF_VALUE: 18
PIF_VALUE: 14
PIF_VALUE: 18
PIF_VALUE: 2
PIF_VALUE: 11
PIF_VALUE: 1
PIF_VALUE: 14
PIF_VALUE: 17
PIF_VALUE: 14
PIF_VALUE: 18
PIF_VALUE: 15
PIF_VALUE: 18
PIF_VALUE: 11
PIF_VALUE: 11
PIF_VALUE: 14
PIF_VALUE: 18
PIF_VALUE: 14
PIF_VALUE: 18
PIF_VALUE: 15
PIF_VALUE: 18
PIF_VALUE: 18
PIF_VALUE: 14
PIF_VALUE: 18
PIF_VALUE: 14

## 2022-01-21 ASSESSMENT — PAIN SCALES - GENERAL
PAINLEVEL_OUTOF10: 8
PAINLEVEL_OUTOF10: 2
PAINLEVEL_OUTOF10: 0
PAINLEVEL_OUTOF10: 4
PAINLEVEL_OUTOF10: 3
PAINLEVEL_OUTOF10: 0
PAINLEVEL_OUTOF10: 8

## 2022-01-21 ASSESSMENT — ENCOUNTER SYMPTOMS
CHEST TIGHTNESS: 0
PHOTOPHOBIA: 0
SORE THROAT: 0
WHEEZING: 0
TROUBLE SWALLOWING: 0
COLOR CHANGE: 0
SHORTNESS OF BREATH: 0
VOMITING: 0
BACK PAIN: 0
NAUSEA: 0
ABDOMINAL PAIN: 0

## 2022-01-21 NOTE — PROGRESS NOTES
CRITICAL CARE PROGRESS NOTE      Patient:  Jose Neal    Unit/Bed:E2-04/E2-04  YOB: 1940  MRN: [id number]   PCP: Timothy Yang MD  Date of Admission: 1/18/2022  Chief Complaint:-Headache     Assessment and Plan:       Acute intracranial hemorrhage: Status post TXA. TEG normal.  Frequent neurochecks. Seizure precautions. Neurosurgery following. CT 1/19/2022 increasing subdural.  Repeat 1/20 for possible need for surgical intervention. S/P crani with Dr. Aryan Whalen 1/21/2022, drain in place. Repeat CT in the AM.   Mechanical fall: Denies loss of consciousness. Denies dizziness prior to fall. PT/OT   Urinary tract infection present on arrival: Culture pending. Rocephin initiated day #4. Urine culture positive for greater than 100,000 gram-negative bacilli, E. coli, sensitivity confirmed to Rocephin  Hypertension:  Resume home medications for hypertension. Arthritis: Noted. INITIAL H AND P AND ICU COURSE:  Suzette Locke is an 39-year-old white female who presented to [facility] on 1/18/2022 as a transfer from [facility] ambulatory care for subdural hematoma after suffering a fall at home. She has a past medical history of lifetime non-smoker, hypertension, arthritis. Per report patient states that she fell this morning while she was trying to change a light bulb in her bathroom. She stated she was standing on a stool and she ended up falling backwards and hitting her head on the toilet. She denies any loss of consciousness. She stated she was able to get up and called her family to go to the emergency department. She does take a baby aspirin daily. [facility] She did states she has a dull headache but denies any other pain. CT head and neck were completed. CT head did show him a intracranial hemorrhage on the right frontal.  CT cervical spine negative.   She was noted to have a laceration on her lower [facility] She was started on a Cardene drip in the emergency department for accelerated hypertension. 1/19 patient had repeat CT which was positive for increased bleeding. This was discussed with Dr. Violet Campos. Will repeat tomorrow for possible need for surgical intervention. Family was updated extensively at the bedside. 1/21 patient status post craniotomy with drain placement with Dr. Violet Campos. CT postoperatively shows almost complete resolution of blood. Patient was awake and interactive postsurgery. Monitor. Past Medical History: Per HPI  Family History: Mother: Lymphoma Father: Prostate  Social History: Lifetime non-smoker, social alcohol use, denies drug use     Review of Systems   Constitutional: Negative for fatigue and fever. HENT: Negative for sore throat and trouble swallowing. Eyes: Negative for photophobia and visual disturbance. Respiratory: Negative for chest tightness, shortness of breath and wheezing. Cardiovascular: Negative for chest pain and leg swelling. Gastrointestinal: Negative for abdominal pain, nausea and vomiting. Endocrine: Negative for polydipsia and polyphagia. Genitourinary: Negative for decreased urine volume and flank pain. Musculoskeletal: Negative for back pain and neck pain. Skin: Positive for wound. Negative for color change, pallor and rash. Allergic/Immunologic: Negative for food allergies. Neurological: Negative for dizziness, weakness and numbness. Hematological: Negative for adenopathy. Psychiatric/Behavioral: Negative for agitation and confusion. The patient is not nervous/anxious.         Scheduled Meds:   [Held by provider] hydroCHLOROthiazide  12.5 mg Oral Daily    lisinopril  40 mg Oral Daily    metoprolol tartrate  50 mg Oral BID    sodium chloride flush  10 mL IntraVENous 2 times per day    docusate sodium  100 mg Oral Daily    famotidine  20 mg Oral Daily    cefTRIAXone (ROCEPHIN) IV 1,000 mg IntraVENous Q24H     Continuous Infusions:   niCARdipine Stopped (01/20/22 1319)    sodium chloride         PHYSICAL EXAMINATION:  T:  97.4.  P:  91. RR:  15. B/P:  123/45. FiO2:  0. O2 Sat:  91.  I/O:  2400/900  Body mass index is 31.53 kg/m². GCS: 15  General:  Acute on chronically ill appearing white female   HEENT:  normocephalic and atraumatic, surgical dressing and drain in place. No scleral icterus. PERR  Neck: supple. No Thyromegaly. Lungs: clear to auscultation. No retractions  Cardiac: RRR. No JVD. Abdomen: soft. Nontender. Extremities:  No clubbing, cyanosis, or edema x 4. Vasculature: capillary refill < 3 seconds. Palpable dorsalis pedis pulses. Skin:  warm and dry. Psych:  Alert and oriented x3. Affect appropriate  Lymph:  No supraclavicular adenopathy. Neurologic:  No focal deficit. No seizures. Data: (All radiographs, tracings, PFTs, and imaging are personally viewed and interpreted unless otherwise noted). Sodium 137, potassium 4.3, chloride 105, CO2 20, BUN 17, creatinine 0.8, anion gap 12.0, glucose 105. WBC 7.5, hemoglobin 13.4, hematocrit 39.9, platelet count 533  Telemetry shows normal sinus rhythm  CT cervical spine negative for acute fracture  CT head 10/18/2022 reports acute hemorrhage over the right frontal and temporal lobes which has mass-effect on the right frontal lobe. Echocardiogram 9/7/2016 reports ejection fraction 55%, normal LV function, grade 1 diastolic dysfunction  CT head 1/19/2022 reports moderate right subdural hematoma with approximately 4 mm leftward shift. CT 1/20/2022 reports no significant change in right subdural hematoma. Midline shift and ventricular effacement. CT head 1/21/2022 reports frontal craniotomy with evacuation of subdural hematoma. Air over the frontal and parietal lobes.       Meets Continued ICU Level Care Criteria:    [x] Yes   [] No - Transfer Planned to listed location:  [] HOSPITALIST CONTACTED- DR Oneal and plan discussed with Dr. Sri Schmidt. Electronically signed by LEONEL Edmondson - CNP  CRITICAL CARE SPECIALIST  Patient seen by me. Case discussed with nurse practitioner. Status post mini craniotomy. Patient did well with excellent evacuation of hematoma. Case discussed with Dr. Violet Campos. .  Italicized font represents changes to the note made by me. CC time 35 minutes. Time was discontiguous. Time does not include procedures. Time does include my direct assessment of the patient and coordination of care.   Electronically signed by Sulema Anderson MD on 1/21/2022 at 4:20 PM

## 2022-01-21 NOTE — OP NOTE
[facility]  RECORD OF OPERATION    Patient name: Chevy Cuenca  Medical Record Number: [id number]  Account Number: [de-identified]      Date of Procedure: 1/21/2022    Pre-operative Diagnosis:   · Acute right sided subdural hemorrhage (13 mm in thickness and associated with about 4 mm midline shift). · Brain compression. Post-operative Diagnosis: The same     PROCEDURE:      · Right sided craniotomy. · Evacuation of acute right sided subdural hemorrhage. · Using the intraoperative neuro-navigation. · Placement of subdural drain connected to closed draining collected system      Anesthesia: General endotracheal     Surgeon:  Coco Devine MD     Assistant : Elyssa Hayes PA-C*     Estimated Blood Loss: Less than 50 ml     Drains: One subdural drain connected to closed draining collected system      Blood Transfusions: None      Complications: none immediately appreciated     Specimens:  None     Indications for Procedure: This is a 78-year old male who developed right sided subdural hematoma (13 mm in thickness and associated with about 4 mm midline shift) secondary to fall of stool. Patient's serial brain CTs showed worsening in the patient's subdural hematoma. For this reason, and giving the result radiological features of patient subdural hematoma,  a surgical intervention (in the form of right sided mini craniotomy and placement of subdural drain connected to a closed draining system) to evacuate patient hematoma is indicated. This surgical intervention was discussed in details with patient and her family , as will as the associated risk and benefit. The alternative treatment options were discussed also (mainly continue observation). - All questions and concerns were addressed and answered. -  Patient and her family elected to proceed with the the surgical option and signed the surgical consent. PROCEDURE:      The patient was brought to the OR.   She was placed  supine. General anesthesia was inducted, IV line, Street catheter were  inserted and maintained. Then we turned the patient's head to the left  side and patient's head was placed on a surgical donut headrest.  We placed under the right  shoulder, a shoulder roll. Then, we shaved the right frontal area of her head. Next, we did registration between the patient and neuronavigation system. Next, we used neuro-naviagtion system  to localize the optimal location of the skin incision that associated with  the center of the hematoma and maximum thickness of the hematoma. Then, we  proceeded with the draping and preparing the patient in the standard  fashion. Then, we proceeded with making a skin incision, which was a  linear skin incision in the right right frontal area. Next, we did a sharp dissection throughout the soft tissue until we reached the bone. We exposed the bone and then we made a mini-craniotomy with about 3x4 cm bone  flap. Next, we elevated the bone flap, and we exposed the dura. Then, we proceeded with the opening of the dura in a cruciate fashion. We faced the hematoma that consistent with  fresh clots. I managed to remove the the subdural hematoma using the tumor forceps and then #3 penefield dissector. Then we did copious irrigation until we were satisfied with amount of blood clot we removed and as we started to see that the brain was pulsating with the color of the irrigation fluid, I proceeded to place a subdural drain. Then, we connected the drain to a closed collecting draining system system. There was slight bloody oozing from the edges of the dura. I controlled that oozing using a combination of bipolar coagulator and floSeal injections Then, we proceeded with closing the dura in a watertight fashion using a piece of DuraGen then we applied DuraSeal. Next,  we placed back the bone flap and fixed it with screws and plates. Then, we did copious irrigation.   We closed the skin incision in the standard fashion. At the end of the surgery, the patient was extubated and he tolerated the surgery very well without intraoperative complications. Next, patient was transferred to the PACU for further observation and treatment.       *Note: Ed Kumari PA-C ( Neurosurgery PA) assisted throughout the procedure with positioning, draping, wound closure, and dressing application       Bowen Garcia MD, MD  Electronically signed by me on 1/21/2022 at 11:48 AM

## 2022-01-21 NOTE — ANESTHESIA POSTPROCEDURE EVALUATION
Department of Anesthesiology  Postprocedure Note    Patient: Raul Sharma  MRN: [id number]  YOB: 1940  Date of evaluation: 1/21/2022  Time:  3:18 PM     Procedure Summary     Date: 01/21/22 Room / Location: [facility] 10 / [facility]    Anesthesia Start: 0816 Anesthesia Stop: 7394    Procedure: RIGHT SIDED CRANIOTOMY FOR SUBDURAL HEMATOMA (Right Head) Diagnosis: (SUBDURAL HEMATOMA)    Surgeons: Maya Lopez MD Responsible Provider: Tonya Stafford DO    Anesthesia Type: general ASA Status: 3          Anesthesia Type: general    Maureen Phase I:      Maureen Phase II:      Last vitals: Reviewed and per EMR flowsheets.        Anesthesia Post Evaluation    Patient location during evaluation: bedside  Patient participation: complete - patient participated  Level of consciousness: awake  Airway patency: patent  Nausea & Vomiting: no nausea  Complications: no  Cardiovascular status: hemodynamically stable  Respiratory status: acceptable  Hydration status: stable

## 2022-01-21 NOTE — BRIEF OP NOTE
Brief Postoperative Note      Patient: Silas Goyal  YOB: 1940  MRN: [id number]    Date of Procedure: 1/21/2022    Pre-Op Diagnosis: SUBDURAL HEMATOMA    Post-Op Diagnosis: Same       Procedure(s):  RIGHT SIDED CRANIOTOMY FOR SUBDURAL HEMATOMA    Surgeon(s):  Seth Espinal MD    Assistant:  Physician Assistant: Connor South PA-C    Anesthesia: General    Estimated Blood Loss (mL): Minimal    Complications: None    Specimens:   * No specimens in log *    Implants:  Implant Name Type Inv.  Item Serial No.  Lot No. LRB No. Used Action   GRAFT DURA [id number] ULTRAPURE POR SUTURABLE DURAGN  GRAFT DURA [id number] ULTRAPURE POR SUTURABLE DURAGN  [employer] FAB[employer]IO- [id number] Right 1 Implanted   PLATE BNE K81OD SWZ7.1HZ 2 H CRANIOMAXILLOFACIAL TI STR FOR  PLATE BNE N59LO MNO7.2IN 2 H CRANIOMAXILLOFACIAL TI STR FOR  PRICILA BIOMET MICROFIXATION-WD  Right 2 Implanted   PLATE BUR H L [id number] 5 H TI BENT W/O TAB NONCOMPRESSION  PLATE BUR H L BGJ77.6CL 5 H TI BENT W/O TAB NONCOMPRESSION  PRICIL[employer] INC-WD  Right 1 Implanted   SCREW BNE L4MM DIA1.5MM CRAN SELF DRL CRSS DRV THINFLAP  SCREW BNE L4MM DIA1.5MM CRAN SELF DRL CRSS DRV THINFLAP  PRICILA BIOMET MICROFIXATION-WD  Right 24 Implanted   PLATE BONE M G53.4NF THK0.3MM 5 H CRAN TI NONCOMPRESSION  PLATE BONE M R43.4OB THK0.3MM 5 H CRAN TI NONCOMPRESSION  [employer]IC[employer] INC-WD  Right 1 Implanted   SYSTEM SPNL SEAL 3ML EXACT DURASEAL  SYSTEM SPNL SEAL 3ML EXACT DURASEAL  [employer] FAB[employer]IO-WD [id number] Right 1 Implanted   SCREW BONE L3.5MM DIA1.5MM CRAN SELF DRL CROSS DRV THINFLAP  SCREW BONE L3.5MM DIA1.5MM CRAN SELF DRL CROSS DRV THINFLAP  [employer]-WD  Right 2 Implanted         Drains: EVD style drain to gravity  Closed/Suction Drain Right Scalp Other (Comment) (Active)       Urethral Catheter Double-lumen 16 fr (Active)       Findings: right SDH    Electronically signed by Connor South PA-C on 1/21/2022 at 12:00 PM

## 2022-01-21 NOTE — ANESTHESIA PRE PROCEDURE
Department of Anesthesiology  Preprocedure Note       Name:  Wily Pitt   Age:  78 y.o.  :  1940                                          MRN:  [id number]         Date:  2022      Surgeon: Alix Umana):  Devon Snellen, MD    Procedure: Procedure(s):  RIGHT SIDED CRANIOTOMY FOR SUBDURAL HEMATOMA    Medications prior to admission:   Prior to Admission medications    Medication Sig Start Date End Date Taking? Authorizing Provider   hydroCHLOROthiazide (HYDRODIURIL) 12.5 MG tablet Take 1 tablet by mouth daily 22   Sravan Vazquez MD   hydrocortisone (ANUSOL-HC) 2.5 % CREA rectal cream Use 2-3 times per day as needed. 21   Sravan Vazquez MD   metoprolol tartrate (LOPRESSOR) 50 MG tablet TAKE 1 TABLET BY MOUTH TWICE DAILY 10/29/21   Sravan Vazquez MD   lisinopril (PRINIVIL;ZESTRIL) 40 MG tablet Take 1 tablet by mouth daily 21  Sravan Vazquez MD   aspirin 81 MG tablet Take 81 mg by mouth daily. Historical Provider, MD   ascorbic acid (VITAMIN C) 500 MG tablet Take 500 mg by mouth daily. Historical Provider, MD   Calcium Carb-Cholecalciferol (CALCIUM PLUS VITAMIN D) 600-100 MG-UNIT CAPS Take  by mouth daily.  2 tablet in morning  /  1 tablets in evening    Historical Provider, MD       Current medications:    Current Facility-Administered Medications   Medication Dose Route Frequency Provider Last Rate Last Admin    niCARdipine (CARDENE) 25 mg in dextrose 5 % 250 mL infusion  3-15 mg/hr IntraVENous Continuous June Davenport DO   Stopped at 22 1319    [Held by provider] hydroCHLOROthiazide (HYDRODIURIL) tablet 12.5 mg  12.5 mg Oral Daily LEONEL Mejia - CNP   12.5 mg at 22 0807    lisinopril (PRINIVIL;ZESTRIL) tablet 40 mg  40 mg Oral Daily LEONEL Singleton - CNP   40 mg at 22 0807    metoprolol tartrate (LOPRESSOR) tablet 50 mg  50 mg Oral BID LEONEL Singleton - CNP   50 mg at 22 0950    sodium chloride flush 0.9 % injection 10 mL  10 mL IntraVENous 2 times per day Calib Carvalho, PA-C   10 mL at 01/20/22 1021    sodium chloride flush 0.9 % injection 10 mL  10 mL IntraVENous PRN Calib Carvalho, PA-C        0.9 % sodium chloride infusion  25 mL IntraVENous PRN Calib Carvalho, PA-C        ondansetron (ZOFRAN-ODT) disintegrating tablet 4 mg  4 mg Oral Q8H PRN Calib Carvalho, PA-C        Or    ondansetron (ZOFRAN) injection 4 mg  4 mg IntraVENous Q6H PRN Calib Carvalho, PA-C        docusate sodium (COLACE) capsule 100 mg  100 mg Oral Daily Calib Carvalho, PA-C   100 mg at 01/20/22 8514    polyethylene glycol (GLYCOLAX) packet 17 g  17 g Oral Daily PRN Calib Carvalho, PA-C        famotidine (PEPCID) tablet 20 mg  20 mg Oral Daily Calib Carvalho, PA-C   20 mg at 01/20/22 0808    cefTRIAXone (ROCEPHIN) 1000 mg IVPB in 50 mL D5W minibag  1,000 mg IntraVENous Q24H Sharion Qualia, APRN - CNP   Stopped at 01/20/22 1403    acetaminophen (TYLENOL) tablet 650 mg  650 mg Oral Q4H PRN Sharion Qualia, APRN - CNP   650 mg at 01/20/22 2129       Allergies: Allergies   Allergen Reactions    Pcn [Penicillins] Itching     redness @ IM site       Problem List:    Patient Active Problem List   Diagnosis Code    Hypertension I10    Syncopal episodes R55    Primary osteoarthritis of right knee M17.11    Subdural hematoma ([address] S06.5X9A    Fall W19. [place]       Past Medical History:        Diagnosis Date    Arthritis     Hypertension        Past Surgical History:        Procedure Laterality Date    CATHETER REMOVAL Bilateral     ELBOW SURGERY  07/04/2018    FOOT SURGERY Left 2012    KNEE SURGERY Right 7-9-14    total    IN BX OF BREAST, NEEDLE CORE, IMAGE GUIDE Left 2016    benign    IN OFFICE/OUTPT VISIT,PROCEDURE ONLY Left 7/5/2018    ORIF LEFT ELBOW performed by Mingo Zeng MD at [facility] History:    Social History     Tobacco Use    Smoking status: Never Smoker    Smokeless tobacco: Never Used   Substance Use Topics    Alcohol use: Yes     Comment: very very rare                                Counseling given: Not Answered      Vital Signs (Current):   Vitals:    01/21/22 0300 01/21/22 0400 01/21/22 0500 01/21/22 0600   BP: (!) 99/52 (!) 109/57 (!) 109/57 (!) 110/57   Pulse: 62 60 76 75   Resp: 26 17 16 13   Temp:  98.2 °F (36.8 °C)     TempSrc:  Temporal     SpO2: 97% 97% 97% 94%   Weight:       Height:                                                  BP Readings from Last 3 Encounters:   01/21/22 (!) 110/57   01/04/22 (!) 164/95   11/08/21 128/72       NPO Status:                                                                                 BMI:   Wt Readings from Last 3 Encounters:   01/18/22 178 lb (80.7 kg)   01/04/22 178 lb (80.7 kg)   11/08/21 177 lb (80.3 kg)     Body mass index is 31.53 kg/m². CBC:   Lab Results   Component Value Date    WBC 7.5 01/21/2022    RBC 4.28 01/21/2022    RBC 4.73 04/23/2012    HGB 13.4 01/21/2022    HCT 39.9 01/21/2022    MCV 93.2 01/21/2022    RDW 12.5 01/18/2022     01/21/2022       CMP:   Lab Results   Component Value Date     01/20/2022    K 3.9 01/20/2022    K 4.1 01/19/2022     01/20/2022    CO2 22 01/20/2022    BUN 13 01/20/2022    CREATININE 0.7 01/20/2022    LABGLOM 80 01/20/2022    GLUCOSE 115 01/20/2022    GLUCOSE 89 08/18/2016    PROT 7.6 01/18/2022    CALCIUM 9.1 01/20/2022    BILITOT 0.6 01/18/2022    BILITOT NEGATIVE 04/23/2012    ALKPHOS 73 01/18/2022    AST 27 01/18/2022    ALT 25 01/18/2022       POC Tests: No results for input(s): POCGLU, POCNA, POCK, POCCL, POCBUN, POCHEMO, POCHCT in the last 72 hours.     Coags:   Lab Results   Component Value Date    INR 0.99 07/09/2014    APTT 35.9 01/20/2022       HCG (If Applicable): No results found for: PREGTESTUR, PREGSERUM, HCG, HCGQUANT     ABGs: No results found for: PHART, PO2ART, LWV3TMH, YEU8KBD, BEART, R4HRMECL     Type & Screen (If Applicable):  Lab Results   Component Value Date    LABRH NEG 07/09/2014       Drug/Infectious Status (If Applicable):  No results found for: HIV, HEPCAB    COVID-19 Screening (If Applicable): No results found for: COVID19        Anesthesia Evaluation  Patient summary reviewed and Nursing notes reviewed  Airway: Mallampati: II        Dental:          Pulmonary: breath sounds clear to auscultation                             Cardiovascular:  Exercise tolerance: good (>4 METS),   (+) hypertension:,       ECG reviewed  Rhythm: regular  Rate: normal                    Neuro/Psych:                ROS comment: Acute SDH GI/Hepatic/Renal:             Endo/Other:                C-spine cleared           Abdominal:             Vascular: Other Findings:             Anesthesia Plan      general     ASA 3       Induction: intravenous. MIPS: Postoperative opioids intended, Prophylactic antiemetics administered and Postoperative trial extubation. Anesthetic plan and risks discussed with patient. Plan discussed with CRNA.                   Corie Escalona DO   1/21/2022

## 2022-01-21 NOTE — PROGRESS NOTES
[facility]  OCCUPATIONAL THERAPY MISSED TREATMENT NOTE  STRZ OR  STRZ OR (General) POOL R*      Date: 2022  Patient Name: Mari Montes        CSN: [id number]   : 1940  (78 y.o.)  Gender: female   Referring Practitioner: GABRIELA Carvalho PA-C  Diagnosis: subdural hematoma         REASON FOR MISSED TREATMENT: Pt currently in surgery for craniotomy to evacuate a hematoma. Will check back and await new orders.

## 2022-01-21 NOTE — PROGRESS NOTES
Patient was seen and examined in the pre op area in conjunction with neurosurgery JESUS ALBERTO Mcdaniel PA-C). There are no changes in patient symptoms and neurological exam since yesterday. Today I discussed with patient and her  family again today planned/recommended surgical intervention as well as the associated risk and benefit and alternative. All questions and concerns were addressed and answered. Patient elected again to go with today planned surgical intervention.

## 2022-01-21 NOTE — CARE COORDINATION
1/21/22, 8:06 AM EST    DISCHARGE ON [facility] day: 3  Location: E2-04/E2-04 Reason for admit: Subdural hematoma ([facility] [address] [S06.5X9A]  Acute UTI [N39.0]  Laceration of scalp, initial encounter [S01.01XA]   Procedure:   1/21 CT Head WO Contrast Impression:   Stable subdural hematoma collections on the right as above. 1/21 RIGHT SIDED CRANIOTOMY FOR SUBDURAL HEMATOMA    Barriers to Discharge: Planning for surgery today. IV Rocephin, pain control, Pepcid, Cardene drip. PCP: Deb Booth MD  Readmission Risk Score: 10.4 ( )%  Patient Goals/Plan/Treatment Preferences: From home alone, family support. Will follow post operatively for potential needs or services.

## 2022-01-22 ENCOUNTER — APPOINTMENT (OUTPATIENT)
Dept: CT IMAGING | Age: 82
DRG: 025 | End: 2022-01-22
Payer: MEDICARE

## 2022-01-22 LAB
ANION GAP SERPL CALCULATED.3IONS-SCNC: 12 MEQ/L (ref 8–16)
BASOPHILS # BLD: 0.2 %
BASOPHILS ABSOLUTE: 0 THOU/MM3 (ref 0–0.1)
BUN BLDV-MCNC: 17 MG/DL (ref 7–22)
CALCIUM SERPL-MCNC: 8.8 MG/DL (ref 8.5–10.5)
CHLORIDE BLD-SCNC: 106 MEQ/L (ref 98–111)
CO2: 19 MEQ/L (ref 23–33)
CREAT SERPL-MCNC: 0.8 MG/DL (ref 0.4–1.2)
EOSINOPHIL # BLD: 0 %
EOSINOPHILS ABSOLUTE: 0 THOU/MM3 (ref 0–0.4)
ERYTHROCYTE [DISTWIDTH] IN BLOOD BY AUTOMATED COUNT: 12.7 % (ref 11.5–14.5)
ERYTHROCYTE [DISTWIDTH] IN BLOOD BY AUTOMATED COUNT: 43.5 FL (ref 35–45)
GFR SERPL CREATININE-BSD FRML MDRD: 68 ML/MIN/1.73M2
GLUCOSE BLD-MCNC: 123 MG/DL (ref 70–108)
HCT VFR BLD CALC: 36.1 % (ref 37–47)
HEMOGLOBIN: 11.9 GM/DL (ref 12–16)
IMMATURE GRANS (ABS): 0.09 THOU/MM3 (ref 0–0.07)
IMMATURE GRANULOCYTES: 0.6 %
LYMPHOCYTES # BLD: 8.4 %
LYMPHOCYTES ABSOLUTE: 1.2 THOU/MM3 (ref 1–4.8)
MCH RBC QN AUTO: 30.7 PG (ref 26–33)
MCHC RBC AUTO-ENTMCNC: 33 GM/DL (ref 32.2–35.5)
MCV RBC AUTO: 93.3 FL (ref 81–99)
MONOCYTES # BLD: 10.6 %
MONOCYTES ABSOLUTE: 1.5 THOU/MM3 (ref 0.4–1.3)
NUCLEATED RED BLOOD CELLS: 0 /100 WBC
PLATELET # BLD: 190 THOU/MM3 (ref 130–400)
PMV BLD AUTO: 10.9 FL (ref 9.4–12.4)
POTASSIUM SERPL-SCNC: 4.2 MEQ/L (ref 3.5–5.2)
RBC # BLD: 3.87 MILL/MM3 (ref 4.2–5.4)
SEG NEUTROPHILS: 80.2 %
SEGMENTED NEUTROPHILS ABSOLUTE COUNT: 11.1 THOU/MM3 (ref 1.8–7.7)
SODIUM BLD-SCNC: 137 MEQ/L (ref 135–145)
WBC # BLD: 13.9 THOU/MM3 (ref 4.8–10.8)

## 2022-01-22 PROCEDURE — 2580000003 HC RX 258: Performed by: PHYSICIAN ASSISTANT

## 2022-01-22 PROCEDURE — 85025 COMPLETE CBC W/AUTO DIFF WBC: CPT

## 2022-01-22 PROCEDURE — 2060000000 HC ICU INTERMEDIATE R&B

## 2022-01-22 PROCEDURE — 6370000000 HC RX 637 (ALT 250 FOR IP): Performed by: NURSE PRACTITIONER

## 2022-01-22 PROCEDURE — 6370000000 HC RX 637 (ALT 250 FOR IP): Performed by: PHYSICIAN ASSISTANT

## 2022-01-22 PROCEDURE — 97110 THERAPEUTIC EXERCISES: CPT

## 2022-01-22 PROCEDURE — 36415 COLL VENOUS BLD VENIPUNCTURE: CPT

## 2022-01-22 PROCEDURE — 97129 THER IVNTJ 1ST 15 MIN: CPT

## 2022-01-22 PROCEDURE — 97116 GAIT TRAINING THERAPY: CPT

## 2022-01-22 PROCEDURE — 97530 THERAPEUTIC ACTIVITIES: CPT

## 2022-01-22 PROCEDURE — 6360000002 HC RX W HCPCS: Performed by: PHYSICIAN ASSISTANT

## 2022-01-22 PROCEDURE — 80048 BASIC METABOLIC PNL TOTAL CA: CPT

## 2022-01-22 PROCEDURE — 99024 POSTOP FOLLOW-UP VISIT: CPT | Performed by: NEUROLOGICAL SURGERY

## 2022-01-22 PROCEDURE — 70450 CT HEAD/BRAIN W/O DYE: CPT

## 2022-01-22 PROCEDURE — APPSS60 APP SPLIT SHARED TIME 46-60 MINUTES: Performed by: PHYSICIAN ASSISTANT

## 2022-01-22 PROCEDURE — APPSS180 APP SPLIT SHARED TIME > 60 MINUTES: Performed by: NURSE PRACTITIONER

## 2022-01-22 RX ORDER — ACETAMINOPHEN 325 MG/1
650 TABLET ORAL EVERY 4 HOURS PRN
Status: DISCONTINUED | OUTPATIENT
Start: 2022-01-22 | End: 2022-01-24 | Stop reason: HOSPADM

## 2022-01-22 RX ADMIN — SODIUM CHLORIDE, PRESERVATIVE FREE 10 ML: 5 INJECTION INTRAVENOUS at 09:01

## 2022-01-22 RX ADMIN — CEFTRIAXONE SODIUM 1000 MG: 1 INJECTION, POWDER, FOR SOLUTION INTRAMUSCULAR; INTRAVENOUS at 13:32

## 2022-01-22 RX ADMIN — METOPROLOL TARTRATE 50 MG: 50 TABLET, FILM COATED ORAL at 20:54

## 2022-01-22 RX ADMIN — HYDROCHLOROTHIAZIDE 12.5 MG: 25 TABLET ORAL at 08:21

## 2022-01-22 RX ADMIN — LISINOPRIL 40 MG: 40 TABLET ORAL at 08:21

## 2022-01-22 RX ADMIN — FAMOTIDINE 20 MG: 20 TABLET ORAL at 08:19

## 2022-01-22 RX ADMIN — ALTEPLASE 1 MG: 2.2 INJECTION, POWDER, LYOPHILIZED, FOR SOLUTION INTRAVENOUS at 12:49

## 2022-01-22 RX ADMIN — SODIUM CHLORIDE, PRESERVATIVE FREE 10 ML: 5 INJECTION INTRAVENOUS at 20:54

## 2022-01-22 RX ADMIN — DOCUSATE SODIUM 100 MG: 100 CAPSULE ORAL at 08:19

## 2022-01-22 RX ADMIN — ACETAMINOPHEN 650 MG: 325 TABLET ORAL at 20:54

## 2022-01-22 RX ADMIN — METOPROLOL TARTRATE 50 MG: 50 TABLET, FILM COATED ORAL at 08:21

## 2022-01-22 ASSESSMENT — PAIN SCALES - GENERAL
PAINLEVEL_OUTOF10: 0
PAINLEVEL_OUTOF10: 3
PAINLEVEL_OUTOF10: 0

## 2022-01-22 ASSESSMENT — PAIN DESCRIPTION - PROGRESSION
CLINICAL_PROGRESSION: GRADUALLY WORSENING
CLINICAL_PROGRESSION: RESOLVED

## 2022-01-22 ASSESSMENT — ENCOUNTER SYMPTOMS
CHEST TIGHTNESS: 0
SHORTNESS OF BREATH: 0
ABDOMINAL PAIN: 0
COLOR CHANGE: 0
TROUBLE SWALLOWING: 0
WHEEZING: 0
ABDOMINAL DISTENTION: 0
APNEA: 0
NAUSEA: 0
PHOTOPHOBIA: 0
VOMITING: 0
SORE THROAT: 0
BACK PAIN: 0

## 2022-01-22 ASSESSMENT — PAIN DESCRIPTION - FREQUENCY: FREQUENCY: INTERMITTENT

## 2022-01-22 ASSESSMENT — PAIN DESCRIPTION - DESCRIPTORS: DESCRIPTORS: HEADACHE

## 2022-01-22 ASSESSMENT — PAIN DESCRIPTION - PAIN TYPE: TYPE: ACUTE PAIN

## 2022-01-22 ASSESSMENT — PAIN DESCRIPTION - LOCATION: LOCATION: HEAD

## 2022-01-22 ASSESSMENT — PAIN - FUNCTIONAL ASSESSMENT: PAIN_FUNCTIONAL_ASSESSMENT: ACTIVITIES ARE NOT PREVENTED

## 2022-01-22 ASSESSMENT — PAIN DESCRIPTION - ONSET: ONSET: GRADUAL

## 2022-01-22 ASSESSMENT — PAIN DESCRIPTION - ORIENTATION: ORIENTATION: POSTERIOR

## 2022-01-22 NOTE — PROGRESS NOTES
Attending Note:     Patient was seen and examined by me in the ICU in conjunction with neurosurgery PA. Discussed with patient, her nurse, her family and the ICU team  as well. All data and imaging reviewed by myself. I agree with examination assessment and plan as documented below. Marilia Rock MD       Neurosurgery Progress Note    Patient:  Guilherme Brumfield      Unit/Bed:4A-19/019-A    YOB: 1940    MRN: [id number]     Acct: [de-identified]     Admit date: 1/18/2022    Chief Complaint   Patient presents with   Duana Big Fall    Laceration       Patient Seen, Chart, Physician notes, Labs, Radiology studies reviewed. Subjective: Patient is seen and evaluated on the floor having transition from the PACU setting yesterday without incident. Patient remains postoperative day #1 from right mini craniotomy and evacuation of subdural hematoma performed by Dr. Allyssa Barrios, without complication. She was sitting up in a chair today comfortably without significant complaints. Pain was very well controlled. Past, Family, Social History unchanged from admission. Diet:  ADULT DIET; Regular    Medications:  Scheduled Meds:   IVPB builder   IntraVENous Once    sodium chloride flush  5-40 mL IntraVENous 2 times per day    hydroCHLOROthiazide  12.5 mg Oral Daily    lisinopril  40 mg Oral Daily    metoprolol tartrate  50 mg Oral BID    sodium chloride flush  10 mL IntraVENous 2 times per day    docusate sodium  100 mg Oral Daily    famotidine  20 mg Oral Daily    cefTRIAXone (ROCEPHIN) IV  1,000 mg IntraVENous Q24H     Continuous Infusions:   sodium chloride       PRN Meds:acetaminophen, sodium chloride flush, sodium chloride, ondansetron **OR** ondansetron, HYDROcodone 5 mg - acetaminophen, HYDROmorphone, sodium chloride flush, polyethylene glycol    Objective: Patient was observed transitioning from chair to bed with little assistance and is comfortable.   She remained stable and intact to her baseline from admission with no significant changes noted to the patient chart overnight. Incisions are flat and dry with dressings intact and the drain is intact and functioning as indicated    Vitals: BP (!) 106/46   Pulse 66   Temp 98.1 °F (36.7 °C) (Oral)   Resp 13   Ht 5' 3\" (1.6 m)   Wt 178 lb (80.7 kg)   LMP  (LMP Unknown)   SpO2 99%   BMI 31.53 kg/m²     Physical Exam   Patient remained stable and grossly neurologically intact her baseline on exam this morning with no additional significant changes noted the patient chart overnight. Physical Exam:  Alert and attentive. Language appropriate, with no aphasia. Pupils equal.  Facial strength symmetric. Review of Systems   Constitutional: Negative for activity change. Respiratory: Negative for apnea, chest tightness and shortness of breath. Cardiovascular: Negative for chest pain and leg swelling. Gastrointestinal: Negative for abdominal distention and abdominal pain. Musculoskeletal: Negative for neck pain and neck stiffness. Neurological: Negative for dizziness, seizures, weakness, numbness and headaches. Psychiatric/Behavioral: Negative for agitation, behavioral problems, confusion and decreased concentration. 24 hour intake/output:    Intake/Output Summary (Last 24 hours) at 1/22/2022 1252  Last data filed at 1/22/2022 0653  Gross per 24 hour   Intake 49.46 ml   Output 955 ml   Net -905.54 ml     Last 3 weights:   Wt Readings from Last 3 Encounters:   01/18/22 178 lb (80.7 kg)   01/04/22 178 lb (80.7 kg)   11/08/21 177 lb (80.3 kg)         CBC:   Recent Labs     01/20/22  0815 01/21/22  0640 01/22/22  0537   WBC 8.6 7.5 13.9*   HGB 13.3 13.4 11.9*    187 190     BMP:    Recent Labs     01/20/22  0815 01/21/22  0640 01/22/22  0537    137 137   K 3.9 4.3 4.2    105 106   CO2 22* 20* 19*   BUN 13 17 17   CREATININE 0.7 0.8 0.8   GLUCOSE 115* 105 123*     Calcium:  Recent Labs     01/22/22  0537   CALCIUM 8.8     Magnesium:No results for input(s): MG in the last 72 hours. Glucose:No results for input(s): POCGLU in the last 72 hours. HgbA1C: No results for input(s): LABA1C in the last 72 hours. Lipids: No results for input(s): CHOL, TRIG, HDL, LDL, LDLCALC in the last 72 hours. Radiology reports as per the Radiologist  Radiology: CT HEAD WO CONTRAST    Result Date: 1/19/2022  Exam: CT head without contrast Comparison: None Findings: Moderate acute subdural hematoma overlying the lateral right frontal parietal and temporal lobes measuring up to 1.3 cm in transverse dimension. Small volume subdural hematoma layering oval along the cerebral falx. 4 mm of leftward midline shift. Right lateral ventricular effacement. No hydrocephalus or axial herniation. Basilar cisterns patent. No anoxic brain changes. No significant white matter disease. No sinus fluid levels are mastoid effusions. No acute fracture. Impression: Moderate right subdural hematoma with approximately 4 mm leftward subfalcine herniation. No axial herniation or hydrocephalus. This document has been electronically signed by: Deja Vega MD on 01/19/2022 07:20 AM All CTs at this facility use dose modulation techniques and iterative reconstructions, and/or weight-based dosing when appropriate to reduce radiation to a low as reasonably achievable. CT HEAD WO CONTRAST    Result Date: 1/18/2022  PROCEDURE: CT HEAD WO CONTRAST CLINICAL INFORMATION: fell from height, struck occiput on toilet,occipital laceration. COMPARISON: CT scan of the brain dated 4 February 2014. . TECHNIQUE: Noncontrast 5 mm axial images were obtained through the brain. Sagittal and coronal reconstructions were obtained. All CT scans at this facility use dose modulation, iterative reconstruction, and/or weight-based dosing when appropriate to reduce radiation dose to as low as reasonably achievable.  FINDINGS: There is an acute extra-axial hemorrhage over the right frontal and temporal lobes measuring 6.5 x 2.9 x 0.8 cm in size. There is mild mass effect upon the right frontal lobe. There is mild atrophy and dilatation of the lateral ventricles. There is diminished attenuation in the white matter most likely representing ischemic changes There is calcification the cavernous segments of both internal carotid arteries. . There  is no hydrocephalus or midline shift . The paranasal sinuses and mastoid air cells are normally aerated. There is no suspicious calvarial abnormality. 1. Acute extra-axial hemorrhage over the right frontal and temporal lobes with mild mass effect upon the right frontal lobe. 2. Mild atrophy and dilatation of the lateral ventricles. 3. Probable ischemic changes in the white matter. 4. Results called to Dr. Mihir Tan at 10.11 AM.. **This report has been created using voice recognition software. It may contain minor errors which are inherent in voice recognition technology. ** Final report electronically signed by DR Jer Holman on 1/18/2022 10:12 AM    CT CERVICAL SPINE WO CONTRAST    Result Date: 1/18/2022  PROCEDURE: CT CERVICAL SPINE WO CONTRAST CLINICAL INFORMATION: 64-year-old female who fell. Injury to the occiput. COMPARISON: CT scan 5/21/2009. TECHNIQUE: 3 mm noncontrast axial images were obtained through the cervical spine with sagittal and coronal reconstructions. All CT scans at this facility use dose modulation, iterative reconstruction, and/or weight-based dosing when appropriate to reduce radiation dose to as low as reasonably achievable. FINDINGS: The cervical vertebral body heights are maintained. There is disc space narrowing at C3-C4, C4-C5, C5-C6 and C6-C7. There is no acute compression fracture. There is no prevertebral soft tissue swelling. There is diffuse facet arthropathy. There is mild grade 1 retrolisthesis of C3 over C4. There is a disc osteophyte complex at C5-6 resulting in severe spinal canal stenosis.  Disc osteophyte complexes are also noted at C3-C4 and C4-C5 resulting in moderate spinal canal stenosis. There are no suspicious findings in the visualized lung apices. Multilevel degenerative changes with no acute fracture. **This report has been created using voice recognition software. It may contain minor errors which are inherent in voice recognition technology. ** Final report electronically signed by Dr Eri Higgins on 1/18/2022 10:05 AM                   Assessment: Postoperative day #1 from right-sided craniotomy and evacuation of subdural hematoma performed by Dr. Laury Heller, without complication    Active Problems:    Subdural hematoma ([address]    Fall  Resolved Problems:    * No resolved hospital problems. *        Plan: Patient is seen and evaluated on the floor having transition from the PACU setting without incident yesterday. Patient remains postoperative day #1 from right-sided craniotomy and evacuation of subdural hematoma performed by Dr. Laury Heller, without complication. Patient was observed today sitting in a chair comfortably and without complaints with flat dry incisions and EVD style drain functioning to gravity as indicated. Patient transition from chair to the bed with little assistance and underwent flushing of the drain and administration of tPA suspended and normal saline with saline flush to break up any residual clotting. The drain was placed in the off position with nursing instructed to maintain the drain in the off position for 2 hours prior to reopening reestablishing drain. CT scan of the head will be ordered for comparison and review in the morning in anticipation of discharge planning.   Neurosurgery to follow      Electronically signed by Rocio Rios PA-C on 1/22/2022 at 12:52 PM    Neurosurgery

## 2022-01-22 NOTE — PROGRESS NOTES
[address]  Occupational Therapy  Daily Note/REassessment  Time:    Time In: 0174  Time Out: 1053  Timed Code Treatment Minutes: 24 Minutes  Minutes: 24          Date: 2022  Patient Name: Radha Walters,   Gender: female      Room: [facility]19/019-A  MRN: [id number]  : 1940  (78 y.o.)  Referring Practitioner: GABRIELA Carvalho PA-C  Diagnosis: subdural hematoma  Additional Pertinent Hx: 57-year-old white female who presented to [facility] on 2022 as a transfer from [facility] ambulatory care for subdural hematoma after suffering a fall at home. She has a past medical history of lifetime non-smoker, hypertension, arthritis. Per report patient states that she fell this morning while she was trying to change a light bulb in her bathroom. She stated she was standing on a stool and she ended up falling backwards and hitting her head on the toilet. She denies any loss of consciousness. She stated she was able to get up and called her family to go to the emergency department. She does take a baby aspirin daily. She did receive 11 staples to her scalp at [facility] facility. She did states she has a dull headache but denies any other pain. CT head and neck were completed. CT head did show him a intracranial hemorrhage on the right frontal.  CT cervical spine negative. She was noted to have a laceration on her lower occipital scalp which was stapled at [facility] facility. She was started on a Cardene drip in the emergency department for accelerated hypertension. Pt s/pRIGHT SIDED CRANIOTOMY FOR SUBDURAL HEMATOMA on 22. Restrictions/Precautions:  Restrictions/Precautions: Fall Risk,General Precautions  Position Activity Restriction  Other position/activity restrictions: drain on right side of head      SUBJECTIVE: Pt s/p right sided craniotomy on 22 with drain placement. Pt sitting up in chair with daughter present.  Pt stating she was feeling well and very agreeable to OT session     PAIN: 0 /10:      Vitals: Vitals not assessed per clinical judgement, see nursing flowsheet    COGNITION: WNL    ADL:   Grooming: Stand By Assistance. seated  Lower Extremity Dressing: Stand By Assistance. socks. Upper Extremity Dressing: min a to don gown d/t safety awareness of drain lines after pt catching hand on it. BALANCE:  Standing Balance: Contact Guard Assistance. with not UE support    BED MOBILITY:  Not Tested    TRANSFERS:  Sit to Stand:  Contact Guard Assistance. from bedsdie chair   Stand to Sit: [employer]. into bedside chair    FUNCTIONAL MOBILITY:  Assistive Device: None  Assist Level:  Contact Guard Assistance. To occasional min A   Distance: into hallway  Pt slightly unsteady at times equiring min A for safety. Pt reporting as long as she goes slow she is ok. ASSESSMENT:     Activity Tolerance:  Patient tolerance of  treatment: good. Discharge Recommendations: Continue to assess pending progress, 24 hour assistance or supervision and with family support. Pt has good family support with tthem willing to assist her upon return home. daughter Yumiko Lanza was preent aslos agreeable. 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

## 2022-01-22 NOTE — PROGRESS NOTES
CRITICAL CARE PROGRESS NOTE      Patient:  Nga Werner    Unit/Bed:E2-04/E2-04  YOB: 1940  MRN: [id number]   PCP: Gustavo Alvarado MD  Date of Admission: 1/18/2022  Chief Complaint:-Headache     Assessment and Plan:       1. Acute intracranial hemorrhage: Status post TXA.  TEG normal.  Frequent neurochecks.  Seizure precautions.  Neurosurgery following.  CT 1/19/2022 increasing subdural.  Repeat 1/20 for possible need for surgical intervention.  S/P crani with Dr. Man Cervantes 1/21/2022, drain in place. Repeat CT 1/22 stable   2. Mechanical fall: Denies loss of consciousness.  Denies dizziness prior to fall.  PT/OT   3. Urinary tract infection present on arrival: Culture pending.  Rocephin initiated day #5.  Urine culture positive for greater than 100,000 gram-negative bacilli, E. coli, sensitivity confirmed to Rocephin  4. Hypertension:  Resume home medications for hypertension. 5. Non-anion gap metabolic acidosis: Mild, monitor  6. Leukocytosis: Likely secondary to surgical intervention. Continue Rocephin for UTI. No fever. 7. Acute blood loss normocytic anemia: Status post surgical intervention. Hemoglobin 11.9, hematocrit 36.1, no active signs of bleeding. Drain has been no output.   8. Arthritis: Noted.     INITIAL H AND P AND ICU COURSE:  Trina Roman is an 78-year-old white female who presented to [facility] on 1/18/2022 as a transfer from [facility] ambulatory care for subdural hematoma after suffering a fall at home. Kareem Mcfarland has a past medical history of lifetime non-smoker, hypertension, arthritis.  Per report patient states that she fell this morning while she was trying to change a light bulb in her bathroom.  She stated she was standing on a stool and she ended up falling backwards and hitting her head on the toilet.  She denies any loss of consciousness.  She stated she was able to get up and called her family to go to the emergency department. Kareem Mcfarland does take a Psychiatric/Behavioral: Negative for agitation and confusion. The patient is not nervous/anxious. Scheduled Meds:   sodium chloride flush  5-40 mL IntraVENous 2 times per day    hydroCHLOROthiazide  12.5 mg Oral Daily    lisinopril  40 mg Oral Daily    metoprolol tartrate  50 mg Oral BID    sodium chloride flush  10 mL IntraVENous 2 times per day    docusate sodium  100 mg Oral Daily    famotidine  20 mg Oral Daily    cefTRIAXone (ROCEPHIN) IV  1,000 mg IntraVENous Q24H     Continuous Infusions:   sodium chloride         PHYSICAL EXAMINATION:  T: 99.  P: 64. RR: 13. B/P: 131/55. FiO2: 0. O2 Sat: 95.  I/O: 2449/1225  Body mass index is 31.53 kg/m². GCS: 15  General:   Acute on chronically ill-appearing white female  HEENT:  normocephalic and atraumatic, surgical bandage, drain. No scleral icterus. PERR  Neck: supple. No Thyromegaly. Lungs: clear to auscultation. No retractions  Cardiac: RRR. No JVD. Abdomen: soft. Nontender. Extremities:  No clubbing, cyanosis, or edema x 4. Vasculature: capillary refill < 3 seconds. Palpable dorsalis pedis pulses. Skin:  warm and dry. Psych:  Alert and oriented x3. Affect appropriate  Lymph:  No supraclavicular adenopathy. Neurologic:  No focal deficit. No seizures. Data: (All radiographs, tracings, PFTs, and imaging are personally viewed and interpreted unless otherwise noted).  Sodium 137, potassium 4.3, chloride 105, CO2 20, BUN 17, creatinine 0.8, anion gap 12.0, glucose 105.  WBC 13.9, hemoglobin 11.9, hematocrit 36.1, platelet count 698.  Telemetry shows normal sinus rhythm  · CT cervical spine negative for acute fracture  · CT head 10/18/2022 reports acute hemorrhage over the right frontal and temporal lobes which has mass-effect on the right frontal lobe.   · Echocardiogram 9/7/2016 reports ejection fraction 55%, normal LV function, grade 1 diastolic dysfunction  · CT head 1/19/2022 reports moderate right subdural hematoma with

## 2022-01-22 NOTE — PROGRESS NOTES
[facility]  INPATIENT PHYSICAL THERAPY  DAILY NOTE  reassessment  STRZ SURGE OVERFLOW - E2-04/E2-04    Time In: 827  Time Out: 4174  Timed Code Treatment Minutes: 24 Minutes  Minutes: 24          Date: 2022  Patient Name: Mari Montes,  Gender:  female        MRN: [id number]  : 1940  (78 y.o.)     Referring Practitioner: Nabil Bryan PA-C  Diagnosis: Subdural hematoma  Additional Pertinent Hx: 24-year-old white female who presented to [facility] on 2022 as a transfer from [facility] ambulatory care for subdural hematoma after suffering a fall at home. She has a past medical history of lifetime non-smoker, hypertension, arthritis. Per report patient states that she fell this morning while she was trying to change a light bulb in her bathroom. She stated she was standing on a stool and she ended up falling backwards and hitting her head on the toilet. She denies any loss of consciousness. She stated she was able to get up and called her family to go to the emergency department. She does take a baby aspirin daily. She did receive 11 staples to her scalp at [facility]. She did states she has a dull headache but denies any other pain. CT head and neck were completed. CT head did show him a intracranial hemorrhage on the right frontal.  CT cervical spine negative. She was noted to have a laceration on her lower occipital scalp which was stapled at [facility] facility. She was started on a Cardene drip in the emergency department for accelerated hypertension.      Prior Level of Function:  Lives With: Alone  Type of Home: House  Home Layout: Two level,Performs ADL's on one level,Able to Live on Main level with bedroom/bathroom (Pt stating she occasionally goes to 2nd floor or her basement)  Home Access: Stairs to enter with rails  Entrance Stairs - Number of Steps: 3  Home Equipment:  (may be able to borrow cane from daughter)   Bathroom Shower/Tub: Tub/Shower unit,Walk-in shower  Bathroom Toilet: Handicap height  Bathroom Accessibility: Accessible    ADL Assistance: Independent  Homemaking Assistance: Independent  Ambulation Assistance: Independent  Transfer Assistance: Independent  Active : Yes  IADL Comments: Pt stating she was indep with all her homemaking tasks. 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 Pt stating she has family members that are retired who could assist her as able    Restrictions/Precautions:  Restrictions/Precautions: Fall Risk,General Precautions  Position Activity Restriction  Other position/activity restrictions: low stimulation guidelines. Surgical drain    SUBJECTIVE: Pt resting in bed. Daughter in with pt. Nsg approved session. Surgical drain in place. PAIN: 0/10:     Vitals: Vitals not assessed per clinical judgement, see nursing flowsheet    OBJECTIVE:  Bed Mobility:  Supine to Sit: Stand By Assistance, with head of bed raised, with rail    Transfers:  Sit to Stand: [employer], to SBA with subsequent trials. Cues for hand placement  Stand to Sit:Contact Guard Assistance, cues for hand placement    Ambulation:  Contact Guard Assistance, to SBA with RW, CGA with no devices  Distance: 50 ft x2 with RW using sidestepping around obstacles and forward walking, 15 ft without RW  Surface: Level Tile  Gait Deviations:  Decreased Step Length Bilaterally and Decreased Gait Speed. Minimally decreased step length noted with trial without RW.       Balance:  Static Sitting Balance:  Modified Independent  Static Standing Balance: Stand By Assistance, with RW support      Functional Outcome Measures: Tinetti not completed due to space limitations in PACU  AM-PAC Inpatient Mobility Raw Score : 20  AM-PAC Inpatient T-Scale Score : 47.67    ASSESSMENT:  Assessment: Pt is seen today s/p mini craniotomy (1/21/2022) for reassessment of functional mobility and gait. She presents with normal BLE strength. Minimal decreased balance noted with improved gait using RW with this 1st post op trial.  Recommend PT f/u for continued balance trials and gait progression. Activity Tolerance:  Patient tolerance of  treatment: good. Equipment Recommendations: Other: monitor for needs  Discharge Recommendations: Home with Assist as Needed and Home with Home Health PT  Plan: Times per week: 6x N/T  Current Treatment Recommendations: Strengthening,Gait Training,Stair training,Balance Training,Functional Mobility Training,Endurance Training,Transfer Training,Safety Education & [employer] Exercise Program,Patient/Caregiver Education & Training,Equipment Evaluation, Education, & procurement    Patient Education  Patient Education: Transfers, Gait, Education Related to Potential Risks and Complications Due to Impairment/Illness/Injury,  - Patient Verbalized Understanding, - Patient Requires Continued Education    Goals:  Patient goals : go home  Short term goals  Time Frame for Short term goals: at discharge  Short term goal 1: Pt to be Mod I for supine <> sit to get in/out of bed  Short term goal 2: Pt to be Mod I for sit <> stand to get up to ambulate  Short term goal 3: Pt to ambulate > 450 ft with/without LRAD with Supervision for household and community distances  Short term goal 4: Pt to negotiate 3 steps with 1 rail with Supervision for home access  Long term goals  Time Frame for Long term goals : not set due to short ELOS    Following session, patient left in safe position with all fall risk precautions in place.

## 2022-01-22 NOTE — PROGRESS NOTES
[address] [id number]  INPATIENT SPEECH THERAPY  [facility] NEUROSCIENCES 4A  DAILY NOTE/Reassessment    TIME   SLP Individual Minutes  Time In: 3259  Time Out: 0646  Minutes: 15  Timed Code Treatment Minutes: 15 Minutes       Date: 2022  Patient Name: Radha Walters      CSN: [id number]   : 1940  (78 y.o.)  Gender: female   Referring Physician: Sandi Jean MD  Diagnosis: Subdural hematoma   Secondary Diagnosis: High level cognitive deficits   Precautions: Fall risk   Current Diet: Regular diet and thin liquids   Swallowing Strategies: Standard Universal Swallow Precautions  Date of Last MBS/FEES: Not Applicable    Pain:  No pain reported. Subjective:  Session approved by RNAlberto. Patient seen upright in recliner with daughter present. Pleasant and cooperative. Short-Term Goals:  SHORT TERM GOAL #1:  Goal 1: Patient will complete mild to moderately complex STM (immediate, delayed, working memory) with 80% accurayc given MIN A to improve retention of personal and newly learned information. GOAL MET- NO NEW GOAL   INTERVENTIONS: Reassessment via completion of MOCA s/p crani :     COGNITION:  [place] Cognitive Assessment (MOCA) version 7.2 completed. Pt scored . Normal is greater than or equal to 26/30. Inclusion of +1 point given highest level of education achieved less than/equal to 12th grade or GED with limited-0 post-secondary schooling     Orientation:  indep *incorrect date   Immediate Recall: Trial 1: , Trial 2:   Short-Term Recall: /5 indep,  FO2   Divergent Namin WPM    Reasonin/2   Thought Organization: mild high level impairments   Insight: good   Attention: higher level difficulties     Math Computation: 1/3 on MOCA   Executive Functioning: 3/5 on MOCA     IMPRESSIONS: significant improvements r/t cognitive functioning with enhanced score on MOCA from 15/30 to .  Continued difficulties within complex executive functioning, attention and thought organization. Improvements within high level recall. Recommendations for ongoing cognitive rehabilitation. SHORT TERM GOAL #2:  Goal 2: Patient will complete higher level complex organization, reeasoning, adn executive functioning tasks (meds, finances, managing farm books) with 80% accuracy given MIN A to work toward independence with return to ADLs/iADLs. INTERVENTIONS: See STG 1.       Long-Term Goals:    No established LTG's given short ELOS          EDUCATION:  Learner: Patient and daughter   Education:  Reviewed results and recommendations of this evaluation and Reviewed ST goals and Plan of Care-- HEP provided   Evaluation of Education: Verbalizes understanding    ASSESSMENT/PLAN:  Activity Tolerance:  Patient tolerance of  treatment: good. Assessment/Plan: Patient progressing toward established goals. Continues to require skilled care of licensed speech pathologist to progress toward achievement of established goals and plan of care. .     Plan for Next Session: cognitive rehabilitation      Doyal Hammans, M.S. Jae Fried 1/22/2022

## 2022-01-23 ENCOUNTER — APPOINTMENT (OUTPATIENT)
Dept: CT IMAGING | Age: 82
DRG: 025 | End: 2022-01-23
Payer: MEDICARE

## 2022-01-23 LAB
ANION GAP SERPL CALCULATED.3IONS-SCNC: 12 MEQ/L (ref 8–16)
BASOPHILS # BLD: 0.7 %
BASOPHILS ABSOLUTE: 0.1 THOU/MM3 (ref 0–0.1)
BUN BLDV-MCNC: 20 MG/DL (ref 7–22)
CALCIUM SERPL-MCNC: 8.7 MG/DL (ref 8.5–10.5)
CHLORIDE BLD-SCNC: 106 MEQ/L (ref 98–111)
CO2: 20 MEQ/L (ref 23–33)
CREAT SERPL-MCNC: 0.8 MG/DL (ref 0.4–1.2)
EOSINOPHIL # BLD: 1.7 %
EOSINOPHILS ABSOLUTE: 0.2 THOU/MM3 (ref 0–0.4)
ERYTHROCYTE [DISTWIDTH] IN BLOOD BY AUTOMATED COUNT: 12.9 % (ref 11.5–14.5)
ERYTHROCYTE [DISTWIDTH] IN BLOOD BY AUTOMATED COUNT: 43.9 FL (ref 35–45)
GFR SERPL CREATININE-BSD FRML MDRD: 68 ML/MIN/1.73M2
GLUCOSE BLD-MCNC: 109 MG/DL (ref 70–108)
HCT VFR BLD CALC: 36.6 % (ref 37–47)
HEMOGLOBIN: 12 GM/DL (ref 12–16)
IMMATURE GRANS (ABS): 0.03 THOU/MM3 (ref 0–0.07)
IMMATURE GRANULOCYTES: 0.3 %
LYMPHOCYTES # BLD: 19.1 %
LYMPHOCYTES ABSOLUTE: 1.7 THOU/MM3 (ref 1–4.8)
MCH RBC QN AUTO: 30.8 PG (ref 26–33)
MCHC RBC AUTO-ENTMCNC: 32.8 GM/DL (ref 32.2–35.5)
MCV RBC AUTO: 93.8 FL (ref 81–99)
MONOCYTES # BLD: 13.8 %
MONOCYTES ABSOLUTE: 1.3 THOU/MM3 (ref 0.4–1.3)
NUCLEATED RED BLOOD CELLS: 0 /100 WBC
PLATELET # BLD: 196 THOU/MM3 (ref 130–400)
PMV BLD AUTO: 10.8 FL (ref 9.4–12.4)
POTASSIUM SERPL-SCNC: 4 MEQ/L (ref 3.5–5.2)
RBC # BLD: 3.9 MILL/MM3 (ref 4.2–5.4)
SEG NEUTROPHILS: 64.4 %
SEGMENTED NEUTROPHILS ABSOLUTE COUNT: 5.9 THOU/MM3 (ref 1.8–7.7)
SODIUM BLD-SCNC: 138 MEQ/L (ref 135–145)
WBC # BLD: 9.1 THOU/MM3 (ref 4.8–10.8)

## 2022-01-23 PROCEDURE — 85025 COMPLETE CBC W/AUTO DIFF WBC: CPT

## 2022-01-23 PROCEDURE — 2580000003 HC RX 258: Performed by: PHYSICIAN ASSISTANT

## 2022-01-23 PROCEDURE — APPSS30 APP SPLIT SHARED TIME 16-30 MINUTES: Performed by: PHYSICIAN ASSISTANT

## 2022-01-23 PROCEDURE — 80048 BASIC METABOLIC PNL TOTAL CA: CPT

## 2022-01-23 PROCEDURE — 99024 POSTOP FOLLOW-UP VISIT: CPT | Performed by: NEUROLOGICAL SURGERY

## 2022-01-23 PROCEDURE — 6370000000 HC RX 637 (ALT 250 FOR IP): Performed by: NURSE PRACTITIONER

## 2022-01-23 PROCEDURE — 6370000000 HC RX 637 (ALT 250 FOR IP): Performed by: PHYSICIAN ASSISTANT

## 2022-01-23 PROCEDURE — 2580000003 HC RX 258: Performed by: STUDENT IN AN ORGANIZED HEALTH CARE EDUCATION/TRAINING PROGRAM

## 2022-01-23 PROCEDURE — 6360000002 HC RX W HCPCS: Performed by: STUDENT IN AN ORGANIZED HEALTH CARE EDUCATION/TRAINING PROGRAM

## 2022-01-23 PROCEDURE — 70450 CT HEAD/BRAIN W/O DYE: CPT

## 2022-01-23 PROCEDURE — 36415 COLL VENOUS BLD VENIPUNCTURE: CPT

## 2022-01-23 PROCEDURE — 2060000000 HC ICU INTERMEDIATE R&B

## 2022-01-23 PROCEDURE — 99233 SBSQ HOSP IP/OBS HIGH 50: CPT | Performed by: INTERNAL MEDICINE

## 2022-01-23 RX ADMIN — DOCUSATE SODIUM 100 MG: 100 CAPSULE ORAL at 08:13

## 2022-01-23 RX ADMIN — SODIUM CHLORIDE, PRESERVATIVE FREE 10 ML: 5 INJECTION INTRAVENOUS at 09:00

## 2022-01-23 RX ADMIN — LISINOPRIL 40 MG: 40 TABLET ORAL at 08:13

## 2022-01-23 RX ADMIN — SODIUM CHLORIDE, PRESERVATIVE FREE 10 ML: 5 INJECTION INTRAVENOUS at 21:00

## 2022-01-23 RX ADMIN — ACETAMINOPHEN 650 MG: 325 TABLET ORAL at 21:00

## 2022-01-23 RX ADMIN — SODIUM CHLORIDE, PRESERVATIVE FREE 10 ML: 5 INJECTION INTRAVENOUS at 13:01

## 2022-01-23 RX ADMIN — CEFTRIAXONE SODIUM 1000 MG: 1 INJECTION, POWDER, FOR SOLUTION INTRAMUSCULAR; INTRAVENOUS at 12:59

## 2022-01-23 RX ADMIN — METOPROLOL TARTRATE 50 MG: 50 TABLET, FILM COATED ORAL at 21:00

## 2022-01-23 RX ADMIN — FAMOTIDINE 20 MG: 20 TABLET ORAL at 08:13

## 2022-01-23 RX ADMIN — HYDROCHLOROTHIAZIDE 12.5 MG: 25 TABLET ORAL at 08:13

## 2022-01-23 RX ADMIN — METOPROLOL TARTRATE 50 MG: 50 TABLET, FILM COATED ORAL at 08:13

## 2022-01-23 ASSESSMENT — PAIN SCALES - GENERAL
PAINLEVEL_OUTOF10: 0

## 2022-01-23 NOTE — PROGRESS NOTES
Hospitalist Progress Note    Patient:  Lloyd Felipe    Unit/Bed:4A-19/019-A  YOB: 1940  MRN: [id number]   PCP: Cipriano Caicedo MD  Date of Admission: 1/18/2022  Date of Service: 1/23/2022  Chief Complaint:- Mechanical Fall    Assessment and Plan (See HPI below for chronological course)    ESSMENT AND PLAN  1. Acute Traumatic Right Subdural Hematoma w/ 4 mm Leftward Midline Shift s/p TXA and s/p Right Sided Craniotomy and Evacuation 1/21/2022  2. Mechanical Fall   3. E. Coli UTI w/o Sepsis - On Rocephin started 1/19/2022  4. Primary Hypertension  5. Arthrtis     POD 2. Dural drain in place, bandages are clean, dry and intact. Draining sanguinous fluid. Neurosurgery anticipates removal of drain tomorrow 1/24/2022 with anticipated discharge planning.  PT/OT recommending assistance at home - consider home w/ [facility] [place] on discharge for continued therapy.  Continue Rocephin for 7 days total therapy duration.  BP goal range 100-160 mmHg. Continue with HydroDIURIL 12.5 mg qd, Lopressor 50 mg BID and Lisinopril 40 mg qd. Consider maximizing therapy first if not within goal SBP range before adding 4th agent. Disposition Planning: Neurosurgery anticipated removing drain on Monday 1/24/2022. Discharge recs from them at that time. Will benefit from [place]. Subjective (Past 24 hour events)     No acute events overnight. Feels well this morning, has no complaints. Daughter and son at bedside. ROS (All review of systems completed. Pertinent positives noted. Otherwise All other systems reviewed and negative.)     HPI     Lloyd Felipe is an 78-year-old  female, lifetime non-smoker, who presented to our hospital on 1/18/2022 as a transfer from [facility] ambulatory center after sustaining a fall at home causing a subdural hematoma. PMH includes primary HTN, arthritis. Patient had reported that she fell while changing a light bulb in her bathroom.   Was standing on a stool and ended up falling backwards and hitting her head on the toilet. She denies LOC. Patient was able to get up on her own and call family for her to go to the ED. Only takes ASA 81 mg daily. Other than a dull headache she reported no other pain or symptoms. On arrival to the ED a CT head showed a right frontal intracranial hemorrhage. CT cervical spine was negative. She did note to have a laceration in her lower occipital scalp which had been stapled at the outside facility. She was found to have accelerated hypertension in the ED and and was started on a Cardene gtt prior to transfer to the ICU. She received TXA for her ICH. Repeat CT head on 1/19/2022 revealed an increased subdural hematoma and so the patient subsequently underwent craniotomy and evacuation by neurosurgery on 1/21/2022 with dural drain in place. Repeat CT head on 1/22/2022 showed stability. Patient transferred to neuro stepdown unit that same day. .    Medications:  Reviewed    Scheduled Meds:   IVPB builder   IntraVENous Once    sodium chloride flush  5-40 mL IntraVENous 2 times per day    hydroCHLOROthiazide  12.5 mg Oral Daily    lisinopril  40 mg Oral Daily    metoprolol tartrate  50 mg Oral BID    sodium chloride flush  10 mL IntraVENous 2 times per day    docusate sodium  100 mg Oral Daily    famotidine  20 mg Oral Daily    cefTRIAXone (ROCEPHIN) IV  1,000 mg IntraVENous Q24H     Continuous Infusions:   sodium chloride       PRN Meds: acetaminophen, sodium chloride flush, sodium chloride, ondansetron **OR** ondansetron, HYDROcodone 5 mg - acetaminophen, HYDROmorphone, sodium chloride flush, polyethylene glycol    Physical Examination   Blood pressure 132/74, pulse 72, temperature 98.9 °F (37.2 °C), temperature source Oral, resp. rate 16, height 5' 3\" (1.6 m), weight 164 lb 14.5 oz (74.8 kg), SpO2 95 %. Constitutional: In no acute distress. Pleasant and cooperative. HENT:   Head: Dural drain in place.  Bandages/wraps and dry and intact. Mouth/Throat: Oropharynx is clear and moist.  Eyes: Conjunctivae are normal. Pupils are equal, round, and reactive to light. No scleral icterus. Neck: No JVD present. No tracheal deviation present. Cardiovascular: Normal rate, regular rhythm, normal S1 and S2 heart sounds. No murmur heard. Pulmonary/Chest: Effort normal and breath sounds normal. No audible stridor. No respiratory distress. No wheezes, no rales. Abdominal: Soft, no tenderness. Patient exhibits no distension. Bowel sounds present. Musculoskeletal: Normal ROM. No deformities. Extremities: Patient exhibits no edema and no tenderness. Neurological: No focal deficits. AAOx4. No seizure like activity. No tremors. Skin: Skin is warm and dry. No rashes or lesions. Psychiatric: Patient has a normal mood and affect. Patient behavior is normal and is cooperative. Diagnostic Data: (All radiographs, EKGs, PFTs, and imaging are personally viewed and interpreted unless otherwise noted). Recent Labs     01/21/22  0640 01/22/22  0537 01/23/22  0341   WBC 7.5 13.9* 9.1   HGB 13.4 11.9* 12.0   HCT 39.9 36.1* 36.6*    190 196     Recent Labs     01/21/22  0640 01/22/22  0537 01/23/22  0341    137 138   K 4.3 4.2 4.0    106 106   CO2 20* 19* 20*   BUN 17 17 20   CREATININE 0.8 0.8 0.8   CALCIUM 9.2 8.8 8.7     No results for input(s): AST, ALT, BILIDIR, BILITOT, ALKPHOS in the last 72 hours. No results for input(s): INR in the last 72 hours. No results for input(s): [place] in the last 72 hours.     Microbiology:    Blood culture #1: No results found for: BC    Blood culture #2:No results found for: BLOODCULT2    Organism:  Lab Results   Component Value Date    ORG Escherichia coli 01/18/2022       No results found for: LABGRAM    MRSA culture only:No results found for: [facility]    Urine culture:   Lab Results   Component Value Date    LABURIN No growth-preliminary  No growth 07/09/2014       Respiratory culture: No results found for: CULTRESP    Aerobic and Anaerobic :  No results found for: LABAERO  No results found for: LABANAE    Urinalysis:      Lab Results   Component Value Date    NITRU POSITIVE 01/18/2022    WBCUA 10-15W/CLUMPS 01/18/2022    BACTERIA MANY 01/18/2022    RBCUA 0-2 01/18/2022    RBCUA 0-2 07/02/2018    BLOODU TRACE 01/18/2022    SPECGRAV 1.010 01/18/2022    GLUCOSEU NEGATIVE 07/02/2018       Imaging Quick Reads from Previous 7 Days:  CT HEAD WO CONTRAST    Result Date: 1/22/2022  1. Right frontal craniotomy with placement of surgical drain for evacuation of right convexity subdural hematoma. Pneumocephalus deep to the craniotomy site has decreased in volume. Residual right convexity subdural hematoma greatest within the right frontal region measuring up to 8-9 mm in transverse diameter grossly stable in size when compared to the prior. No mass-effect or midline shift. Minimal amount of blood along the anterior interhemispheric fissure similar to prior. 2.Subcortical/periventricular white matter hypoattenuation statistically representing changes of chronic microvascular ischemia. Global parenchymal volume loss which is commensurate with reported age. This document has been electronically signed by: Ronn Vizcarra DO on 01/22/2022 06:59 AM All CTs at this facility use dose modulation techniques and iterative reconstructions, and/or weight-based dosing when appropriate to reduce radiation to a low as reasonably achievable. CT HEAD WO CONTRAST    Result Date: 1/21/2022   1. The patient has undergone interval right frontal craniotomy, evacuation of the subdural hematoma over the right frontal lobe and placement of drain. 2. There is is air over the frontal and to lesser extent parietal lobes. 3. There is slightly increased attenuation along the interhemispheric fissure and tentorium cerebelli on the right side. 4. There has been diminution in mass effect upon the right lateral ventricle.  This no midline deviation. 5. There are probable ischemic changes in the white matter. **This report has been created using voice recognition software. It may contain minor errors which are inherent in voice recognition technology. ** Final report electronically signed by DR Surekha Yao on 1/21/2022 1:19 PM    CT HEAD WO CONTRAST    Result Date: 1/21/2022  Impression: Stable subdural hematoma collections on the right as above. This document has been electronically signed by: Flora Medrano MD on 01/21/2022 05:36 AM All CTs at this facility use dose modulation techniques and iterative reconstructions, and/or weight-based dosing when appropriate to reduce radiation to a low as reasonably achievable. CT HEAD WO CONTRAST    Result Date: 1/20/2022  Impression: No significant change in right subdural hematoma with associated midline shift and ventricular effacement. No axial herniation or hydrocephalus. This document has been electronically signed by: Benny Brown MD on 01/20/2022 07:08 AM All CTs at this facility use dose modulation techniques and iterative reconstructions, and/or weight-based dosing when appropriate to reduce radiation to a low as reasonably achievable. CT HEAD WO CONTRAST    Result Date: 1/19/2022  Impression: Moderate right subdural hematoma with approximately 4 mm leftward subfalcine herniation. No axial herniation or hydrocephalus. This document has been electronically signed by: Benny Brown MD on 01/19/2022 07:20 AM All CTs at this facility use dose modulation techniques and iterative reconstructions, and/or weight-based dosing when appropriate to reduce radiation to a low as reasonably achievable. CT HEAD WO CONTRAST    Result Date: 1/18/2022   1. Acute extra-axial hemorrhage over the right frontal and temporal lobes with mild mass effect upon the right frontal lobe. 2. Mild atrophy and dilatation of the lateral ventricles. 3. Probable ischemic changes in the white matter.  4. Results called to Dr. Bairon Pineda at 10.11 AM.. **This report has been created using voice recognition software. It may contain minor errors which are inherent in voice recognition technology. ** Final report electronically signed by DR Elsa Ochoa on 1/18/2022 10:12 AM    CT CERVICAL SPINE WO CONTRAST    Result Date: 1/18/2022  Multilevel degenerative changes with no acute fracture. **This report has been created using voice recognition software. It may contain minor errors which are inherent in voice recognition technology. ** Final report electronically signed by Dr Naa Shea on 1/18/2022 10:05 AM      Electronically signed by Leon Benito.  Hortencia Sagastume M.D. on 1/23/2022  PGY-2 Internal Medicine Resident

## 2022-01-23 NOTE — PROGRESS NOTES
Attending Note:     Patient was seen and examined by me in the floor in conjunction with neurosurgery PA. Discussed with patient, her nurse and her family  as well. All data and imaging reviewed by myself. I agree with examination assessment and plan as documented below. Nany Cadena MD     Neurosurgery Progress Note    Patient:  Harvinder Priest      Unit/Bed:4A-19/019-A    YOB: 1940    MRN: [id number]     Acct: [de-identified]     Admit date: 1/18/2022    Chief Complaint   Patient presents with   Granda Fall    Laceration       Patient Seen, Chart, Physician notes, Labs, Radiology studies reviewed. Subjective: The patient is observed sitting up in a chair, comfortably with pain very well controlled postoperative day #2 from right craniotomy and evacuation of subdural hematoma performed by Dr. Lis Calixto, without complication. Past, Family, Social History unchanged from admission. Diet:  ADULT DIET; Regular    Medications:  Scheduled Meds:   IVPB builder   IntraVENous Once    sodium chloride flush  5-40 mL IntraVENous 2 times per day    hydroCHLOROthiazide  12.5 mg Oral Daily    lisinopril  40 mg Oral Daily    metoprolol tartrate  50 mg Oral BID    sodium chloride flush  10 mL IntraVENous 2 times per day    docusate sodium  100 mg Oral Daily    famotidine  20 mg Oral Daily    cefTRIAXone (ROCEPHIN) IV  1,000 mg IntraVENous Q24H     Continuous Infusions:   sodium chloride       PRN Meds:acetaminophen, sodium chloride flush, sodium chloride, ondansetron **OR** ondansetron, HYDROcodone 5 mg - acetaminophen, HYDROmorphone, sodium chloride flush, polyethylene glycol    Objective: She is observed sitting up in a chair, comfortably. Incisions are flat and dry with dressings intact and the drain is intact and functioning approximately 20 cc per shift. Patient tri stable and intact neurologically with clear appropriate speech and movement x4 with no bands of commands.   She is ambulating with and without assistance with no significant changes noted to the chart overnight    Vitals: /74   Pulse 72   Temp 98.9 °F (37.2 °C) (Oral)   Resp 16   Ht 5' 3\" (1.6 m)   Wt 164 lb 14.5 oz (74.8 kg)   LMP  (LMP Unknown)   SpO2 95%   BMI 29.21 kg/m²     Physical Exam   She remained stable and intact neurologically to her baseline with no additional significant changes noted to the patient chart overnight. Physical Exam:  Alert and attentive. Language appropriate, with no aphasia. Pupils equal.  Facial strength symmetric. Review of Systems   Constitutional: Negative for activity change. Respiratory: Negative for apnea, chest tightness and shortness of breath. Cardiovascular: Negative for chest pain and leg swelling. Gastrointestinal: Negative for abdominal distention and abdominal pain. Musculoskeletal: Negative for neck pain and neck stiffness. Neurological: Negative for dizziness, seizures, weakness, numbness and headaches. Psychiatric/Behavioral: Negative     24 hour intake/output:    Intake/Output Summary (Last 24 hours) at 1/23/2022 1032  Last data filed at 1/23/2022 0817  Gross per 24 hour   Intake 280 ml   Output 108 ml   Net 172 ml     Last 3 weights: Wt Readings from Last 3 Encounters:   01/23/22 164 lb 14.5 oz (74.8 kg)   01/04/22 178 lb (80.7 kg)   11/08/21 177 lb (80.3 kg)         CBC:   Recent Labs     01/21/22  0640 01/22/22  0537 01/23/22  0341   WBC 7.5 13.9* 9.1   HGB 13.4 11.9* 12.0    190 196     BMP:    Recent Labs     01/21/22  0640 01/22/22  0537 01/23/22  0341    137 138   K 4.3 4.2 4.0    106 106   CO2 20* 19* 20*   BUN 17 17 20   CREATININE 0.8 0.8 0.8   GLUCOSE 105 123* 109*     Calcium:  Recent Labs     01/23/22  0341   CALCIUM 8.7     Magnesium:No results for input(s): MG in the last 72 hours. Glucose:No results for input(s): POCGLU in the last 72 hours.   HgbA1C: No results for input(s): LABA1C in the last 72 hours. Lipids: No results for input(s): CHOL, TRIG, HDL, LDL, LDLCALC in the last 72 hours. Radiology reports as per the Radiologist  Radiology: CT HEAD WO CONTRAST    Result Date: 1/19/2022  xam: CT head without contrast Comparison: None Findings: Moderate acute subdural hematoma overlying the lateral right frontal parietal and temporal lobes measuring up to 1.3 cm in transverse dimension. Small volume subdural hematoma layering oval along the cerebral falx. 4 mm of leftward midline shift. Right lateral ventricular effacement. No hydrocephalus or axial herniation. Basilar cisterns patent. No anoxic brain changes. No significant white matter disease. No sinus fluid levels are mastoid effusions. No acute fracture. Impression: Moderate right subdural hematoma with approximately 4 mm leftward subfalcine herniation. No axial herniation or hydrocephalus. This document has been electronically signed by: Randal Cunningham MD on 01/19/2022 07:20 AM All CTs at this facility use dose modulation techniques and iterative reconstructions, and/or weight-based dosing when appropriate to reduce radiation to a low as reasonably achievable. CT HEAD WO CONTRAST    Result Date: 1/18/2022  PROCEDURE: CT HEAD WO CONTRAST CLINICAL INFORMATION: fell from height, struck occiput on toilet,occipital laceration. COMPARISON: CT scan of the brain dated 4 February 2014. . TECHNIQUE: Noncontrast 5 mm axial images were obtained through the brain. Sagittal and coronal reconstructions were obtained. All CT scans at this facility use dose modulation, iterative reconstruction, and/or weight-based dosing when appropriate to reduce radiation dose to as low as reasonably achievable. FINDINGS: There is an acute extra-axial hemorrhage over the right frontal and temporal lobes measuring 6.5 x 2.9 x 0.8 cm in size. There is mild mass effect upon the right frontal lobe. There is mild atrophy and dilatation of the lateral ventricles.  There is diminished attenuation in the white matter most likely representing ischemic changes There is calcification the cavernous segments of both internal carotid arteries. . There  is no hydrocephalus or midline shift . The paranasal sinuses and mastoid air cells are normally aerated. There is no suspicious calvarial abnormality. 1. Acute extra-axial hemorrhage over the right frontal and temporal lobes with mild mass effect upon the right frontal lobe. 2. Mild atrophy and dilatation of the lateral ventricles. 3. Probable ischemic changes in the white matter. 4. Results called to Dr. Chaz Raymond at 10.11 AM.. **This report has been created using voice recognition software. It may contain minor errors which are inherent in voice recognition technology. ** Final report electronically signed by DR Luz Ambrose on 1/18/2022 10:12 AM    CT CERVICAL SPINE WO CONTRAST    Result Date: 1/18/2022  PROCEDURE: CT CERVICAL SPINE WO CONTRAST CLINICAL INFORMATION: 38-year-old female who fell. Injury to the occiput. COMPARISON: CT scan 5/21/2009. TECHNIQUE: 3 mm noncontrast axial images were obtained through the cervical spine with sagittal and coronal reconstructions. All CT scans at this facility use dose modulation, iterative reconstruction, and/or weight-based dosing when appropriate to reduce radiation dose to as low as reasonably achievable. FINDINGS: The cervical vertebral body heights are maintained. There is disc space narrowing at C3-C4, C4-C5, C5-C6 and C6-C7. There is no acute compression fracture. There is no prevertebral soft tissue swelling. There is diffuse facet arthropathy. There is mild grade 1 retrolisthesis of C3 over C4. There is a disc osteophyte complex at C5-6 resulting in severe spinal canal stenosis. Disc osteophyte complexes are also noted at C3-C4 and C4-C5 resulting in moderate spinal canal stenosis. There are no suspicious findings in the visualized lung apices.      Multilevel degenerative changes with no acute fracture. **This report has been created using voice recognition software. It may contain minor errors which are inherent in voice recognition technology. ** Final report electronically signed by Dr Terell Longo on 1/18/2022 10:05 AM                   Assessment: Postoperative day #2 from mini craniotomy and evacuation of subdural hematoma performed by Dr. Judith Molina, without complication. Active Problems:    Subdural hematoma ([address]    Fall  Resolved Problems:    * No resolved hospital problems. *        Plan: The patient is seen and evaluated on the floor with evaluation exam findings reviewed and discussed with Dr. Judith Molina and with nursing and family. Patient is resting comfortably in a chair with pain very well controlled. Incisions are flat and dry with dressings intact and the drain is intact and functioning approximately 20 cc per shift. Patient remained stable and neurologically intact her baseline with no additional significant changes noted overnight. She has been ambulating with and without assistance and demonstrates clear appropriate speech, participated in discussions involving her care appropriately. Neurosurgery anticipates removal of the drain at bedside tomorrow morning with discharge planning. A follow-up with neurosurgery 1 week from her discharge for suture removal with a new CT scan of the head for comparison at that time is recommended. Neurosurgery to follow.       Electronically signed by Beena Valadez PA-C on 1/23/2022 at 10:32 AM    Neurosurgery

## 2022-01-24 VITALS
WEIGHT: 164.9 LBS | DIASTOLIC BLOOD PRESSURE: 81 MMHG | BODY MASS INDEX: 29.22 KG/M2 | SYSTOLIC BLOOD PRESSURE: 113 MMHG | OXYGEN SATURATION: 95 % | HEIGHT: 63 IN | HEART RATE: 81 BPM | TEMPERATURE: 98.2 F | RESPIRATION RATE: 16 BRPM

## 2022-01-24 PROCEDURE — 97130 THER IVNTJ EA ADDL 15 MIN: CPT

## 2022-01-24 PROCEDURE — 6370000000 HC RX 637 (ALT 250 FOR IP): Performed by: PHYSICIAN ASSISTANT

## 2022-01-24 PROCEDURE — 97129 THER IVNTJ 1ST 15 MIN: CPT

## 2022-01-24 PROCEDURE — 99024 POSTOP FOLLOW-UP VISIT: CPT | Performed by: NEUROLOGICAL SURGERY

## 2022-01-24 PROCEDURE — 2580000003 HC RX 258: Performed by: STUDENT IN AN ORGANIZED HEALTH CARE EDUCATION/TRAINING PROGRAM

## 2022-01-24 PROCEDURE — 6370000000 HC RX 637 (ALT 250 FOR IP): Performed by: NURSE PRACTITIONER

## 2022-01-24 PROCEDURE — 2580000003 HC RX 258: Performed by: PHYSICIAN ASSISTANT

## 2022-01-24 PROCEDURE — 99239 HOSP IP/OBS DSCHRG MGMT >30: CPT | Performed by: INTERNAL MEDICINE

## 2022-01-24 PROCEDURE — 97530 THERAPEUTIC ACTIVITIES: CPT

## 2022-01-24 PROCEDURE — 97535 SELF CARE MNGMENT TRAINING: CPT

## 2022-01-24 PROCEDURE — 6360000002 HC RX W HCPCS: Performed by: STUDENT IN AN ORGANIZED HEALTH CARE EDUCATION/TRAINING PROGRAM

## 2022-01-24 PROCEDURE — APPSS60 APP SPLIT SHARED TIME 46-60 MINUTES: Performed by: PHYSICIAN ASSISTANT

## 2022-01-24 RX ADMIN — DOCUSATE SODIUM 100 MG: 100 CAPSULE ORAL at 08:35

## 2022-01-24 RX ADMIN — METOPROLOL TARTRATE 50 MG: 50 TABLET, FILM COATED ORAL at 08:35

## 2022-01-24 RX ADMIN — LISINOPRIL 40 MG: 40 TABLET ORAL at 08:35

## 2022-01-24 RX ADMIN — HYDROCHLOROTHIAZIDE 12.5 MG: 25 TABLET ORAL at 08:35

## 2022-01-24 RX ADMIN — FAMOTIDINE 20 MG: 20 TABLET ORAL at 08:35

## 2022-01-24 RX ADMIN — SODIUM CHLORIDE, PRESERVATIVE FREE 10 ML: 5 INJECTION INTRAVENOUS at 08:37

## 2022-01-24 RX ADMIN — ACETAMINOPHEN 650 MG: 325 TABLET ORAL at 13:07

## 2022-01-24 RX ADMIN — CEFTRIAXONE SODIUM 1000 MG: 1 INJECTION, POWDER, FOR SOLUTION INTRAMUSCULAR; INTRAVENOUS at 13:06

## 2022-01-24 ASSESSMENT — PAIN SCALES - GENERAL
PAINLEVEL_OUTOF10: 0
PAINLEVEL_OUTOF10: 3

## 2022-01-24 NOTE — CARE COORDINATION
1/24/22, 11:03 AM EST    Patient goals/plan/ treatment preferences discussed by  and . Patient goals/plan/ treatment preferences reviewed with patient/ family. Patient/ family verbalize understanding of discharge plan and are in agreement with goal/plan/treatment preferences. Understanding was demonstrated using the teach back method. AVS provided by RN at time of discharge, which includes all necessary medical information pertaining to the patients current course of illness, treatment, post-discharge goals of care, and treatment preferences. Services After Discharge  Services At/After Discharge: [facility])   IMM Letter  IMM Letter given to Patient/Family/Significant other/Guardian/POA/by[de-identified] CM  IMM Letter date given[de-identified] 01/24/22  IMM Letter time given[de-identified] 1004     Met with Stan Arnold and her daughter today. They are agreeable to [facility] for RN, PT, OT and ST. Made referral to Linda Valdovinos at [facility] and they are able to accept. Stan Arnold was very independent prior to admission. She was still driving and doing her own shopping. She was not using any equipment. Family plan to stay with her 24/7 for the first week she is home.

## 2022-01-24 NOTE — DISCHARGE SUMMARY
Hospital Medicine Discharge Summary      Patient Identification:   John Guillermo  : 1940  MRN: [id number]   Account: [de-identified]      Patient's PCP: Sarah Ballard MD    Admit Date: 2022     Discharge Date: 2022     Admitting Physician: Kasandra Espinoza MD     Discharge Physician: Chucky Mcdowell MD     HPI and Summarized Hospital Course:    John Guillermo is an 40-year-old  female, lifetime non-smoker, who presented to our hospital on 2022 as a transfer from [facility] center after sustaining a fall at home causing a subdural hematoma. PMH includes primary HTN, arthritis. Patient had reported that she fell while changing a light bulb in her bathroom. Was standing on a stool and ended up falling backwards and hitting her head on the toilet. She denies LOC. Patient was able to get up on her own and call family for her to go to the ED. Only takes ASA 81 mg daily. Other than a dull headache she reported no other pain or symptoms. On arrival to the ED a CT head showed a right frontal intracranial hemorrhage. CT cervical spine was negative. She did note to have a laceration in her lower occipital scalp which had been stapled at the outside facility. She was found to have accelerated hypertension in the ED and and was started on a Cardene gtt prior to transfer to the ICU. She received TXA for her ICH. Repeat CT head on 2022 revealed an increased subdural hematoma and so the patient subsequently underwent craniotomy and evacuation by neurosurgery on 2022 with dural drain in place. Repeat CT head on 2022 showed stability. Patient transferred to neuro stepdown unit that same day. Dural drain removed 2022 by neurosurgery. Continued with Rocephin for E. Coli UTI w/o Sepsis. The patient was stable for discharge - all consultants were contacted and in agreement with plan for discharge.   Appropriate follow up appointment was arranged prior to discharge. Please see below or view chart for more details from hospital course. Hospital Diagnoses:    1. Acute Traumatic Right Subdural Hematoma w/ 4 mm Leftward Midline Shift s/p TXA and s/p Right Sided Craniotomy and Evacuation 1/21/2022  2. Mechanical Fall  3. E. Coli UTI w/o Sepsis treated with Rocephin   4. Primary Hypertension  5. Arthritis    Please view chart for detailed course and treatment of above hospital diagnoses. The patient was seen and examined on day of discharge and this discharge summary is in conjunction with any daily progress note from day of discharge. Exam:     Vitals:  Vitals:    01/23/22 2054 01/23/22 2356 01/24/22 0354 01/24/22 0744   BP: (!) 118/58 119/66 130/70 113/81   Pulse: 73 56 72 81   Resp: 18 18 18 16   Temp: 98.7 °F (37.1 °C) 98.2 °F (36.8 °C) 98.2 °F (36.8 °C) 98.2 °F (36.8 °C)   TempSrc: Oral Oral Oral Oral   SpO2: 96% 99% 96% 95%   Weight:       Height:         Weight: Weight: 164 lb 14.5 oz (74.8 kg)     24 hour intake/output:  No intake or output data in the 24 hours ending 02/02/22 1949    Constitutional: In no acute distress. Pleasant and cooperative. HENT:   Head: Dural drain in place. Bandages/wraps and dry and intact. Mouth/Throat: Oropharynx is clear and moist.  Eyes: Conjunctivae are normal. Pupils are equal, round, and reactive to light. No scleral icterus. Neck: No JVD present. No tracheal deviation present. Cardiovascular: Normal rate, regular rhythm, normal S1 and S2 heart sounds. No murmur heard. Pulmonary/Chest: Effort normal and breath sounds normal. No audible stridor. No respiratory distress. No wheezes, no rales. Abdominal: Soft, no tenderness. Patient exhibits no distension. Bowel sounds present. Musculoskeletal: Normal ROM. No deformities. Extremities: Patient exhibits no edema and no tenderness. Neurological: No focal deficits. AAOx4. No seizure like activity. No tremors. Skin: Skin is warm and dry.  No rashes or facility use dose modulation techniques and iterative    reconstructions, and/or weight-based dosing   when appropriate to reduce radiation to a low as reasonably achievable. CT HEAD WO CONTRAST   Final Result       1. The patient has undergone interval right frontal craniotomy, evacuation of the subdural hematoma over the right frontal lobe and placement of drain. 2. There is is air over the frontal and to lesser extent parietal lobes. 3. There is slightly increased attenuation along the interhemispheric fissure and tentorium cerebelli on the right side. 4. There has been diminution in mass effect upon the right lateral ventricle. This no midline deviation. 5. There are probable ischemic changes in the white matter. **This report has been created using voice recognition software. It may contain minor errors which are inherent in voice recognition technology. **      Final report electronically signed by DR Yamil Britton on 1/21/2022 1:19 PM      CT HEAD WO CONTRAST   Final Result   Impression:   Stable subdural hematoma collections on the right as above. This document has been electronically signed by: Timothy Alfredo MD on    01/21/2022 05:36 AM      All CTs at this facility use dose modulation techniques and iterative    reconstructions, and/or weight-based dosing   when appropriate to reduce radiation to a low as reasonably achievable. CT HEAD WO CONTRAST   Final Result   Impression:   No significant change in right subdural hematoma with associated midline    shift and ventricular effacement. No axial herniation or hydrocephalus. This document has been electronically signed by: Ricki Bahena MD on    01/20/2022 07:08 AM      All CTs at this facility use dose modulation techniques and iterative    reconstructions, and/or weight-based dosing   when appropriate to reduce radiation to a low as reasonably achievable.       CT HEAD WO CONTRAST   Final Result Impression:   Moderate right subdural hematoma with approximately 4 mm leftward    subfalcine herniation. No axial herniation or hydrocephalus. This document has been electronically signed by: Rhett Sharp MD on    01/19/2022 07:20 AM      All CTs at this facility use dose modulation techniques and iterative    reconstructions, and/or weight-based dosing   when appropriate to reduce radiation to a low as reasonably achievable. CT CERVICAL SPINE WO CONTRAST   Final Result   Multilevel degenerative changes with no acute fracture. **This report has been created using voice recognition software. It may contain minor errors which are inherent in voice recognition technology. **      Final report electronically signed by Dr Terell Longo on 1/18/2022 10:05 AM      CT HEAD WO CONTRAST   Final Result       1. Acute extra-axial hemorrhage over the right frontal and temporal lobes with mild mass effect upon the right frontal lobe. 2. Mild atrophy and dilatation of the lateral ventricles. 3. Probable ischemic changes in the white matter. 4. Results called to Dr. Cristiane Hope at 10.11 AM.. **This report has been created using voice recognition software. It may contain minor errors which are inherent in voice recognition technology. **      Final report electronically signed by DR Vladimir Winters on 1/18/2022 10:12 AM        Consults:     [facility] TO CRITICAL CARE  IP CONSULT  [address] CONSULT TO HOME CARE NEEDS    Disposition:    [x] Home       [] TCU       [] Rehab       [] Psych       [] SNF       [] [place]       [] Other-    Condition at Discharge: Stable    Code Status:  Prior     Patient Instructions:    Discharge lab work: see orders  Activity: activity as tolerated  Diet: No diet orders on file      Follow-up visits:   Lindy Smiley MD  [place] [address]  [address] [phone]    On 2/1/2022  Your appointment time is 9:45AM    [facility] [id number]  [facility], [address] [place] [address] 85  1 [facility]    On 1/31/2022  suture removal/hospital follow up 11:00    [address] Scan  [address] [address]  [phone]  On 1/27/2022  Your appointment time is at  12:30pm    , Arrive 30 minutes early      , please bring photo ID and medications       Discharge Medications:        Medication List      CONTINUE taking these medications    hydroCHLOROthiazide 12.5 MG tablet  Commonly known as: HYDRODIURIL  Take 1 tablet by mouth daily     lisinopril 40 MG tablet  Commonly known as: PRINIVIL;ZESTRIL  Take 1 tablet by mouth daily     metoprolol tartrate 50 MG tablet  Commonly known as: LOPRESSOR  TAKE 1 TABLET BY MOUTH TWICE DAILY        STOP taking these medications    ascorbic acid 500 MG tablet  Commonly known as: VITAMIN C     aspirin 81 MG tablet     Calcium Plus Vitamin D 600-100 MG-UNIT Caps  Generic drug: Calcium Carb-Cholecalciferol     hydrocortisone 2.5 % Crea rectal cream  Commonly known as: Anusol-HC            Time Spent on discharge is roughly > 30 minutes in the examination, evaluation, counseling and review of medications and discharge plan. Thank you Wilenr Gutierrez MD for the opportunity to be involved in this patient's care. Please do not hesitate to reach out to me for any questions or clarifications needed regarding this patient's care.     Signed:    Electronically signed by Kelly Luong MD on 2/2/2022  Internal Medicine Resident   PGY-2

## 2022-01-24 NOTE — PROGRESS NOTES
[address]  Occupational Therapy  Daily Note  Time:   Time In: 4341  Time Out: 0913  Timed Code Treatment Minutes: 23 Minutes  Minutes: 23          Date: 2022  Patient Name: Soha Kumar,   Gender: female      Room: 4A-19/019-A  MRN: [id number]  : 1940  (78 y.o.)  Referring Practitioner: GABRIELA Carvalho PA-C  Diagnosis: subdural hematoma  Additional Pertinent Hx: 22-year-old white female who presented to [facility] on 2022 as a transfer from [facility] ambulatory care for subdural hematoma after suffering a fall at home. She has a past medical history of lifetime non-smoker, hypertension, arthritis. Per report patient states that she fell this morning while she was trying to change a light bulb in her bathroom. She stated she was standing on a stool and she ended up falling backwards and hitting her head on the toilet. She denies any loss of consciousness. She stated she was able to get up and called her family to go to the emergency department. She does take a baby aspirin daily. She did receive 11 staples to her scalp at [facility] facility. She did states she has a dull headache but denies any other pain. CT head and neck were completed. CT head did show him a intracranial hemorrhage on the right frontal.  CT cervical spine negative. She was noted to have a laceration on her lower occipital scalp which was stapled at [facility] facility. She was started on a Cardene drip in the emergency department for accelerated hypertension. Pt s/pRIGHT SIDED CRANIOTOMY FOR SUBDURAL HEMATOMA on 22. Restrictions/Precautions:  Restrictions/Precautions: Fall Risk,General Precautions  Position Activity Restriction  Other position/activity restrictions: drain on right side of head     SUBJECTIVE: Pt seated in bedside chair upon arrival. Agreeable to OT session. Pleasant throughout. PAIN: None stated/10.     Vitals: Vitals not assessed per clinical judgement, see nursing flowsheet    COGNITION: WNL    ADL:   Grooming: Stand By Assistance. Washing face and brushing teeth sink side  Lower Extremity Dressing: Stand By Assistance. [facility] hospital socks, donning personal socks and shoes  Toileting: Supervision. Toilet Transfer: Stand By Assistance. For safety. BALANCE:  Sitting Balance:  Supervision. On toilet  Standing Balance: Stand By Assistance. For safety    BED MOBILITY:  Not Tested    TRANSFERS:  Sit to Stand:  Stand By Assistance. From bedside chair and from toilet for safety  Stand to Sit: Stand By Assistance. Onto bedside chair and onto toilet for safety    FUNCTIONAL MOBILITY:  Assistive Device: None  Assist Level:  Stand By Assistance. Distance: To and from bathroom, To and from therapy gym and while taking 2 walks throughout hallways  No losses of balance noted. Pt tolerated well. Took seated rest break after first walk to don personal socks and shoes. ADDITIONAL ACTIVITIES:  Pt challenged with homemaking task addressing dynamic standing balance. Pt demonstrated ability to fold variety of linens while standing on a slightly raised, soft surface. No losses of balance throughout activity. Pt conversational while completing activity. Pt educated on home safety modifications such as picking up throw rugs, using night light, and completing ADL tasks in a seated position. ASSESSMENT:     Activity Tolerance:  Patient tolerance of  treatment: good. 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

## 2022-01-24 NOTE — PROGRESS NOTES
Attending Note:     Patient was seen and examined by me in the floor in conjunction with neurosurgery PA.  Discussed with patient, her nurse and her family  as well.  All data and imaging reviewed by myself. I agree with examination assessment and plan as documented below.      Bg Henry MD     Neurosurgery Progress Note    Patient:  Soha Kumar      Unit/Bed:4A-19/019-A    YOB: 1940    MRN: [id number]     Acct: [de-identified]     Admit date: 1/18/2022    Chief Complaint   Patient presents with   Granda Fall    Laceration       Patient Seen, Chart, Physician notes, Labs, Radiology studies reviewed. Subjective: Patient is seen and evaluated on the floor with evaluation exam findings reviewed discussed with Dr. Shana Baires, with nursing and with family. Patient is resting comfortably postoperative day #3 from craniotomy and evacuation of subdural hematoma performed by Dr. Shana Baires, without complication. She is sitting up in a chair this morning comfortably with pain very well controlled. Past, Family, Social History unchanged from admission. Diet:  ADULT DIET;  Regular    Medications:  Scheduled Meds:   IVPB builder   IntraVENous Once    sodium chloride flush  5-40 mL IntraVENous 2 times per day    hydroCHLOROthiazide  12.5 mg Oral Daily    lisinopril  40 mg Oral Daily    metoprolol tartrate  50 mg Oral BID    sodium chloride flush  10 mL IntraVENous 2 times per day    docusate sodium  100 mg Oral Daily    famotidine  20 mg Oral Daily    cefTRIAXone (ROCEPHIN) IV  1,000 mg IntraVENous Q24H     Continuous Infusions:   sodium chloride       PRN Meds:acetaminophen, sodium chloride flush, sodium chloride, ondansetron **OR** ondansetron, HYDROcodone 5 mg - acetaminophen, HYDROmorphone, sodium chloride flush, polyethylene glycol    Objective: Patient is observed sitting up in a chair, comfortably with pain very well controlled and remained stable and intact neurologically to her baseline this morning with no additional significant changes noted to the patient's chart overnight. Dressings were changed over flat dry incisions following removal of the surgical drain performed at bedside this morning, without complication. Vitals: /81   Pulse 81   Temp 98.2 °F (36.8 °C) (Oral)   Resp 16   Ht 5' 3\" (1.6 m)   Wt 164 lb 14.5 oz (74.8 kg)   LMP  (LMP Unknown)   SpO2 95%   BMI 29.21 kg/m²     Physical Exam   Patient remained stable neurologically intact her baseline on exam with no additional significant changes noted overnight. Physical Exam:  Alert and attentive. Language appropriate, with no aphasia. Pupils equal.  Facial strength symmetric. Review of Systems      Constitutional: Negative for activity change. Respiratory: Negative for apnea, chest tightness and shortness of breath.    Cardiovascular: Negative for chest pain and leg swelling. Gastrointestinal: Negative for abdominal distention and abdominal pain. Musculoskeletal: Negative for neck pain and neck stiffness. Neurological: Negative for dizziness, seizures, weakness, numbness and headaches. Psychiatric/Behavioral: Negative     24 hour intake/output:    Intake/Output Summary (Last 24 hours) at 1/24/2022 0911  Last data filed at 1/24/2022 0355  Gross per 24 hour   Intake 250 ml   Output 70 ml   Net 180 ml     Last 3 weights: Wt Readings from Last 3 Encounters:   01/23/22 164 lb 14.5 oz (74.8 kg)   01/04/22 178 lb (80.7 kg)   11/08/21 177 lb (80.3 kg)         CBC:   Recent Labs     01/22/22  0537 01/23/22  0341   WBC 13.9* 9.1   HGB 11.9* 12.0    196     BMP:    Recent Labs     01/22/22  0537 01/23/22  0341    138   K 4.2 4.0    106   CO2 19* 20*   BUN 17 20   CREATININE 0.8 0.8   GLUCOSE 123* 109*     Calcium:  Recent Labs     01/23/22  0341   CALCIUM 8.7     Magnesium:No results for input(s): MG in the last 72 hours.   Glucose:No results for input(s): POCGLU in is mild atrophy and dilatation of the lateral ventricles. There is diminished attenuation in the white matter most likely representing ischemic changes There is calcification the cavernous segments of both internal carotid arteries. . There  is no hydrocephalus or midline shift . The paranasal sinuses and mastoid air cells are normally aerated. There is no suspicious calvarial abnormality. 1. Acute extra-axial hemorrhage over the right frontal and temporal lobes with mild mass effect upon the right frontal lobe. 2. Mild atrophy and dilatation of the lateral ventricles. 3. Probable ischemic changes in the white matter. 4. Results called to Dr. Matteo Tristan at 10.11 AM.. **This report has been created using voice recognition software. It may contain minor errors which are inherent in voice recognition technology. ** Final report electronically signed by DR Patrica Marin on 1/18/2022 10:12 AM    CT CERVICAL SPINE WO CONTRAST    Result Date: 1/18/2022  PROCEDURE: CT CERVICAL SPINE WO CONTRAST CLINICAL INFORMATION: 20-year-old female who fell. Injury to the occiput. COMPARISON: CT scan 5/21/2009. TECHNIQUE: 3 mm noncontrast axial images were obtained through the cervical spine with sagittal and coronal reconstructions. All CT scans at this facility use dose modulation, iterative reconstruction, and/or weight-based dosing when appropriate to reduce radiation dose to as low as reasonably achievable. FINDINGS: The cervical vertebral body heights are maintained. There is disc space narrowing at C3-C4, C4-C5, C5-C6 and C6-C7. There is no acute compression fracture. There is no prevertebral soft tissue swelling. There is diffuse facet arthropathy. There is mild grade 1 retrolisthesis of C3 over C4. There is a disc osteophyte complex at C5-6 resulting in severe spinal canal stenosis. Disc osteophyte complexes are also noted at C3-C4 and C4-C5 resulting in moderate spinal canal stenosis.  There are no suspicious findings in the visualized lung apices. Multilevel degenerative changes with no acute fracture. **This report has been created using voice recognition software. It may contain minor errors which are inherent in voice recognition technology. ** Final report electronically signed by Dr Zeke Franco on 1/18/2022 10:05 AM                   Assessment: Postoperative day #3 from mini craniotomy and evacuation of right-sided subdural hematoma performed by Dr. Herrera Proctor, without complication. Active Problems:    Subdural hematoma ([address]    Fall  Resolved Problems:    * No resolved hospital problems. *        Plan: The patient is seen and evaluated on the floor with evaluation exam findings reviewed and discussed with Dr. Herrera Proctor and with nursing and with family. Patient is resting comfortably in a chair this morning with pain very well controlled. Dressings were changed over flat dry incisions following removal of the surgical drain this morning without incident. She remained stable and neurologically intact to her baseline on exam this morning with no additional significant changes noted overnight. Neurosurgery anticipates discharge home with self-care with a follow-up with neurosurgery recommended in 1 week for suture removal.  The follow-up appointment is to include a new CT scan of the head to be imaged at that time for comparison and review.   Neurosurgery to follow      Electronically signed by Florence Rodriguez PA-C on 1/24/2022 at 9:11 AM    Neurosurgery

## 2022-01-24 NOTE — PROGRESS NOTES
[facility]  INPATIENT SPEECH THERAPY  [facility] NEUROSCIENCES 4A  DAILY NOTE    TIME   SLP Individual Minutes  Time In: 5551  Time Out: 1373  Minutes: 23  Timed Code Treatment Minutes: 23 Minutes       Date: 2022  Patient Name: Suni Kingsley      CSN: [id number]   : 1940  (78 y.o.)  Gender: female   Referring Physician: Tania Griffin MD  Diagnosis: Subdural hematoma   Secondary Diagnosis: High level cognitive deficits   Precautions: Fall risk   Current Diet: Regular solids, thin liquids   Swallowing Strategies: Standard Universal Swallow Precautions  Date of Last MBS/FEES: Not Applicable    Pain:  No pain reported. Subjective:  Patient seen upright in recliner with her daughter present for ST session. Completed detailed review of the Low Stimulation Environment Guidelines:  1. Keep light dim or limit number of lights on at one time. 2. Keep door to the room closed. 3. Encourage and allow frequent rest breaks. 4. Limit the amount of time the television or radio is turned on & turn off the TV when visitors are present. 5. Limit visitors in the room; no more than 2 at a time. 6. Always identify yourself when entering the room. 8. Consider the amount of visual stimulation (number of pictures, banners, colors, etc). **REMEMBER, the brain is working when it is processing information of any kind. Short-Term Goals:  SHORT TERM GOAL #1:  Goal 1: Patient will complete mild to moderately complex STM (immediate, delayed, working memory) with 80% accurayc given MIN A to improve retention of personal and newly learned information. INTERVENTIONS:  ACE- Acute Concussion Evaluation:   Physical Symptoms: 0/10  Cognitive Symptoms: 2/4  Emotional Symptoms: 2/4  Sleep Symptoms: 0/4     Patient given ACE- Acute Concussion Evaluation this date following admission with documents concussion symptoms. Cognitive linguistic evaluation was completed and scores were atypical (MOCA 25/30). Patient scores were 4/22. It should be noted that a score with concerns for concussion are greater than 12/22. Patient with education on concussion like symptoms, recommended follow-up with MD if symptoms persist after 6-8 weeks. *pt and family encouraged to complete every 1-3 days to monitor concussion like symptoms. SHORT TERM GOAL #2:  Goal 2: Patient will complete higher level complex organization, reeasoning, and executive functioning tasks (meds, finances, managing farm books) with 80% accuracy given MIN A to work toward independence with return to ADLs/iADLs. INTERVENTIONS: Patient was provided with increased amount of HEP therapy materials re: finances, check writing, deposit slips, numeric reasoning word problems. Long-Term Goals:  No LTM Goals recommended, due to anticipated short ELOS. EDUCATION:  Learner: Patient  Education:  Reviewed results and recommendations of this evaluation, Reviewed ST goals and Plan of Care and Reviewed recommendations for follow-up  Evaluation of Education: Verbalizes understanding    ASSESSMENT/PLAN:  Activity Tolerance:  Patient tolerance of  treatment: good. Assessment/Plan: Patient progressing toward established goals. Continues to require skilled care of licensed speech pathologist to progress toward achievement of established goals and plan of care. .     Plan for Next Session: low stimulation      Macy L. Theron Dubin MA., [address] [facility] / [phone]

## 2022-01-24 NOTE — PROGRESS NOTES
Discharge teaching and instructions for diagnosis/procedure of subdural hematoma  completed with patient and daughter using teachback method. AVS reviewed. Patient and daughter voiced understanding regarding prescriptions, follow up appointments, and care of self at home.  Discharged ambulatory and in a wheelchair to  home with support per family

## 2022-01-25 ENCOUNTER — TELEPHONE (OUTPATIENT)
Dept: FAMILY MEDICINE CLINIC | Age: 82
End: 2022-01-25

## 2022-01-25 NOTE — TELEPHONE ENCOUNTER
[facility] Transitions Initial Follow Up Call    Outreach made within 2 business days of discharge: Yes    Patient: Christoph Vuong Patient : 1940   MRN: [id number]  Reason for Admission: There are no discharge diagnoses documented for the most recent discharge. Discharge Date: 22       Spoke with: Patient, Nitin Stauffer    Discharge department/facility: [place]    TCM Interactive Patient Contact:  Was patient able to fill all prescriptions: Pt stated no new medications  Was patient instructed to bring all medications to the follow-up visit: Yes  Is patient taking all medications as directed in the discharge summary?  Yes  Does patient understand their discharge instructions: Yes  Does patient have questions or concerns that need addressed prior to 7-14 day follow up office visit: no    Scheduled appointment with PCP within 7-14 days    Follow Up  Future Appointments   Date Time Provider Nataliya Monaco   2022 12:30 PM STR [facility]ACC CT IMAGING RM1 STRZ PUT OP STR Thiago R   2022 11:00 AM Delana Phalen, [address] [place] [address]   2022  9:45 AM MD Kassandra Fuller [facility]   2022  8:45 AM MD Mary Ellen Fuller [address]

## 2022-01-27 ENCOUNTER — HOSPITAL ENCOUNTER (OUTPATIENT)
Dept: CT IMAGING | Age: 82
Discharge: HOME | End: 2022-01-27
Payer: MEDICARE

## 2022-01-27 DIAGNOSIS — S06.5XAA SUBDURAL HEMATOMA: ICD-10-CM

## 2022-01-27 PROCEDURE — 70450 CT HEAD/BRAIN W/O DYE: CPT

## 2022-01-31 ENCOUNTER — OFFICE VISIT (OUTPATIENT)
Dept: NEUROSURGERY | Age: 82
End: 2022-01-31
Payer: MEDICARE

## 2022-01-31 VITALS
HEART RATE: 63 BPM | BODY MASS INDEX: 30.65 KG/M2 | OXYGEN SATURATION: 98 % | SYSTOLIC BLOOD PRESSURE: 139 MMHG | HEIGHT: 63 IN | RESPIRATION RATE: 16 BRPM | DIASTOLIC BLOOD PRESSURE: 72 MMHG | WEIGHT: 173 LBS

## 2022-01-31 DIAGNOSIS — S06.5XAA SUBDURAL HEMATOMA: Primary | ICD-10-CM

## 2022-01-31 PROCEDURE — 99213 OFFICE O/P EST LOW 20 MIN: CPT

## 2022-01-31 PROCEDURE — G8482 FLU IMMUNIZE ORDER/ADMIN: HCPCS

## 2022-01-31 PROCEDURE — G8427 DOCREV CUR MEDS BY ELIG CLIN: HCPCS

## 2022-01-31 PROCEDURE — G8399 PT W/DXA RESULTS DOCUMENT: HCPCS

## 2022-01-31 PROCEDURE — 1090F PRES/ABSN URINE INCON ASSESS: CPT

## 2022-01-31 PROCEDURE — G8417 CALC BMI ABV UP PARAM F/U: HCPCS

## 2022-01-31 PROCEDURE — 4040F PNEUMOC VAC/ADMIN/RCVD: CPT

## 2022-01-31 PROCEDURE — 1036F TOBACCO NON-USER: CPT

## 2022-01-31 PROCEDURE — 1111F DSCHRG MED/CURRENT MED MERGE: CPT

## 2022-01-31 PROCEDURE — 1123F ACP DISCUSS/DSCN MKR DOCD: CPT

## 2022-01-31 ASSESSMENT — ENCOUNTER SYMPTOMS
BACK PAIN: 0
SHORTNESS OF BREATH: 0
SHORTNESS OF BREATH: 0
NAUSEA: 0
RHINORRHEA: 0
COUGH: 0
CONSTIPATION: 0
CONSTIPATION: 0
COUGH: 0
NAUSEA: 0
DIARRHEA: 0
COLOR CHANGE: 0
VOMITING: 0
EYE REDNESS: 0
TROUBLE SWALLOWING: 0

## 2022-01-31 NOTE — PROGRESS NOTES
[address]  [address]  Phone:  [phone]       Post-Discharge Transitional Care Management Services or Hospital Follow Up      Jose Neal   YOB: 1940    Date of Office Visit:  2/1/2022  Date of Hospital Admission: 1/18/22  Date of Hospital Discharge: 1/24/22  Readmission Risk Score(high >=14%. Medium >=10%):Readmission Risk Score: 9.6 ( )    Care management risk score Rising risk (score 2-5) and Complex Care (Scores >=6): 1     Non face to face  following discharge, date last encounter closed (first attempt may have been earlier): 1/25/2022  3:27 PM 1/25/2022  3:27 PM    Call initiated 2 business days of discharge: Yes     Inpatient course: Discharge summary reviewed- see chart. Interval history/Current status:  See below. Patient Active Problem List   Diagnosis    Hypertension    Syncopal episodes    Primary osteoarthritis of right knee    Subdural hematoma (HCC)    Fall       Allergies   Allergen Reactions    Pcn [Penicillins] Itching     redness @ IM site       Medications listed as ordered at the time of discharge from hospital     Medication List          Accurate as of February 1, 2022 10:00 AM. If you have any questions, ask your nurse or doctor.             CONTINUE taking these medications    hydroCHLOROthiazide 12.5 MG tablet  Commonly known as: HYDRODIURIL  Take 1 tablet by mouth daily     lisinopril 40 MG tablet  Commonly known as: PRINIVIL;ZESTRIL  Take 1 tablet by mouth daily     metoprolol tartrate 50 MG tablet  Commonly known as: LOPRESSOR  TAKE 1 TABLET BY MOUTH TWICE DAILY            Medications marked \"taking\" at this time  Outpatient Medications Marked as Taking for the 2/1/22 encounter (Office Visit) with Timothy Yang MD   Medication Sig Dispense Refill    hydroCHLOROthiazide (HYDRODIURIL) 12.5 MG tablet Take 1 tablet by mouth daily 90 tablet 0    metoprolol tartrate (LOPRESSOR) 50 MG tablet TAKE 1 TABLET BY MOUTH TWICE DAILY 180 tablet 1    lisinopril (PRINIVIL;ZESTRIL) 40 MG tablet Take 1 tablet by mouth daily 90 tablet 1      Medications patient taking as of now reconciled against medications ordered at time of hospital discharge: Yes    Chief Complaint   Patient presents with    Follow-Up from [address] Kramer is an 77 yo female who presents today for transition of care. She was hospitalized at [facility] from 1/18/22-1/24/22. Hospital records, labs, and imaging were reviewed and are summarized below. On 1/18 she was at home in her bathroom changing a light bulb in her vanity and was on a small stepstool. She stepped back, forgetting she was on the stool and fell backward, striking her buttocks on the ground and the back of her head on the toilet. There was no LOC, but she reported seeing stars. She called her daughter who took her to [facility] ER where CT cervical spine revealed no fracture, but CT head demonstrated a subdural hematoma. She was transferred to [facility] as a trauma case and was evaluated by neurosurgery. Repeat CTs were done on subsequent days, but the hematoma was slowly enlarging instead of improving with TPX so on 1/21/22 she was taken to the OR by Dr. Luca Hays for a right-sided craniotomy where the blood was successfully evacuated. She tolerated the procedure well and was able to be discharged home on 1/24/22. During her hospitalization she was also treated for an asymptomatic UTI. Her children have been taking turns staying with her 24/7, but last night she stayed alone and did well. She has home PT, OT, and speech therapy, but was released from OT and speech therapy already. Repeat CT head shows improvement. She denies headaches, dizziness, blurry vision, etc.  She is trying to rest her brain and is taking at least 2 naps/day. She's eating well. Review of Systems   Constitutional: Positive for fatigue. Negative for chills and fever.    HENT: Negative for congestion and rhinorrhea. Eyes: Negative for redness and visual disturbance. Respiratory: Negative for cough and shortness of breath. Cardiovascular: Negative for chest pain and palpitations. Gastrointestinal: Negative for constipation, diarrhea, nausea and vomiting. Genitourinary: Negative for dysuria and frequency. Neurological: Negative for dizziness, syncope, speech difficulty, weakness, light-headedness and headaches. Psychiatric/Behavioral: The patient is nervous/anxious. Vitals:    02/01/22 0944   BP: 136/60   Site: Left Upper Arm   Position: Sitting   Cuff Size: Medium Adult   Pulse: 68   Resp: 18   SpO2: 98%   Weight: 172 lb 3.2 oz (78.1 kg)   Height: 5' 3\" (1.6 m)     Body mass index is 30.5 kg/m². Wt Readings from Last 3 Encounters:   02/01/22 172 lb 3.2 oz (78.1 kg)   01/31/22 173 lb (78.5 kg)   01/23/22 164 lb 14.5 oz (74.8 kg)     BP Readings from Last 3 Encounters:   02/01/22 136/60   01/31/22 139/72   01/24/22 113/81       Physical Exam  Vitals and nursing note reviewed. Constitutional:       General: She is not in acute distress. Appearance: She is well-developed. HENT:      Head: Normocephalic. Comments: Posterior scalp laceration healing well. Nose: Nose normal.   Eyes:      Conjunctiva/sclera: Conjunctivae normal.   Cardiovascular:      Rate and Rhythm: Normal rate and regular rhythm. Heart sounds: Normal heart sounds. Pulmonary:      Effort: Pulmonary effort is normal. No respiratory distress. Breath sounds: Normal breath sounds. No wheezing. Abdominal:      General: Bowel sounds are normal. There is no distension. Palpations: Abdomen is soft. Tenderness: There is no abdominal tenderness. Musculoskeletal:      Cervical back: Normal range of motion and neck supple. Skin:     General: Skin is warm and dry. Neurological:      Mental Status: She is alert and oriented to person, place, and time.    Psychiatric:         Behavior: Behavior normal.       Assessment/Plan:  1. Hospital discharge follow-up  [facility] records, labs, and imaging were reviewed as above. - OK DISCHARGE MEDS RECONCILED W/ CURRENT OUTPATIENT MED LIST    2. Fall, subsequent encounter  3. Subdural hematoma ([address]  4. S/P craniotomy  - She is doing well s/p craniotomy. Her CT this week showed improvement in the hematoma. She is still not to drive until she passes a driving test through OT which will be scheduled after her next neurology appointment in 1 month. Rest and increased hydration encouraged. 5. Primary hypertension  - Blood pressure has been controlled so will continue on current medications. 6. Acute cystitis without hematuria  - In office UA was negative for infection so the antibiotics treated it successfully.  - POCT Urinalysis no Micro        Medical Decision Making: high complexity    Electronically signed by Monster Pineda MD on 2/1/22.

## 2022-01-31 NOTE — PROGRESS NOTES
Neurosurgery Follow up Note    Date:1/31/2022         Patient Lucendia Litten Schnipke     YOB: 1940     Age:81 y.o. Reason for Follow up: Follow up for hospital follow-up status post fall off of a stool and CT revealed acute extra-axial hemorrhage over the right frontal and temporal lobes with mild mass-effect upon on the right frontal lobe status post right-sided craniotomy on 1/21/2022 by Dr. Stuart Fairchild    Chief Complaint: No chief complaint on file. Pastora Pathak is a 43-year-old female who presents to the office today alone. Ambulating without assistive device. Patient was admitted to [facility] on 1/18/2022 following a fall off of a stool. Patient underwent a brain CT that showed acute extra-axial hemorrhage over the right frontal and temporal lobes with mild mass-effect upon the right frontal lobe. Patient underwent surgery by Dr. Stuart Fairchild on 1/21/2022 for a right sided craniotomy with evacuation of acute right-sided subdural hemorrhage. Patient discharged home. Patient had a CT of her head without contrast completed on 1/27/2022 which showed continued improvement in the appearance of the right-sided subdural collection. Patient denies headache, blurred vision, and double vision. Review of Systems   Review of Systems   Constitutional: Negative for activity change, appetite change, fatigue and fever. HENT: Negative for trouble swallowing. Eyes: Negative for visual disturbance. Respiratory: Negative for cough and shortness of breath. Cardiovascular: Negative for chest pain. Gastrointestinal: Negative for constipation and nausea. Musculoskeletal: Negative for back pain, gait problem and myalgias. Skin: Negative for color change, rash and wound. Neurological: Negative for dizziness, speech difficulty, weakness, light-headedness and headaches. Psychiatric/Behavioral: Negative for confusion. The patient is not nervous/anxious. Medications     Current Outpatient Medications on File Prior to Visit   Medication Sig Dispense Refill    hydroCHLOROthiazide (HYDRODIURIL) 12.5 MG tablet Take 1 tablet by mouth daily 90 tablet 0    metoprolol tartrate (LOPRESSOR) 50 MG tablet TAKE 1 TABLET BY MOUTH TWICE DAILY 180 tablet 1    lisinopril (PRINIVIL;ZESTRIL) 40 MG tablet Take 1 tablet by mouth daily 90 tablet 1     No current facility-administered medications on file prior to visit. Past History    Past Medical History:   has a past medical history of Arthritis, Hypertension, and Subdural hematoma ([address] Social History:   reports that she has never smoked. She has never used smokeless tobacco. She reports current alcohol use. She reports that she does not use drugs. Family History:   Family History   Problem Relation Age of Onset   Granda Cancer Mother         lymphoma    Cancer Father         prostate and bone    Heart Disease Sister     Heart Disease Brother     Atrial Fibrillation Daughter     Hypertension Daughter     Elevated Lipids Daughter     Breast Cancer Sister 78       Physical Examination      Vitals:  LMP  (LMP Unknown)       Physical Exam  Constitutional:       Appearance: Normal appearance. She is not ill-appearing. Cardiovascular:      Rate and Rhythm: Normal rate. Pulses: Normal pulses. Pulmonary:      Effort: Pulmonary effort is normal.   Abdominal:      General: Abdomen is flat. Palpations: Abdomen is soft. Musculoskeletal:         General: Normal range of motion. Skin:     General: Skin is warm and dry. Neurological:      General: No focal deficit present. Mental Status: She is alert and oriented to person, place, and time. Cranial Nerves: No cranial nerve deficit or dysarthria. Sensory: No sensory deficit. Motor: No weakness.       Gait: Gait normal.      Comments: RUE 5/5  LUE 5/5  RLE 5/5  LLE 5/5   Psychiatric:         Mood and Affect: Mood normal. Behavior: Behavior normal.         Thought Content: Thought content normal.         Judgment: Judgment normal.       Neurologic Exam     Mental Status   Oriented to person, place, and time. Imaging   Imaging last 30 days:  CT HEAD WO CONTRAST    Result Date: 1/27/2022   Continued improvement in the appearance of the right-sided subdural collection. **This report has been created using voice recognition software. It may contain minor errors which are inherent in voice recognition technology. ** Final report electronically signed by Dr. Gia Williamson on 1/27/2022 1:23 PM    CT HEAD WO CONTRAST    Result Date: 1/23/2022   1. Stable postoperative changes from evacuation of a right subdural hematoma with decreasing pneumocephalus. There is a small amount of residual subdural hemorrhage. **This report has been created using voice recognition software. It may contain minor errors which are inherent in voice recognition technology. ** Final report electronically signed by Dr. Gia Williamson on 1/23/2022 10:44 AM    CT HEAD WO CONTRAST    Result Date: 1/22/2022  1. Right frontal craniotomy with placement of surgical drain for evacuation of right convexity subdural hematoma. Pneumocephalus deep to the craniotomy site has decreased in volume. Residual right convexity subdural hematoma greatest within the right frontal region measuring up to 8-9 mm in transverse diameter grossly stable in size when compared to the prior. No mass-effect or midline shift. Minimal amount of blood along the anterior interhemispheric fissure similar to prior. 2.Subcortical/periventricular white matter hypoattenuation statistically representing changes of chronic microvascular ischemia. Global parenchymal volume loss which is commensurate with reported age.  This document has been electronically signed by: Jovanna Mas DO on 01/22/2022 06:59 AM All CTs at this facility use dose modulation techniques and iterative reconstructions, and/or weight-based dosing when appropriate to reduce radiation to a low as reasonably achievable. CT HEAD WO CONTRAST    Result Date: 1/21/2022   1. The patient has undergone interval right frontal craniotomy, evacuation of the subdural hematoma over the right frontal lobe and placement of drain. 2. There is is air over the frontal and to lesser extent parietal lobes. 3. There is slightly increased attenuation along the interhemispheric fissure and tentorium cerebelli on the right side. 4. There has been diminution in mass effect upon the right lateral ventricle. This no midline deviation. 5. There are probable ischemic changes in the white matter. **This report has been created using voice recognition software. It may contain minor errors which are inherent in voice recognition technology. ** Final report electronically signed by DR Rain Rubalcava on 1/21/2022 1:19 PM    CT HEAD WO CONTRAST    Result Date: 1/21/2022  Impression: Stable subdural hematoma collections on the right as above. This document has been electronically signed by: Ariel Chavez MD on 01/21/2022 05:36 AM All CTs at this facility use dose modulation techniques and iterative reconstructions, and/or weight-based dosing when appropriate to reduce radiation to a low as reasonably achievable. CT HEAD WO CONTRAST    Result Date: 1/20/2022  Impression: No significant change in right subdural hematoma with associated midline shift and ventricular effacement. No axial herniation or hydrocephalus. This document has been electronically signed by: Pina Ruiz MD on 01/20/2022 07:08 AM All CTs at this facility use dose modulation techniques and iterative reconstructions, and/or weight-based dosing when appropriate to reduce radiation to a low as reasonably achievable. CT HEAD WO CONTRAST    Result Date: 1/19/2022  Impression: Moderate right subdural hematoma with approximately 4 mm leftward subfalcine herniation.  No axial herniation or hydrocephalus. This document has been electronically signed by: Suad Charlton MD on 01/19/2022 07:20 AM All CTs at this facility use dose modulation techniques and iterative reconstructions, and/or weight-based dosing when appropriate to reduce radiation to a low as reasonably achievable. CT HEAD WO CONTRAST    Result Date: 1/18/2022   1. Acute extra-axial hemorrhage over the right frontal and temporal lobes with mild mass effect upon the right frontal lobe. 2. Mild atrophy and dilatation of the lateral ventricles. 3. Probable ischemic changes in the white matter. 4. Results called to Dr. Bairon Pineda at 10.11 AM.. **This report has been created using voice recognition software. It may contain minor errors which are inherent in voice recognition technology. ** Final report electronically signed by DR Elsa Ochoa on 1/18/2022 10:12 AM    CT CERVICAL SPINE WO CONTRAST    Result Date: 1/18/2022  Multilevel degenerative changes with no acute fracture. **This report has been created using voice recognition software. It may contain minor errors which are inherent in voice recognition technology. ** Final report electronically signed by Dr Naa Shea on 1/18/2022 10:05 AM         Assessment and Plan:          1. Hospital follow up   2. CT of the head results discussed with patient and daughter  3. CT head in 4 weeks   4. No driving- will order driving eval at next visit  5. Remove staples today. Maintain steri strips for 3-5 days. May remove after 7 days   6. Keep incision clean and dry. May shower 24 hours after suture removal.  7. Call office if fever,drainage, redness, pain  from surgical site  8. Fall precautions  9. Low stimulation guidelines  10. Continue home health and therapy   11. Follow up in 4 week after CT scan is completed  12. All patient questions answered. Pt voiced understanding.  Patient instructed to call the office with any questions or concerns      Electronically signed by LEONEL Givens - CNP on 1/31/22 at 10:41 AM EST

## 2022-02-01 ENCOUNTER — OFFICE VISIT (OUTPATIENT)
Dept: FAMILY MEDICINE CLINIC | Age: 82
End: 2022-02-01
Payer: MEDICARE

## 2022-02-01 VITALS
DIASTOLIC BLOOD PRESSURE: 60 MMHG | HEIGHT: 63 IN | WEIGHT: 172.2 LBS | OXYGEN SATURATION: 98 % | HEART RATE: 68 BPM | BODY MASS INDEX: 30.51 KG/M2 | RESPIRATION RATE: 18 BRPM | SYSTOLIC BLOOD PRESSURE: 136 MMHG

## 2022-02-01 DIAGNOSIS — N30.00 ACUTE CYSTITIS WITHOUT HEMATURIA: ICD-10-CM

## 2022-02-01 DIAGNOSIS — Z09 HOSPITAL DISCHARGE FOLLOW-UP: Primary | ICD-10-CM
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
## 2022-02-01 DIAGNOSIS — I10 PRIMARY HYPERTENSION: ICD-10-CM

## 2022-02-01 DIAGNOSIS — S06.5XAA SUBDURAL HEMATOMA: ICD-10-CM

## 2022-02-01 DIAGNOSIS — Z98.890 S/P CRANIOTOMY: ICD-10-CM

## 2022-02-01 LAB
BILIRUBIN, POC: NORMAL
BLOOD URINE, POC: NORMAL
CLARITY, POC: CLEAR
COLOR, POC: YELLOW
GLUCOSE URINE, POC: NORMAL
KETONES, POC: NORMAL
LEUKOCYTE EST, POC: NORMAL
NITRITE, POC: NORMAL
PH, POC: 5.5
PROTEIN, POC: NORMAL
SPECIFIC GRAVITY, POC: 1.01
UROBILINOGEN, POC: 0.2

## 2022-02-01 PROCEDURE — 81002 URINALYSIS NONAUTO W/O SCOPE: CPT | Performed by: FAMILY MEDICINE

## 2022-02-01 PROCEDURE — 1111F DSCHRG MED/CURRENT MED MERGE: CPT | Performed by: FAMILY MEDICINE

## 2022-02-01 PROCEDURE — 99495 TRANSJ CARE MGMT MOD F2F 14D: CPT | Performed by: FAMILY MEDICINE

## 2022-02-17 ENCOUNTER — OFFICE VISIT (OUTPATIENT)
Dept: FAMILY MEDICINE CLINIC | Age: 82
End: 2022-02-17
Payer: MEDICARE

## 2022-02-17 VITALS
OXYGEN SATURATION: 99 % | WEIGHT: 173.6 LBS | TEMPERATURE: 96.4 F | DIASTOLIC BLOOD PRESSURE: 82 MMHG | SYSTOLIC BLOOD PRESSURE: 138 MMHG | HEART RATE: 52 BPM | BODY MASS INDEX: 30.75 KG/M2 | RESPIRATION RATE: 14 BRPM

## 2022-02-17 DIAGNOSIS — L82.1 SEBORRHEIC KERATOSES: ICD-10-CM

## 2022-02-17 DIAGNOSIS — H91.92 HEARING LOSS OF LEFT EAR, UNSPECIFIED HEARING LOSS TYPE: Primary | ICD-10-CM

## 2022-02-17 PROCEDURE — 1123F ACP DISCUSS/DSCN MKR DOCD: CPT | Performed by: FAMILY MEDICINE

## 2022-02-17 PROCEDURE — 1036F TOBACCO NON-USER: CPT | Performed by: FAMILY MEDICINE

## 2022-02-17 PROCEDURE — G8427 DOCREV CUR MEDS BY ELIG CLIN: HCPCS | Performed by: FAMILY MEDICINE

## 2022-02-17 PROCEDURE — G8417 CALC BMI ABV UP PARAM F/U: HCPCS | Performed by: FAMILY MEDICINE

## 2022-02-17 PROCEDURE — 99213 OFFICE O/P EST LOW 20 MIN: CPT | Performed by: FAMILY MEDICINE

## 2022-02-17 PROCEDURE — G8482 FLU IMMUNIZE ORDER/ADMIN: HCPCS | Performed by: FAMILY MEDICINE

## 2022-02-17 PROCEDURE — G8399 PT W/DXA RESULTS DOCUMENT: HCPCS | Performed by: FAMILY MEDICINE

## 2022-02-17 PROCEDURE — 1090F PRES/ABSN URINE INCON ASSESS: CPT | Performed by: FAMILY MEDICINE

## 2022-02-17 PROCEDURE — 1111F DSCHRG MED/CURRENT MED MERGE: CPT | Performed by: FAMILY MEDICINE

## 2022-02-17 PROCEDURE — 4040F PNEUMOC VAC/ADMIN/RCVD: CPT | Performed by: FAMILY MEDICINE

## 2022-02-17 ASSESSMENT — ENCOUNTER SYMPTOMS
COUGH: 0
SHORTNESS OF BREATH: 0

## 2022-02-17 NOTE — PROGRESS NOTES
[address]  [address] [place] [address]  Phone:  [phone]          Name: Harry Schwartz  : 1940    Chief Complaint   Patient presents with    Hearing Problem       HPI:     Harry Schwartz is a 78 y.o. female who presents today for evaluation of decreased hearing as well as for evaluation of lesions on her back. She had a craniotomy after fall during which she sustained a subdural hematoma last month. She's noticed her hearing has been decreased, and she feels it's worse since her hospitalization. Yesterday she was in [employer] and the children doing the readings sounded muffled. She's had 2 lesions on her right mid/upper back for years, but they get caught on her bra and can be painful and bleed. She would like to have them removed. Current Outpatient Medications:     hydroCHLOROthiazide (HYDRODIURIL) 12.5 MG tablet, Take 1 tablet by mouth daily, Disp: 90 tablet, Rfl: 0    metoprolol tartrate (LOPRESSOR) 50 MG tablet, TAKE 1 TABLET BY MOUTH TWICE DAILY, Disp: 180 tablet, Rfl: 1    lisinopril (PRINIVIL;ZESTRIL) 40 MG tablet, Take 1 tablet by mouth daily, Disp: 90 tablet, Rfl: 1    Allergies   Allergen Reactions    Pcn [Penicillins] Itching     redness @ IM site       Subjective:      Review of Systems   Constitutional: Negative for chills and fever. HENT: Positive for hearing loss. Negative for congestion, ear discharge and ear pain. Respiratory: Negative for cough and shortness of breath. Skin:        Skin lesions right upper back. Objective:     /82 (Site: Right Upper Arm, Position: Sitting, Cuff Size: Large Adult)   Pulse 52   Temp 96.4 °F (35.8 °C) (Temporal)   Resp 14   Wt 173 lb 9.6 oz (78.7 kg)   LMP  (LMP Unknown)   SpO2 99%   BMI 30.75 kg/m²     Physical Exam  Vitals and nursing note reviewed. Constitutional:       General: She is not in acute distress. Appearance: She is well-developed.    HENT:      Head: Normocephalic and atraumatic. Right Ear: Tympanic membrane, ear canal and external ear normal.      Left Ear: Tympanic membrane, ear canal and external ear normal.      Nose: Nose normal.   Eyes:      Conjunctiva/sclera: Conjunctivae normal.   Cardiovascular:      Rate and Rhythm: Normal rate and regular rhythm. Heart sounds: Normal heart sounds. Pulmonary:      Effort: Pulmonary effort is normal. No respiratory distress. Breath sounds: Normal breath sounds. No wheezing. Musculoskeletal:      Cervical back: Normal range of motion and neck supple. Skin:     General: Skin is warm and dry. Neurological:      Mental Status: She is alert and oriented to person, place, and time. Psychiatric:         Behavior: Behavior normal.       Excisional Biopsy Procedure Note    PRE-OP DIAGNOSIS:  Seborrheic keratoses    PROCEDURE:  Skin Lesion Excision(s)    Lesion    Location:  Right mid/upper back   Color:  brown    Diameter:  1.5 cm each   Border and Symmetry:  asymmetrical, elevated. Inflammation:  present     INDICATIONS:  Patient presents for minor skin surgery for changing and concerning skin lesion(s). The patient understands all risks, benefits, indications, potential complications, and alternatives, and freely consents for the procedure. The patient also understands the option of performing no surgery, the risk for scarring, and the technique of the procedure. ANESTHESIA: Local    TECHNIQUE:  After informed consent was obtained, and after the skin was prepped and area was cleansed with alcohol. 2 cc of 1% lidocaine with epinephrine was used for anesthetic. It was injected around and underneath the site and good analgesic affect was obtained. A Dermablade was used to remove the skin lesion. Antibiotic ointment and a sterile dressing applied. The specimen were not sent for pathologic examination. A dressing was applied and wound care instructions were provided.   The patient tolerated the procedure well and without complications. The patient will be alert for any signs of cutaneous infection and will follow up as instructed. EBL: Minimal    CONDITION: Stable    COMPLICATIONS:  None. Assessment/Plan:     Ana Crowell was seen today for hearing problem. Diagnoses and all orders for this visit:    Hearing loss of left ear, unspecified hearing loss type        -     She was hoping her hearing loss was secondary to cerumen impaction, but this was not the case as there is minimal wax present. She was encouraged to see Audiology to have her hearing tested. Seborrheic keratoses        -     The 2 seborrheic keratoses on her back were removed today and she tolerated the procedure well. She was advised on aftercare instructions. Return if symptoms worsen or fail to improve.     Electronically signed by Gustavo Alvarado MD on 2/17/2022 at 1:29 PM

## 2022-02-20 ENCOUNTER — TELEPHONE (OUTPATIENT)
Dept: FAMILY MEDICINE CLINIC | Age: 82
End: 2022-02-20

## 2022-02-20 RX ORDER — CEPHALEXIN 500 MG/1
500 CAPSULE ORAL 2 TIMES DAILY
Qty: 14 CAPSULE | Refills: 0 | Status: SHIPPED | OUTPATIENT
Start: 2022-02-20 | End: 2022-02-27

## 2022-02-21 ENCOUNTER — OFFICE VISIT (OUTPATIENT)
Dept: NEUROSURGERY | Age: 82
End: 2022-02-21

## 2022-02-21 ENCOUNTER — HOSPITAL ENCOUNTER (OUTPATIENT)
Dept: CT IMAGING | Age: 82
Discharge: HOME | End: 2022-02-21
Payer: MEDICARE

## 2022-02-21 VITALS
DIASTOLIC BLOOD PRESSURE: 72 MMHG | BODY MASS INDEX: 30.74 KG/M2 | SYSTOLIC BLOOD PRESSURE: 150 MMHG | HEIGHT: 63 IN | WEIGHT: 173.5 LBS | HEART RATE: 63 BPM

## 2022-02-21 DIAGNOSIS — L24.A9 WOUND DRAINAGE: ICD-10-CM

## 2022-02-21 DIAGNOSIS — S06.5XAA SUBDURAL HEMATOMA: ICD-10-CM

## 2022-02-21 DIAGNOSIS — S06.5XAA SUBDURAL HEMATOMA: Primary | ICD-10-CM

## 2022-02-21 PROCEDURE — 70450 CT HEAD/BRAIN W/O DYE: CPT

## 2022-02-21 PROCEDURE — 99024 POSTOP FOLLOW-UP VISIT: CPT

## 2022-02-21 ASSESSMENT — ENCOUNTER SYMPTOMS
PHOTOPHOBIA: 0
BACK PAIN: 0
SHORTNESS OF BREATH: 0
CONSTIPATION: 0
NAUSEA: 0
COLOR CHANGE: 0
TROUBLE SWALLOWING: 0
COUGH: 0

## 2022-02-21 NOTE — PROGRESS NOTES
Neurosurgery Follow up Note    Date:2/21/2022         Patient Evangelina Crigler     YOB: 1940     Age:81 y.o. Reason for Follow up: Follow up for S/P craniotomy follow up for incisional site check    Chief Complaint:   Chief Complaint   Patient presents with    Follow-up     S/P Craniotomy        Subjective   Franklin Young is a 78year old patient who presents to the office today with her daughter. Patient presents ambulating without assistive device. Patient  Underwent surgery by Dr. Man Cervantes on 1/21/2022 for a right sided craniotomy with evacuation of acute right sided subdural hemorrhage. Patient presents today with complaints of wound drainage that started 2/20/2022. Patient stated she woke up this morning and there was serous sanguinous fluid on her pillowcase. She stated that she has not had any more drainage from her incision. She stated that she does have a scab at the edge of her incision. Patient stated that her incision is tender. She stated that the center of her incision pulsates. Patient and daughters noticed that her incision pulsates when she is upset or worried about something. Patient stated that she was worried about her CT of her head that was just completed. Patient started to notice this pulsation yesterday as well. She stated that she has not been picking or scratching at incisional site. Patient is on Keflex 500 mg twice a day as patient recently underwent skin lesion excisions to her right mid upper back. This was completed on 2/17/2022. The preop diagnosis for the skin lesions was seborrheic keratosis. Patient denies fever, fatigue, blurred vision, double vision. Patient denies headache, dizziness, weakness.  CT of the head without contrast was completed today 2/21/2022 which showed significant interval improvement since previous study dated January 27, 2022 with near complete resolution of the previous noted extra-axial hemorrhage, air and fluid over the right frontal and temporal lobes. Probable ischemic changes in the white matter. Calcification in the cavernosus segments of both internal carotid arteries. Minimal mucosal thickening in ethmoid air cells bilaterally. Imaging was reviewed with Dr. Pam Patel over the telephone, patient, and her 2 daughters are present in the room. Patient denies recent fall or trauma. Review of Systems   Review of Systems   Constitutional: Negative for activity change, appetite change, fatigue and fever. HENT: Negative for trouble swallowing. Eyes: Negative for photophobia and visual disturbance. Respiratory: Negative for cough and shortness of breath. Cardiovascular: Negative for chest pain. Gastrointestinal: Negative for constipation and nausea. Genitourinary: Negative for difficulty urinating. Musculoskeletal: Negative for back pain and gait problem. Skin: Positive for wound (scab to edge of right frontal surgical site incision. dried serous sanguinous drainage ). Negative for color change and rash. Neurological: Negative for dizziness, weakness, numbness and headaches. Psychiatric/Behavioral: Negative for confusion and sleep disturbance. The patient is not nervous/anxious. Medications     Current Outpatient Medications on File Prior to Visit   Medication Sig Dispense Refill    cephALEXin (KEFLEX) 500 MG capsule Take 1 capsule by mouth 2 times daily for 7 days 14 capsule 0    hydroCHLOROthiazide (HYDRODIURIL) 12.5 MG tablet Take 1 tablet by mouth daily 90 tablet 0    metoprolol tartrate (LOPRESSOR) 50 MG tablet TAKE 1 TABLET BY MOUTH TWICE DAILY 180 tablet 1    lisinopril (PRINIVIL;ZESTRIL) 40 MG tablet Take 1 tablet by mouth daily 90 tablet 1     No current facility-administered medications on file prior to visit. Past History    Past Medical History:   has a past medical history of Arthritis, Hypertension, and Subdural hematoma ([address]     Social History:   reports that she has never smoked. She has never used smokeless tobacco. She reports previous alcohol use. She reports that she does not use drugs. Family History:   Family History   Problem Relation Age of Onset   [employer] Cancer Mother         lymphoma    Cancer Father         prostate and bone    Heart Disease Sister     Heart Disease Brother     Atrial Fibrillation Daughter     Hypertension Daughter     Elevated Lipids Daughter     Breast Cancer Sister 78       Physical Examination      Vitals:  BP (!) 150/72 (Site: Right Upper Arm, Position: Sitting, Cuff Size: Large Adult)   Pulse 63   Ht 5' 2.99\" (1.6 m)   Wt 173 lb 8 oz (78.7 kg)   LMP  (LMP Unknown)   BMI 30.74 kg/m²       Physical Exam  Constitutional:       Appearance: Normal appearance. She is not ill-appearing. HENT:      Head:     Cardiovascular:      Rate and Rhythm: Normal rate. Pulses: Normal pulses. Pulmonary:      Effort: Pulmonary effort is normal.   Abdominal:      General: Bowel sounds are normal.   Musculoskeletal:         General: No swelling or tenderness. Skin:     General: Skin is warm. Findings: No bruising or erythema. Neurological:      Mental Status: She is oriented to person, place, and time. Sensory: No sensory deficit. Motor: No weakness. Psychiatric:         Mood and Affect: Mood normal.         Thought Content: Thought content normal.       Neurologic Exam     Mental Status   Oriented to person, place, and time. Imaging   Imaging last 30 days:  CT HEAD WO CONTRAST    Result Date: 1/27/2022   Continued improvement in the appearance of the right-sided subdural collection. **This report has been created using voice recognition software. It may contain minor errors which are inherent in voice recognition technology. ** Final report electronically signed by Dr. Anjana Cervantes on 1/27/2022 1:23 PM    CT HEAD WO CONTRAST    Result Date: 1/23/2022   1.  Stable postoperative changes from evacuation of a right subdural hematoma with decreasing pneumocephalus. There is a small amount of residual subdural hemorrhage. **This report has been created using voice recognition software. It may contain minor errors which are inherent in voice recognition technology. ** Final report electronically signed by Dr. Powell Ganser on 1/23/2022 10:44 AM         Assessment and Plan:          1. Follow up for incisional site drainage and scab. 2. CT of the head reviewed with Dr. Harrison Spangler over the phone, patient, and her 2 daughters in exam room. 3. OT eval for driving eval  4. Wound care referral for surgical site incision that is draining serous sanguinous fluid, incision with scab at edge of incisional line  5. Cleanse area with antibiotic soap, rinse with water, pat dry, and cover with gauze daily  6. Continue taking Keflex as prescribed by PCP  7. Call office if fever, increased drainage, increased redness, or increased pain from surgical site  8. Follow up in 1 week. 9. All patient questions answered. Pt voiced understanding.  Patient instructed to call the office with any questions or concerns    Electronically signed by LEONEL Astudillo CNP on 2/21/22 at 1:29 PM EST

## 2022-02-23 ENCOUNTER — HOSPITAL ENCOUNTER (INPATIENT)
Age: 82
LOS: 1 days | Discharge: HOME | DRG: 908 | End: 2022-02-25
Attending: EMERGENCY MEDICINE | Admitting: PHYSICIAN ASSISTANT
Payer: MEDICARE

## 2022-02-23 ENCOUNTER — TELEPHONE (OUTPATIENT)
Dept: WOUND CARE | Age: 82
End: 2022-02-23

## 2022-02-23 ENCOUNTER — HOSPITAL ENCOUNTER (OUTPATIENT)
Dept: WOUND CARE | Age: 82
Discharge: HOME | DRG: 908 | End: 2022-02-23
Payer: MEDICARE

## 2022-02-23 VITALS
RESPIRATION RATE: 18 BRPM | HEART RATE: 54 BPM | SYSTOLIC BLOOD PRESSURE: 170 MMHG | TEMPERATURE: 97.2 F | OXYGEN SATURATION: 98 % | DIASTOLIC BLOOD PRESSURE: 76 MMHG

## 2022-02-23 DIAGNOSIS — T81.31XA DISRUPTION OF EXTERNAL SURGICAL WOUND, INITIAL ENCOUNTER: Primary | ICD-10-CM

## 2022-02-23 PROBLEM — T81.89XA NONHEALING SURGICAL WOUND, INITIAL ENCOUNTER: Status: ACTIVE | Noted: 2022-02-23

## 2022-02-23 LAB
ANION GAP SERPL CALCULATED.3IONS-SCNC: 14 MEQ/L (ref 8–16)
BASOPHILS # BLD: 0.9 %
BASOPHILS ABSOLUTE: 0.1 THOU/MM3 (ref 0–0.1)
BUN BLDV-MCNC: 27 MG/DL (ref 7–22)
CALCIUM SERPL-MCNC: 9.6 MG/DL (ref 8.5–10.5)
CHLORIDE BLD-SCNC: 102 MEQ/L (ref 98–111)
CO2: 23 MEQ/L (ref 23–33)
CREAT SERPL-MCNC: 1.1 MG/DL (ref 0.4–1.2)
EOSINOPHIL # BLD: 4.3 %
EOSINOPHILS ABSOLUTE: 0.3 THOU/MM3 (ref 0–0.4)
ERYTHROCYTE [DISTWIDTH] IN BLOOD BY AUTOMATED COUNT: 12.9 % (ref 11.5–14.5)
ERYTHROCYTE [DISTWIDTH] IN BLOOD BY AUTOMATED COUNT: 42.5 FL (ref 35–45)
GFR SERPL CREATININE-BSD FRML MDRD: 47 ML/MIN/1.73M2
GLUCOSE BLD-MCNC: 99 MG/DL (ref 70–108)
HCT VFR BLD CALC: 38.5 % (ref 37–47)
HEMOGLOBIN: 12.9 GM/DL (ref 12–16)
IMMATURE GRANS (ABS): 0.03 THOU/MM3 (ref 0–0.07)
IMMATURE GRANULOCYTES: 0.5 %
LYMPHOCYTES # BLD: 26.6 %
LYMPHOCYTES ABSOLUTE: 1.7 THOU/MM3 (ref 1–4.8)
MCH RBC QN AUTO: 30.7 PG (ref 26–33)
MCHC RBC AUTO-ENTMCNC: 33.5 GM/DL (ref 32.2–35.5)
MCV RBC AUTO: 91.7 FL (ref 81–99)
MONOCYTES # BLD: 13 %
MONOCYTES ABSOLUTE: 0.8 THOU/MM3 (ref 0.4–1.3)
NUCLEATED RED BLOOD CELLS: 0 /100 WBC
OSMOLALITY CALCULATION: 282.7 MOSMOL/KG (ref 275–300)
PLATELET # BLD: 211 THOU/MM3 (ref 130–400)
PMV BLD AUTO: 11 FL (ref 9.4–12.4)
POTASSIUM REFLEX MAGNESIUM: 4 MEQ/L (ref 3.5–5.2)
RBC # BLD: 4.2 MILL/MM3 (ref 4.2–5.4)
SEG NEUTROPHILS: 54.7 %
SEGMENTED NEUTROPHILS ABSOLUTE COUNT: 3.6 THOU/MM3 (ref 1.8–7.7)
SODIUM BLD-SCNC: 139 MEQ/L (ref 135–145)
WBC # BLD: 6.5 THOU/MM3 (ref 4.8–10.8)

## 2022-02-23 PROCEDURE — 6370000000 HC RX 637 (ALT 250 FOR IP): Performed by: PHYSICIAN ASSISTANT

## 2022-02-23 PROCEDURE — G0378 HOSPITAL OBSERVATION PER HR: HCPCS

## 2022-02-23 PROCEDURE — 99024 POSTOP FOLLOW-UP VISIT: CPT | Performed by: NEUROLOGICAL SURGERY

## 2022-02-23 PROCEDURE — 99284 EMERGENCY DEPT VISIT MOD MDM: CPT

## 2022-02-23 PROCEDURE — 2580000003 HC RX 258: Performed by: PHYSICIAN ASSISTANT

## 2022-02-23 PROCEDURE — 85025 COMPLETE CBC W/AUTO DIFF WBC: CPT

## 2022-02-23 PROCEDURE — 99223 1ST HOSP IP/OBS HIGH 75: CPT | Performed by: PHYSICIAN ASSISTANT

## 2022-02-23 PROCEDURE — 80048 BASIC METABOLIC PNL TOTAL CA: CPT

## 2022-02-23 PROCEDURE — APPSS30 APP SPLIT SHARED TIME 16-30 MINUTES

## 2022-02-23 PROCEDURE — 99213 OFFICE O/P EST LOW 20 MIN: CPT

## 2022-02-23 RX ORDER — HYDROCHLOROTHIAZIDE 25 MG/1
12.5 TABLET ORAL DAILY
Status: DISCONTINUED | OUTPATIENT
Start: 2022-02-24 | End: 2022-02-25 | Stop reason: HOSPADM

## 2022-02-23 RX ORDER — POTASSIUM CHLORIDE 20 MEQ/1
40 TABLET, EXTENDED RELEASE ORAL PRN
Status: DISCONTINUED | OUTPATIENT
Start: 2022-02-23 | End: 2022-02-25 | Stop reason: HOSPADM

## 2022-02-23 RX ORDER — SODIUM CHLORIDE 0.9 % (FLUSH) 0.9 %
10 SYRINGE (ML) INJECTION PRN
Status: DISCONTINUED | OUTPATIENT
Start: 2022-02-23 | End: 2022-02-25 | Stop reason: HOSPADM

## 2022-02-23 RX ORDER — ASCORBIC ACID 500 MG
1000 TABLET ORAL DAILY
COMMUNITY

## 2022-02-23 RX ORDER — MAGNESIUM SULFATE IN WATER 40 MG/ML
2000 INJECTION, SOLUTION INTRAVENOUS PRN
Status: DISCONTINUED | OUTPATIENT
Start: 2022-02-23 | End: 2022-02-25 | Stop reason: HOSPADM

## 2022-02-23 RX ORDER — METOPROLOL TARTRATE 50 MG/1
50 TABLET, FILM COATED ORAL 2 TIMES DAILY
Status: DISCONTINUED | OUTPATIENT
Start: 2022-02-23 | End: 2022-02-25 | Stop reason: HOSPADM

## 2022-02-23 RX ORDER — SODIUM CHLORIDE 0.9 % (FLUSH) 0.9 %
10 SYRINGE (ML) INJECTION EVERY 12 HOURS SCHEDULED
Status: DISCONTINUED | OUTPATIENT
Start: 2022-02-23 | End: 2022-02-25 | Stop reason: HOSPADM

## 2022-02-23 RX ORDER — POTASSIUM CHLORIDE 7.45 MG/ML
10 INJECTION INTRAVENOUS PRN
Status: DISCONTINUED | OUTPATIENT
Start: 2022-02-23 | End: 2022-02-25 | Stop reason: HOSPADM

## 2022-02-23 RX ORDER — ACETAMINOPHEN 650 MG/1
650 SUPPOSITORY RECTAL EVERY 6 HOURS PRN
Status: DISCONTINUED | OUTPATIENT
Start: 2022-02-23 | End: 2022-02-25 | Stop reason: HOSPADM

## 2022-02-23 RX ORDER — TRAZODONE HYDROCHLORIDE 50 MG/1
50 TABLET ORAL NIGHTLY PRN
Status: DISCONTINUED | OUTPATIENT
Start: 2022-02-23 | End: 2022-02-25 | Stop reason: HOSPADM

## 2022-02-23 RX ORDER — LISINOPRIL 40 MG/1
40 TABLET ORAL DAILY
Status: DISCONTINUED | OUTPATIENT
Start: 2022-02-24 | End: 2022-02-25 | Stop reason: HOSPADM

## 2022-02-23 RX ORDER — POLYETHYLENE GLYCOL 3350 17 G/17G
17 POWDER, FOR SOLUTION ORAL DAILY PRN
Status: DISCONTINUED | OUTPATIENT
Start: 2022-02-23 | End: 2022-02-25 | Stop reason: HOSPADM

## 2022-02-23 RX ORDER — ACETAMINOPHEN 325 MG/1
650 TABLET ORAL EVERY 6 HOURS PRN
Status: DISCONTINUED | OUTPATIENT
Start: 2022-02-23 | End: 2022-02-25 | Stop reason: HOSPADM

## 2022-02-23 RX ORDER — CEPHALEXIN 500 MG/1
500 CAPSULE ORAL 2 TIMES DAILY
Status: DISCONTINUED | OUTPATIENT
Start: 2022-02-23 | End: 2022-02-24

## 2022-02-23 RX ORDER — ZINC GLUCONATE 50 MG
50 TABLET ORAL DAILY
COMMUNITY

## 2022-02-23 RX ORDER — ONDANSETRON 2 MG/ML
4 INJECTION INTRAMUSCULAR; INTRAVENOUS EVERY 6 HOURS PRN
Status: DISCONTINUED | OUTPATIENT
Start: 2022-02-23 | End: 2022-02-25 | Stop reason: HOSPADM

## 2022-02-23 RX ORDER — ONDANSETRON 4 MG/1
4 TABLET, ORALLY DISINTEGRATING ORAL EVERY 8 HOURS PRN
Status: DISCONTINUED | OUTPATIENT
Start: 2022-02-23 | End: 2022-02-25 | Stop reason: HOSPADM

## 2022-02-23 RX ORDER — SODIUM CHLORIDE 9 MG/ML
25 INJECTION, SOLUTION INTRAVENOUS PRN
Status: DISCONTINUED | OUTPATIENT
Start: 2022-02-23 | End: 2022-02-24 | Stop reason: SDUPTHER

## 2022-02-23 RX ADMIN — CEPHALEXIN 500 MG: 500 CAPSULE ORAL at 20:21

## 2022-02-23 RX ADMIN — METOPROLOL TARTRATE 50 MG: 50 TABLET, FILM COATED ORAL at 20:22

## 2022-02-23 RX ADMIN — SODIUM CHLORIDE, PRESERVATIVE FREE 10 ML: 5 INJECTION INTRAVENOUS at 20:24

## 2022-02-23 RX ADMIN — TRAZODONE HYDROCHLORIDE 50 MG: 50 TABLET ORAL at 21:16

## 2022-02-23 ASSESSMENT — PAIN SCALES - GENERAL
PAINLEVEL_OUTOF10: 0

## 2022-02-23 NOTE — ED NOTES
Pt resting on cot with unlabored respirations. Denies any needs, family at bedside.       Avery Otto RN  02/23/22 2119

## 2022-02-23 NOTE — H&P
Hospitalist History & Physical    Patient:  Arturo Roberts    Unit/Bed:18/018A  YOB: 1940  MRN: [id number]   Acct: [de-identified]   PCP: Ashley Kovacs MD  Code Status: Prior    Date of Service: Pt seen/examined on 02/23/22 and admitted to Inpatient with expected LOS greater than two midnights due to medical therapy. Chief Complaint: nonhealing surgical wound    Assessment/Plan:    1. Dehiscent scalp wound: S/p evacuation of subdural hematoma on 1/21 with Dr. Jorge Laureano. Patient seen in wound care clinic today, Dr. Alin Chu recommended I&D and closure with possible removal of hardware. Consults placed to Dr. Jorge Laureano and Dr. Alin Chu. Resume PO Keflex. 2. Essential HTN: Resume home lisinopril, metoprolol, hctz. Monitor closely for uncontrolled htn. History of Present Illness:  78year old female with PMHx hypertension, subdural hematoma who presented to [facility] ED for evaluation of a nonhealing surgical scalp wound. The patient was recently admitted 1/18-1/24 for a traumatic subdural hematoma. On 1/21/22 the patient underwent a right sided craniotomy with evacuation of acute right sided subdural hemorrhage with Dr. Jorge Laureano. Patient's daughter states that she had minimal drainage from the wound a couple days ago. She states initially there was a scab over the wound but that has fallen off and the hardware is now visible. She states that at times the center of the incision pulsates. Daughter states she has taken her BP at that time and it has been notably elevated but improves with her medications and the pulsating stops. Patient went for her follow up with neurosurgery on 2/21. Wound has dehisced and there is exposed hardware so the patient was referred to the wound care clinic. Patient was seen by Dr. Alin Chu this morning who recommended I&D, closure of the wound with possible removal of hardware. Dr. Jorge Laureano was contacted in the ED and recommended admission.        Review of Systems: Pertinent positives as noted in the HPI. All other systems reviewed and negative. Past Medical History:        Diagnosis Date    Arthritis     Hypertension     Subdural hematoma ([address] 01/2022       Past Surgical History:        Procedure Laterality Date    CATHETER REMOVAL Bilateral 11/2014    CRANIOTOMY Right 01/21/2022    RIGHT SIDED CRANIOTOMY FOR SUBDURAL HEMATOMA performed by Bg Henry MD at [facility]  07/04/2018    FOOT SURGERY Left 2012    KNEE SURGERY Right 07/09/2014    total    FL BX OF BREAST, NEEDLE CORE, IMAGE GUIDE Left 2016    benign    FL OFFICE/OUTPT VISIT,PROCEDURE ONLY Left 07/05/2018    ORIF LEFT ELBOW performed by Marley Lizama MD at [address] [place] [address] Medications:   No current facility-administered medications on file prior to encounter. Current Outpatient Medications on File Prior to Encounter   Medication Sig Dispense Refill    cephALEXin (KEFLEX) 500 MG capsule Take 1 capsule by mouth 2 times daily for 7 days 14 capsule 0    hydroCHLOROthiazide (HYDRODIURIL) 12.5 MG tablet Take 1 tablet by mouth daily 90 tablet 0    metoprolol tartrate (LOPRESSOR) 50 MG tablet TAKE 1 TABLET BY MOUTH TWICE DAILY 180 tablet 1    lisinopril (PRINIVIL;ZESTRIL) 40 MG tablet Take 1 tablet by mouth daily 90 tablet 1       Allergies:    Pcn [penicillins]    Social History:    reports that she has never smoked. She has never used smokeless tobacco. She reports previous alcohol use. She reports that she does not use drugs.     Family History:       Problem Relation Age of Onset   [employer] Cancer Mother         lymphoma    Cancer Father         prostate and bone    Heart Disease Sister     Heart Disease Brother     Atrial Fibrillation Daughter     Hypertension Daughter     Elevated Lipids Daughter     Breast Cancer Sister 78       Diet:  No diet orders on file      Physical Exam:  BP 95/61   Pulse 55   Temp 97.5 °F (36.4 °C) (Oral)   Resp 19   Ht 5' 3\" (1.6 m)   Wt 173 lb (78.5 kg) LMP  (LMP Unknown)   SpO2 96%   BMI 30.65 kg/m²   General:   Pleasant, elderly female. NAD. HEENT:  normocephalic. No scleral icterus. PERR. Dehiscent wound on scalp. Visible hardware. No erythema or drainage. Neck: supple. No JVD. No thyromegaly. Lungs: clear to auscultation. No retractions  Cardiac: RRR without murmur. Abdomen: soft. Nontender. Bowel sounds positive. Extremities:  No clubbing, cyanosis, or edema x 4. Vasculature: capillary refill < 3 seconds. Palpable LE pulses bilaterally. Skin:  warm and dry. No rashes or lesions. Psych:  Alert and oriented x3. Affect appropriate  Lymph:  No supraclavicular adenopathy. Neurologic:  No focal deficit. No seizures. Data: (All radiographs, tracings, PFTs, and imaging are personally viewed and interpreted unless otherwise noted)  Labs:   Recent Labs     02/23/22  0950   WBC 6.5   HGB 12.9   HCT 38.5        Recent Labs     02/23/22  0950      K 4.0      CO2 23   BUN 27*   CREATININE 1.1   CALCIUM 9.6     No results for input(s): AST, ALT, BILIDIR, BILITOT, ALKPHOS in the last 72 hours. No results for input(s): INR in the last 72 hours. No results for input(s): Burnice Pageton in the last 72 hours. Urinalysis:   Lab Results   Component Value Date    NITRU POSITIVE 01/18/2022    WBCUA 10-15W/CLUMPS 01/18/2022    BACTERIA MANY 01/18/2022    RBCUA 0-2 01/18/2022    RBCUA 0-2 07/02/2018    BLOODU neg 02/01/2022    BLOODU TRACE 01/18/2022    SPECGRAV 1.015 02/01/2022    SPECGRAV 1.010 01/18/2022    GLUCOSEU neg 02/01/2022    GLUCOSEU NEGATIVE 07/02/2018       EKG: normal sinus rhythm, no blocks or conduction defects, no ischemic changes    Radiology:  No orders to display     No results found. Tele:   [] yes             [x] no      Thank you Stacy Barriga MD for the opportunity to be involved in this patient's care.     Electronically signed by Milagros Powell PA-C on 2/23/2022 at 3:24 PM

## 2022-02-23 NOTE — PROGRESS NOTES
Pt admitted to  5K6 in a wheelchair. Complaints: Non healing surgical head wound. .      INT into the forearm left, condition patent and no redness  with about. IV site free of s/s of infection or infiltration. Vital signs obtained. Assessment and data collection initiated. Two nurse skin assessment performed by Jasmyn Magana and [facility] RN. Oriented to room. Explained patients right to have family, representative or physician notified of their admission. Patient has Declined for physician to be notified. Patient has Declined for family/representative to be notified. The patient is interested in [facility]. [facility]s meds to beds program?:  No    Policies and procedures for 5 explained. All questions answered with no further questions at this time. Fall prevention and safety brochure discussed with patient. Bed alarm on. Call light in reach.

## 2022-02-23 NOTE — ED PROVIDER NOTES
[facility] EMERGENCY DEPT      CHIEF COMPLAINT       Chief Complaint   Patient presents with    Post-op Problem       Nurses Notes reviewed and I agree except as noted in the HPI. HISTORY OF PRESENT ILLNESS    Radha Walters is a 78 y.o. female who presents with complaints of postop complication, patient underwent right-sided craniectomy with evacuation of subdural hemorrhage after a fall approximately 4 weeks ago. States that she was evaluated by neurosurgery approximately a week ago, she had a nonhealing surgical incision to the right forehead region that she followed up with infectious disease today. The infectious disease physician noted that hardware was showing when he took out the dressing. Patient was therefore, referred to the ER for evaluation. She has no headaches, no neuro deficits, no neck pain, no fever chills. There is no drainage out of the incision site. REVIEW OF SYSTEMS      Review of Systems   Constitutional: Negative for fever, chills, diaphoresis and fatigue. HENT: Negative for congestion, drooling, facial swelling and sore throat. Exposed hardware to right forehead from a recent surgery. Eyes: Negative for photophobia, pain and discharge. Respiratory: Negative for cough, shortness of breath, wheezing and stridor. Cardiovascular: Negative for chest pain, palpitations and leg swelling. Gastrointestinal: Negative for abdominal pain, blood in stool and abdominal distention. Genitourinary: Negative for dysuria, urgency, hematuria and difficulty urinating. Musculoskeletal: Negative for gait problem, neck pain and neck stiffness. Skin; No rash, No itching. Exposed  Neurological: Negative for seizures, weakness and numbness. Hematological: Negative for adenopathy. Does not bruise/bleed easily. Psychiatric/Behavioral: Negative for hallucinations, confusion and agitation.      PAST MEDICAL HISTORY    has a past medical history of Arthritis, Hypertension, and Subdural hematoma ([facility] [address] SURGICAL HISTORY      has a past surgical history that includes Foot surgery (Left, ); knee surgery (Right, 2014); Catheter Removal (Bilateral, 2014); pr office/outpt visit,procedure only (Left, 2018); Elbow surgery (2018); pr bx of breast, needle core, image guide (Left, ); and craniotomy (Right, 2022). CURRENT MEDICATIONS       Previous Medications    CEPHALEXIN (KEFLEX) 500 MG CAPSULE    Take 1 capsule by mouth 2 times daily for 7 days    HYDROCHLOROTHIAZIDE (HYDRODIURIL) 12.5 MG TABLET    Take 1 tablet by mouth daily    LISINOPRIL (PRINIVIL;ZESTRIL) 40 MG TABLET    Take 1 tablet by mouth daily    METOPROLOL TARTRATE (LOPRESSOR) 50 MG TABLET    TAKE 1 TABLET BY MOUTH TWICE DAILY       ALLERGIES     is allergic to pcn [penicillins]. FAMILY HISTORY     She indicated that her mother is . She indicated that her father is . She indicated that only one of her two sisters is alive. She indicated that her brother is alive. She indicated that her daughter is alive. family history includes Atrial Fibrillation in her daughter; Breast Cancer (age of onset: 78) in her sister; Cancer in her father and mother; Elevated Lipids in her daughter; Heart Disease in her brother and sister; Hypertension in her daughter. SOCIAL HISTORY      reports that she has never smoked. She has never used smokeless tobacco. She reports previous alcohol use. She reports that she does not use drugs. PHYSICAL EXAM     INITIAL VITALS:  height is 5' 3\" (1.6 m) and weight is 173 lb (78.5 kg). Her oral temperature is 97.5 °F (36.4 °C). Her blood pressure is 134/46 (abnormal) and her pulse is 54. Her respiration is 20 and oxygen saturation is 99%. Physical Exam   Constitutional:  well-developed and well-nourished.    HENT: Head: Normocephalic, atraumatic, Bilateral external ears normal, Oropharynx mosit, No oral exudates, Nose normal.   Patient has exposed hardware, recent craniotomy. There is no cellulitis, no purulent drainage. Eyes: PERRL, EOMI, Conjunctiva normal, No discharge. No scleral icterus  Neck: Normal range of motion, No tenderness, Supple  Lympatics: No lymphadenopathy. Cardiovascular: Normal rate, regular rhythm, S1 normal and S2 normal.  Exam reveals no gallop. Pulmonary/Chest: Effort normal and breath sounds normal. No accessory muscle usage or stridor. No respiratory distress. no wheezes. has no rales. exhibits no tenderness. Abdominal: Soft. Bowel sounds are normal.  exhibits no distension. There is no tenderness. There is no rebound and no guarding. Genitourinary:   Extremities: No edema, no tenderness, no cyanosis, no clubbing. Musculoskeletal: Good range of motion in major joints is observed. No major deformities noted. Neurological: Alert and oriented ×3, normal motor function, normal sensory function, no focal deficits. GCS   Skin: Skin is warm, dry and intact. No rash noted. No erythema. Psychiatric: Affect normal, judgment normal, mood normal.  DIFFERENTIAL DIAGNOSIS:       DIAGNOSTIC RESULTS     EKG: All EKG's are interpreted by the Emergency Department Physician who either signs or Co-signs this chart in the absence of a cardiologist.      RADIOLOGY: non-plain film images(s) such as CT, Ultrasound and MRI are read by the radiologist.  Plain radiographic images are visualized and preliminarily interpreted by the emergency physician unless otherwise stated below.     LABS:   Labs Reviewed   BASIC METABOLIC PANEL W/ REFLEX TO MG FOR LOW K - Abnormal; Notable for the following components:       Result Value    BUN 27 (*)     All other components within normal limits   GLOMERULAR FILTRATION RATE, ESTIMATED - Abnormal; Notable for the following components:    Est, Glom Filt Rate 47 (*)     All other components within normal limits   CBC WITH AUTO DIFFERENTIAL   ANION GAP   OSMOLALITY       EMERGENCY DEPARTMENT COURSE:   Vitals: Vitals:    02/23/22 1037 02/23/22 1157 02/23/22 1308 02/23/22 1408   BP: (!) 115/54 (!) 128/59 (!) 104/56 (!) 134/46   Pulse: 57 56 62 54   Resp: 16 17 24 20   Temp:       TempSrc:       SpO2: 99% 99% 96% 99%   Weight:       Height:         Patient is well-appearing, nontoxic, no fever. The wound is not draining purulent material, there is no cellulitis. She has no headache, no neck pain. CRITICAL CARE:       CONSULTS:  None    PROCEDURES:  None    FINAL IMPRESSION      1. Disruption of external surgical wound, initial encounter          DISPOSITION/PLAN   Admitted    PATIENT REFERRED TO:  No follow-up provider specified.     DISCHARGE MEDICATIONS:  New Prescriptions    No medications on file       (Please note that portions of this note were completed with a voice recognition program.  Efforts were made to edit the dictations but occasionally words are mis-transcribed.)    DO Lex Hinson DO  02/23/22 1427

## 2022-02-23 NOTE — ED NOTES
Pt resting in bed, respirations easy and unlabored. Family at bedside. Call light in reach.      Bella Og RN  02/23/22 1038

## 2022-02-23 NOTE — ED NOTES
5K notified of patient transport to 5K-06. Pt in stable condition upon transport.      Shad Crosser  02/23/22 3597

## 2022-02-23 NOTE — ED NOTES
Presents to ER from Dr. Mckay Hubbard office with complaints of exposed hardware at incision site. t states she had a head bleed 4 weeks ago and had surgery by Dr. Jorge Laureano. Pt states she was instructed by Dr. Jorge Laureano to come to ER and be admitted to have surgery again due to incision not healing correctly. Pt denies any pain or concerns. Will continue to monitor.      Luke Magdaleno [facility] ClicktivatedShanon Og RN  02/23/22 0544

## 2022-02-23 NOTE — CONSULTS
[facility]         Consult and Procedure Note      [address] [place] [address], [facility]  RECORD NUMBER:  [id number]  AGE: 78 y.o. GENDER: female  : 1940  EPISODE DATE:  2022    Subjective:     Chief Complaint   Patient presents with    Wound Check     scalp wound         HISTORY OF PRESENT ILLNESS     She is an 49-year-old female patient who was referred to the wound clinic for evaluation of the scalp wound. She had a fall and had subdural hematoma requiring surgery. She was sent to the clinic because the wound has dehisced and there is exposed hardware. She also had skin lesion removed and had an eschar on her back. She has no fever. She has history of hypertension and osteoarthritis. Her daughter is a retired nurse who was accompanying her.     PAST MEDICAL HISTORY                 Diagnosis Date    Arthritis     Hypertension     Subdural hematoma ([address] 2022     PAST SURGICAL HISTORY     PAST SURGICAL HISTORY    Past Surgical History:   Procedure Laterality Date    CATHETER REMOVAL Bilateral 2014    CRANIOTOMY Right 2022    RIGHT SIDED CRANIOTOMY FOR SUBDURAL HEMATOMA performed by Klarissa Kilgore MD at [address] [place] [address]   2018    FOOT SURGERY Left     KNEE SURGERY Right 2014    total    IN BX OF BREAST, NEEDLE CORE, IMAGE GUIDE Left     benign    IN OFFICE/OUTPT VISIT,PROCEDURE ONLY Left 2018    ORIF LEFT ELBOW performed by Yasmin London MD at [address] [place] [address] HISTORY         Family History   Problem Relation Age of Onset    Cancer Mother         lymphoma    Cancer Father         prostate and bone    Heart Disease Sister     Heart Disease Brother     Atrial Fibrillation Daughter     Hypertension Daughter     Elevated Lipids Daughter     Breast Cancer Sister 78       SOCIAL HISTORY       Social History     Tobacco Use    Smoking status: Never Smoker    Smokeless tobacco: Never Used   Vaping Use    Vaping Use: Never used distress. HEENT: pink conjunctiva, unicteric sclera, she has surgical wound on her right side of her scalp. There is exposed metallic hardware. The wound has dehisced. Chest: She has 2 scabbed wound on her back with an eschar  CNS: Awake and oriented. Wound 02/23/22 Back Right;Upper (Active)   Wound Image   02/23/22 0826   Wound Length (cm) 1.5 cm 02/23/22 0826   Wound Width (cm) 2.2 cm 02/23/22 0826   Wound Depth (cm) 0.1 cm 02/23/22 0826   Wound Surface Area (cm^2) 3.3 cm^2 02/23/22 0826   Wound Volume (cm^3) 0.33 cm^3 02/23/22 0826   Wound Assessment Epithelialization;Slough; Devitalized tissue 02/23/22 0826   Drainage Amount Small 02/23/22 0826   Drainage Description Serosanguinous 02/23/22 0826   Odor None 02/23/22 0826   Kia-wound Assessment Dry/flaky; Blanchable erythema 02/23/22 0826   Margins Attached edges 02/23/22 0826   Number of days: 0       Wound 02/23/22 Face Upper;Right (Active)   Wound Image   02/23/22 0826   Wound Length (cm) 1.5 cm 02/23/22 0826   Wound Width (cm) 0.7 cm 02/23/22 0826   Wound Depth (cm) 0.3 cm 02/23/22 0826   Wound Surface Area (cm^2) 1.05 cm^2 02/23/22 0826   Wound Volume (cm^3) 0.315 cm^3 02/23/22 0826   Wound Assessment Epithelialization;Exposed hardware;Slough 02/23/22 0826   Drainage Amount Small 02/23/22 0826   Drainage Description Serosanguinous; Yellow 02/23/22 0826   Odor None 02/23/22 0826   Margins Attached edges 02/23/22 0826   Number of days: 0                 Assessment:   Dehiscent scalp wound after she had evacuation of subdural hematoma. There is exposed hardware. My recommendation is to do incision debridement and closure of the wound with possible removal of the hardware as soon as possible  We will apply Betadine to her back wound for the eschar to mature. We will contact Dr. Félix Carnes. for debridement and closure of the wound  I contacted Dr Félix Carnes recommend to be admitted under hospitalist  for surgery.   Will contact patient as she had already left the wound care.  Patient Active Problem List   Diagnosis Code    Hypertension I10    Syncopal episodes R55    Primary osteoarthritis of right knee M17.11    Subdural hematoma ([facility] [address] Y30.8E5O          PLAN     Patient examined and evaluated   Antibiotics: keflex for 7 days (previously ordered by her family physician   patient will be called for admission.       Electronically signed by Celina Carter MD on 2/23/2022 at 8:32 AM

## 2022-02-23 NOTE — ED NOTES
ED nurse-to-nurse bedside report    Chief Complaint   Patient presents with    Post-op Problem      LOC: alert and orientated to name, place, date  Vital signs   Vitals:    02/23/22 0907 02/23/22 1037 02/23/22 1157   BP: (!) 167/64 (!) 115/54 (!) 128/59   Pulse: 56 57 56   Resp: 17 16 17   Temp: 97.5 °F (36.4 °C)     TempSrc: Oral     SpO2: 99% 99% 99%   Weight: 173 lb (78.5 kg)     Height: 5' 3\" (1.6 m)        Pain:  0  Pain Interventions: comfort  Pain Goal: 0  Oxygen: NA    Current needs required none   Telemetry: Yes  LDAs:   Peripheral IV 02/23/22 Left Forearm (Active)   Site Assessment Clean;Dry; Intact 02/23/22 0916   Line Status Normal saline locked 02/23/22 0916   Dressing Status Clean;Dry; Intact 02/23/22 0916     Continuous Infusions:   Mobility: Independent  [place] Fall Risk Score:    Fall Risk 4/27/2021 4/9/2020 3/13/2020 2/27/2019 2/13/2018 8/15/2016   2 or more falls in past year? no no no no no no   Fall with injury in past year? no no no no no no     Fall Interventions: call light  Report given to: Grabiel [facility] Og RN  02/23/22 1257

## 2022-02-23 NOTE — TELEPHONE ENCOUNTER
Dr Mickie Aparicio spoke to Dr Darren Amador regarding patient and possible need for surgical intervention. Dr Darren Amador advised that patient be admitted into the hospital. Patient had already left wound clinic. Called and spoke to patient. She will discuss with daughter and then report to the hospital as directed. She states she will go later today. No other concerns at this time.

## 2022-02-23 NOTE — PLAN OF CARE
Problem: Wound:  Goal: Will show signs of wound healing; wound closure and no evidence of infection  Description: Will show signs of wound healing; wound closure and no evidence of infection  Outcome: Ongoing   Patient seen in clinic. No s/s of infection. See AVS for discharge instructions. Follow up after procedure with Dr Amado El reviewed with patient and daughter. Patient and daughter verbalize understanding of the plan of care and contribute to goal setting.

## 2022-02-23 NOTE — PROGRESS NOTES
Attending Note:     Patient seen and examined in the floor in conjunction with neurosurgery NP. Discussed with patient, his family and his nurse as well. All data and imaging reviewed by myself. I agree with examination assessment and plan as documented below.    -Patient developed wound dehiscence in her right frontal wound.  -I discussed the case with Dr. Grayland Runner who recommended wound debridement and  Revision.  -This recommended treatment option was discussed in detail with patient and her family. The associated risk and benefits were discussed also in detail. All questions and concerns addressed and answered.  -Patient was in agreement with the recommended surgical intervention and she signed the surgical consent.  -Plan for surgery tomorrow.  -Please keep the patient n.p.o. after midnight. Coco Devine MD        Neurosurgery Interim Progress Note    Patient:  Chevy Cuenca      Unit/Bed:5K-06/006-A    YOB: 1940    MRN: [id number]     Acct: [de-identified]     Admit date: 2/23/2022    Chief Complaint   Patient presents with    Post-op Problem       Patient Seen, Chart, Physician notes, Labs, Radiology studies reviewed. Patient recommendation and treatment plan from a neurosurgical perspective at this time:  Patient was seen in the ER. Patient sitting up in stretcher  And patients daughter at bedside. Patient seen Dr. Grayland Runner today for wound. Dr. Grayland Runner noted that patient wound dehisced and this is exposed hardware. Patient denies headache, blurred vision or double vision. Patient denies pain. Patients daughter [facility] stated that patient has not had any more drainage from her wound since she had drainage Monday morning. She has no fever. Plan to consult Dr. Grayland Runner. NPO at midnight for incisional debridement and closure of the wound Thursday or Friday depending on OR time. Case was discussed with Dr. Maranda Briones and detailed neurosurgery note to follow.     Electronically signed by Byron Santos APRN - CNP on 2/23/2022 at 4:36 PM

## 2022-02-23 NOTE — ED NOTES
Upon first contact with patient this RN receives bedside shift report from [facility]. Pt resting on cot with unlabored respirations. Pt denies any needs at this time, family at bedside.         Benito CARLSONQKENNETH LINARES  02/23/22 3301

## 2022-02-24 ENCOUNTER — ANESTHESIA EVENT (OUTPATIENT)
Dept: OPERATING ROOM | Age: 82
DRG: 908 | End: 2022-02-24
Payer: MEDICARE

## 2022-02-24 ENCOUNTER — ANESTHESIA (OUTPATIENT)
Dept: OPERATING ROOM | Age: 82
DRG: 908 | End: 2022-02-24
Payer: MEDICARE

## 2022-02-24 VITALS — TEMPERATURE: 97.7 F | SYSTOLIC BLOOD PRESSURE: 159 MMHG | DIASTOLIC BLOOD PRESSURE: 114 MMHG | OXYGEN SATURATION: 100 %

## 2022-02-24 LAB
ANION GAP SERPL CALCULATED.3IONS-SCNC: 11 MEQ/L (ref 8–16)
BASOPHILS # BLD: 0.9 %
BASOPHILS ABSOLUTE: 0 THOU/MM3 (ref 0–0.1)
BUN BLDV-MCNC: 24 MG/DL (ref 7–22)
CALCIUM SERPL-MCNC: 9 MG/DL (ref 8.5–10.5)
CHLORIDE BLD-SCNC: 107 MEQ/L (ref 98–111)
CO2: 22 MEQ/L (ref 23–33)
CREAT SERPL-MCNC: 0.9 MG/DL (ref 0.4–1.2)
EOSINOPHIL # BLD: 5.4 %
EOSINOPHILS ABSOLUTE: 0.3 THOU/MM3 (ref 0–0.4)
ERYTHROCYTE [DISTWIDTH] IN BLOOD BY AUTOMATED COUNT: 12.8 % (ref 11.5–14.5)
ERYTHROCYTE [DISTWIDTH] IN BLOOD BY AUTOMATED COUNT: 42.5 FL (ref 35–45)
GFR SERPL CREATININE-BSD FRML MDRD: 60 ML/MIN/1.73M2
GLUCOSE BLD-MCNC: 98 MG/DL (ref 70–108)
HCT VFR BLD CALC: 36 % (ref 37–47)
HEMOGLOBIN: 11.9 GM/DL (ref 12–16)
IMMATURE GRANS (ABS): 0.02 THOU/MM3 (ref 0–0.07)
IMMATURE GRANULOCYTES: 0.4 %
LYMPHOCYTES # BLD: 28.5 %
LYMPHOCYTES ABSOLUTE: 1.6 THOU/MM3 (ref 1–4.8)
MCH RBC QN AUTO: 30.2 PG (ref 26–33)
MCHC RBC AUTO-ENTMCNC: 33.1 GM/DL (ref 32.2–35.5)
MCV RBC AUTO: 91.4 FL (ref 81–99)
MONOCYTES # BLD: 12.5 %
MONOCYTES ABSOLUTE: 0.7 THOU/MM3 (ref 0.4–1.3)
NUCLEATED RED BLOOD CELLS: 0 /100 WBC
PLATELET # BLD: 185 THOU/MM3 (ref 130–400)
PMV BLD AUTO: 10.7 FL (ref 9.4–12.4)
POTASSIUM REFLEX MAGNESIUM: 4.3 MEQ/L (ref 3.5–5.2)
RBC # BLD: 3.94 MILL/MM3 (ref 4.2–5.4)
SEG NEUTROPHILS: 52.3 %
SEGMENTED NEUTROPHILS ABSOLUTE COUNT: 2.9 THOU/MM3 (ref 1.8–7.7)
SODIUM BLD-SCNC: 140 MEQ/L (ref 135–145)
WBC # BLD: 5.5 THOU/MM3 (ref 4.8–10.8)

## 2022-02-24 PROCEDURE — G0378 HOSPITAL OBSERVATION PER HR: HCPCS

## 2022-02-24 PROCEDURE — 3600000004 HC SURGERY LEVEL 4 BASE: Performed by: NEUROLOGICAL SURGERY

## 2022-02-24 PROCEDURE — 2709999900 HC NON-CHARGEABLE SUPPLY: Performed by: NEUROLOGICAL SURGERY

## 2022-02-24 PROCEDURE — 2500000003 HC RX 250 WO HCPCS: Performed by: NURSE ANESTHETIST, CERTIFIED REGISTERED

## 2022-02-24 PROCEDURE — 10180 I&D COMPLEX PO WOUND INFCTJ: CPT | Performed by: NEUROLOGICAL SURGERY

## 2022-02-24 PROCEDURE — 2720000010 HC SURG SUPPLY STERILE: Performed by: NEUROLOGICAL SURGERY

## 2022-02-24 PROCEDURE — 3600000014 HC SURGERY LEVEL 4 ADDTL 15MIN: Performed by: NEUROLOGICAL SURGERY

## 2022-02-24 PROCEDURE — 3700000001 HC ADD 15 MINUTES (ANESTHESIA): Performed by: NEUROLOGICAL SURGERY

## 2022-02-24 PROCEDURE — 99232 SBSQ HOSP IP/OBS MODERATE 35: CPT | Performed by: INTERNAL MEDICINE

## 2022-02-24 PROCEDURE — 85025 COMPLETE CBC W/AUTO DIFF WBC: CPT

## 2022-02-24 PROCEDURE — 87075 CULTR BACTERIA EXCEPT BLOOD: CPT

## 2022-02-24 PROCEDURE — 6370000000 HC RX 637 (ALT 250 FOR IP): Performed by: PHYSICIAN ASSISTANT

## 2022-02-24 PROCEDURE — APPSS30 APP SPLIT SHARED TIME 16-30 MINUTES

## 2022-02-24 PROCEDURE — 87205 SMEAR GRAM STAIN: CPT

## 2022-02-24 PROCEDURE — 0NH004Z INSERTION OF INTERNAL FIXATION DEVICE INTO SKULL, OPEN APPROACH: ICD-10-PCS | Performed by: NEUROLOGICAL SURGERY

## 2022-02-24 PROCEDURE — 0NP004Z REMOVAL OF INTERNAL FIXATION DEVICE FROM SKULL, OPEN APPROACH: ICD-10-PCS | Performed by: NEUROLOGICAL SURGERY

## 2022-02-24 PROCEDURE — 7100000001 HC PACU RECOVERY - ADDTL 15 MIN: Performed by: NEUROLOGICAL SURGERY

## 2022-02-24 PROCEDURE — 88300 SURGICAL PATH GROSS: CPT

## 2022-02-24 PROCEDURE — 80048 BASIC METABOLIC PNL TOTAL CA: CPT

## 2022-02-24 PROCEDURE — C1713 ANCHOR/SCREW BN/BN,TIS/BN: HCPCS | Performed by: NEUROLOGICAL SURGERY

## 2022-02-24 PROCEDURE — 6360000002 HC RX W HCPCS

## 2022-02-24 PROCEDURE — 36415 COLL VENOUS BLD VENIPUNCTURE: CPT

## 2022-02-24 PROCEDURE — 2580000003 HC RX 258: Performed by: PHYSICIAN ASSISTANT

## 2022-02-24 PROCEDURE — 87077 CULTURE AEROBIC IDENTIFY: CPT

## 2022-02-24 PROCEDURE — 3700000000 HC ANESTHESIA ATTENDED CARE: Performed by: NEUROLOGICAL SURGERY

## 2022-02-24 PROCEDURE — 6360000002 HC RX W HCPCS: Performed by: PHYSICIAN ASSISTANT

## 2022-02-24 PROCEDURE — 2580000003 HC RX 258: Performed by: NURSE ANESTHETIST, CERTIFIED REGISTERED

## 2022-02-24 PROCEDURE — 7100000000 HC PACU RECOVERY - FIRST 15 MIN: Performed by: NEUROLOGICAL SURGERY

## 2022-02-24 PROCEDURE — 87147 CULTURE TYPE IMMUNOLOGIC: CPT

## 2022-02-24 PROCEDURE — 6360000002 HC RX W HCPCS: Performed by: INTERNAL MEDICINE

## 2022-02-24 PROCEDURE — 6360000002 HC RX W HCPCS: Performed by: NURSE ANESTHETIST, CERTIFIED REGISTERED

## 2022-02-24 PROCEDURE — 87070 CULTURE OTHR SPECIMN AEROBIC: CPT

## 2022-02-24 PROCEDURE — 6360000002 HC RX W HCPCS: Performed by: ANESTHESIOLOGY

## 2022-02-24 DEVICE — IMPLANTABLE DEVICE: Type: IMPLANTABLE DEVICE | Status: FUNCTIONAL

## 2022-02-24 DEVICE — SCREW BNE L4MM DIA1.5MM CRAN SELF DRL CRSS DRV THINFLAP: Type: IMPLANTABLE DEVICE | Status: FUNCTIONAL

## 2022-02-24 DEVICE — DURAGEN® SUTURABLE DURAL REGENERATION MATRIX, 2 IN X 2 IN (5 CM X 5 CM)
Type: IMPLANTABLE DEVICE | Status: FUNCTIONAL
Brand: DURAGEN® SUTURABLE

## 2022-02-24 DEVICE — SCREW BONE L5MM DIA1.5MM CORT MAXILLOMANDIBULAR GRN TI SELF: Type: IMPLANTABLE DEVICE | Status: FUNCTIONAL

## 2022-02-24 RX ORDER — SODIUM CHLORIDE 0.9 % (FLUSH) 0.9 %
5-40 SYRINGE (ML) INJECTION EVERY 12 HOURS SCHEDULED
Status: DISCONTINUED | OUTPATIENT
Start: 2022-02-24 | End: 2022-02-25 | Stop reason: HOSPADM

## 2022-02-24 RX ORDER — LIDOCAINE HCL/PF 100 MG/5ML
SYRINGE (ML) INJECTION PRN
Status: DISCONTINUED | OUTPATIENT
Start: 2022-02-24 | End: 2022-02-24 | Stop reason: SDUPTHER

## 2022-02-24 RX ORDER — FENTANYL CITRATE 50 UG/ML
INJECTION, SOLUTION INTRAMUSCULAR; INTRAVENOUS PRN
Status: DISCONTINUED | OUTPATIENT
Start: 2022-02-24 | End: 2022-02-24 | Stop reason: SDUPTHER

## 2022-02-24 RX ORDER — FENTANYL CITRATE 50 UG/ML
50 INJECTION, SOLUTION INTRAMUSCULAR; INTRAVENOUS EVERY 5 MIN PRN
Status: DISCONTINUED | OUTPATIENT
Start: 2022-02-24 | End: 2022-02-24 | Stop reason: HOSPADM

## 2022-02-24 RX ORDER — SODIUM CHLORIDE 9 MG/ML
25 INJECTION, SOLUTION INTRAVENOUS PRN
Status: DISCONTINUED | OUTPATIENT
Start: 2022-02-24 | End: 2022-02-24 | Stop reason: HOSPADM

## 2022-02-24 RX ORDER — CEFAZOLIN SODIUM 1 G/50ML
1000 INJECTION, SOLUTION INTRAVENOUS EVERY 8 HOURS
Status: DISCONTINUED | OUTPATIENT
Start: 2022-02-24 | End: 2022-02-25 | Stop reason: HOSPADM

## 2022-02-24 RX ORDER — TRAMADOL HYDROCHLORIDE 50 MG/1
50 TABLET ORAL EVERY 4 HOURS PRN
Status: DISCONTINUED | OUTPATIENT
Start: 2022-02-24 | End: 2022-02-25 | Stop reason: HOSPADM

## 2022-02-24 RX ORDER — MORPHINE SULFATE 2 MG/ML
4 INJECTION, SOLUTION INTRAMUSCULAR; INTRAVENOUS
Status: DISCONTINUED | OUTPATIENT
Start: 2022-02-24 | End: 2022-02-25 | Stop reason: HOSPADM

## 2022-02-24 RX ORDER — DEXAMETHASONE SODIUM PHOSPHATE 10 MG/ML
INJECTION, EMULSION INTRAMUSCULAR; INTRAVENOUS PRN
Status: DISCONTINUED | OUTPATIENT
Start: 2022-02-24 | End: 2022-02-24 | Stop reason: SDUPTHER

## 2022-02-24 RX ORDER — FENTANYL CITRATE 50 UG/ML
50 INJECTION, SOLUTION INTRAMUSCULAR; INTRAVENOUS EVERY 5 MIN PRN
Status: COMPLETED | OUTPATIENT
Start: 2022-02-24 | End: 2022-02-24

## 2022-02-24 RX ORDER — MORPHINE SULFATE 2 MG/ML
2 INJECTION, SOLUTION INTRAMUSCULAR; INTRAVENOUS
Status: DISCONTINUED | OUTPATIENT
Start: 2022-02-24 | End: 2022-02-25 | Stop reason: HOSPADM

## 2022-02-24 RX ORDER — SODIUM CHLORIDE 0.9 % (FLUSH) 0.9 %
5-40 SYRINGE (ML) INJECTION EVERY 12 HOURS SCHEDULED
Status: DISCONTINUED | OUTPATIENT
Start: 2022-02-24 | End: 2022-02-24 | Stop reason: HOSPADM

## 2022-02-24 RX ORDER — SODIUM CHLORIDE 0.9 % (FLUSH) 0.9 %
5-40 SYRINGE (ML) INJECTION PRN
Status: DISCONTINUED | OUTPATIENT
Start: 2022-02-24 | End: 2022-02-25 | Stop reason: HOSPADM

## 2022-02-24 RX ORDER — DEXAMETHASONE SODIUM PHOSPHATE 4 MG/ML
4 INJECTION, SOLUTION INTRA-ARTICULAR; INTRALESIONAL; INTRAMUSCULAR; INTRAVENOUS; SOFT TISSUE EVERY 6 HOURS
Status: DISCONTINUED | OUTPATIENT
Start: 2022-02-24 | End: 2022-02-25 | Stop reason: HOSPADM

## 2022-02-24 RX ORDER — EPHEDRINE SULFATE/0.9% NACL/PF 50 MG/5 ML
SYRINGE (ML) INTRAVENOUS PRN
Status: DISCONTINUED | OUTPATIENT
Start: 2022-02-24 | End: 2022-02-24 | Stop reason: SDUPTHER

## 2022-02-24 RX ORDER — SODIUM CHLORIDE 9 MG/ML
25 INJECTION, SOLUTION INTRAVENOUS PRN
Status: DISCONTINUED | OUTPATIENT
Start: 2022-02-24 | End: 2022-02-25 | Stop reason: HOSPADM

## 2022-02-24 RX ORDER — PHENYLEPHRINE HYDROCHLORIDE 10 MG/ML
INJECTION INTRAVENOUS PRN
Status: DISCONTINUED | OUTPATIENT
Start: 2022-02-24 | End: 2022-02-24 | Stop reason: SDUPTHER

## 2022-02-24 RX ORDER — CEFAZOLIN SODIUM 1 G/3ML
INJECTION, POWDER, FOR SOLUTION INTRAMUSCULAR; INTRAVENOUS PRN
Status: DISCONTINUED | OUTPATIENT
Start: 2022-02-24 | End: 2022-02-24 | Stop reason: SDUPTHER

## 2022-02-24 RX ORDER — SODIUM CHLORIDE 9 MG/ML
INJECTION, SOLUTION INTRAVENOUS CONTINUOUS
Status: DISCONTINUED | OUTPATIENT
Start: 2022-02-24 | End: 2022-02-25 | Stop reason: HOSPADM

## 2022-02-24 RX ORDER — ONDANSETRON 2 MG/ML
INJECTION INTRAMUSCULAR; INTRAVENOUS PRN
Status: DISCONTINUED | OUTPATIENT
Start: 2022-02-24 | End: 2022-02-24 | Stop reason: SDUPTHER

## 2022-02-24 RX ORDER — SODIUM CHLORIDE 0.9 % (FLUSH) 0.9 %
5-40 SYRINGE (ML) INJECTION PRN
Status: DISCONTINUED | OUTPATIENT
Start: 2022-02-24 | End: 2022-02-24 | Stop reason: HOSPADM

## 2022-02-24 RX ORDER — PROPOFOL 10 MG/ML
INJECTION, EMULSION INTRAVENOUS PRN
Status: DISCONTINUED | OUTPATIENT
Start: 2022-02-24 | End: 2022-02-24 | Stop reason: SDUPTHER

## 2022-02-24 RX ORDER — SODIUM CHLORIDE 9 MG/ML
INJECTION, SOLUTION INTRAVENOUS CONTINUOUS PRN
Status: DISCONTINUED | OUTPATIENT
Start: 2022-02-24 | End: 2022-02-24 | Stop reason: SDUPTHER

## 2022-02-24 RX ORDER — FENTANYL CITRATE 50 UG/ML
INJECTION, SOLUTION INTRAMUSCULAR; INTRAVENOUS
Status: COMPLETED
Start: 2022-02-24 | End: 2022-02-24

## 2022-02-24 RX ORDER — FENTANYL CITRATE 50 UG/ML
25 INJECTION, SOLUTION INTRAMUSCULAR; INTRAVENOUS EVERY 5 MIN PRN
Status: DISCONTINUED | OUTPATIENT
Start: 2022-02-24 | End: 2022-02-24 | Stop reason: HOSPADM

## 2022-02-24 RX ORDER — GLYCOPYRROLATE 1 MG/5 ML
SYRINGE (ML) INTRAVENOUS PRN
Status: DISCONTINUED | OUTPATIENT
Start: 2022-02-24 | End: 2022-02-24 | Stop reason: SDUPTHER

## 2022-02-24 RX ORDER — ROCURONIUM BROMIDE 10 MG/ML
INJECTION, SOLUTION INTRAVENOUS PRN
Status: DISCONTINUED | OUTPATIENT
Start: 2022-02-24 | End: 2022-02-24 | Stop reason: SDUPTHER

## 2022-02-24 RX ADMIN — FENTANYL CITRATE 25 MCG: 50 INJECTION, SOLUTION INTRAMUSCULAR; INTRAVENOUS at 11:26

## 2022-02-24 RX ADMIN — CEFAZOLIN SODIUM 1000 MG: 1 INJECTION, SOLUTION INTRAVENOUS at 18:52

## 2022-02-24 RX ADMIN — PHENYLEPHRINE HYDROCHLORIDE 100 MCG: 10 INJECTION INTRAVENOUS at 11:45

## 2022-02-24 RX ADMIN — PHENYLEPHRINE HYDROCHLORIDE 100 MCG: 10 INJECTION INTRAVENOUS at 10:57

## 2022-02-24 RX ADMIN — SODIUM CHLORIDE: 9 INJECTION, SOLUTION INTRAVENOUS at 12:08

## 2022-02-24 RX ADMIN — SODIUM CHLORIDE: 9 INJECTION, SOLUTION INTRAVENOUS at 10:38

## 2022-02-24 RX ADMIN — FENTANYL CITRATE 25 MCG: 50 INJECTION, SOLUTION INTRAMUSCULAR; INTRAVENOUS at 11:24

## 2022-02-24 RX ADMIN — MORPHINE SULFATE 4 MG: 2 INJECTION, SOLUTION INTRAMUSCULAR; INTRAVENOUS at 16:41

## 2022-02-24 RX ADMIN — Medication 0.2 MG: at 10:58

## 2022-02-24 RX ADMIN — DEXAMETHASONE SODIUM PHOSPHATE 4 MG: 4 INJECTION, SOLUTION INTRA-ARTICULAR; INTRALESIONAL; INTRAMUSCULAR; INTRAVENOUS; SOFT TISSUE at 15:40

## 2022-02-24 RX ADMIN — METOPROLOL TARTRATE 50 MG: 50 TABLET, FILM COATED ORAL at 20:24

## 2022-02-24 RX ADMIN — ROCURONIUM BROMIDE 50 MG: 50 INJECTION, SOLUTION INTRAVENOUS at 10:41

## 2022-02-24 RX ADMIN — FENTANYL CITRATE 25 MCG: 50 INJECTION, SOLUTION INTRAMUSCULAR; INTRAVENOUS at 11:36

## 2022-02-24 RX ADMIN — MORPHINE SULFATE 4 MG: 2 INJECTION, SOLUTION INTRAMUSCULAR; INTRAVENOUS at 13:57

## 2022-02-24 RX ADMIN — FENTANYL CITRATE 25 MCG: 50 INJECTION, SOLUTION INTRAMUSCULAR; INTRAVENOUS at 10:41

## 2022-02-24 RX ADMIN — ONDANSETRON 4 MG: 2 INJECTION INTRAMUSCULAR; INTRAVENOUS at 12:13

## 2022-02-24 RX ADMIN — PHENYLEPHRINE HYDROCHLORIDE 100 MCG: 10 INJECTION INTRAVENOUS at 10:55

## 2022-02-24 RX ADMIN — PROPOFOL 100 MG: 10 INJECTION, EMULSION INTRAVENOUS at 10:41

## 2022-02-24 RX ADMIN — DEXAMETHASONE SODIUM PHOSPHATE 4 MG: 4 INJECTION, SOLUTION INTRA-ARTICULAR; INTRALESIONAL; INTRAMUSCULAR; INTRAVENOUS; SOFT TISSUE at 20:23

## 2022-02-24 RX ADMIN — FENTANYL CITRATE 50 MCG: 50 INJECTION, SOLUTION INTRAMUSCULAR; INTRAVENOUS at 12:27

## 2022-02-24 RX ADMIN — FENTANYL CITRATE 50 MCG: 50 INJECTION INTRAMUSCULAR; INTRAVENOUS at 12:50

## 2022-02-24 RX ADMIN — Medication 25 MG: at 11:10

## 2022-02-24 RX ADMIN — TRAMADOL HYDROCHLORIDE 50 MG: 50 TABLET, COATED ORAL at 20:23

## 2022-02-24 RX ADMIN — FENTANYL CITRATE 50 MCG: 50 INJECTION INTRAMUSCULAR; INTRAVENOUS at 12:45

## 2022-02-24 RX ADMIN — Medication 25 MG: at 11:06

## 2022-02-24 RX ADMIN — DEXAMETHASONE SODIUM PHOSPHATE 5 MG: 10 INJECTION, EMULSION INTRAMUSCULAR; INTRAVENOUS at 10:47

## 2022-02-24 RX ADMIN — SUGAMMADEX 200 MG: 100 INJECTION, SOLUTION INTRAVENOUS at 12:15

## 2022-02-24 RX ADMIN — FENTANYL CITRATE 50 MCG: 50 INJECTION INTRAMUSCULAR; INTRAVENOUS at 13:15

## 2022-02-24 RX ADMIN — CEFAZOLIN 2000 MG: 1 INJECTION, POWDER, FOR SOLUTION INTRAMUSCULAR; INTRAVENOUS at 11:20

## 2022-02-24 RX ADMIN — FENTANYL CITRATE 50 MCG: 50 INJECTION, SOLUTION INTRAMUSCULAR; INTRAVENOUS at 12:07

## 2022-02-24 RX ADMIN — SODIUM CHLORIDE, PRESERVATIVE FREE 10 ML: 5 INJECTION INTRAVENOUS at 10:02

## 2022-02-24 RX ADMIN — Medication 50 MG: at 10:41

## 2022-02-24 RX ADMIN — FENTANYL CITRATE 50 MCG: 50 INJECTION INTRAMUSCULAR; INTRAVENOUS at 13:10

## 2022-02-24 ASSESSMENT — PAIN SCALES - GENERAL
PAINLEVEL_OUTOF10: 0
PAINLEVEL_OUTOF10: 3
PAINLEVEL_OUTOF10: 7
PAINLEVEL_OUTOF10: 0
PAINLEVEL_OUTOF10: 6
PAINLEVEL_OUTOF10: 7
PAINLEVEL_OUTOF10: 8
PAINLEVEL_OUTOF10: 10
PAINLEVEL_OUTOF10: 3
PAINLEVEL_OUTOF10: 8
PAINLEVEL_OUTOF10: 8
PAINLEVEL_OUTOF10: 7
PAINLEVEL_OUTOF10: 3

## 2022-02-24 ASSESSMENT — PULMONARY FUNCTION TESTS
PIF_VALUE: 1
PIF_VALUE: 15
PIF_VALUE: 16
PIF_VALUE: 17
PIF_VALUE: 16
PIF_VALUE: 17
PIF_VALUE: 16
PIF_VALUE: 17
PIF_VALUE: 19
PIF_VALUE: 20
PIF_VALUE: 17
PIF_VALUE: 16
PIF_VALUE: 1
PIF_VALUE: 23
PIF_VALUE: 16
PIF_VALUE: 2
PIF_VALUE: 17
PIF_VALUE: 16
PIF_VALUE: 18
PIF_VALUE: 16
PIF_VALUE: 16
PIF_VALUE: 15
PIF_VALUE: 17
PIF_VALUE: 5
PIF_VALUE: 16
PIF_VALUE: 16
PIF_VALUE: 17
PIF_VALUE: 16
PIF_VALUE: 18
PIF_VALUE: 18
PIF_VALUE: 16
PIF_VALUE: 17
PIF_VALUE: 18
PIF_VALUE: 17
PIF_VALUE: 17
PIF_VALUE: 16
PIF_VALUE: 15
PIF_VALUE: 16
PIF_VALUE: 15
PIF_VALUE: 17
PIF_VALUE: 3
PIF_VALUE: 16
PIF_VALUE: 17
PIF_VALUE: 16
PIF_VALUE: 16
PIF_VALUE: 17
PIF_VALUE: 18
PIF_VALUE: 17
PIF_VALUE: 15
PIF_VALUE: 16
PIF_VALUE: 17
PIF_VALUE: 3
PIF_VALUE: 16
PIF_VALUE: 16
PIF_VALUE: 15
PIF_VALUE: 16
PIF_VALUE: 16
PIF_VALUE: 5
PIF_VALUE: 16
PIF_VALUE: 18
PIF_VALUE: 16
PIF_VALUE: 17
PIF_VALUE: 16
PIF_VALUE: 16
PIF_VALUE: 15
PIF_VALUE: 23
PIF_VALUE: 16
PIF_VALUE: 19
PIF_VALUE: 16
PIF_VALUE: 16
PIF_VALUE: 2
PIF_VALUE: 1
PIF_VALUE: 16
PIF_VALUE: 15
PIF_VALUE: 16
PIF_VALUE: 17
PIF_VALUE: 17
PIF_VALUE: 16
PIF_VALUE: 15
PIF_VALUE: 16
PIF_VALUE: 19
PIF_VALUE: 15
PIF_VALUE: 17
PIF_VALUE: 16
PIF_VALUE: 17
PIF_VALUE: 16
PIF_VALUE: 16
PIF_VALUE: 1
PIF_VALUE: 16
PIF_VALUE: 0
PIF_VALUE: 15

## 2022-02-24 ASSESSMENT — PAIN DESCRIPTION - PAIN TYPE
TYPE: SURGICAL PAIN
TYPE: SURGICAL PAIN

## 2022-02-24 ASSESSMENT — ENCOUNTER SYMPTOMS
COLOR CHANGE: 0
TROUBLE SWALLOWING: 0
SHORTNESS OF BREATH: 0
COUGH: 0
NAUSEA: 0
CONSTIPATION: 0
BACK PAIN: 0
PHOTOPHOBIA: 0

## 2022-02-24 ASSESSMENT — PAIN DESCRIPTION - LOCATION
LOCATION: HEAD
LOCATION: HEAD

## 2022-02-24 ASSESSMENT — PAIN DESCRIPTION - ORIENTATION: ORIENTATION: ANTERIOR

## 2022-02-24 ASSESSMENT — PAIN DESCRIPTION - DESCRIPTORS: DESCRIPTORS: ACHING;HEADACHE

## 2022-02-24 ASSESSMENT — PAIN DESCRIPTION - DIRECTION: RADIATING_TOWARDS: INCISION

## 2022-02-24 ASSESSMENT — PAIN DESCRIPTION - ONSET: ONSET: PROGRESSIVE

## 2022-02-24 ASSESSMENT — PAIN DESCRIPTION - PROGRESSION: CLINICAL_PROGRESSION: RESOLVED

## 2022-02-24 ASSESSMENT — PAIN DESCRIPTION - FREQUENCY: FREQUENCY: CONTINUOUS

## 2022-02-24 ASSESSMENT — PAIN - FUNCTIONAL ASSESSMENT: PAIN_FUNCTIONAL_ASSESSMENT: PREVENTS OR INTERFERES SOME ACTIVE ACTIVITIES AND ADLS

## 2022-02-24 NOTE — PROGRESS NOTES
Progress note: Infectious diseases    Patient - Meghan Stark,  Age - 78 y.o.    - 1940      Room Number - 5K-06/006-A   MRN -  [id number]   Acct # - [de-identified]  Date of Admission -  2022  9:01 AM    SUBJECTIVE:   She had surgery. Has post surgical pain  OBJECTIVE   VITALS    height is 5' 3\" (1.6 m) and weight is 173 lb 9.6 oz (78.7 kg). Her axillary temperature is 98.1 °F (36.7 °C). Her blood pressure is 137/70 and her pulse is 70. Her respiration is 18 and oxygen saturation is 94%.        Wt Readings from Last 3 Encounters:   22 173 lb 9.6 oz (78.7 kg)   22 173 lb 8 oz (78.7 kg)   22 173 lb 9.6 oz (78.7 kg)       I/O (24 Hours)    Intake/Output Summary (Last 24 hours) at 2022 1400  Last data filed at 2022 1224  Gross per 24 hour   Intake 900 ml   Output 50 ml   Net 850 ml       General Appearance  Awake, alert, oriented,  not  In acute distress  HEENT - dressed scalp wound  Lungs -  Bilateral good air entry, no rhonchi, no wheeze  Cardiovascular - Heart sounds are normal.     Abdomen - soft, not distended, nontender,   Neurologic -oriented  Skin - No bruising or bleeding  Extremities - No edema, no cyanosis, clubbing     MEDICATIONS:      sodium chloride flush  5-40 mL IntraVENous 2 times per day    dexamethasone  4 mg IntraVENous Q6H    hydroCHLOROthiazide  12.5 mg Oral Daily    lisinopril  40 mg Oral Daily    metoprolol tartrate  50 mg Oral BID    sodium chloride flush  10 mL IntraVENous 2 times per day    [Held by provider] enoxaparin  40 mg SubCUTAneous Daily    cephALEXin  500 mg Oral BID      sodium chloride 100 mL/hr at 22 1338    sodium chloride       sodium chloride flush, sodium chloride, morphine **OR** morphine, traMADol, sodium chloride flush, ondansetron **OR** ondansetron, polyethylene glycol, acetaminophen **OR** acetaminophen, potassium chloride **OR** potassium alternative oral replacement **OR** potassium chloride, magnesium sulfate, traZODone      LABS:     CBC:   Recent Labs     02/23/22  0950 02/24/22  0354   WBC 6.5 5.5   HGB 12.9 11.9*    185     BMP:    Recent Labs     02/23/22  0950 02/24/22  0354    140   K 4.0 4.3    107   CO2 23 22*   BUN 27* 24*   CREATININE 1.1 0.9   GLUCOSE 99 98     Calcium:  Recent Labs     02/24/22  0354   CALCIUM 9.0        CULTURES:   UA: No results for input(s): SPECGRAV, PHUR, COLORU, CLARITYU, MUCUS, PROTEINU, BLOODU, RBCUA, WBCUA, BACTERIA, NITRU, GLUCOSEU, BILIRUBINUR, UROBILINOGEN, KETUA, LABCAST, LABCASTTY, AMORPHOS in the last 72 hours.     Invalid input(s): CRYSTALS  Micro: No results found for: BC       Problem list of patient:     Patient Active Problem List   Diagnosis Code    Hypertension I10    Syncopal episodes R55    Primary osteoarthritis of right knee M17.11    Subdural hematoma ([facility] [address] S06.5X9A    Nonhealing surgical wound, initial encounter T81.89XA         ASSESSMENT/PLAN   Scalp wound dehiscence: will give ancef pending cx report  Hold keflex  Will follow cx report      Gokul Gaytan MD, MD, FACP 2/24/2022 2:00 PM

## 2022-02-24 NOTE — PROGRESS NOTES
Attending Note:     Patient seen and examined in the floor in conjunction with neurosurgery NP. Discussed with patient, his family and his nurse as well. All data and imaging reviewed by myself. I agree with examination assessment and plan as documented below. Roe Kohler MD        Neurosurgery Progress Note    Patient:  Radha Walters      Unit/Bed:5K-06/006-A    YOB: 1940    MRN: [id number]     Acct: [de-identified]     Admit date: 2/23/2022    Chief Complaint   Patient presents with    Post-op Problem       Patient Seen, Chart, Physician notes, Labs, Radiology studies reviewed. Subjective:   Patient sitting up in bed talking with her daughters. Patient denies headache, pain, blurred vision or double vision. Patient stated that she has not had any further drainage from her surgical incision. Dressing is dry and intact. Patient stated she is waiting to have surgery. Patient stated that she has been ambulating with her daughters in the hallway. Past, Family, Social History unchanged from admission. Diet:  Diet NPO    Medications:  Scheduled Meds:   hydroCHLOROthiazide  12.5 mg Oral Daily    lisinopril  40 mg Oral Daily    metoprolol tartrate  50 mg Oral BID    sodium chloride flush  10 mL IntraVENous 2 times per day    [Held by provider] enoxaparin  40 mg SubCUTAneous Daily    cephALEXin  500 mg Oral BID     Continuous Infusions:   sodium chloride       PRN Meds:sodium chloride flush, sodium chloride, ondansetron **OR** ondansetron, polyethylene glycol, acetaminophen **OR** acetaminophen, potassium chloride **OR** potassium alternative oral replacement **OR** potassium chloride, magnesium sulfate, traZODone    Objective:    Vitals: BP (!) 113/52   Pulse 58   Temp 98.1 °F (36.7 °C) (Oral)   Resp 16   Ht 5' 3\" (1.6 m)   Wt 173 lb 9.6 oz (78.7 kg)   LMP  (LMP Unknown)   SpO2 98%   BMI 30.75 kg/m²     Physical Exam  Constitutional:       Appearance: Normal appearance. She is not ill-appearing. HENT:      Mouth/Throat:      Mouth: Mucous membranes are moist.   Eyes:      Pupils: Pupils are equal, round, and reactive to light. Cardiovascular:      Rate and Rhythm: Normal rate. Pulses: Normal pulses. Pulmonary:      Effort: Pulmonary effort is normal.   Abdominal:      General: Bowel sounds are normal.   Musculoskeletal:         General: No swelling or tenderness. Cervical back: No rigidity or tenderness. Skin:     General: Skin is warm and dry. Comments: Frontal surgical incision that dehisced- hardware visible   Neurological:      Mental Status: She is alert and oriented to person, place, and time. Sensory: No sensory deficit. Motor: No weakness. Psychiatric:         Mood and Affect: Mood normal.         Behavior: Behavior normal.         Thought Content: Thought content normal.         Judgment: Judgment normal.          Physical Exam:  Alert and attentive. Language appropriate, with no aphasia. Pupils equal.  Facial strength symmetric. Review of Systems   Constitutional: Negative for activity change, appetite change, fatigue and fever. HENT: Negative for trouble swallowing. Eyes: Negative for photophobia and visual disturbance. Respiratory: Negative for cough and shortness of breath. Cardiovascular: Negative for chest pain. Gastrointestinal: Negative for constipation and nausea. Genitourinary: Negative for difficulty urinating. Musculoskeletal: Negative for back pain and gait problem. Skin: Positive for wound (right frontal sugical incision- hardware visible). Negative for color change and rash. Neurological: Negative for dizziness, weakness and headaches. Psychiatric/Behavioral: Negative for confusion and sleep disturbance. The patient is not nervous/anxious. 24 hour intake/output:No intake or output data in the 24 hours ending 02/24/22 1031  Last 3 weights:   Wt Readings from Last 3 Encounters:   02/23/22 173 lb 9.6 oz (78.7 kg)   02/21/22 173 lb 8 oz (78.7 kg)   02/17/22 173 lb 9.6 oz (78.7 kg)         CBC:   Recent Labs     02/23/22  0950 02/24/22  0354   WBC 6.5 5.5   HGB 12.9 11.9*    185     BMP:    Recent Labs     02/23/22  0950 02/24/22  0354    140   K 4.0 4.3    107   CO2 23 22*   BUN 27* 24*   CREATININE 1.1 0.9   GLUCOSE 99 98     Calcium:  Recent Labs     02/24/22  0354   CALCIUM 9.0     Magnesium:No results for input(s): MG in the last 72 hours. Glucose:No results for input(s): POCGLU in the last 72 hours. HgbA1C: No results for input(s): LABA1C in the last 72 hours. Lipids: No results for input(s): CHOL, TRIG, HDL, LDL, LDLCALC in the last 72 hours. Radiology reports as per the Radiologist  Radiology: No results found. Assessment:    Principal Problem:    Nonhealing surgical wound, initial encounter  Resolved Problems:    * No resolved hospital problems. *        Plan:  -Dehiscent scalp wound  -NPO  -Lovenox on hold- SCDs  -Infectious disease continue to follow  -Discussed surgery for right frontal wound debridement and washout and possible removal and replacement of hardware. With patient and patients two daughters   - I explained for patient and her daughters the recommended surgical intervention , as well as the associated risks and benefits.  - I discussed the possible complications of surgery again with patient in detail, including excessive blood loss requiring transfusion, infection that may become meningitis, problem with heart lung and kidney as a result of general anesthesia such as heart attack, pneumonia, kidney shutdown and stroke.  I also discussed the possible complication of the operations in and around the brain including bleeding, infection, death, paralysis, weakness on one side or the other of the body, decreased sensation or pain on one side or the other of the body,, postoperative development of a blood clot that may require another operation, leakage of fluid from the wound that may require another operation. The patient understands that no guarantee can be made regarding the extent of post-operative pain or other per op  symptoms relief. - All question and concerns were addressed and answered.    - Patient elected to proceed with the surgical treatment and she signed the surgical consent.  - Surgery is planned for 2-24-22               Electronically signed by LEONEL Ludwig CNP on 2/24/2022 at 10:31 AM    Neurosurgery

## 2022-02-24 NOTE — ANESTHESIA PRE PROCEDURE
Department of Anesthesiology  Preprocedure Note       Name:  Aubrie Otto   Age:  78 y.o.  :  1940                                          MRN:  [id number]         Date:  2022      Surgeon: Edel Mccurdy):  Yvette Samuels MD    Procedure: Procedure(s):  RIGHT FRONTAL WOUND DEBRIDEMENT AND WASHOUT    Medications prior to admission:   Prior to Admission medications    Medication Sig Start Date End Date Taking?  Authorizing Provider   vitamin C (ASCORBIC ACID) 500 MG tablet Take 1,000 mg by mouth daily   Yes Historical Provider, MD   zinc 50 MG TABS tablet Take 50 mg by mouth daily   Yes Historical Provider, MD   diphenhydrAMINE-APAP, sleep, (TYLENOL PM EXTRA STRENGTH PO) Take 2 tablets by mouth nightly   Yes Historical Provider, MD   cephALEXin (KEFLEX) 500 MG capsule Take 1 capsule by mouth 2 times daily for 7 days 22 Yes Lindy Smiley MD   hydroCHLOROthiazide (HYDRODIURIL) 12.5 MG tablet Take 1 tablet by mouth daily 22  Yes Lindy Smiley MD   metoprolol tartrate (LOPRESSOR) 50 MG tablet TAKE 1 TABLET BY MOUTH TWICE DAILY 10/29/21  Yes Lindy Smiley MD   lisinopril (PRINIVIL;ZESTRIL) 40 MG tablet Take 1 tablet by mouth daily 21 Yes Lindy Smiley MD       Current medications:    Current Facility-Administered Medications   Medication Dose Route Frequency Provider Last Rate Last Admin    fentaNYL (SUBLIMAZE) injection 50 mcg  50 mcg IntraVENous Q5 Min PRN Maria Del Carmen Moreira DO   50 mcg at 22 1250    hydroCHLOROthiazide (HYDRODIURIL) tablet 12.5 mg  12.5 mg Oral Daily Sweta Arita PA-C        lisinopril (PRINIVIL;ZESTRIL) tablet 40 mg  40 mg Oral Daily Sweta Arita PA-C        metoprolol tartrate (LOPRESSOR) tablet 50 mg  50 mg Oral BID Sweta Arita PA-C   50 mg at 22    sodium chloride flush 0.9 % injection 10 mL  10 mL IntraVENous 2 times per day Sweta Arita PA-C   10 mL at 22 1002    sodium chloride flush 0.9 % injection 10 mL  10 mL IntraVENous PRN JESUS ALBERTO Herrmann-C        0.9 % sodium chloride infusion  25 mL IntraVENous PRN Sweta Arita PA-C        [Held by provider] enoxaparin (LOVENOX) injection 40 mg  40 mg SubCUTAneous Daily Sweta Arita PA-C        ondansetron (ZOFRAN-ODT) disintegrating tablet 4 mg  4 mg Oral Q8H PRN Sweta Arita PA-C        Or    ondansetron (ZOFRAN) injection 4 mg  4 mg IntraVENous Q6H PRN Sweta Arita PA-C        polyethylene glycol (GLYCOLAX) packet 17 g  17 g Oral Daily PRN Sweta Arita PA-C        acetaminophen (TYLENOL) tablet 650 mg  650 mg Oral Q6H PRN Sweta Arita PA-C        Or    acetaminophen (TYLENOL) suppository 650 mg  650 mg Rectal Q6H PRN Sweta Arita PA-C        potassium chloride (KLOR-CON M) extended release tablet 40 mEq  40 mEq Oral PRN Sweta Arita PA-C        Or    potassium bicarb-citric acid (EFFER-K) effervescent tablet 40 mEq  40 mEq Oral PRN JESUS ALBERTO Herrmann-KARIN        Or    potassium chloride 10 mEq/100 mL IVPB (Peripheral Line)  10 mEq IntraVENous PRN JESUS ALBERTO Herrmann-KARIN        magnesium sulfate 2000 mg in 50 mL IVPB premix  2,000 mg IntraVENous PRN Sweta Arita PA-C        cephALEXin (KEFLEX) capsule 500 mg  500 mg Oral BID Sweta Arita PA-C   500 mg at 02/23/22 2021    traZODone (DESYREL) tablet 50 mg  50 mg Oral Nightly PRN JESUS ALBERTO Owens   50 mg at 02/23/22 2116       Allergies:     Allergies   Allergen Reactions    Pcn [Penicillins] Itching     redness @ IM site       Problem List:    Patient Active Problem List   Diagnosis Code    Hypertension I10    Syncopal episodes R55    Primary osteoarthritis of right knee M17.11    Subdural hematoma ([address] S06.5X9A    Nonhealing surgical wound, initial encounter T81.89XA       Past Medical History:        Diagnosis Date    Arthritis     Hypertension     Subd[facility] [address] [address] 01/2022       Past Surgical History:        Procedure Laterality Date    CATHETER REMOVAL Bilateral 11/2014    CRANIOTOMY Right 01/21/2022    RIGHT SIDED CRANIOTOMY FOR SUBDURAL HEMATOMA performed by Loli Mireles MD at [address] [place] [address]   07/04/2018    FOOT SURGERY Left 2012    KNEE SURGERY Right 07/09/2014    total    [facility] BX OF BREAST, NEEDLE CORE, IMAGE GUIDE Left 2016    benign    [facility] OFFICE/OUTPT VISIT,PROCEDURE ONLY Left 07/05/2018    ORIF LEFT ELBOW performed by Antonio Jessica MD at [address] [place] [address] History:    Social History     Tobacco Use    Smoking status: Never Smoker    Smokeless tobacco: Never Used   Substance Use Topics    Alcohol use: Not Currently     Comment: very very rare                                Counseling given: Not Answered      Vital Signs (Current):   Vitals:    02/24/22 1240 02/24/22 1245 02/24/22 1250 02/24/22 1255   BP: (!) 147/65 (!) 142/66 (!) 147/66 (!) 143/64   Pulse: 71 69 65 64   Resp: 12 15 12 11   Temp:       TempSrc:       SpO2: 96% 94% 100% 100%   Weight:       Height:                                                  BP Readings from Last 3 Encounters:   02/24/22 (!) 143/64   02/24/22 (!) 159/114   02/23/22 (!) 170/76       NPO Status:                                                                                 BMI:   Wt Readings from Last 3 Encounters:   02/23/22 173 lb 9.6 oz (78.7 kg)   02/21/22 173 lb 8 oz (78.7 kg)   02/17/22 173 lb 9.6 oz (78.7 kg)     Body mass index is 30.75 kg/m².     CBC:   Lab Results   Component Value Date    WBC 5.5 02/24/2022    RBC 3.94 02/24/2022    RBC 4.73 04/23/2012    HGB 11.9 02/24/2022    HCT 36.0 02/24/2022    MCV 91.4 02/24/2022    RDW 12.5 01/18/2022     02/24/2022       CMP:   Lab Results   Component Value Date     02/24/2022    K 4.3 02/24/2022     02/24/2022    CO2 22 02/24/2022    BUN 24 02/24/2022    CREATININE 0.9 02/24/2022    LABGLOM 60 02/24/2022    GLUCOSE 98 02/24/2022    GLUCOSE 89 08/18/2016    PROT 7.6 01/18/2022    CALCIUM 9.0 02/24/2022    BILITOT 0.6 01/18/2022    BILITOT NEGATIVE 04/23/2012    ALKPHOS 73 01/18/2022    AST 27 01/18/2022    ALT 25 01/18/2022       POC Tests: No results for input(s): POCGLU, POCNA, POCK, POCCL, POCBUN, POCHEMO, POCHCT in the last 72 hours. Coags:   Lab Results   Component Value Date    INR 0.99 07/09/2014    APTT 35.9 01/20/2022       HCG (If Applicable): No results found for: PREGTESTUR, PREGSERUM, HCG, HCGQUANT     ABGs: No results found for: PHART, PO2ART, AHW0ZGV, WCI3QDY, BEART, P4LCBKYE     Type & Screen (If Applicable):  Lab Results   Component Value Date    LABRH NEG 07/09/2014       Drug/Infectious Status (If Applicable):  No results found for: HIV, HEPCAB    COVID-19 Screening (If Applicable): No results found for: COVID19        Anesthesia Evaluation  Patient summary reviewed  Airway: Mallampati: III  TM distance: >3 FB   Neck ROM: full  Mouth opening: > = 3 FB Dental:          Pulmonary:                              Cardiovascular:    (+) hypertension:,       ECG reviewed                        Neuro/Psych:   (+) CVA:,             GI/Hepatic/Renal:             Endo/Other:                     Abdominal:             Vascular: Other Findings:             Anesthesia Plan      general     ASA 3       Induction: intravenous. Anesthetic plan and risks discussed with patient. Plan discussed with NATHALY. Shelbi Hrenandez.  [facility]   2/24/2022

## 2022-02-24 NOTE — PROGRESS NOTES
1225: Pt arrives to pacu, awakens to verbal stimuli. Pt c/o pain, fentanyl administered per crna. Pt alert and oriented, able to move all extremities. Denies numbness/tingling. VSS  1245: Pt denies change in pain, 50mcg of fentanyl administered. VSS  1250: Pt states pain 10/10, 50mcg of fentanyl given. 1305: Provided report to Harsh Lui on 5K.  1310: Pt states pain 8/10, 50mcg of fentanyl administered. Ice chips provided to patient per her request, tolerated well. 1315: Pt states pain 7/10, 50mcg of fentanyl administered. 1325: Pt resting on and off with eyes closed, easily awakens to voice.  States pain 5/10 and tolerable  1335: Pt meets criteria for discharge from pacu,

## 2022-02-24 NOTE — PROGRESS NOTES
Daily    metoprolol tartrate  50 mg Oral BID    sodium chloride flush  10 mL IntraVENous 2 times per day    [Held by provider] enoxaparin  40 mg SubCUTAneous Daily     Continuous Infusions:   sodium chloride 100 mL/hr at 02/24/22 1338    sodium chloride       PRN Meds:sodium chloride flush, sodium chloride, morphine **OR** morphine, traMADol, sodium chloride flush, ondansetron **OR** ondansetron, polyethylene glycol, acetaminophen **OR** acetaminophen, potassium chloride **OR** potassium alternative oral replacement **OR** potassium chloride, magnesium sulfate, traZODone    Objective:  CBC:   Recent Labs     02/23/22  0950 02/24/22  0354   WBC 6.5 5.5   HGB 12.9 11.9*    185     BMP:    Recent Labs     02/23/22  0950 02/24/22  0354    140   K 4.0 4.3    107   CO2 23 22*   BUN 27* 24*   CREATININE 1.1 0.9   GLUCOSE 99 98     Calcium:  Recent Labs     02/24/22  0354   CALCIUM 9.0     Ionized Calcium:No results for input(s): IONCA in the last 72 hours. Magnesium:No results for input(s): MG in the last 72 hours. Phosphorus:No results for input(s): PHOS in the last 72 hours. BNP:No results for input(s): BNP in the last 72 hours. Glucose:No results for input(s): POCGLU in the last 72 hours. HgbA1C: No results for input(s): LABA1C in the last 72 hours. INR: No results for input(s): INR in the last 72 hours. Hepatic: No results for input(s): ALKPHOS, ALT, AST, PROT, BILITOT, BILIDIR, LABALBU in the last 72 hours. Amylase and Lipase:No results for input(s): LACTA, AMYLASE in the last 72 hours. Lactic Acid: No results for input(s): LACTA in the last 72 hours. Troponin: No results for input(s): CKTOTAL, CKMB, TROPONINT in the last 72 hours. BNP: No results for input(s): BNP in the last 72 hours. Lipids: No results for input(s): CHOL, TRIG, HDL, LDL, LDLCALC in the last 72 hours.   ABGs: No results found for: PH, PCO2, PO2, HCO3, O2SAT        Radiology reports as per the Radiologist  Radiology: CT HEAD WO CONTRAST    Result Date: 2/21/2022  PROCEDURE: CT HEAD WO CONTRAST CLINICAL INFORMATION: Subdural hematoma ([address] COMPARISON: CT scan of the brain dated 27th of January 2022. TECHNIQUE: Noncontrast 5 mm axial images were obtained through the brain. Sagittal and coronal reconstructions were obtained. All CT scans at this facility use dose modulation, iterative reconstruction, and/or weight-based dosing when appropriate to reduce radiation dose to as low as reasonably achievable. FINDINGS: There are postoperative changes involving the right frontal  calvarium. There has been almost complete resolution of the previously noted extra-axial hemorrhage, air and fluid over the right frontal and temporal lobes There is no hydrocephalus, midline shift or mass effect. There is diminished attenuation in the white matter most likely representing ischemic changes. There is calcification in the cavernous segments of both internal carotid arteries There is minimal mucosal thickening in ethmoid air cells bilaterally. The remaining paranasal sinuses and mastoid air cells are normally aerated. There is no other suspicious calvarial abnormality. 1. Significant interval improvement since previous study dated 27 January 2022 with near complete resolution of the previously noted extra-axial hemorrhage, air and fluid over the right frontal and temporal lobes. 2. Probable ischemic changes in the white matter. 3. Calcification in the cavernous segments of both internal carotid arteries. 4.. Minimal mucosal thickening in ethmoid air cells bilaterally. **This report has been created using voice recognition software. It may contain minor errors which are inherent in voice recognition technology. ** Final report electronically signed by DR Martha Blevins on 2/21/2022 1:48 PM    CT HEAD WO CONTRAST    Result Date: 1/27/2022  PROCEDURE: CT HEAD WO CONTRAST CLINICAL INFORMATION: Subdural hematoma ([address] Follow-up. COMPARISON: Head CT 1/23/2022. TECHNIQUE: Noncontrast 5 mm axial images were obtained through the brain. Sagittal and coronal reconstructions were obtained. All CT scans at this facility use dose modulation, iterative reconstruction, and/or weight-based dosing when appropriate to reduce radiation dose to as low as reasonably achievable. FINDINGS: The patient has postsurgical changes from her recent right frontal craniotomy. There are overlying skin scalp staples. Deep to this, there is decreasing an improving pneumocephalus. There is a small residual mixed attenuation subdural collection measuring 5 mm. There is a small amount of subdural hemorrhage along the right tentorium. There is no new hemorrhage. There is very slight midline shift to the left. There is no hydrocephalus. There is a stable mild/moderate amount of patchy abnormal low attenuation in the white matter the brain suggesting chronic small vessel ischemic changes. The paranasal sinuses and mastoid air cells are normally aerated. There is no suspicious calvarial abnormality. Continued improvement in the appearance of the right-sided subdural collection. **This report has been created using voice recognition software. It may contain minor errors which are inherent in voice recognition technology. ** Final report electronically signed by Dr. Epifanio Gómez on 1/27/2022 1:23 PM        Physical Exam:  Vitals: BP (!) 127/58   Pulse 63   Temp 97.9 °F (36.6 °C) (Oral)   Resp 16   Ht 5' 3\" (1.6 m)   Wt 173 lb 9.6 oz (78.7 kg)   LMP  (LMP Unknown)   SpO2 93%   BMI 30.75 kg/m²   24 hour intake/output:    Intake/Output Summary (Last 24 hours) at 2/24/2022 1747  Last data filed at 2/24/2022 1224  Gross per 24 hour   Intake 900 ml   Output 50 ml   Net 850 ml     Last 3 weights:   Wt Readings from Last 3 Encounters:   02/23/22 173 lb 9.6 oz (78.7 kg)   02/21/22 173 lb 8 oz (78.7 kg)   02/17/22 173 lb 9.6 oz (78.7 kg)       General appearance - alert, awake appears to be in no acute distress  HEENT: Atraumatic normocephalic, no JVD. Trachea midline.   Postoperative dressing in place patient was examined post procedurally she is somnolent but interactive awake  Chest - Bilateral air entry, no wheezes, crackles or rhonchi  Cardiovascular - S1S2 RRR, no murmurs or gallops  Abdomen - Soft non tender non distended, normoactive bowel sounds   Neurological - non focal, no neurological deficits noted  Integumentary - Skin color, texture, turgor normal. No Rashes or lesions  Musculoskeletal -Full ROM, No clubbing or cyanosis  Extremities: No peripheral edema    DVT prophylaxis: [] Lovenox                                 [x] SCDs                                 [] SQ Heparin                                 [] Encourage ambulation           [] Already on Anticoagulation               Electronically signed by Alisha Eid MD on 2/24/2022 at 5:47 PM    [facility] Hospitalist

## 2022-02-24 NOTE — BRIEF OP NOTE
ml 01/24/22 0830       [REMOVED] Urethral Catheter Double-lumen 16 fr (Removed)   Catheter Indications Perioperative use for selected surgical procedures 01/22/22 0800   Site Assessment Roann 01/22/22 0800   Urine Color Yellow 01/22/22 0800   Urine Appearance Clear 01/22/22 0800   Output (mL) 550 mL 01/22/22 0653       Findings: wound I&D with wash out     Electronically signed by Hector Funk PA-C on 2/24/2022 at 12:25 PM

## 2022-02-24 NOTE — PLAN OF CARE
Problem: Falls - Risk of:  Goal: Will remain free from falls  Description: Will remain free from falls  2/24/2022 1118 by Mela Plascencia RN  Outcome: Ongoing  Note: Patient bed in lowest position, wheels locked, 2/4 side rails up and alarm on. Call light and belongings within reach. Pathway clear. Nonskid footwear on. Patient rounded on hourly. Fall risk assessment complete. Patient remains free from falls this shift. Patient ambulates standby assist. Fall risk due to history of recent fall and post op anesthesia. Problem: Pain:  Goal: Control of acute pain  Description: Control of acute pain  2/24/2022 1118 by Mela Plascencia RN  Outcome: Ongoing  Note: Patient pain goal is 4/10. Patient denying pain. PRN Tylenol. Patient utilizes rest and repositioning for comfort. Pain assessment ongoing. Problem: Discharge Planning:  Goal: Discharged to appropriate level of care  Description: Discharged to appropriate level of care  2/24/2022 1118 by Mela Plascencia RN  Outcome: Ongoing  Note: Patient planning on returning home alone. Patient can complete all ADLs independently. Patient having neurosurgery today to repair non healing wound on R frontal lobe. Discharge planning ongoing. Problem: Skin Integrity:  Goal: Skin integrity will be maintained  Description: Skin integrity will be maintained  2/24/2022 1118 by Mela Plascencia RN  Outcome: Ongoing  Note: Patient currently in surgery for non healing wound of R frontal lobe. Previous dressing was 4x4 and ABD pad. Patient repositions every 2 hours. Heels elevated off of bed. Skin kept clean and dry. Skin assessed with every head to toe. Ozzie scale complete. Bathed this shift. Problem: Sensory:  Goal: Ability to demonstrate ways to enhance comfort will improve  Description: Ability to demonstrate ways to enhance comfort will improve  Outcome: Ongoing  Note: Patient provided with calm and quiet environment.  Lights dim to prevent over stimulation and headaches. Care plan reviewed with patient and daughters. Patient and daughters verbalize understanding of the plan of care and contribute to goal setting.

## 2022-02-24 NOTE — PLAN OF CARE
Problem: Falls - Risk of:  Goal: Will remain free from falls  Description: Will remain free from falls  Outcome: Ongoing  Note: Fall assessment completed. Patient using call light appropriately to call for assistance with ambulation to bathroom. Personal items within reach. Patient is also compliant with use of non-skid slippers. Patient free from falls. Bed alarm on. Ambulates with a standby assist.        Problem: Pain:  Goal: Control of acute pain  Description: Control of acute pain  Outcome: Ongoing  Note: Patient rates pain 0 on REGGIE 0-10 scale. Will continue to monitor and provide alternative interventions as needed such as ice for pain manag       Problem: Discharge Planning:  Goal: Discharged to appropriate level of care  Description: Discharged to appropriate level of care  Outcome: Ongoing  Note: ONGOING. Patient expected to have procedure in AM      Problem: Skin Integrity:  Goal: Skin integrity will be maintained  Description: Skin integrity will be maintained  Outcome: Ongoing  Note: No skin breakdown this shift. Patient has dressing on right forehead that is CDI   Care plan reviewed with patient and family. Patient and family verbalize understanding of the plan of care and contribute to goal setting.

## 2022-02-24 NOTE — ANESTHESIA POSTPROCEDURE EVALUATION
Department of Anesthesiology  Postprocedure Note    Patient: Jorge Velasco  MRN: [id number]  YOB: 1940  Date of evaluation: 2/24/2022  Time:  3:44 PM     Procedure Summary     Date: 02/24/22 Room / Location: [address] [facility]    Anesthesia Start: 8273 Anesthesia Stop: 2890    Procedure: RIGHT FRONTAL WOUND DEBRIDEMENT AND WASHOUT (Right Head) Diagnosis: (WOUND DEHISCENCE)    Surgeons: Yisel Glover MD Responsible Provider: Radha Dacosta DO    Anesthesia Type: general ASA Status: 3          Anesthesia Type: No value filed. Maureen Phase I: Maureen Score: 8    Maureen Phase II:      Last vitals: Reviewed and per EMR flowsheets.        Anesthesia Post Evaluation    Patient location during evaluation: bedside  Patient participation: complete - patient participated  Level of consciousness: awake  Airway patency: patent  Nausea & Vomiting: no vomiting and no nausea  Cardiovascular status: hemodynamically stable  Respiratory status: acceptable  Hydration status: stable

## 2022-02-25 VITALS
HEART RATE: 63 BPM | HEIGHT: 63 IN | TEMPERATURE: 98.2 F | DIASTOLIC BLOOD PRESSURE: 71 MMHG | RESPIRATION RATE: 18 BRPM | BODY MASS INDEX: 30.76 KG/M2 | OXYGEN SATURATION: 94 % | WEIGHT: 173.6 LBS | SYSTOLIC BLOOD PRESSURE: 118 MMHG

## 2022-02-25 PROBLEM — T81.49XA SUPERFICIAL POSTOPERATIVE WOUND INFECTION: Status: ACTIVE | Noted: 2022-02-25

## 2022-02-25 LAB
BASOPHILS # BLD: 0.1 %
BASOPHILS ABSOLUTE: 0 THOU/MM3 (ref 0–0.1)
EOSINOPHIL # BLD: 0 %
EOSINOPHILS ABSOLUTE: 0 THOU/MM3 (ref 0–0.4)
ERYTHROCYTE [DISTWIDTH] IN BLOOD BY AUTOMATED COUNT: 12.6 % (ref 11.5–14.5)
ERYTHROCYTE [DISTWIDTH] IN BLOOD BY AUTOMATED COUNT: 42.7 FL (ref 35–45)
HCT VFR BLD CALC: 35.9 % (ref 37–47)
HEMOGLOBIN: 11.6 GM/DL (ref 12–16)
IMMATURE GRANS (ABS): 0.06 THOU/MM3 (ref 0–0.07)
IMMATURE GRANULOCYTES: 0.6 %
LYMPHOCYTES # BLD: 9.1 %
LYMPHOCYTES ABSOLUTE: 0.8 THOU/MM3 (ref 1–4.8)
MCH RBC QN AUTO: 30 PG (ref 26–33)
MCHC RBC AUTO-ENTMCNC: 32.3 GM/DL (ref 32.2–35.5)
MCV RBC AUTO: 92.8 FL (ref 81–99)
MONOCYTES # BLD: 2.1 %
MONOCYTES ABSOLUTE: 0.2 THOU/MM3 (ref 0.4–1.3)
NUCLEATED RED BLOOD CELLS: 0 /100 WBC
PLATELET # BLD: 193 THOU/MM3 (ref 130–400)
PMV BLD AUTO: 10.9 FL (ref 9.4–12.4)
RBC # BLD: 3.87 MILL/MM3 (ref 4.2–5.4)
SEG NEUTROPHILS: 88.1 %
SEGMENTED NEUTROPHILS ABSOLUTE COUNT: 8.2 THOU/MM3 (ref 1.8–7.7)
WBC # BLD: 9.3 THOU/MM3 (ref 4.8–10.8)

## 2022-02-25 PROCEDURE — 6370000000 HC RX 637 (ALT 250 FOR IP): Performed by: PHYSICIAN ASSISTANT

## 2022-02-25 PROCEDURE — 1200000000 HC SEMI PRIVATE

## 2022-02-25 PROCEDURE — 99024 POSTOP FOLLOW-UP VISIT: CPT | Performed by: NEUROLOGICAL SURGERY

## 2022-02-25 PROCEDURE — 6360000002 HC RX W HCPCS: Performed by: INTERNAL MEDICINE

## 2022-02-25 PROCEDURE — 6360000002 HC RX W HCPCS: Performed by: PHYSICIAN ASSISTANT

## 2022-02-25 PROCEDURE — 99239 HOSP IP/OBS DSCHRG MGMT >30: CPT | Performed by: INTERNAL MEDICINE

## 2022-02-25 PROCEDURE — G0378 HOSPITAL OBSERVATION PER HR: HCPCS

## 2022-02-25 PROCEDURE — APPSS30 APP SPLIT SHARED TIME 16-30 MINUTES: Performed by: PHYSICIAN ASSISTANT

## 2022-02-25 PROCEDURE — 96376 TX/PRO/DX INJ SAME DRUG ADON: CPT

## 2022-02-25 PROCEDURE — 96374 THER/PROPH/DIAG INJ IV PUSH: CPT

## 2022-02-25 PROCEDURE — 85025 COMPLETE CBC W/AUTO DIFF WBC: CPT

## 2022-02-25 PROCEDURE — 36415 COLL VENOUS BLD VENIPUNCTURE: CPT

## 2022-02-25 PROCEDURE — 2580000003 HC RX 258: Performed by: PHYSICIAN ASSISTANT

## 2022-02-25 RX ORDER — CEPHALEXIN 500 MG/1
500 CAPSULE ORAL 3 TIMES DAILY
Qty: 21 CAPSULE | Refills: 0 | Status: SHIPPED | OUTPATIENT
Start: 2022-02-25 | End: 2022-03-04

## 2022-02-25 RX ORDER — ONDANSETRON 4 MG/1
4 TABLET, ORALLY DISINTEGRATING ORAL EVERY 8 HOURS PRN
Qty: 15 TABLET | Refills: 0 | Status: SHIPPED | OUTPATIENT
Start: 2022-02-25 | End: 2022-03-02

## 2022-02-25 RX ADMIN — METOPROLOL TARTRATE 50 MG: 50 TABLET, FILM COATED ORAL at 08:40

## 2022-02-25 RX ADMIN — LISINOPRIL 40 MG: 40 TABLET ORAL at 08:39

## 2022-02-25 RX ADMIN — SODIUM CHLORIDE, PRESERVATIVE FREE 10 ML: 5 INJECTION INTRAVENOUS at 08:40

## 2022-02-25 RX ADMIN — CEFAZOLIN SODIUM 1000 MG: 1 INJECTION, SOLUTION INTRAVENOUS at 08:47

## 2022-02-25 RX ADMIN — DEXAMETHASONE SODIUM PHOSPHATE 4 MG: 4 INJECTION, SOLUTION INTRA-ARTICULAR; INTRALESIONAL; INTRAMUSCULAR; INTRAVENOUS; SOFT TISSUE at 08:40

## 2022-02-25 RX ADMIN — TRAMADOL HYDROCHLORIDE 50 MG: 50 TABLET, COATED ORAL at 02:02

## 2022-02-25 RX ADMIN — HYDROCHLOROTHIAZIDE 12.5 MG: 25 TABLET ORAL at 08:39

## 2022-02-25 RX ADMIN — DEXAMETHASONE SODIUM PHOSPHATE 4 MG: 4 INJECTION, SOLUTION INTRA-ARTICULAR; INTRALESIONAL; INTRAMUSCULAR; INTRAVENOUS; SOFT TISSUE at 02:03

## 2022-02-25 RX ADMIN — SODIUM CHLORIDE: 9 INJECTION, SOLUTION INTRAVENOUS at 08:43

## 2022-02-25 RX ADMIN — CEFAZOLIN SODIUM 1000 MG: 1 INJECTION, SOLUTION INTRAVENOUS at 00:06

## 2022-02-25 ASSESSMENT — ENCOUNTER SYMPTOMS
ABDOMINAL DISTENTION: 0
SHORTNESS OF BREATH: 0
CHEST TIGHTNESS: 0
APNEA: 0
ABDOMINAL PAIN: 0

## 2022-02-25 ASSESSMENT — PAIN SCALES - GENERAL
PAINLEVEL_OUTOF10: 0
PAINLEVEL_OUTOF10: 2
PAINLEVEL_OUTOF10: 3
PAINLEVEL_OUTOF10: 1

## 2022-02-25 ASSESSMENT — PAIN DESCRIPTION - DESCRIPTORS: DESCRIPTORS: ACHING

## 2022-02-25 ASSESSMENT — PAIN DESCRIPTION - ORIENTATION: ORIENTATION: ANTERIOR

## 2022-02-25 ASSESSMENT — PAIN DESCRIPTION - FREQUENCY: FREQUENCY: CONTINUOUS

## 2022-02-25 ASSESSMENT — PAIN DESCRIPTION - DIRECTION: RADIATING_TOWARDS: INCISION

## 2022-02-25 ASSESSMENT — PAIN DESCRIPTION - PAIN TYPE: TYPE: SURGICAL PAIN

## 2022-02-25 ASSESSMENT — PAIN DESCRIPTION - LOCATION: LOCATION: HEAD

## 2022-02-25 NOTE — PLAN OF CARE
Nutrition Problem #1: Increased nutrient needs  Intervention: Food and/or Nutrient Delivery: Continue Current Diet,Start Oral Nutrition Supplement  Nutritional Goals: Pt will consume greater than 75% of meals and supplements to support wound healing during LOS.

## 2022-02-25 NOTE — PROGRESS NOTES
Addendum by Dr. Nany Cadena MD:  I have seen and examined the patient independently. Face to face evaluation and examination was performed. The below evaluation and note have been reviewed. Labs and radiographs were reviewed. I Have discussed with Neurosurgery PA about this patient in detail. The below assessment and plan have been reviewed. Please see my modifications mentioned below. My additional comments and modifications:     · Postop day 1   · S/p right frontal wound debridement and washout. · Patient is doing well. · Medical management per patient primary. · Follow-up the recommendation infectious disease. · Patient can be discharged from neurosurgical perspective once she is cleared by infectious disease, and follow-up with neurosurgery outpatient office as scheduled. · Sutures removal after 14 to 15 days of surgery. ·  From this time on, neurosurgery team will see this patient only as needed as long as she is in the hospital. Please call if you have any further questions or concerns regarding this patient. Nany Cadena MD      Neurosurgery Progress Note    Patient:  Harvinder Priest      Unit/Bed:5K-06/006-A    YOB: 1940    MRN: [id number]     Acct: [de-identified]     Admit date: 2/23/2022    Chief Complaint   Patient presents with    Post-op Problem       Patient Seen, Chart, Physician notes, Labs, Radiology studies reviewed. Subjective: Patient is seen and evaluated on the floor postoperative day #1 from I&D of incisional site performed by Dr. Lis Calixto at the request of Dr. Mehrdad espinosa. Patient was sitting up in bed this morning with pain very well controlled today. Past, Family, Social History unchanged from admission. Diet:  ADULT DIET;  Regular    Medications:  Scheduled Meds:   sodium chloride flush  5-40 mL IntraVENous 2 times per day    dexamethasone  4 mg IntraVENous Q6H    ceFAZolin  1,000 mg IntraVENous Q8H    hydroCHLOROthiazide  12.5 mg Oral Daily    lisinopril  40 mg Oral Daily    metoprolol tartrate  50 mg Oral BID    sodium chloride flush  10 mL IntraVENous 2 times per day    [Held by provider] enoxaparin  40 mg SubCUTAneous Daily     Continuous Infusions:   sodium chloride 100 mL/hr at 02/25/22 0843    sodium chloride       PRN Meds:sodium chloride flush, sodium chloride, morphine **OR** morphine, traMADol, sodium chloride flush, ondansetron **OR** ondansetron, polyethylene glycol, acetaminophen **OR** acetaminophen, potassium chloride **OR** potassium alternative oral replacement **OR** potassium chloride, magnesium sulfate, traZODone    Objective: Patient is observed sitting up in bed with head of the bed elevated 30 degrees and pain very well controlled. Dressings are intact over flat dry incisions and the patient remained stable and neurologically intact to her baseline on admission with no additional significant changes noted overnight. Vitals: /71   Pulse 63   Temp 98.2 °F (36.8 °C) (Oral)   Resp 18   Ht 5' 3\" (1.6 m)   Wt 173 lb 9.6 oz (78.7 kg)   LMP  (LMP Unknown)   SpO2 94%   BMI 30.75 kg/m²     Physical Exam   Patient remained stable and intact her baseline this morning on exam with no additional significant changes noted the patient chart overnight. Physical Exam:  Alert and attentive. Language appropriate, with no aphasia. Pupils equal.  Facial strength symmetric. Review of Systems   Constitutional: Negative for activity change. Respiratory: Negative for apnea, chest tightness and shortness of breath. Cardiovascular: Negative for chest pain and leg swelling. Gastrointestinal: Negative for abdominal distention and abdominal pain. Neurological: Negative for speech difficulty and headaches. Psychiatric/Behavioral: Negative for agitation, behavioral problems, confusion and decreased concentration.         24 hour intake/output:    Intake/Output Summary (Last 24 hours) at 2/25/2022 1026  Last data filed at 2/24/2022 2249  Gross per 24 hour   Intake 1706 ml   Output 700 ml   Net 1006 ml     Last 3 weights: Wt Readings from Last 3 Encounters:   02/23/22 173 lb 9.6 oz (78.7 kg)   02/21/22 173 lb 8 oz (78.7 kg)   02/17/22 173 lb 9.6 oz (78.7 kg)         CBC:   Recent Labs     02/23/22  0950 02/24/22  0354 02/25/22  0438   WBC 6.5 5.5 9.3   HGB 12.9 11.9* 11.6*    185 193     BMP:    Recent Labs     02/23/22  0950 02/24/22  0354    140   K 4.0 4.3    107   CO2 23 22*   BUN 27* 24*   CREATININE 1.1 0.9   GLUCOSE 99 98     Calcium:  Recent Labs     02/24/22  0354   CALCIUM 9.0     Magnesium:No results for input(s): MG in the last 72 hours. Glucose:No results for input(s): POCGLU in the last 72 hours. HgbA1C: No results for input(s): LABA1C in the last 72 hours. Lipids: No results for input(s): CHOL, TRIG, HDL, LDL, LDLCALC in the last 72 hours. Radiology reports as per the Radiologist  Radiology: No results found. Assessment: Postoperative day #1 from reexploration and revision via I&D of incisional site performed by Dr. Judith Molina at the request of Dr. Syed espinosa    Principal Problem:    Nonhealing surgical wound, initial encounter  Active Problems:    Superficial postoperative wound infection  Resolved Problems:    * No resolved hospital problems. *        Plan: Patient is seen and evaluated on the floor with evaluation exam findings reviewed and discussed with Dr. Judith Molina with nursing and with family. Patient remains postoperative day #1 from I&D of incisional site performed by Dr. Judith Molina at the request of Dr. Syed espinosa. Patient was seen and evaluated along with Lucien Johnson/medical student. Patient is sitting up in bed with the head of the bed elevated 30 degrees and pain very well controlled.   Dressings were intact over a flat dry incision with the patient otherwise stable and intact neurologically to her baseline on exam with no additional significant changes noted. Neurosurgery recommends continuing follow-up with infectious disease with a follow-up appoint with neurosurgery 14 days from the day of the procedure for suture removal as indicated.   Neurosurgery tofollow      Electronically signed by Shaun Rogers PA-C on 2/25/2022 at 10:26 AM    Neurosurgery

## 2022-02-25 NOTE — PROGRESS NOTES
Recent Labs     02/23/22  0950 02/24/22  0354    140   K 4.0 4.3    107   CO2 23 22*   BUN 27* 24*   CREATININE 1.1 0.9   GLUCOSE 99 98     Calcium:  Recent Labs     02/24/22  0354   CALCIUM 9.0        CULTURES:   UA: No results for input(s): [place] [employer], COLORU, CLARITYU, MUCUS, PROTEINU, BLOODU, RBCUA, WBCUA, BACTERIA, NITRU, GLUCOSEU, BILIRUBINUR, UROBILINOGEN, KETUA, LABCAST, LABCASTTY, AMORPHOS in the last 72 hours. Invalid input(s): CRYSTALS  Micro: No results found for: BC       Problem list of patient:     Patient Active Problem List   Diagnosis Code    Hypertension I10    Syncopal episodes R55    Primary osteoarthritis of right knee M17.11    Subdural hematoma ([facility] [address] S06.5X9A    Nonhealing surgical wound, initial encounter T81.89XA    Superficial postoperative wound infection T81.49XA    Disruption of external surgical wound T81. 31XA         ASSESSMENT/PLAN   Scalp wound dehiscence: ok with discharge  Follow up on Wed 3/9/22  Will be on keflex for one wk    Arnie Tavarez MD, MD, FACP 2/25/2022 1:14 PM

## 2022-02-25 NOTE — PLAN OF CARE
Problem: Falls - Risk of:  Goal: Will remain free from falls  Description: Will remain free from falls  Outcome: Ongoing  Note:   Patient bed in lowest position, wheels locked, 2/4 side rails up and alarm on. Call light and belongings within reach. Pathway clear. Nonskid footwear on. Patient rounded on hourly. Fall risk assessment complete. Patient remains free from falls this shift. Patient ambulates standby assist. Fall risk due to history of recent fall. Problem: Pain:  Goal: Control of acute pain  Description: Control of acute pain  Outcome: Ongoing  Note: Patient complains of 2/10 pain and pain goal is 4/10. Pain being controlled with PRN medications. Utilizing rest and repositioning for comfort      Problem: Discharge Planning:  Goal: Discharged to appropriate level of care  Description: Discharged to appropriate level of care  Outcome: Ongoing  Note: Patient plans on returning back to her current living arrangement alone. She completes ADL's independently and has children that visit frequently. Discharge plan is ongoing      Problem: Skin Integrity:  Goal: Skin integrity will be maintained  Description: Skin integrity will be maintained  Outcome: Ongoing  Note: Patient has R frontal Lobe dressing that is CDI. No new drainage is reported. Patient turns self in the bed and remains free from any skin breakdown      Problem: Sensory:  Goal: Ability to demonstrate ways to enhance comfort will improve  Description: Ability to demonstrate ways to enhance comfort will improve  Outcome: Ongoing  Note: Calm quiet environment for patient to promote relaxation and healing.

## 2022-02-25 NOTE — PROGRESS NOTES
Comprehensive Nutrition Assessment    Type and Reason for Visit:  Initial,Positive Nutrition Screen,Wound    Nutrition Recommendations/Plan:   Monitor PO and Charly intake. Monitor renal values and adjust nutrition recommendations as necessary. Nutrition Assessment:      Pt. nutritionally compromised AEB wounds. At risk for further nutrition compromise r/t increased nutrient needs for wound healing, underlying medical condition (hx:HTN, osteoarthritis). Malnutrition Assessment:  Malnutrition Status:  No malnutrition    Context:  Acute Illness     Findings of the 6 clinical characteristics of malnutrition:  Energy Intake:  No significant decrease in energy intake  Weight Loss:  No significant weight loss     Body Fat Loss:  No significant body fat loss     Muscle Mass Loss:  No significant muscle mass loss    Fluid Accumulation:  No significant fluid accumulation     Strength:  Not Performed    Estimated Daily Nutrient Needs:  Energy (kcal):  8011-0005 kcal/day (20-25 kcal/kg); Weight Used for Energy Requirements:  Current     Protein (g):  78-94 g/day (1.5-1.8 g/kg IBW); Weight Used for Protein Requirements:  Ideal        Fluid (ml/day):  2471-6450 ml/day; Method Used for Fluid Requirements:  1 ml/kcal      Nutrition Related Findings:       Pt. Report/Treatments/Miscellaneous: S/P subdural hematoma from a fall, 1/21. OR 2/24 for wound debridement and closure of wound. Spoke with patient at bedside this afternoon. Pt states likely going home today. Provided patient with Charly sample bag to take home upon discharge. Explained and educated on importance of maintaining appropriate protein intake to support wound healing.    GI Status: last BM 2/23, no abdominal discomfort at this time  Pertinent Labs: 2/24: Na 140, K 4.3, BUN 24, creatinine 0.9, glucose 98  Pertinent Meds: decadron, lisinopril, metoprolol tartrate, hydrodiuril, morphine, tramadol     Wounds:  Multiple,Surgical Incision,Open Wounds (2/23:

## 2022-02-25 NOTE — OP NOTE
[address] [id number]  RECORD OF OPERATION    Patient name: Meghan Stark  Medical Record Number: [id number]  Account Number: [de-identified]      Date of Procedure: 2/24/2022      Pre-operative Diagnosis:     · Right frontal wound dehiscence (about 3 cm from actual wound plan which was about  7 cm). Post-operative Diagnosis: The same     PROCEDURE:         · Right frontal Wound  debridement and washout. ·      Re-expolation of previous bur hole cover of previous craniotomy and replaced it with smaller plate. Anesthesia: General endotracheal     Surgeon: : Kush Sylvester MD   Assistant: Stephany Cervantes PA-C     Estimated Blood Loss: Minimal     Drains: None     Blood Transfusions: None      Complications: none immediately appreciated     Specimens:      · Several tissues  samples from the wound were sent. Intraoperative findings:     · Dehiscence of right frontal wound with exposed hardware. Some of the subcutaneous Vicryl sutures came out. Indications for Procedure: This is a 66year old who underwent surgery on 1/21/2022  (right-sided mini craniectomy for evacuation of acute subdural hemorrhage). Patient did well after the surgery, however patient presented to the ER yesterday after she was medicated by Dr. Bird Sharp (infectious diseases /wound care) because of concern of wound dehiscence and possible infection, as well as exposed of hardware(bur hole cover). Apparently, patient was picking at her surgical site that it is opened. There is no history of fever or obvious discharge from the wound.     -Based on patient clinical presentation and after thoroughly discussion with infectious disease, we recommended for the patient a surgical intervention and in the form of right frontal wound debridement, washout, hardware and closed. -This recommended surgical intervention will discussed in detail with patient and her family, as well as the patient the risk and benefit.   All questions and concern were addressed and answered. Patient and her family agreed to proceed with the above recommended treatment plan and the surgical consent was signed. PROCEDURE:      Patient was brought to the OR. She was intubated by Anesthesia staff while in supine position. Time out was performed. Next, the  right frontal was prepped and draped in the usual neurosurgical fashion. Excisional debridement was performed for the wound  (anterior aspect about 4 cm). All the exposed Vicryl sutures were removed. The exposed hardware (bur hole cover) was removed and replaced with smaller plate. After that, meticulous debridement for the wound were carried out . Then, we closed the skin incision in the standard fashion  using 1 layer nylon sutures. Sterile dressing was applied and the patient was then extubated and taken to physician anesthesia recovery in satisfactory condition. Patient tolerated the surgery very well without intraoperative complications.        Coco Devine MD, MD  Electronically signed by me on 2/25/2022 at 10:58 AM

## 2022-02-25 NOTE — PROGRESS NOTES
AVS was printed and discussed with the patient and her daughter is bedside. All questions and concerns were answered. Peripheral IV was removed and extra dressing supplies were given to patient prior discharging. Patient was discharged home and family is providing transportation. Patient was escorted with MISSY Pendleton via wheeled chair. Patient was successfully discharged home.

## 2022-02-25 NOTE — DISCHARGE SUMMARY
Discharge Summary    Patient:  Yuli Boyd  YOB: 1940    MRN: [id number]   Acct: [de-identified]    Primary Care Physician: Megan Kirby MD    Admit date:  2/23/2022    Discharge date:   2/25/2022      Discharge Diagnoses:   Nonhealing surgical wound, initial encounter  Principal Problem:    Nonhealing surgical wound, initial encounter  Active Problems:    Superficial postoperative wound infection    Disruption of external surgical wound  Resolved Problems:    * No resolved hospital problems. *   Dehiscent scalp wound: S/p evacuation of subdural hematoma on 1/21 with Dr. Crow Vickers. Patient seen in wound care clinic 2-3 days ago, Dr. Eden Michael recommended I&D and closure with possible removal of hardware. Patient has had successful neurosurgical irrigation and closure of wound and no perioperative or postoperative complications stable clinically postoperatively. 2. Essential HTN: Resume home lisinopril, metoprolol, hctz.  Monitor closely for uncontrolled htn blood pressures within adequate parameters at this time        Admitted for: (HPI) hardware infection    Hospital Course: No acute complications overnight seen by infectious disease for wound and wound dehiscence NX sternal exposed hardware patient underwent successful neurosurgical intervention today this is day 1 of Ancef placed on the patient by infectious disease to help prevent secondary infection patient has had successful incisional closure and irrigation of the wound, patient in her previous hospital stay had a urinary tract infection with E. coli will review the chart to see if she has had a urinalysis, her blood pressures thus far have been within adequate limits with a peak systolic blood pressure of 137 mmHg, patient has received IV Ancef in previous 36 hours, and will be discharged on oral cephalexin the patient has been seen by infectious disease and by neurosurgery both of which have cleared her for discharge she may continue her antihypertensive medications and follow-up with neurosurgery at time aside to have sutures removed and wound examined. Consultants:  Patient Care Team:  Shaheen Garza MD as PCP - Trudi Fuentes MD as PCP - [facility] Provider    Discharge Medications:       Medication List      START taking these medications    ondansetron 4 MG disintegrating tablet  Commonly known as: ZOFRAN-ODT  Take 1 tablet by mouth every 8 hours as needed for Nausea or Vomiting        CHANGE how you take these medications    * cephALEXin 500 MG capsule  Commonly known as: KEFLEX  Take 1 capsule by mouth 2 times daily for 7 days  What changed: Another medication with the same name was added. Make sure you understand how and when to take each. * cephALEXin 500 MG capsule  Commonly known as: KEFLEX  Take 1 capsule by mouth 3 times daily for 7 days  What changed: You were already taking a medication with the same name, and this prescription was added. Make sure you understand how and when to take each. * This list has 2 medication(s) that are the same as other medications prescribed for you. Read the directions carefully, and ask your doctor or other care provider to review them with you.             CONTINUE taking these medications    hydroCHLOROthiazide 12.5 MG tablet  Commonly known as: HYDRODIURIL  Take 1 tablet by mouth daily     lisinopril 40 MG tablet  Commonly known as: PRINIVIL;ZESTRIL  Take 1 tablet by mouth daily     metoprolol tartrate 50 MG tablet  Commonly known as: LOPRESSOR  TAKE 1 TABLET BY MOUTH TWICE DAILY     TYLENOL PM EXTRA STRENGTH PO     vitamin C 500 MG tablet  Commonly known as: ASCORBIC ACID     zinc 50 MG Tabs tablet           Where to Get Your Medications      These medications [facility] [place] [id number]    Phone: [phone]   · cephALEXin 500 MG capsule  · ondansetron 4 MG disintegrating tablet           Physical Exam:    Vitals:  Vitals:    02/24/22 2015 02/25/22 0000 02/25/22 0405 02/25/22 0833   BP: (!) 130/56 118/65 136/62 118/71   Pulse: 63 67 63 63   Resp: 16 16 18 18   Temp: 98.1 °F (36.7 °C) 97.5 °F (36.4 °C) 98.2 °F (36.8 °C) 98.2 °F (36.8 °C)   TempSrc: Oral Oral Oral Oral   SpO2: 95% 95% 95% 94%   Weight:       Height:         Weight: Weight: 173 lb 9.6 oz (78.7 kg)     24 hour intake/output:    Intake/Output Summary (Last 24 hours) at 2/25/2022 1503  Last data filed at 2/24/2022 2249  Gross per 24 hour   Intake 806 ml   Output 650 ml   Net 156 ml       General appearance - alert, awake appears to be in no acute distress  HEENT: Atraumatic normocephalic, no JVD. Trachea midline. Postoperative dressing in place patient was examined post procedurally she is somnolent but interactive awake  Chest - Bilateral air entry, no wheezes, crackles or rhonchi  Cardiovascular - S1S2 RRR, no murmurs or gallops  Abdomen - Soft non tender non distended, normoactive bowel sounds   Neurological - non focal, no neurological deficits noted  Integumentary - Skin color, texture, turgor normal. No Rashes or lesions  Musculoskeletal -Full ROM, No clubbing or cyanosis  Extremities: No peripheral edema  Procedures:  Neurosurgical intervention    Diagnostic Test:  na    Radiology reports as per the Radiologist  Radiology:  CT HEAD WO CONTRAST    Result Date: 2/21/2022  PROCEDURE: CT HEAD WO CONTRAST CLINICAL INFORMATION: Subdural hematoma ([address] COMPARISON: CT scan of the brain dated 27th of January 2022. TECHNIQUE: Noncontrast 5 mm axial images were obtained through the brain. Sagittal and coronal reconstructions were obtained. All CT scans at this facility use dose modulation, iterative reconstruction, and/or weight-based dosing when appropriate to reduce radiation dose to as low as reasonably achievable.  FINDINGS: There are postoperative changes involving the right frontal  calvarium. There has been almost complete resolution of the previously noted extra-axial hemorrhage, air and fluid over the right frontal and temporal lobes There is no hydrocephalus, midline shift or mass effect. There is diminished attenuation in the white matter most likely representing ischemic changes. There is calcification in the cavernous segments of both internal carotid arteries There is minimal mucosal thickening in ethmoid air cells bilaterally. The remaining paranasal sinuses and mastoid air cells are normally aerated. There is no other suspicious calvarial abnormality. 1. Significant interval improvement since previous study dated 27 January 2022 with near complete resolution of the previously noted extra-axial hemorrhage, air and fluid over the right frontal and temporal lobes. 2. Probable ischemic changes in the white matter. 3. Calcification in the cavernous segments of both internal carotid arteries. 4.. Minimal mucosal thickening in ethmoid air cells bilaterally. **This report has been created using voice recognition software. It may contain minor errors which are inherent in voice recognition technology. ** Final report electronically signed by DR Mono Gerber on 2/21/2022 1:48 PM    CT HEAD WO CONTRAST    Result Date: 1/27/2022  PROCEDURE: CT HEAD WO CONTRAST CLINICAL INFORMATION: Subdural hematoma ([address] Follow-up. COMPARISON: Head CT 1/23/2022. TECHNIQUE: Noncontrast 5 mm axial images were obtained through the brain. Sagittal and coronal reconstructions were obtained. All CT scans at this facility use dose modulation, iterative reconstruction, and/or weight-based dosing when appropriate to reduce radiation dose to as low as reasonably achievable. FINDINGS: The patient has postsurgical changes from her recent right frontal craniotomy. There are overlying skin scalp staples. Deep to this, there is decreasing an improving pneumocephalus.  There is a small residual mixed attenuation subdural collection measuring 5 mm. There is a small amount of subdural hemorrhage along the right tentorium. There is no new hemorrhage. There is very slight midline shift to the left. There is no hydrocephalus. There is a stable mild/moderate amount of patchy abnormal low attenuation in the white matter the brain suggesting chronic small vessel ischemic changes. The paranasal sinuses and mastoid air cells are normally aerated. There is no suspicious calvarial abnormality. Continued improvement in the appearance of the right-sided subdural collection. **This report has been created using voice recognition software. It may contain minor errors which are inherent in voice recognition technology. ** Final report electronically signed by Dr. Marisela Gonzalez on 1/27/2022 1:23 PM      Results for orders placed or performed during the hospital encounter of 02/23/22   Culture, Anaerobic and Aerobic    Specimen: Scalp; Tissue   Result Value Ref Range    Aerobic Culture No growth-preliminary      Gram Stain Result       No segmented neutrophils observed. Rare epithelial cells observed. Few gram positive bacilli. Culture, Anaerobic and Aerobic    Specimen: Scalp; Hardware   Result Value Ref Range    Aerobic Culture No growth-preliminary      Gram Stain Result       Rare segmented neutrophils observed. Rare epithelial cells observed. No bacteria seen. CBC with Auto Differential   Result Value Ref Range    WBC 6.5 4.8 - 10.8 thou/mm3    RBC 4.20 4. 20 - 5.40 mill/mm3    Hemoglobin 12.9 12.0 - 16.0 gm/dl    Hematocrit 38.5 37.0 - 47.0 %    MCV 91.7 81.0 - 99.0 fL    MCH 30.7 26.0 - 33.0 pg    MCHC 33.5 32.2 - 35.5 gm/dl    RDW-CV 12.9 11.5 - 14.5 %    RDW-SD 42.5 35.0 - 45.0 fL    Platelets 659 450 - 450 thou/mm3    MPV 11.0 9.4 - 12.4 fL    Seg Neutrophils 54.7 %    Lymphocytes 26.6 %    Monocytes 13.0 %    Eosinophils 4.3 %    Basophils 0.9 %    Immature Granulocytes 0.5 %    Segs Absolute 3.6 1.8 - 7.7 thou/mm3 Lymphocytes Absolute 1.7 1.0 - 4.8 thou/mm3    Monocytes Absolute 0.8 0.4 - 1.3 thou/mm3    Eosinophils Absolute 0.3 0.0 - 0.4 thou/mm3    Basophils Absolute 0.1 0.0 - 0.1 thou/mm3    Immature Grans (Abs) 0.03 0.00 - 0.07 thou/mm3    nRBC 0 /100 wbc   Basic Metabolic Panel w/ Reflex to MG   Result Value Ref Range    Sodium 139 135 - 145 meq/L    Potassium reflex Magnesium 4.0 3.5 - 5.2 meq/L    Chloride 102 98 - 111 meq/L    CO2 23 23 - 33 meq/L    Glucose 99 70 - 108 mg/dL    BUN 27 (H) 7 - 22 mg/dL    CREATININE 1.1 0.4 - 1.2 mg/dL    Calcium 9.6 8.5 - 10.5 mg/dL   Anion Gap   Result Value Ref Range    Anion Gap 14.0 8.0 - 16.0 meq/L   Glomerular Filtration Rate, Estimated   Result Value Ref Range    Est, Glom Filt Rate 47 (A) ml/min/1.73m2   Osmolality   Result Value Ref Range    Osmolality Calc 282.7 275.0 - 300.0 mOsmol/kg   Basic Metabolic Panel w/ Reflex to MG   Result Value Ref Range    Sodium 140 135 - 145 meq/L    Potassium reflex Magnesium 4.3 3.5 - 5.2 meq/L    Chloride 107 98 - 111 meq/L    CO2 22 (L) 23 - 33 meq/L    Glucose 98 70 - 108 mg/dL    BUN 24 (H) 7 - 22 mg/dL    CREATININE 0.9 0.4 - 1.2 mg/dL    Calcium 9.0 8.5 - 10.5 mg/dL   CBC with Auto Differential   Result Value Ref Range    WBC 5.5 4.8 - 10.8 thou/mm3    RBC 3.94 (L) 4.20 - 5.40 mill/mm3    Hemoglobin 11.9 (L) 12.0 - 16.0 gm/dl    Hematocrit 36.0 (L) 37.0 - 47.0 %    MCV 91.4 81.0 - 99.0 fL    MCH 30.2 26.0 - 33.0 pg    MCHC 33.1 32.2 - 35.5 gm/dl    RDW-CV 12.8 11.5 - 14.5 %    RDW-SD 42.5 35.0 - 45.0 fL    Platelets 221 833 - 720 thou/mm3    MPV 10.7 9.4 - 12.4 fL    Seg Neutrophils 52.3 %    Lymphocytes 28.5 %    Monocytes 12.5 %    Eosinophils 5.4 %    Basophils 0.9 %    Immature Granulocytes 0.4 %    Segs Absolute 2.9 1.8 - 7.7 thou/mm3    Lymphocytes Absolute 1.6 1.0 - 4.8 thou/mm3    Monocytes Absolute 0.7 0.4 - 1.3 thou/mm3    Eosinophils Absolute 0.3 0.0 - 0.4 thou/mm3    Basophils Absolute 0.0 0.0 - 0.1 thou/mm3    Immature Grans (Abs) 0.02 0.00 - 0.07 thou/mm3    nRBC 0 /100 wbc   Anion Gap   Result Value Ref Range    Anion Gap 11.0 8.0 - 16.0 meq/L   Glomerular Filtration Rate, Estimated   Result Value Ref Range    Est, Glom Filt Rate 60 (A) ml/min/1.73m2   CBC auto differential   Result Value Ref Range    WBC 9.3 4.8 - 10.8 thou/mm3    RBC 3.87 (L) 4.20 - 5.40 mill/mm3    Hemoglobin 11.6 (L) 12.0 - 16.0 gm/dl    Hematocrit 35.9 (L) 37.0 - 47.0 %    MCV 92.8 81.0 - 99.0 fL    MCH 30.0 26.0 - 33.0 pg    MCHC 32.3 32.2 - 35.5 gm/dl    RDW-CV 12.6 11.5 - 14.5 %    RDW-SD 42.7 35.0 - 45.0 fL    Platelets 490 221 - 327 thou/mm3    MPV 10.9 9.4 - 12.4 fL    Seg Neutrophils 88.1 %    Lymphocytes 9.1 %    Monocytes 2.1 %    Eosinophils 0.0 %    Basophils 0.1 %    Immature Granulocytes 0.6 %    Segs Absolute 8.2 (H) 1.8 - 7.7 thou/mm3    Lymphocytes Absolute 0.8 (L) 1.0 - 4.8 thou/mm3    Monocytes Absolute 0.2 (L) 0.4 - 1.3 thou/mm3    Eosinophils Absolute 0.0 0.0 - 0.4 thou/mm3    Basophils Absolute 0.0 0.0 - 0.1 thou/mm3    Immature Grans (Abs) 0.06 0.00 - 0.07 thou/mm3    nRBC 0 /100 wbc       Diet:  ADULT DIET; Regular  ADULT ORAL NUTRITION SUPPLEMENT; Breakfast, Dinner;  Wound Healing Oral Supplement    Activity:  Activity as tolerated (Patient may move about with assist as indicated or with supervision.)    Follow-up:  in 1 weeks with Jes Pro MD,  in the next few days, please follow-up with neurosurgery at present time for removal of your sutures    Disposition: home    Condition: Stable      Time Spent: 35 minutes    Electronically signed by Yessi Alvarado MD on 2/25/2022 at 3:03 PM    Discharging Hospitalist

## 2022-02-28 ENCOUNTER — CARE COORDINATION (OUTPATIENT)
Dept: CASE MANAGEMENT | Age: 82
End: 2022-02-28

## 2022-02-28 DIAGNOSIS — T81.31XA DISRUPTION OF EXTERNAL SURGICAL WOUND, INITIAL ENCOUNTER: Primary | ICD-10-CM

## 2022-02-28 PROCEDURE — 1111F DSCHRG MED/CURRENT MED MERGE: CPT | Performed by: FAMILY MEDICINE

## 2022-02-28 NOTE — CARE COORDINATION
Jad 45 Transitions Initial Follow Up Call    Call within 2 business days of discharge: Yes    Patient: Wily Pitt Patient : 1940   MRN: [id number]  Reason for Admission:  Nonhealing surgical wound, initial encounter  Principal Problem:    Nonhealing surgical wound, initial encounter  Active Problems:    Superficial postoperative wound infection    Disruption of external surgical wound  Discharge Date: 22 RARS: Readmission Risk Score: 15.9 ( )      Last Discharge [facility]       Complaint Diagnosis Description Type Department Provider    22 Post-op Problem Disruption of external surgical wound, initial encounter ED to Hosp-Admission (Discharged) (ADMITTED) JAYE 5K Ike Sheppard MD; Rosa Aden,... Spoke with: Daughter Audie Garcia, reports Barrie Patel is doing well at home, just went to take a little nap before having some company over today. They have new RX Cephlaxin and Zofran. Denies need for continued home care; daughter is retired RN and staying with her to monitor. BP was 141/68 today. Checking at least once daily. Granddaughter is a physician and also monitoring patient. Denies any needs at this time. Reviewed f/u appts. No further questions or concerns    Transitions of Care Initial Call    Was this an external facility discharge? No Discharge Facility: [facility] to be reviewed by the provider   Additional needs identified to be addressed with provider: No  none             Method of communication with provider : none      Advance Care Planning:   Does patient have an Advance Directive: reviewed and current. Was this a readmission? Yes  Patient stated reason for admission:  Wound dehisence  Patients top risk factors for readmission: medical condition-Craniotomy ; wound dehisence, HTN     Care Transition Nurse (CTN) contacted the family by telephone to perform post hospital discharge assessment. Verified name and  with family as identifiers. Provided introduction to self, and explanation of the CTN role. CTN reviewed discharge instructions, medical action plan and red flags with family who verbalized understanding. Family given an opportunity to ask questions and does not have any further questions or concerns at this time. Were discharge instructions available to patient? Yes. Reviewed appropriate site of care based on symptoms and resources available to patient including: PCP and Specialist. The family agrees to contact the PCP office for questions related to their healthcare. Medication reconciliation was performed with family, who verbalizes understanding of administration of home medications. Advised obtaining a 90-day supply of all daily and as-needed medications. Covid Risk Education     Educated patient about risk for severe COVID-19 due to risk factors according to CDC guidelines. CTN reviewed discharge instructions, medical action plan and red flag symptoms with the family who verbalized understanding. Discussed COVID vaccination status: No. Education provided on COVID-19 vaccination as appropriate. Discussed exposure protocols and quarantine with CDC Guidelines. Family was given an opportunity to verbalize any questions and concerns and agrees to contact CTN or health care provider for questions related to their healthcare. Reviewed and educated family on any new and changed medications related to discharge diagnosis. Was patient discharged with a pulse oximeter? No Discussed and confirmed pulse oximeter discharge instructions and when to notify provider or seek emergency care. CTN provided contact information. Plan for follow-up call in 5-7 days based on severity of symptoms and risk factors.   Plan for next call: symptom management-blood pressure reading / wound on scalp how is it looking  follow up appointment-Wound appt with Dr. Traci Jean: Beckie Holliday services provided:  Obtained and reviewed discharge summary and/or continuity of care documents  Education of patient/family/caregiver/guardian to support self-management-completed  Assessment and support for treatment adherence and medication management-completed    Care Transitions 24 Hour Call    Do you have any ongoing symptoms?: No  Do you have a copy of your discharge instructions?: Yes  Do you have all of your prescriptions and are they filled?: Yes  Have you been contacted by a [address] [place] [address] Avenue?: No  Have you scheduled your follow up appointment?: No  Were you discharged with any Home Care or Post Acute Services: No  Do you feel like you have everything you need to keep you well at home?: Yes  Care Transitions Interventions         Follow Up  Future Appointments   Date Time Provider Nataliya Monaco   3/9/2022  8:15 AM Bradly Bhandari 149   3/10/2022 11:00 AM Shanice Conner, [address] [place] [address]   3/16/2022  1:45 PM FLAVIO Marin OT   5/9/2022  8:45 AM MD Kassandra Hernandez  [place] - Sandrita Orozco RN

## 2022-02-28 NOTE — CARE COORDINATION
Late entry, patient discharged to home with no services added/requested. 2/28/22, 7:32 AM EST    Patient goals/plan/ treatment preferences discussed by  and . Patient goals/plan/ treatment preferences reviewed with patient/ family. Patient/ family verbalize understanding of discharge plan and are in agreement with goal/plan/treatment preferences. Understanding was demonstrated using the teach back method. AVS provided by RN at time of discharge, which includes all necessary medical information pertaining to the patients current course of illness, treatment, post-discharge goals of care, and treatment preferences. IMM Letter  IMM Letter given to Patient/Family/Significant other/Guardian/POA/by[de-identified] Leslie-.   IMM Letter date given[de-identified] 02/25/22  IMM Letter time given[de-identified] 0  Observation Status Letter date given[de-identified] 02/23/22  Observation Status Letter time given[de-identified] 6248  Observation Status Letter given to Patient/Family/Significant other/Guardian/POA/by[de-identified] Staff

## 2022-03-01 LAB
AEROBIC CULTURE: ABNORMAL
AEROBIC CULTURE: ABNORMAL
AEROBIC CULTURE: NORMAL
ANAEROBIC CULTURE: ABNORMAL
ANAEROBIC CULTURE: NORMAL
GRAM STAIN RESULT: ABNORMAL
GRAM STAIN RESULT: NORMAL
ORGANISM: ABNORMAL

## 2022-03-07 ENCOUNTER — CARE COORDINATION (OUTPATIENT)
Dept: CASE MANAGEMENT | Age: 82
End: 2022-03-07

## 2022-03-07 NOTE — CARE COORDINATION
Jad 45 Transitions Follow Up Call    3/7/2022    Patient: Franco Ojeda  Patient : 1940   MRN: [id number]  Reason for Admission: dehiscence scalp wound  Discharge Date: 22 RARS: Readmission Risk Score: 15.9 ( )         Spoke with: Meri Reyes (daughter) today and she says Raji Varner is doing really well. She is active around the house, eating, drinking & sleeping well. QZ=662/70. Denies fever/ Wound is well approximated without redness or drainage. Appt. W/ Dr. Mono Campo  3/10/22; neurosurgery 3/10/22 for possible staple removal. No other concerns voiced at this time. Meri Reyes said ok to call Raji Varner directly. Care Transitions Subsequent and Final Call    Subsequent and Final Calls  Do you have any ongoing symptoms?: No  Have your medications changed?: No  Do you have any questions related to your medications?: No  Do you currently have any active services?: No  Do you have any needs or concerns that I can assist you with?: No  Identified Barriers: Lack of Education  Care Transitions Interventions  No Identified Needs  Other Interventions:         Care Transitions Follow Up Call    Needs to be reviewed by the provider   Additional needs identified to be addressed with provider: No  none             Method of communication with provider : none      Care Transition Nurse (CTN) contacted the family by telephone to follow up after admission on 22. Verified name and  with family as identifiers. Addressed changes since last contact: none  Discussed follow-up appointments. If no appointment was previously scheduled, appointment scheduling offered: Yes. Is follow up appointment scheduled within 7 days of discharge? Yes. Advance Care Planning:   Does patient have an Advance Directive: on file. CTN reviewed discharge instructions, medical action plan and red flags with family and discussed any barriers to care and/or understanding of plan of care after discharge.  Discussed appropriate site of care based on symptoms and resources available to patient including: PCP and Specialist. The family agrees to contact the PCP office for questions related to their healthcare. Patients top risk factors for readmission: lack of knowledge about disease  medical condition-R frontal scalp wound w/dehiscence, HTN  Interventions to address risk factors: Scheduled appointment with Specialist-ID 3/9; neurosurgery 3/10      Non-[facility] follow up appointment(s):     CTN provided contact information for future needs. Plan for follow-up call in 5-7 days based on severity of symptoms and risk factors.   Plan for next call: symptom management-scalp wound?, BP        Follow Up  Future Appointments   Date Time Provider Nataliya Monaco   3/9/2022  8:15 AM Bradly Gonzalez 149   3/10/2022 11:00 AM Devyn Wick, APRN - [address] [place] [email or web address]   3/16/2022  1:45 PM FLAVIO Rodríguez OT, AM [facility] \[facility]\""   5/9/2022  8:45 AM [facility] Tito Wilson RN

## 2022-03-09 ENCOUNTER — HOSPITAL ENCOUNTER (OUTPATIENT)
Dept: WOUND CARE | Age: 82
Discharge: HOME | End: 2022-03-09
Payer: MEDICARE

## 2022-03-09 VITALS
HEART RATE: 52 BPM | OXYGEN SATURATION: 99 % | RESPIRATION RATE: 18 BRPM | TEMPERATURE: 97 F | SYSTOLIC BLOOD PRESSURE: 138 MMHG | DIASTOLIC BLOOD PRESSURE: 61 MMHG

## 2022-03-09 PROCEDURE — 99213 OFFICE O/P EST LOW 20 MIN: CPT

## 2022-03-09 PROCEDURE — 99212 OFFICE O/P EST SF 10 MIN: CPT

## 2022-03-09 NOTE — PROGRESS NOTES
[facility]          Progress Note and Procedure Note      Off [facility] [address], [facility] RECORD NUMBER:  [id number]  AGE: 78 y.o. GENDER: female  : 1940  EPISODE DATE:  3/9/2022    Subjective:     SUBJECTIVE     Chief Complaint   Patient presents with    Wound Check     scalp           HISTORY OF PRESENT ILLNESS      Ambreen Fang is a 78 y.o. female who presents today for wound/ulcer evaluation. She had debridement of her scalp wound after she had a wound dehiscence following craniotomy.   The wound looks very good she has sutures and staples she has no fever or chills the wound is healing nicely     PAST MEDICAL HISTORY         Diagnosis Date    Arthritis     Hypertension     Subdural hematoma ([address] 2022         PAST SURGICAL HISTORY     Past Surgical History:   Procedure Laterality Date    CATHETER REMOVAL Bilateral 2014    CRANIOPLASTY Right 2022    RIGHT FRONTAL WOUND DEBRIDEMENT AND WASHOUT performed by Roe Jerome MD at [address] [facility] Right 2022    RIGHT SIDED CRANIOTOMY FOR SUBDURAL HEMATOMA performed by Roe Jerome MD at [address] [place] [address]  2018    FOOT SURGERY Left     KNEE SURGERY Right 2014    total    [place] BX OF BREAST, NEEDLE CORE, IMAGE GUIDE Left     benign    [place] OFFICE/OUTPT VISIT,PROCEDURE ONLY Left 2018    ORIF LEFT ELBOW performed by Lisa Hodge MD at [address] History   Problem Relation Age of Onset    Cancer Mother         lymphoma    Cancer Father         prostate and bone    Heart Disease Sister     Heart Disease Brother     Atrial Fibrillation Daughter     Hypertension Daughter     Elevated Lipids Daughter     Breast Cancer Sister 78     SOCIAL HISTORY       Social History     Tobacco Use    Smoking status: Never Smoker    Smokeless tobacco: Never Used   Vaping Use    Vaping Use: Never used   Substance Use Topics    Alcohol use: Not Currently     Comment: 02/21/22 173 lb 8 oz (78.7 kg)   02/17/22 173 lb 9.6 oz (78.7 kg)        She has healing scalp wound sutures and staples were noted there is no redness or drainage overall looks better    Wound 02/23/22 Back Right;Upper (Active)   Wound Image   03/09/22 0823   Wound Etiology Non-Healing Surgical 02/25/22 1225   Dressing Status Clean; Intact;Dry 02/25/22 1225   Wound Cleansed Betadine/povidone iodine 02/24/22 2015   Dressing/Treatment Betadine swabs/povidone iodine 02/24/22 2015   Wound Length (cm) 0 cm 03/09/22 0823   Wound Width (cm) 0 cm 03/09/22 0823   Wound Depth (cm) 0 cm 03/09/22 0823   Wound Surface Area (cm^2) 0 cm^2 03/09/22 0823   Change in Wound Size % (l*w) 100 03/09/22 0823   Wound Volume (cm^3) 0 cm^3 03/09/22 0823   Wound Healing % 100 03/09/22 0823   Wound Assessment Epithelialization;Slough; Devitalized tissue 02/23/22 0826   Drainage Amount None 02/25/22 1225   Drainage Description Serosanguinous 02/23/22 0826   Odor None 02/25/22 1225   Kia-wound Assessment Dry/flaky; Blanchable erythema 02/23/22 1615   Margins Attached edges 02/23/22 1615   Number of days: 14         Assessment:   Postsurgical wound: Recommend to remove alternate staples and wait a week or 2 prior to removing all the staples as there is some tension on the proximal end of the wound. Patient will follow with neurosurgeon they will call as needed at the wound clinic. Patient Active Problem List   Diagnosis Code    Hypertension I10    Syncopal episodes R55    Primary osteoarthritis of right knee M17.11    Subdural hematoma ([address] S06.5X9A    Nonhealing surgical wound, initial encounter T81.89XA    Superficial postoperative wound infection T81.49XA    Disruption of external surgical wound T81. 31XA          Plan:     Patient examined and evaluated      Please see attached Discharge Instructions    Written patient dismissal instructions given to patient and signed by patient or POA.              Electronically signed by Marga Jaquez Debby Scanlon MD on 3/9/2022 at 8:45 AM

## 2022-03-09 NOTE — PLAN OF CARE
Problem: Wound:  Goal: Will show signs of wound healing; wound closure and no evidence of infection  Description: Will show signs of wound healing; wound closure and no evidence of infection  Outcome: Ongoing     Patient presents to wound clinic for back and upper head wounds. Back wounds healed. Head incision healing well. No s/s of infection noted today. See avs for discharge instructions. Follow up visit: Follow up as needed. Call with any concerns. Care plan reviewed with patient and family. Patient and family verbalize understanding of the plan of care and contribute to goal setting.

## 2022-03-10 ENCOUNTER — OFFICE VISIT (OUTPATIENT)
Dept: NEUROSURGERY | Age: 82
End: 2022-03-10

## 2022-03-10 VITALS
HEIGHT: 63 IN | HEART RATE: 56 BPM | WEIGHT: 173.5 LBS | BODY MASS INDEX: 30.74 KG/M2 | DIASTOLIC BLOOD PRESSURE: 66 MMHG | SYSTOLIC BLOOD PRESSURE: 132 MMHG

## 2022-03-10 DIAGNOSIS — S06.5XAA SUBDURAL HEMATOMA: Primary | ICD-10-CM

## 2022-03-10 PROCEDURE — 99024 POSTOP FOLLOW-UP VISIT: CPT

## 2022-03-10 RX ORDER — CHOLECALCIFEROL (VITAMIN D3) 125 MCG
5 CAPSULE ORAL NIGHTLY
COMMUNITY

## 2022-03-10 ASSESSMENT — ENCOUNTER SYMPTOMS
COLOR CHANGE: 0
BACK PAIN: 0
COUGH: 0
TROUBLE SWALLOWING: 0
SHORTNESS OF BREATH: 0
NAUSEA: 0
CONSTIPATION: 0

## 2022-03-10 NOTE — PROGRESS NOTES
Neurosurgery Follow up Note    Date:3/10/2022         Patient Bob Brothers     YOB: 1940     Age:81 y.o. Reason for Follow up: Follow up for hospital follow up visit      Chief Complaint:   Chief Complaint   Patient presents with    Follow-up     S/P Right frontal wound debridgement and washout        Pastora Erazo is a 78year old female who presents to the office today with her daughter. Patient presents ambulating without assistive device. Patient underwent surgery by Dr. Teresa Hewitt on 1/21/2022 for right-sided craniotomy with evacuation of right-sided subdural hemorrhage. Patient presented to the office on 2/20/2022 for wound drainage and pulsating incision. Patient presented to the hospital on 2/23/2022 for a dehiscence of scalp wound. Patient underwent right frontal wound debridement and washout of exposed hardware (the bur hole cover) and replaced with smaller plate on 1/09/1432. Patient denies headache, blurred vision, double vision, or weakness. Incision is flat, no drainage, no erythremia. Sutures and staples intact. Patient stated that she is following with Dr. Lucita Cortez wound care yesterday. Patient stated that she is to follow-up as needed with Dr. Lucita Cortez. Patient stated that she had an active in the time is looking forward to going out with friends this weekend. Review of Systems   Review of Systems   Constitutional: Negative for activity change, appetite change, fatigue and fever. HENT: Negative for trouble swallowing. Eyes: Negative for visual disturbance. Respiratory: Negative for cough and shortness of breath. Cardiovascular: Negative for chest pain. Gastrointestinal: Negative for constipation and nausea. Genitourinary: Negative for difficulty urinating. Musculoskeletal: Negative for back pain and gait problem. Skin: Negative for color change, rash and wound (incision- sutures and staples intact, no erthyema, no drainage). Neurological: Negative for dizziness, weakness, numbness and headaches. Psychiatric/Behavioral: Negative for confusion and sleep disturbance. The patient is not nervous/anxious. Medications     Current Outpatient Medications on File Prior to Visit   Medication Sig Dispense Refill    melatonin 5 MG TABS tablet Take 5 mg by mouth nightly      vitamin C (ASCORBIC ACID) 500 MG tablet Take 1,000 mg by mouth daily      zinc 50 MG TABS tablet Take 50 mg by mouth daily      diphenhydrAMINE-APAP, sleep, (TYLENOL PM EXTRA STRENGTH PO) Take 2 tablets by mouth nightly      hydroCHLOROthiazide (HYDRODIURIL) 12.5 MG tablet Take 1 tablet by mouth daily 90 tablet 0    metoprolol tartrate (LOPRESSOR) 50 MG tablet TAKE 1 TABLET BY MOUTH TWICE DAILY 180 tablet 1    lisinopril (PRINIVIL;ZESTRIL) 40 MG tablet Take 1 tablet by mouth daily 90 tablet 1     No current facility-administered medications on file prior to visit. Past History    Past Medical History:   has a past medical history of Arthritis, Hypertension, and Subdural hematoma ([address] Social History:   reports that she has never smoked. She has never used smokeless tobacco. She reports previous alcohol use. She reports that she does not use drugs. Family History:   Family History   Problem Relation Age of Onset   [facility] Cancer Mother         lymphoma    Cancer Father         prostate and bone    Heart Disease Sister     Heart Disease Brother     Atrial Fibrillation Daughter     Hypertension Daughter     Elevated Lipids Daughter     Breast Cancer Sister 78       Physical Examination      Vitals:  /66 (Site: Right Upper Arm, Position: Sitting, Cuff Size: Large Adult)   Pulse 56   Ht 5' 2.99\" (1.6 m)   Wt 173 lb 8 oz (78.7 kg)   LMP  (LMP Unknown)   BMI 30.74 kg/m²       Physical Exam  Constitutional:       Appearance: Normal appearance. Eyes:      Pupils: Pupils are equal, round, and reactive to light.    Cardiovascular:      Rate and Rhythm: Normal rate. Pulses: Normal pulses. Pulmonary:      Effort: Pulmonary effort is normal.   Abdominal:      General: Bowel sounds are normal.   Skin:     General: Skin is warm and dry. Findings: No erythema. Comments: Surgical incision flat, no drainage, erthyema,    Neurological:      Mental Status: She is alert and oriented to person, place, and time. Sensory: No sensory deficit. Motor: No weakness. Gait: Gait normal.   Psychiatric:         Mood and Affect: Mood normal.         Behavior: Behavior normal.         Thought Content: Thought content normal.         Judgment: Judgment normal.       Neurologic Exam     Mental Status   Oriented to person, place, and time. Cranial Nerves     CN III, IV, VI   Pupils are equal, round, and reactive to light. Imaging   Imaging last 30 days:  CT HEAD WO CONTRAST    Result Date: 2/21/2022   1. Significant interval improvement since previous study dated 27 January 2022 with near complete resolution of the previously noted extra-axial hemorrhage, air and fluid over the right frontal and temporal lobes. 2. Probable ischemic changes in the white matter. 3. Calcification in the cavernous segments of both internal carotid arteries. 4.. Minimal mucosal thickening in ethmoid air cells bilaterally. **This report has been created using voice recognition software. It may contain minor errors which are inherent in voice recognition technology. ** Final report electronically signed by DR Ava Fuller on 2/21/2022 1:48 PM         Assessment and Plan:          1. Hospital follow up visit for suture removal  2. Staples and Sutures removed   3. Maintain steri strips for 7 days. May shower 48 hours after sutures and staples removed  4. If steri strips are still on the skin in 7 days you may remove. 5. Call office if fever develops, increased drainage, redness,  Or pain   from surgical site  6. Fall precautions  7.  OT driving evaluation is scheduled  8. Follow up in 4 week(s). 9. All patient questions answered. Pt voiced understanding.  Patient instructed to call the office with any questions or concerns    Electronically signed by LEONEL Lu CNP on 3/10/22 at 11:04 AM EST

## 2022-03-14 ENCOUNTER — TELEPHONE (OUTPATIENT)
Dept: NEUROSURGERY | Age: 82
End: 2022-03-14

## 2022-03-14 ENCOUNTER — OFFICE VISIT (OUTPATIENT)
Dept: FAMILY MEDICINE CLINIC | Age: 82
End: 2022-03-14
Payer: MEDICARE

## 2022-03-14 ENCOUNTER — HOSPITAL ENCOUNTER (INPATIENT)
Age: 82
LOS: 8 days | Discharge: HOME | DRG: 904 | End: 2022-03-22
Attending: EMERGENCY MEDICINE | Admitting: FAMILY MEDICINE
Payer: MEDICARE

## 2022-03-14 VITALS
DIASTOLIC BLOOD PRESSURE: 82 MMHG | HEIGHT: 63 IN | BODY MASS INDEX: 30.65 KG/M2 | TEMPERATURE: 98.2 F | RESPIRATION RATE: 12 BRPM | SYSTOLIC BLOOD PRESSURE: 145 MMHG | WEIGHT: 173 LBS

## 2022-03-14 DIAGNOSIS — Z98.890 S/P CRANIOTOMY: ICD-10-CM

## 2022-03-14 DIAGNOSIS — T81.30XA WOUND DEHISCENCE: Primary | ICD-10-CM

## 2022-03-14 DIAGNOSIS — T81.89XA NONHEALING SURGICAL WOUND, INITIAL ENCOUNTER: ICD-10-CM

## 2022-03-14 DIAGNOSIS — T81.31XD DEHISCENCE OF OPERATIVE WOUND, SUBSEQUENT ENCOUNTER: ICD-10-CM

## 2022-03-14 DIAGNOSIS — I10 ESSENTIAL HYPERTENSION: ICD-10-CM

## 2022-03-14 DIAGNOSIS — T81.31XA DEHISCENCE OF OPERATIVE WOUND, INITIAL ENCOUNTER: Primary | ICD-10-CM

## 2022-03-14 LAB
ANION GAP SERPL CALCULATED.3IONS-SCNC: 13 MEQ/L (ref 8–16)
BUN BLDV-MCNC: 37 MG/DL (ref 7–22)
CALCIUM SERPL-MCNC: 9.4 MG/DL (ref 8.5–10.5)
CHLORIDE BLD-SCNC: 106 MEQ/L (ref 98–111)
CO2: 20 MEQ/L (ref 23–33)
CREAT SERPL-MCNC: 0.8 MG/DL (ref 0.4–1.2)
ERYTHROCYTE [DISTWIDTH] IN BLOOD BY AUTOMATED COUNT: 13.2 % (ref 11.5–14.5)
ERYTHROCYTE [DISTWIDTH] IN BLOOD BY AUTOMATED COUNT: 44.4 FL (ref 35–45)
GFR SERPL CREATININE-BSD FRML MDRD: 68 ML/MIN/1.73M2
GLUCOSE BLD-MCNC: 91 MG/DL (ref 70–108)
HCT VFR BLD CALC: 35.9 % (ref 37–47)
HEMOGLOBIN: 11.8 GM/DL (ref 12–16)
MCH RBC QN AUTO: 30.4 PG (ref 26–33)
MCHC RBC AUTO-ENTMCNC: 32.9 GM/DL (ref 32.2–35.5)
MCV RBC AUTO: 92.5 FL (ref 81–99)
PLATELET # BLD: 187 THOU/MM3 (ref 130–400)
PMV BLD AUTO: 11.1 FL (ref 9.4–12.4)
POTASSIUM REFLEX MAGNESIUM: 4.2 MEQ/L (ref 3.5–5.2)
RBC # BLD: 3.88 MILL/MM3 (ref 4.2–5.4)
SODIUM BLD-SCNC: 139 MEQ/L (ref 135–145)
WBC # BLD: 6.2 THOU/MM3 (ref 4.8–10.8)

## 2022-03-14 PROCEDURE — 99282 EMERGENCY DEPT VISIT SF MDM: CPT

## 2022-03-14 PROCEDURE — 99214 OFFICE O/P EST MOD 30 MIN: CPT | Performed by: FAMILY MEDICINE

## 2022-03-14 PROCEDURE — 94761 N-INVAS EAR/PLS OXIMETRY MLT: CPT

## 2022-03-14 PROCEDURE — 2580000003 HC RX 258

## 2022-03-14 PROCEDURE — G8399 PT W/DXA RESULTS DOCUMENT: HCPCS | Performed by: FAMILY MEDICINE

## 2022-03-14 PROCEDURE — 1036F TOBACCO NON-USER: CPT | Performed by: FAMILY MEDICINE

## 2022-03-14 PROCEDURE — G8482 FLU IMMUNIZE ORDER/ADMIN: HCPCS | Performed by: FAMILY MEDICINE

## 2022-03-14 PROCEDURE — G8427 DOCREV CUR MEDS BY ELIG CLIN: HCPCS | Performed by: FAMILY MEDICINE

## 2022-03-14 PROCEDURE — 1111F DSCHRG MED/CURRENT MED MERGE: CPT | Performed by: FAMILY MEDICINE

## 2022-03-14 PROCEDURE — 1200000000 HC SEMI PRIVATE

## 2022-03-14 PROCEDURE — 1123F ACP DISCUSS/DSCN MKR DOCD: CPT | Performed by: FAMILY MEDICINE

## 2022-03-14 PROCEDURE — 6370000000 HC RX 637 (ALT 250 FOR IP)

## 2022-03-14 PROCEDURE — 1090F PRES/ABSN URINE INCON ASSESS: CPT | Performed by: FAMILY MEDICINE

## 2022-03-14 PROCEDURE — G8417 CALC BMI ABV UP PARAM F/U: HCPCS | Performed by: FAMILY MEDICINE

## 2022-03-14 PROCEDURE — 4040F PNEUMOC VAC/ADMIN/RCVD: CPT | Performed by: FAMILY MEDICINE

## 2022-03-14 PROCEDURE — 80048 BASIC METABOLIC PNL TOTAL CA: CPT

## 2022-03-14 PROCEDURE — 85027 COMPLETE CBC AUTOMATED: CPT

## 2022-03-14 PROCEDURE — 2580000003 HC RX 258: Performed by: INTERNAL MEDICINE

## 2022-03-14 PROCEDURE — 36415 COLL VENOUS BLD VENIPUNCTURE: CPT

## 2022-03-14 PROCEDURE — 6360000002 HC RX W HCPCS: Performed by: INTERNAL MEDICINE

## 2022-03-14 PROCEDURE — 99222 1ST HOSP IP/OBS MODERATE 55: CPT | Performed by: FAMILY MEDICINE

## 2022-03-14 RX ORDER — LANOLIN ALCOHOL/MO/W.PET/CERES
6 CREAM (GRAM) TOPICAL NIGHTLY
Status: DISCONTINUED | OUTPATIENT
Start: 2022-03-14 | End: 2022-03-22 | Stop reason: HOSPADM

## 2022-03-14 RX ORDER — METOPROLOL TARTRATE 50 MG/1
50 TABLET, FILM COATED ORAL 2 TIMES DAILY
Status: DISCONTINUED | OUTPATIENT
Start: 2022-03-14 | End: 2022-03-22 | Stop reason: HOSPADM

## 2022-03-14 RX ORDER — SODIUM CHLORIDE 9 MG/ML
25 INJECTION, SOLUTION INTRAVENOUS PRN
Status: DISCONTINUED | OUTPATIENT
Start: 2022-03-14 | End: 2022-03-19 | Stop reason: SDUPTHER

## 2022-03-14 RX ORDER — ONDANSETRON 4 MG/1
4 TABLET, ORALLY DISINTEGRATING ORAL EVERY 8 HOURS PRN
Status: DISCONTINUED | OUTPATIENT
Start: 2022-03-14 | End: 2022-03-22 | Stop reason: HOSPADM

## 2022-03-14 RX ORDER — HYDROCHLOROTHIAZIDE 25 MG/1
12.5 TABLET ORAL DAILY
Status: DISCONTINUED | OUTPATIENT
Start: 2022-03-15 | End: 2022-03-22 | Stop reason: HOSPADM

## 2022-03-14 RX ORDER — ACETAMINOPHEN 650 MG/1
650 SUPPOSITORY RECTAL EVERY 6 HOURS PRN
Status: DISCONTINUED | OUTPATIENT
Start: 2022-03-14 | End: 2022-03-19

## 2022-03-14 RX ORDER — LISINOPRIL 40 MG/1
40 TABLET ORAL DAILY
Qty: 90 TABLET | Refills: 1 | Status: SHIPPED | OUTPATIENT
Start: 2022-03-14 | End: 2022-06-30 | Stop reason: SDUPTHER

## 2022-03-14 RX ORDER — SODIUM CHLORIDE 0.9 % (FLUSH) 0.9 %
10 SYRINGE (ML) INJECTION PRN
Status: DISCONTINUED | OUTPATIENT
Start: 2022-03-14 | End: 2022-03-19 | Stop reason: SDUPTHER

## 2022-03-14 RX ORDER — POLYETHYLENE GLYCOL 3350 17 G/17G
17 POWDER, FOR SOLUTION ORAL DAILY PRN
Status: DISCONTINUED | OUTPATIENT
Start: 2022-03-14 | End: 2022-03-22 | Stop reason: HOSPADM

## 2022-03-14 RX ORDER — SODIUM CHLORIDE 0.9 % (FLUSH) 0.9 %
5-40 SYRINGE (ML) INJECTION EVERY 12 HOURS SCHEDULED
Status: DISCONTINUED | OUTPATIENT
Start: 2022-03-14 | End: 2022-03-19 | Stop reason: SDUPTHER

## 2022-03-14 RX ORDER — M-VIT,TX,IRON,MINS/CALC/FOLIC 27MG-0.4MG
1 TABLET ORAL DAILY
COMMUNITY

## 2022-03-14 RX ORDER — ACETAMINOPHEN 325 MG/1
650 TABLET ORAL EVERY 6 HOURS PRN
Status: DISCONTINUED | OUTPATIENT
Start: 2022-03-14 | End: 2022-03-19

## 2022-03-14 RX ORDER — HYDROCHLOROTHIAZIDE 12.5 MG/1
12.5 TABLET ORAL DAILY
Qty: 90 TABLET | Refills: 1 | Status: SHIPPED | OUTPATIENT
Start: 2022-03-14 | End: 2022-04-05 | Stop reason: SDUPTHER

## 2022-03-14 RX ORDER — ACETAMINOPHEN 325 MG/1
650 TABLET ORAL NIGHTLY
COMMUNITY

## 2022-03-14 RX ORDER — LISINOPRIL 40 MG/1
40 TABLET ORAL DAILY
Status: DISCONTINUED | OUTPATIENT
Start: 2022-03-15 | End: 2022-03-22 | Stop reason: HOSPADM

## 2022-03-14 RX ORDER — ASCORBIC ACID 500 MG
1000 TABLET ORAL DAILY
Status: DISCONTINUED | OUTPATIENT
Start: 2022-03-15 | End: 2022-03-22 | Stop reason: HOSPADM

## 2022-03-14 RX ORDER — ONDANSETRON 2 MG/ML
4 INJECTION INTRAMUSCULAR; INTRAVENOUS EVERY 6 HOURS PRN
Status: DISCONTINUED | OUTPATIENT
Start: 2022-03-14 | End: 2022-03-22 | Stop reason: HOSPADM

## 2022-03-14 RX ADMIN — SODIUM CHLORIDE, PRESERVATIVE FREE 10 ML: 5 INJECTION INTRAVENOUS at 21:22

## 2022-03-14 RX ADMIN — ACETAMINOPHEN 650 MG: 325 TABLET ORAL at 21:20

## 2022-03-14 RX ADMIN — CEFTRIAXONE SODIUM 1000 MG: 1 INJECTION, POWDER, FOR SOLUTION INTRAMUSCULAR; INTRAVENOUS at 21:19

## 2022-03-14 ASSESSMENT — ENCOUNTER SYMPTOMS
TROUBLE SWALLOWING: 0
SORE THROAT: 0
COLOR CHANGE: 0
VOMITING: 0
COUGH: 0
SHORTNESS OF BREATH: 0
DIARRHEA: 0
ABDOMINAL PAIN: 0
NAUSEA: 0

## 2022-03-14 ASSESSMENT — PAIN SCALES - GENERAL
PAINLEVEL_OUTOF10: 3
PAINLEVEL_OUTOF10: 1

## 2022-03-14 NOTE — H&P
HISTORY & PHYSICAL       Patient:  Christoph Licona  YOB: 1940    MRN: [id number]     Acct: [de-identified]    PCP: Alonso Denny MD    Date of Admission: 3/14/2022    Date of Service: Pt seen/examined on 3/14/2022 and Admitted to Observation  ASSESSMENT:    C[facility]rett [facility] [address] Problems    Diagnosis Date Noted    Wound dehiscence [T81.30XA] 03/14/2022       PLAN:    Wound dehiscence of right frontal cranium  Patient had evacuation of subdural hematoma on 1/21 with Dr. Mekhi Cohen  Sutures and staples were removed last week by Dr. Mekhi Cohen  Was seen in PCPs office today and noted wound dehiscence with some tunneling   Does not appear to be infected on exam  Vitals stable  Consult I&D  Wound management  CBC, BMP    Hypertension  Resume home meds    Chief Complaint: Wound Dehiscence         History Of Present Illness:     78 y.o. female who presented to [address] [place] [address] with Wound Dehiscence. She has a past medical history of hypertension. Patient states she fell in January and hit her head. At that time she had a subdural hematoma. She underwent an evacuation with Dr. Mekhi Cohen at that time. She was admitted previously on 2/23/2022 for wound dehiscence with screw working its way out and had neurosurgical irrigation and closure of her surgical wound at that time. Last week patient follow-up with Dr. Tam Player office to have staples and sutures removed. She had Steri-Strips placed at that time. Patient states that this morning she was feeling well until she started noticing a \"grunting\" or swishing sound in her right cranium, similar to when she previously had a wound dehiscence. She states she tried to get into Dr. Jeffery Shepherd and Dr. Mekhi Cohen' office today but she could not. She went to her PCP who recommended she go to the ED. She denies pain, drainage, bleeding. She denies all other review of systems. She otherwise has no new complaints. Family is requesting Dr. Luciano Ortega consult.  She was afebrile in the ED with BP stable. Past Medical History:          Diagnosis Date    Arthritis     Hypertension     Subdural hematoma ([address] 01/2022       Past Surgical History:          Procedure Laterality Date    CATHETER REMOVAL Bilateral 11/2014    CRANIOPLASTY Right 2/24/2022    RIGHT FRONTAL WOUND DEBRIDEMENT AND WASHOUT performed by Homer Verdin MD at [facility] Right 01/21/2022    RIGHT SIDED CRANIOTOMY FOR SUBDURAL HEMATOMA performed by Homer Verdin MD at [address] [place] [address]  07/04/2018    FOOT SURGERY Left 2012    KNEE SURGERY Right 07/09/2014    total    [place] BX OF BREAST, NEEDLE CORE, IMAGE GUIDE Left 2016    benign    [place] OFFICE/OUTPT VISIT,PROCEDURE ONLY Left 07/05/2018    ORIF LEFT ELBOW performed by Brittany Sena MD at [facility]       Medications Prior to Admission:      Prior to Admission medications    Medication Sig Start Date End Date Taking? Authorizing Provider   lisinopril (PRINIVIL;ZESTRIL) 40 MG tablet Take 1 tablet by mouth daily 3/14/22 6/12/22 Yes Shaheen Garza MD   hydroCHLOROthiazide (HYDRODIURIL) 12.5 MG tablet Take 1 tablet by mouth daily 3/14/22  Yes Shaheen Garza MD   Multiple Vitamins-Minerals (THERAPEUTIC MULTIVITAMIN-MINERALS) tablet Take 1 tablet by mouth daily   Yes Historical Provider, MD   acetaminophen (TYLENOL) 325 MG tablet Take 650 mg by mouth at bedtime   Yes Historical Provider, MD   melatonin 5 MG TABS tablet Take 5 mg by mouth nightly   Yes Historical Provider, MD   vitamin C (ASCORBIC ACID) 500 MG tablet Take 1,000 mg by mouth daily   Yes Historical Provider, MD   zinc 50 MG TABS tablet Take 50 mg by mouth daily   Yes Historical Provider, MD   metoprolol tartrate (LOPRESSOR) 50 MG tablet TAKE 1 TABLET BY MOUTH TWICE DAILY 10/29/21  Yes Shaheen Garza MD       Allergies:  Pcn [penicillins]    Social History:        TOBACCO:   reports that she has never smoked.  She has never used smokeless tobacco.  ETOH:   reports previous alcohol use.      Family History:            Problem Relation Age of Onset   [facility] Cancer Mother         lymphoma    Cancer Father         prostate and bone    Heart Disease Sister     Heart Disease Brother     Atrial Fibrillation Daughter     Hypertension Daughter     Elevated Lipids Daughter     Breast Cancer Sister 78       Diet:  ADULT DIET; Regular    Review of Systems   Constitutional: Negative for fatigue and fever. HENT: Negative for sore throat and trouble swallowing. Respiratory: Negative for cough and shortness of breath. Cardiovascular: Negative for chest pain and palpitations. Gastrointestinal: Negative for abdominal pain, diarrhea, nausea and vomiting. Genitourinary: Negative for decreased urine volume, frequency and urgency. Skin: Positive for wound (Right forehead). Neurological: Negative for light-headedness and headaches. Psychiatric/Behavioral: Negative for behavioral problems. PHYSICAL EXAM:    BP (!) 118/58   Pulse 60   Temp 98.8 °F (37.1 °C) (Oral)   Resp 18   Ht 5' 4\" (1.626 m)   Wt 175 lb (79.4 kg)   LMP  (LMP Unknown)   SpO2 100%   BMI 30.04 kg/m²     Physical Exam  Constitutional:       General: She is not in acute distress. Appearance: Normal appearance. She is normal weight. She is not ill-appearing. HENT:      Head: Normocephalic and atraumatic. Nose: Nose normal.   Eyes:      General: No scleral icterus. Conjunctiva/sclera: Conjunctivae normal.   Cardiovascular:      Rate and Rhythm: Normal rate and regular rhythm. Heart sounds: Normal heart sounds. No murmur heard. No friction rub. No gallop. Pulmonary:      Effort: Pulmonary effort is normal.      Breath sounds: Normal breath sounds. No wheezing, rhonchi or rales. Chest:      Chest wall: No tenderness. Abdominal:      General: Abdomen is flat. Bowel sounds are normal. There is no distension. Palpations: Abdomen is soft. Tenderness: There is no abdominal tenderness. There is no guarding. Musculoskeletal:      Cervical back: Normal range of motion and neck supple. No rigidity or tenderness. Skin:     General: Skin is warm and dry. Capillary Refill: Capillary refill takes less than 2 seconds. Findings: Wound present. Comments: Post craniotomy wound without erythema on right temple region. Noted one area of tunneling. No discharge or bleeding. Neurological:      General: No focal deficit present. Mental Status: She is alert and oriented to person, place, and time. Mental status is at baseline. Motor: No weakness. Psychiatric:         Mood and Affect: Mood normal.         Behavior: Behavior normal.         Thought Content: Thought content normal.         Judgment: Judgment normal.           Labs:     No results for input(s): WBC, HGB, HCT, PLT in the last 72 hours. No results for input(s): NA, K, CL, CO2, BUN, CREATININE, CALCIUM, PHOS in the last 72 hours. Invalid input(s): MAGNES  No results for input(s): AST, ALT, BILIDIR, BILITOT, ALKPHOS in the last 72 hours. No results for input(s): INR in the last 72 hours. No results for input(s): Wilmer Snuffer in the last 72 hours. Urinalysis:      Lab Results   Component Value Date    NITRU POSITIVE 01/18/2022    WBCUA 10-15W/CLUMPS 01/18/2022    BACTERIA MANY 01/18/2022    RBCUA 0-2 01/18/2022    RBCUA 0-2 07/02/2018    BLOODU neg 02/01/2022    BLOODU TRACE 01/18/2022    SPECGRAV 1.015 02/01/2022    SPECGRAV 1.010 01/18/2022    GLUCOSEU neg 02/01/2022    GLUCOSEU NEGATIVE 07/02/2018       Intake & Output:  No intake/output data recorded. No intake/output data recorded.       Radiology:     None    No orders to display            DVT prophylaxis: [x] Lovenox                                 [] SCDs                                 [] SQ Heparin                                 [] Encourage ambulation           [] Already on Anticoagulation    Code Status: Full Code      PT/OT Eval Status: N/A    Disposition:    [x] Home       [] TCU       [] Rehab       [] Psych       [] SNF       [] [place]       [] Other-        Thank you Lobito Luis MD for the opportunity to be involved in this patient's care. Electronically signed by Michelene Canavan, DO on 3/14/2022 at 6:36 PM    This note was electronically signed. Parts of this note may have been dictated by use of voice recognition software and electronically transcribed. The note may contain errors not detected in proofreading. Please refer to my supervising physician's documentation if my documentation differs.

## 2022-03-14 NOTE — FLOWSHEET NOTE
03/14/22 1853   Treatment Team Notification   Team Member Name Dr Andria Frazier Team Role Consulting Provider   Method of Communication Face to face   Response See orders   Notification Time 1853   new order received for brain CT for tomorrow s/p surgery

## 2022-03-14 NOTE — ED TRIAGE NOTES
Pt arrives to ED from home with c/o wound dehiscence, pt states she fell in January which resulted in subdural hematoma and surgery, pt states 2 weeks following surgery her wound dehisced, which required another surgery. Pt states she was at home today when she started to have the same feeling as the first dehiscence episode, her PCP advised her to come to ED right away.

## 2022-03-14 NOTE — PROGRESS NOTES
[address]  [address] [place] [address]  Phone:  [phone]          Name: Lisa Mitchell  : 1940    Chief Complaint   Patient presents with    Wound Check       HPI:     Lisa Mitchell is a 78 y.o. female who presents today for a wound check. She had a right frontal craniotomy in January after fall during which she sustained a subdural hematoma. Last month she had a right frontal wound debridement and washout as the screw and worked it's way out. She was feeling well until this morning when she started feeling a swishing sound in her head, and she's concerned as she felt the same thing the last time the screw worked it's way loose. She cannot see the wound as it's covered in steri strips. Current Outpatient Medications:     lisinopril (PRINIVIL;ZESTRIL) 40 MG tablet, Take 1 tablet by mouth daily, Disp: 90 tablet, Rfl: 1    hydroCHLOROthiazide (HYDRODIURIL) 12.5 MG tablet, Take 1 tablet by mouth daily, Disp: 90 tablet, Rfl: 1    melatonin 5 MG TABS tablet, Take 5 mg by mouth nightly, Disp: , Rfl:     vitamin C (ASCORBIC ACID) 500 MG tablet, Take 1,000 mg by mouth daily, Disp: , Rfl:     zinc 50 MG TABS tablet, Take 50 mg by mouth daily, Disp: , Rfl:     diphenhydrAMINE-APAP, sleep, (TYLENOL PM EXTRA STRENGTH PO), Take 2 tablets by mouth nightly, Disp: , Rfl:     metoprolol tartrate (LOPRESSOR) 50 MG tablet, TAKE 1 TABLET BY MOUTH TWICE DAILY, Disp: 180 tablet, Rfl: 1    Allergies   Allergen Reactions    Pcn [Penicillins] Itching     redness @ IM site       Subjective:      Review of Systems   Constitutional: Negative for chills and fever. Skin: Positive for wound. Negative for color change. Neurological: Negative for dizziness, light-headedness and headaches.        Objective:     BP (!) 145/82   Temp 98.2 °F (36.8 °C)   Resp 12   Ht 5' 2.99\" (1.6 m)   Wt 173 lb (78.5 kg)   LMP  (LMP Unknown)   BMI 30.65 kg/m²     Physical Exam  Vitals and nursing note reviewed. Constitutional:       General: She is not in acute distress. Appearance: She is well-developed. HENT:      Head: Normocephalic and atraumatic. Nose: Nose normal.   Eyes:      Conjunctiva/sclera: Conjunctivae normal.   Cardiovascular:      Rate and Rhythm: Normal rate and regular rhythm. Heart sounds: Normal heart sounds. Pulmonary:      Effort: Pulmonary effort is normal. No respiratory distress. Breath sounds: Normal breath sounds. No wheezing. Abdominal:      General: Bowel sounds are normal. There is no distension. Palpations: Abdomen is soft. Tenderness: There is no abdominal tenderness. Musculoskeletal:      Cervical back: Normal range of motion and neck supple. Skin:     General: Skin is warm and dry. Comments: Right frontal scalp wound dehiscence as below. Neurological:      Mental Status: She is alert and oriented to person, place, and time. Psychiatric:         Behavior: Behavior normal.             Assessment/Plan:     Noretta Rubinstein was seen today for wound check. Diagnoses and all orders for this visit:    Wound dehiscence  S/P craniotomy        -     There is right frontal scalp wound dehiscence for the second time. Our office contacted Dr. Jamari Hess who cannot see in her office until Wednesday. Our office then contacted Dr. Feng Wong office as he performed the surgery and he's not in the office the next 2 days, and his NPs cannot see her. The wound was covered in office and her daughter is taking her to the ER. Report was called to the ER. Return if symptoms worsen or fail to improve.     Electronically signed by Timothy Yang MD on 3/14/2022 at 2:29 PM

## 2022-03-14 NOTE — ED NOTES
[place] ENCOUNTER          Pt Name: Tito Purvis  MRN: [id number]  [place] 1940  Date of evaluation: 3/14/2022  Treating Resident Physician: Val Pérez DPM  Supervising Physician: Rickie Tesfaye DO    CHIEF COMPLAINT       Chief Complaint   Patient presents with    Post-op Problem     History obtained from the patient and patient's daughter. HISTORY OF PRESENT ILLNESS    HPI  Tito Purvis is a 78 y.o. female who presents to the emergency department for evaluation of right frontal cranial wound dehiscence. The patient presented to her PCP today for a wound check. The patient had a right frontal craniotomy in January after a fall and sustained a subdural hematoma. Last month, the screw had worked its way out and she had it removed and a new plate was inserted. The patient states that she hears a swish/grunt noise that is pulsating, which she heard initially too. She states that the sutures and staples were removed last week in Dr. Lou Weller office and steri strips were in place until today's visit with PCP. The patient and patient's daughters are highly concerned about infection and having her go home. The patient denies any other concerns. The patient has no other acute complaints at this time. REVIEW OF SYSTEMS   Review of Systems   Skin: Positive for wound.          PAST MEDICAL AND SURGICAL HISTORY     Past Medical History:   Diagnosis Date    Arthritis     Hypertension     Subdural hematoma ([address] 01/2022     Past Surgical History:   Procedure Laterality Date    CATHETER REMOVAL Bilateral 11/2014    CRANIOPLASTY Right 2/24/2022    RIGHT FRONTAL WOUND DEBRIDEMENT AND WASHOUT performed by Beth Moran MD at [address] [place] [address] Right 01/21/2022    RIGHT SIDED CRANIOTOMY FOR SUBDURAL HEMATOMA performed by Beth Moran MD at [address] [place] [address]   07/04/2018    FOOT SURGERY Left 2012    KNEE SURGERY Right 07/09/2014    total    SD BX OF BREAST, NEEDLE CORE, IMAGE GUIDE Left 2016    benign    SD OFFICE/OUTPT VISIT,PROCEDURE ONLY Left 07/05/2018    ORIF LEFT ELBOW performed by Roslyn Waldrop MD at [address]   No current facility-administered medications for this encounter. Current Outpatient Medications:     lisinopril (PRINIVIL;ZESTRIL) 40 MG tablet, Take 1 tablet by mouth daily, Disp: 90 tablet, Rfl: 1    hydroCHLOROthiazide (HYDRODIURIL) 12.5 MG tablet, Take 1 tablet by mouth daily, Disp: 90 tablet, Rfl: 1    melatonin 5 MG TABS tablet, Take 5 mg by mouth nightly, Disp: , Rfl:     vitamin C (ASCORBIC ACID) 500 MG tablet, Take 1,000 mg by mouth daily, Disp: , Rfl:     zinc 50 MG TABS tablet, Take 50 mg by mouth daily, Disp: , Rfl:     diphenhydrAMINE-APAP, sleep, (TYLENOL PM EXTRA STRENGTH PO), Take 2 tablets by mouth nightly, Disp: , Rfl:     metoprolol tartrate (LOPRESSOR) 50 MG tablet, TAKE 1 TABLET BY MOUTH TWICE DAILY, Disp: 180 tablet, Rfl: 1      SOCIAL HISTORY     Social History     Social History Narrative    Not on file     Social History     Tobacco Use    Smoking status: Never Smoker    Smokeless tobacco: Never Used   Vaping Use    Vaping Use: Never used   Substance Use Topics    Alcohol use: Not Currently     Comment: very very rare    Drug use: No         ALLERGIES     Allergies   Allergen Reactions    Pcn [Penicillins] Itching     redness @ IM site         FAMILY HISTORY     Family History   Problem Relation Age of Onset    Cancer Mother         lymphoma    Cancer Father         prostate and bone    Heart Disease Sister     Heart Disease Brother     Atrial Fibrillation Daughter     Hypertension Daughter     Elevated Lipids Daughter     Breast Cancer Sister 78         PREVIOUS RECORDS   Previous records reviewed: The patient has other previous ED encounters.         PHYSICAL EXAM     ED Triage Vitals [03/14/22 1603]   BP Temp Temp Source Pulse Resp SpO2 Height Weight   (!) 161/63 98.5 °F (36.9 °C) Oral 77 18 95 % 5' 4\" (1.626 m) 175 lb (79.4 kg)     Initial vital signs and nursing assessment reviewed and normal. Body mass index is 30.04 kg/m². Pulsoximetry is normal per my interpretation. Additional Vital Signs:  Vitals:    03/14/22 1603   BP: (!) 161/63   Pulse: 77   Resp: 18   Temp: 98.5 °F (36.9 °C)   SpO2: 95%       Physical Exam  HENT:      Head:     Skin:     Findings: Wound present. No lesion. Neurological:      Mental Status: She is alert. MEDICAL DECISION MAKING   Initial Assessment:   1. Wound dehiscence; right frontal cranium . Plan:    Patient seen and evaluated.  Applied steri strips, gauze, and tegaderm to wound dehiscence   Patient and patient's daughter persistent on patient being admitted   [place] [address] admitting patient    Consult to Dr. Lucita Cortez for wound care management.  All of patient's and patient's daughter's questions and concerns addressed. ED RESULTS   Laboratory results:  Labs Reviewed - No data to display    Radiologic studies results:  No orders to display       ED Medications administered this visit: Medications - No data to display      ED COURSE              MEDICATION CHANGES     New Prescriptions    No medications on file         FINAL DISPOSITION     Final diagnoses:   None     Condition: condition: good  Dispo: Admit to med/surg floor      This transcription was electronically signed. Parts of this transcriptions may have been dictated by use of voice recognition software and electronically transcribed, and parts may have been transcribed with the assistance of an ED scribe. The transcription may contain errors not detected in proofreading. Please refer to my supervising physician's documentation if my documentation differs.     Electronically Signed: Alondra Lambert DPM, 03/14/22, 4:12 PM         Alondra Lambert DPM  Resident  03/14/22 6766

## 2022-03-14 NOTE — TELEPHONE ENCOUNTER
Pennie Buerger for Dr. Shaffer [facility] office called stating patient is currently at their office, and when removing the steri-strips the patient has an open tunneling wound at the surgical site. Pennie Buerger states patient has an appt with Dr. Wes Abad on Wednesday but needs to be seen. I spoke to Wilton CNP and informed her of what Pennie Buerger stated. Meera stated to take the patient to the ER to be seen. I informed Pennie Buerger what [employer] stated, Pennie Buerger stated she will let the patient know. I verbalized understanding.

## 2022-03-14 NOTE — ED NOTES
Pt transported to [facility] at this time. Floor notified, spoke with Colby Wick.       Georgiamaine Morganza  03/14/22 5713

## 2022-03-15 ENCOUNTER — APPOINTMENT (OUTPATIENT)
Dept: CT IMAGING | Age: 82
DRG: 904 | End: 2022-03-15
Payer: MEDICARE

## 2022-03-15 ENCOUNTER — CARE COORDINATION (OUTPATIENT)
Dept: CASE MANAGEMENT | Age: 82
End: 2022-03-15

## 2022-03-15 LAB
ANION GAP SERPL CALCULATED.3IONS-SCNC: 12 MEQ/L (ref 8–16)
BASOPHILS # BLD: 1.1 %
BASOPHILS ABSOLUTE: 0.1 THOU/MM3 (ref 0–0.1)
BUN BLDV-MCNC: 31 MG/DL (ref 7–22)
CALCIUM SERPL-MCNC: 8.8 MG/DL (ref 8.5–10.5)
CHLORIDE BLD-SCNC: 108 MEQ/L (ref 98–111)
CO2: 20 MEQ/L (ref 23–33)
CREAT SERPL-MCNC: 0.7 MG/DL (ref 0.4–1.2)
EOSINOPHIL # BLD: 4.6 %
EOSINOPHILS ABSOLUTE: 0.2 THOU/MM3 (ref 0–0.4)
ERYTHROCYTE [DISTWIDTH] IN BLOOD BY AUTOMATED COUNT: 13.2 % (ref 11.5–14.5)
ERYTHROCYTE [DISTWIDTH] IN BLOOD BY AUTOMATED COUNT: 44.7 FL (ref 35–45)
GFR SERPL CREATININE-BSD FRML MDRD: 80 ML/MIN/1.73M2
GLUCOSE BLD-MCNC: 94 MG/DL (ref 70–108)
HCT VFR BLD CALC: 35.6 % (ref 37–47)
HEMOGLOBIN: 11.7 GM/DL (ref 12–16)
IMMATURE GRANS (ABS): 0.01 THOU/MM3 (ref 0–0.07)
IMMATURE GRANULOCYTES: 0.2 %
LYMPHOCYTES # BLD: 36.9 %
LYMPHOCYTES ABSOLUTE: 1.7 THOU/MM3 (ref 1–4.8)
MCH RBC QN AUTO: 30.5 PG (ref 26–33)
MCHC RBC AUTO-ENTMCNC: 32.9 GM/DL (ref 32.2–35.5)
MCV RBC AUTO: 92.7 FL (ref 81–99)
MONOCYTES # BLD: 12.7 %
MONOCYTES ABSOLUTE: 0.6 THOU/MM3 (ref 0.4–1.3)
NUCLEATED RED BLOOD CELLS: 0 /100 WBC
PLATELET # BLD: 155 THOU/MM3 (ref 130–400)
PMV BLD AUTO: 11.5 FL (ref 9.4–12.4)
POTASSIUM REFLEX MAGNESIUM: 4 MEQ/L (ref 3.5–5.2)
RBC # BLD: 3.84 MILL/MM3 (ref 4.2–5.4)
SEG NEUTROPHILS: 44.5 %
SEGMENTED NEUTROPHILS ABSOLUTE COUNT: 2 THOU/MM3 (ref 1.8–7.7)
SODIUM BLD-SCNC: 140 MEQ/L (ref 135–145)
WBC # BLD: 4.6 THOU/MM3 (ref 4.8–10.8)

## 2022-03-15 PROCEDURE — APPSS30 APP SPLIT SHARED TIME 16-30 MINUTES

## 2022-03-15 PROCEDURE — 1200000000 HC SEMI PRIVATE

## 2022-03-15 PROCEDURE — 6360000002 HC RX W HCPCS: Performed by: INTERNAL MEDICINE

## 2022-03-15 PROCEDURE — 36415 COLL VENOUS BLD VENIPUNCTURE: CPT

## 2022-03-15 PROCEDURE — 80048 BASIC METABOLIC PNL TOTAL CA: CPT

## 2022-03-15 PROCEDURE — 70450 CT HEAD/BRAIN W/O DYE: CPT

## 2022-03-15 PROCEDURE — 2580000003 HC RX 258: Performed by: INTERNAL MEDICINE

## 2022-03-15 PROCEDURE — 99024 POSTOP FOLLOW-UP VISIT: CPT | Performed by: NEUROLOGICAL SURGERY

## 2022-03-15 PROCEDURE — 2580000003 HC RX 258

## 2022-03-15 PROCEDURE — 6370000000 HC RX 637 (ALT 250 FOR IP)

## 2022-03-15 PROCEDURE — 85025 COMPLETE CBC W/AUTO DIFF WBC: CPT

## 2022-03-15 PROCEDURE — 99232 SBSQ HOSP IP/OBS MODERATE 35: CPT | Performed by: FAMILY MEDICINE

## 2022-03-15 RX ADMIN — OXYCODONE HYDROCHLORIDE AND ACETAMINOPHEN 1000 MG: 500 TABLET ORAL at 09:34

## 2022-03-15 RX ADMIN — Medication 6 MG: at 20:27

## 2022-03-15 RX ADMIN — SODIUM CHLORIDE, PRESERVATIVE FREE 10 ML: 5 INJECTION INTRAVENOUS at 20:28

## 2022-03-15 RX ADMIN — SODIUM CHLORIDE, PRESERVATIVE FREE 10 ML: 5 INJECTION INTRAVENOUS at 09:39

## 2022-03-15 RX ADMIN — LISINOPRIL 40 MG: 40 TABLET ORAL at 09:37

## 2022-03-15 RX ADMIN — CEFTRIAXONE SODIUM 1000 MG: 1 INJECTION, POWDER, FOR SOLUTION INTRAMUSCULAR; INTRAVENOUS at 20:34

## 2022-03-15 RX ADMIN — METOPROLOL TARTRATE 50 MG: 50 TABLET, FILM COATED ORAL at 09:38

## 2022-03-15 RX ADMIN — HYDROCHLOROTHIAZIDE 12.5 MG: 25 TABLET ORAL at 09:36

## 2022-03-15 ASSESSMENT — ENCOUNTER SYMPTOMS
SHORTNESS OF BREATH: 0
SORE THROAT: 0
NAUSEA: 0
DIARRHEA: 0
COLOR CHANGE: 0
TROUBLE SWALLOWING: 0
CONSTIPATION: 0
COUGH: 0
ABDOMINAL PAIN: 0
BACK PAIN: 0
VOMITING: 0

## 2022-03-15 ASSESSMENT — PAIN SCALES - GENERAL
PAINLEVEL_OUTOF10: 0

## 2022-03-15 NOTE — PROGRESS NOTES
Attending Note:     Patient seen and examined in the floor in conjunction with neurosurgery NP. Discussed with patient, her family and his nurse as well. All data and imaging reviewed by myself. I agree with examination assessment and plan as documented below. · Patient developed wound dehiscence again. · Patient skin is thin, stiff and tight and in these most likely contribute to patient's suboptimal wound healing process. · I discussed the case with infectious disease (Dr. Marisa Goodman on with the plastic surgery team), we all recommended for the patient a wound revision, removal of the previous bone flap and perform a new cranioplasty and closing the wound with adjacent tissue transfer here (with the plastic surgery team). · This recommended surgical intervention was discussed in detail with patient and her family. The associated risks and benefits also were discussed with them in detail. All questions and concerns were addressed and answered. Patient and her family agreed to proceed with the recommended surgical intervention and patient signed the surgical consent. · Plan for surgery this Saturday. Loli Mireles MD      Neurosurgery Progress Note    Patient:  Hodan Hassan      Unit/Bed:[facility]/010-A    YOB: 1940    MRN: [id number]     Acct: [de-identified]     Admit date: 3/14/2022    Chief Complaint   Patient presents with    Post-op Problem       Patient Seen, Chart, Physician notes, Labs, Radiology studies reviewed. Subjective: Patient sitting up in bed eating breakfast.  Patient denies headache, blurred vision, double vision, wound drainage. Patient stated that yesterday she felt well but started to feel a swishing sound in her head. Patient stated she had the same feeling the last time the screw worked its way loose. Patient seen her PCP where she was advised to go to the emergency room due to right frontal scalp wound dehiscence.         Past, Family, Social History unchanged from admission. Diet:  ADULT DIET; Regular    Medications:  Scheduled Meds:   metoprolol tartrate  50 mg Oral BID    lisinopril  40 mg Oral Daily    hydroCHLOROthiazide  12.5 mg Oral Daily    melatonin  6 mg Oral Nightly    sodium chloride flush  5-40 mL IntraVENous 2 times per day    enoxaparin  40 mg SubCUTAneous Daily    vitamin C  1,000 mg Oral Daily    cefTRIAXone (ROCEPHIN) IV  1,000 mg IntraVENous Q24H     Continuous Infusions:   sodium chloride       PRN Meds:sodium chloride flush, sodium chloride, ondansetron **OR** ondansetron, polyethylene glycol, acetaminophen **OR** acetaminophen    Objective:    Vitals: /63   Pulse 67   Temp 97.9 °F (36.6 °C) (Oral)   Resp 16   Ht 5' 4\" (1.626 m)   Wt 175 lb (79.4 kg)   LMP  (LMP Unknown)   SpO2 95%   BMI 30.04 kg/m²     Physical Exam  Constitutional:       Appearance: Normal appearance. She is not ill-appearing. HENT:      Mouth/Throat:      Mouth: Mucous membranes are moist.   Eyes:      Pupils: Pupils are equal, round, and reactive to light. Cardiovascular:      Rate and Rhythm: Normal rate. Pulses: Normal pulses. Pulmonary:      Effort: Pulmonary effort is normal.   Abdominal:      General: Bowel sounds are normal.   Musculoskeletal:         General: No swelling or tenderness. Cervical back: Normal range of motion. Skin:     General: Skin is warm and dry. Findings: No erythema. Comments: Right frontal wound dehiscence. Steri strips intact   Neurological:      Mental Status: She is alert and oriented to person, place, and time. Sensory: No sensory deficit. Motor: No weakness. Psychiatric:         Mood and Affect: Mood normal.         Behavior: Behavior normal.         Thought Content: Thought content normal.         Judgment: Judgment normal.          Physical Exam:  Alert and attentive. Language appropriate, with no aphasia. Pupils equal.  Facial strength symmetric.     Review of Systems Constitutional: Negative for activity change, appetite change, fatigue and fever. HENT: Negative for trouble swallowing. Eyes: Negative for visual disturbance. Respiratory: Negative for cough and shortness of breath. Cardiovascular: Negative for chest pain. Gastrointestinal: Negative for constipation and nausea. Genitourinary: Negative for difficulty urinating. Musculoskeletal: Negative for back pain and gait problem. Skin: Positive for wound (Right frontal scalp wound dehisence). Negative for color change and rash. Neurological: Negative for dizziness, weakness, light-headedness and headaches. Psychiatric/Behavioral: Negative for confusion and sleep disturbance. The patient is not nervous/anxious. 24 hour intake/output:    Intake/Output Summary (Last 24 hours) at 3/15/2022 0819  Last data filed at 3/15/2022 0244  Gross per 24 hour   Intake 200 ml   Output    Net 200 ml     Last 3 weights: Wt Readings from Last 3 Encounters:   03/14/22 175 lb (79.4 kg)   03/14/22 173 lb (78.5 kg)   03/10/22 173 lb 8 oz (78.7 kg)         CBC:   Recent Labs     03/14/22  1843 03/15/22  0432   WBC 6.2 4.6*   HGB 11.8* 11.7*    155     BMP:    Recent Labs     03/14/22  1843 03/15/22  0432    140   K 4.2 4.0    108   CO2 20* 20*   BUN 37* 31*   CREATININE 0.8 0.7   GLUCOSE 91 94     Calcium:  Recent Labs     03/15/22  0432   CALCIUM 8.8     Magnesium:No results for input(s): MG in the last 72 hours. Glucose:No results for input(s): POCGLU in the last 72 hours. HgbA1C: No results for input(s): LABA1C in the last 72 hours. Lipids: No results for input(s): CHOL, TRIG, HDL, LDL, LDLCALC in the last 72 hours. Radiology reports as per the Radiologist  Radiology: No results found. Assessment:    Principal Problem:    Wound dehiscence  Resolved Problems:    * No resolved hospital problems.  *        Plan:  -Dehisced scalp wound  -CT head to make sure there is not any fluid

## 2022-03-15 NOTE — CONSULTS
No drainage was noted. IMPRESSION:  Dehisced scalp wound, this is likely related to very tight  skin, has now increased around her forehead and appears to be very  tight, this is more pressure related from tension. PLAN:  We applied Steri-Strips, placed her on antibiotic. She will have  CT scan of the head in the morning to make sure there is not any  collection. We will continue to monitor the patient. If these becomes  an issue, we will consult plastic surgeon as an outpatient to assist  with her closure.           Radha Christensen M.D.    D: 03/14/2022 19:19:56       T: 03/14/2022 23:24:49     [email or web address]  Job#: [id number]     Doc#: [id number]    CC:

## 2022-03-15 NOTE — CARE COORDINATION
3/15/22, 7:05 AM EDT  DISCHARGE PLANNING EVALUATION:    Carmen Acosta       Admitted: 3/14/2022/ 1826 [facility] day: 1   Location: On license of [facility]/010-A Reason for admit: Wound dehiscence [T81.30XA]  Dehiscence of operative wound, initial encounter Yuliya Qiu   PMH:  has a past medical history of Arthritis, Hypertension, and Subdural hematoma ([address] Procedure: N/A  Barriers to Discharge:  Patient presents due to dehiscence of incision to craniotomy side. Consults to Neurology and ID, Rocephin, Lovenox, prn Tylenol and Zofran, CBC in a.m., CT of head with contrast, regular diet, SCD's, incentive spirometry, up with assistance. PCP: Svitlana Ochoa MD  Readmission Risk Score: 16.3 ( )%    Patient Goals/Plan/Treatment Preferences: Met with Destiny Dimas and her two daughter's present at bedside, Lester Hubbard and Flory Bullock. Destiny Dimas is from home with family support. Her children make sure she has everything she needs. Destiny Dimas verifies her insurance and PCP. She is able to afford her medications and has the DME needed at home. One of her children will drive her home at discharge. Destiny Dimas is scheduled for her drivers evaluation with OT tomorrow. She is hopeful for discharge today. Destiny Dimas and her daughter's deny a need for services at discharge. Transportation/Food Security/Housekeeping Addressed:  No issues identified.

## 2022-03-15 NOTE — PLAN OF CARE
Problem: Falls - Risk of:  Goal: Will remain free from falls  Description: Will remain free from falls  Outcome: Ongoing   Fall assessment completed. Patient using call light appropriately to call for assistance with ambulation to bathroom. Personal items within reach. Patient is also compliant with use of non-skid slippers. Problem: Discharge Planning:  Goal: Discharged to appropriate level of care  Description: Discharged to appropriate level of care  Outcome: Ongoing   Discharge plan is return home. Problem: Skin Integrity:  Goal: Will show no infection signs and symptoms  Description: Will show no infection signs and symptoms  Outcome: Ongoing   No skin breakdown this shift. Patient being assisted with turning. Patients states understanding of repositioning every two hours. Problem: Pain:  Description: Pain management should include both nonpharmacologic and pharmacologic interventions. Goal: Pain level will decrease  Description: Pain level will decrease  Outcome: Ongoing  Pain Assessment: 0-10  Pain Level: 0   Patient's Stated Pain Goal: No pain   Is pain goal met at this time? Yes    Care plan reviewed with patient and family. Patient and family verbalize understanding of the plan of care and contribute to goal setting.

## 2022-03-15 NOTE — PLAN OF CARE
Problem: Falls - Risk of:  Goal: Will remain free from falls  Description: Will remain free from falls  Outcome: Ongoing   Fall sign posted. Arm band in place. Non-skid slippers on. Bed alarm on. Patient agreeable to use of call light. Call light within reach. Bed in lowest position. Problem: Skin Integrity:  Goal: Will show no infection signs and symptoms  Description: Will show no infection signs and symptoms  Outcome: Ongoing   No new signs of infection or new skin break down so far this shift. Vital signs within normal limits. IV atb given as ordered. Incision site on head with steri strips. Problem: Pain:  Goal: Pain level will decrease  Description: Pain level will decrease  Outcome: Ongoing   Denying pain so far this shift. PRN tylenol available for pain control. Pain goal 0/10 and met this shift. Encouraged to rest and reposition for additional comfort measures. Problem: Discharge Planning:  Goal: Discharged to appropriate level of care  Description: Discharged to appropriate level of care  Outcome: Ongoing   Patient from home. Plans to return home at time of discharge. Unknown discharge date at this time. Care plan reviewed with patient. Patient verbalize understanding of the plan of care and contribute to goal setting.

## 2022-03-15 NOTE — FLOWSHEET NOTE
03/15/22 0955   Encounter Summary   Services provided to: Patient   Referral/Consult From: Rounding   Continue Visiting Yes  (3/15)   Complexity of Encounter Moderate   Length of Encounter 15 minutes   Routine   Type Initial   Assessment Approachable   Intervention Nurtured hope   Outcome Comfort   Spiritual/Yarsanism   Type Spiritual support   Assessment: In my encounter with the 78 yr old patient, while rounding  the unit I provided spiritual care to patient through conversation, I also came to assess the patients spiritual needs present. The pt was admitted due to wound dehiscence. Interventions:  I provided prayer, emotional support and words of comfort.  provided a listening presence and encouraged pt to share their beliefs and how they support him during their hospitalization. Outcomes: The patient was encouraged and didnt share any further spiritual needs at this time. Plan:  Chaplains will follow-up at a later time for assessment of any spiritual care needs present. The [place] is [place] [address].

## 2022-03-15 NOTE — PROGRESS NOTES
Vitals obtained. /55. Temp 98.0 Pt. States no pain. Pulse 62. O2 98%. A&O x4. Skin is pink warm and dry. Pt. Makes no complaints of numbness or tingling in either extremities. Hand grasp strong and equal. Arm drift negative. Pedal push and pull strong and equal. Lung sounds clear throughout. Heart sounds auscultated. Cap refill <3. Right INT capped. Patient is in chair playing cards. Daughter in room.

## 2022-03-15 NOTE — PROGRESS NOTES
PROGRESS NOTE      Patient:  Karen Frias      Unit/Bed:5K-10/010-A    YOB: 1940    MRN: [id number]       Acct: [de-identified]     PCP: Debby Dixon MD    Date of Admission: 3/14/2022      Assessment/Plan:    Anticipated Discharge in : Potentially tomorrow    Active Hospital Problems    Diagnosis Date Noted    Wound dehiscence [T81.30XA] 03/14/2022     Wound dehiscence of right frontal cranium  Patient had evacuation of subdural hematoma on 1/21 with Dr. Red Pagan  Sutures and staples were removed last week by Dr. Red Pagan  I&D consulted  Rocephin started per I&D  CT Head shows small chronic mixed extra-axial fluid collection subjacent to the craniotomy with increased air collection. Neurosurgery following  I&D consulted plastic surgery for closure  Wound management  Follow labs     Hypertension  Resume home meds      Chief Complaint: Wound Dehiscence     Hospital Course:   \"78 y.o. female who presented to [facility] with Wound Dehiscence. She has a past medical history of hypertension.      Patient states she fell in January and hit her head. At that time she had a subdural hematoma. She underwent an evacuation with Dr. Red Pagan at that time. She was admitted previously on 2/23/2022 for wound dehiscence with screw working its way out and had neurosurgical irrigation and closure of her surgical wound at that time. Last week patient follow-up with Dr. Ross Palacios office to have staples and sutures removed. She had Steri-Strips placed at that time.     Patient states that this morning she was feeling well until she started noticing a \"grunting\" or swishing sound in her right cranium, similar to when she previously had a wound dehiscence. She states she tried to get into Dr. Lorna Mo and Dr. Red Pagan' office today but she could not. She went to her PCP who recommended she go to the ED. She denies pain, drainage, bleeding. She denies all other review of systems.   She otherwise has no new complaints. Family is requesting Dr. Kent Mohs consult. She was afebrile in the ED with BP stable. \"    Subjective:  Patient seen and examined bedside. States she has no complaints. Vital signs are stable. I&D started on Rocephin. CT Scan shows persistent small chronic extra-axial fluid. Neurosurgery following. Review of Systems   Constitutional: Negative for fatigue and fever. HENT: Negative for sore throat and trouble swallowing. Respiratory: Negative for cough and shortness of breath. Cardiovascular: Negative for chest pain and palpitations. Gastrointestinal: Negative for abdominal pain, diarrhea, nausea and vomiting. Genitourinary: Negative for decreased urine volume, frequency and urgency. Skin: Positive for wound. Neurological: Negative for light-headedness and headaches. Psychiatric/Behavioral: Negative for behavioral problems. Medications:  Reviewed    Infusion Medications    sodium chloride       Scheduled Medications    metoprolol tartrate  50 mg Oral BID    lisinopril  40 mg Oral Daily    hydroCHLOROthiazide  12.5 mg Oral Daily    melatonin  6 mg Oral Nightly    sodium chloride flush  5-40 mL IntraVENous 2 times per day    enoxaparin  40 mg SubCUTAneous Daily    vitamin C  1,000 mg Oral Daily    cefTRIAXone (ROCEPHIN) IV  1,000 mg IntraVENous Q24H     PRN Meds: sodium chloride flush, sodium chloride, ondansetron **OR** ondansetron, polyethylene glycol, acetaminophen **OR** acetaminophen      Intake/Output Summary (Last 24 hours) at 3/15/2022 1755  Last data filed at 3/15/2022 1242  Gross per 24 hour   Intake 1040 ml   Output    Net 1040 ml       Diet:  ADULT DIET; Regular    Exam:  BP (!) 110/55   Pulse 62   Temp 98 °F (36.7 °C) (Oral)   Resp 17   Ht 5' 4\" (1.626 m)   Wt 175 lb (79.4 kg)   LMP  (LMP Unknown)   SpO2 91%   BMI 30.04 kg/m²     Physical Exam  Constitutional:       General: She is not in acute distress. Appearance: Normal appearance.  She is normal weight. She is not ill-appearing. HENT:      Head: Normocephalic and atraumatic. Nose: Nose normal.   Eyes:      General: No scleral icterus. Conjunctiva/sclera: Conjunctivae normal.   Cardiovascular:      Rate and Rhythm: Normal rate and regular rhythm. Heart sounds: Normal heart sounds. No murmur heard. No friction rub. No gallop. Pulmonary:      Effort: Pulmonary effort is normal.      Breath sounds: Normal breath sounds. No wheezing, rhonchi or rales. Chest:      Chest wall: No tenderness. Abdominal:      General: Abdomen is flat. Bowel sounds are normal. There is no distension. Palpations: Abdomen is soft. Tenderness: There is no abdominal tenderness. There is no guarding. Musculoskeletal:      Cervical back: Normal range of motion and neck supple. No rigidity or tenderness. Skin:     General: Skin is warm and dry. Capillary Refill: Capillary refill takes less than 2 seconds. Findings: Wound present. Comments: Post craniotomy wound without erythema on right temple region. Noted one area of tunneling. No discharge or bleeding. Neurological:      General: No focal deficit present. Mental Status: She is alert and oriented to person, place, and time. Mental status is at baseline. Motor: No weakness. Psychiatric:         Mood and Affect: Mood normal.         Behavior: Behavior normal.         Thought Content: Thought content normal.         Judgment: Judgment normal.           Labs:   Recent Labs     03/14/22 1843 03/15/22  0432   WBC 6.2 4.6*   HGB 11.8* 11.7*   HCT 35.9* 35.6*    155     Recent Labs     03/14/22  1843 03/15/22  0432    140   K 4.2 4.0    108   CO2 20* 20*   BUN 37* 31*   CREATININE 0.8 0.7   CALCIUM 9.4 8.8     No results for input(s): AST, ALT, BILIDIR, BILITOT, ALKPHOS in the last 72 hours. No results for input(s): INR in the last 72 hours.   No results for input(s): Cecilia Peek in the last 72 hours.    Urinalysis:      Lab Results   Component Value Date    NITRU POSITIVE 01/18/2022    WBCUA 10-15W/CLUMPS 01/18/2022    BACTERIA MANY 01/18/2022    RBCUA 0-2 01/18/2022    RBCUA 0-2 07/02/2018    BLOODU neg 02/01/2022    BLOODU TRACE 01/18/2022    SPECGRAV 1.015 02/01/2022    SPECGRAV 1.010 01/18/2022    GLUCOSEU neg 02/01/2022    GLUCOSEU NEGATIVE 07/02/2018       Radiology:  CT HEAD WO CONTRAST   Final Result    Redemonstration of a small right frontoparietal craniotomy with persistent small chronic mixed extra-axial fluid collection subjacent to the craniotomy but with increased air within the collection compared to prior exam. This suggests possible    microcommunication of the collection with the external hemisphere through the craniotomy and adjacent scalp. Superinfection of the collection can't be excluded. **This report has been created using voice recognition software. It may contain minor errors which are inherent in voice recognition technology. **      Final report electronically signed by Dr. Armaan Valadez MD on 3/15/2022 8:55 AM          Diet: ADULT DIET; Regular    DVT prophylaxis: [x] Lovenox                                 [] SCDs                                 [] SQ Heparin                                 [] Encourage ambulation           [] Already on Anticoagulation     Disposition:    [x] Home       [] TCU       [] Rehab       [] Psych       [] SNF       [] [facility]       [] Other-    Code Status: Full Code    PT/OT Eval Status: N/A      Electronically signed by Jesus Alberto Chavez DO on 3/15/2022 at 5:55 PM    This note was electronically signed. Parts of this note may have been dictated by use of voice recognition software and electronically transcribed. The note may contain errors not detected in proofreading. Please refer to my supervising physician's documentation if my documentation differs.

## 2022-03-16 PROBLEM — T81.31XA RUPTURE OF OPERATION WOUND: Status: ACTIVE | Noted: 2022-03-14

## 2022-03-16 LAB
ANION GAP SERPL CALCULATED.3IONS-SCNC: 13 MEQ/L (ref 8–16)
BASOPHILS # BLD: 1.2 %
BASOPHILS ABSOLUTE: 0.1 THOU/MM3 (ref 0–0.1)
BUN BLDV-MCNC: 24 MG/DL (ref 7–22)
CALCIUM SERPL-MCNC: 9.2 MG/DL (ref 8.5–10.5)
CHLORIDE BLD-SCNC: 102 MEQ/L (ref 98–111)
CO2: 23 MEQ/L (ref 23–33)
CREAT SERPL-MCNC: 1.1 MG/DL (ref 0.4–1.2)
EOSINOPHIL # BLD: 3.9 %
EOSINOPHILS ABSOLUTE: 0.2 THOU/MM3 (ref 0–0.4)
ERYTHROCYTE [DISTWIDTH] IN BLOOD BY AUTOMATED COUNT: 13.2 % (ref 11.5–14.5)
ERYTHROCYTE [DISTWIDTH] IN BLOOD BY AUTOMATED COUNT: 43.2 FL (ref 35–45)
GFR SERPL CREATININE-BSD FRML MDRD: 47 ML/MIN/1.73M2
GLUCOSE BLD-MCNC: 105 MG/DL (ref 70–108)
HCT VFR BLD CALC: 35.8 % (ref 37–47)
HEMOGLOBIN: 11.8 GM/DL (ref 12–16)
IMMATURE GRANS (ABS): 0.01 THOU/MM3 (ref 0–0.07)
IMMATURE GRANULOCYTES: 0.2 %
LYMPHOCYTES # BLD: 25.5 %
LYMPHOCYTES ABSOLUTE: 1.3 THOU/MM3 (ref 1–4.8)
MCH RBC QN AUTO: 30.3 PG (ref 26–33)
MCHC RBC AUTO-ENTMCNC: 33 GM/DL (ref 32.2–35.5)
MCV RBC AUTO: 91.8 FL (ref 81–99)
MONOCYTES # BLD: 15 %
MONOCYTES ABSOLUTE: 0.8 THOU/MM3 (ref 0.4–1.3)
NUCLEATED RED BLOOD CELLS: 0 /100 WBC
PLATELET # BLD: 161 THOU/MM3 (ref 130–400)
PMV BLD AUTO: 10.5 FL (ref 9.4–12.4)
POTASSIUM REFLEX MAGNESIUM: 4.3 MEQ/L (ref 3.5–5.2)
RBC # BLD: 3.9 MILL/MM3 (ref 4.2–5.4)
SEG NEUTROPHILS: 54.2 %
SEGMENTED NEUTROPHILS ABSOLUTE COUNT: 2.8 THOU/MM3 (ref 1.8–7.7)
SODIUM BLD-SCNC: 138 MEQ/L (ref 135–145)
WBC # BLD: 5.1 THOU/MM3 (ref 4.8–10.8)

## 2022-03-16 PROCEDURE — 1200000000 HC SEMI PRIVATE

## 2022-03-16 PROCEDURE — 85025 COMPLETE CBC W/AUTO DIFF WBC: CPT

## 2022-03-16 PROCEDURE — 36415 COLL VENOUS BLD VENIPUNCTURE: CPT

## 2022-03-16 PROCEDURE — 2580000003 HC RX 258

## 2022-03-16 PROCEDURE — 6370000000 HC RX 637 (ALT 250 FOR IP)

## 2022-03-16 PROCEDURE — 99232 SBSQ HOSP IP/OBS MODERATE 35: CPT | Performed by: FAMILY MEDICINE

## 2022-03-16 PROCEDURE — 6360000002 HC RX W HCPCS: Performed by: INTERNAL MEDICINE

## 2022-03-16 PROCEDURE — 80048 BASIC METABOLIC PNL TOTAL CA: CPT

## 2022-03-16 PROCEDURE — 2580000003 HC RX 258: Performed by: INTERNAL MEDICINE

## 2022-03-16 RX ADMIN — Medication 6 MG: at 20:56

## 2022-03-16 RX ADMIN — CEFTRIAXONE SODIUM 1000 MG: 1 INJECTION, POWDER, FOR SOLUTION INTRAMUSCULAR; INTRAVENOUS at 20:58

## 2022-03-16 RX ADMIN — OXYCODONE HYDROCHLORIDE AND ACETAMINOPHEN 1000 MG: 500 TABLET ORAL at 08:02

## 2022-03-16 RX ADMIN — METOPROLOL TARTRATE 50 MG: 50 TABLET, FILM COATED ORAL at 08:02

## 2022-03-16 RX ADMIN — LISINOPRIL 40 MG: 40 TABLET ORAL at 08:02

## 2022-03-16 RX ADMIN — SODIUM CHLORIDE 25 ML: 9 INJECTION, SOLUTION INTRAVENOUS at 20:58

## 2022-03-16 RX ADMIN — HYDROCHLOROTHIAZIDE 12.5 MG: 25 TABLET ORAL at 08:00

## 2022-03-16 RX ADMIN — ACETAMINOPHEN 650 MG: 325 TABLET ORAL at 21:01

## 2022-03-16 RX ADMIN — SODIUM CHLORIDE, PRESERVATIVE FREE 10 ML: 5 INJECTION INTRAVENOUS at 08:02

## 2022-03-16 RX ADMIN — SODIUM CHLORIDE, PRESERVATIVE FREE 10 ML: 5 INJECTION INTRAVENOUS at 20:56

## 2022-03-16 ASSESSMENT — PAIN SCALES - GENERAL
PAINLEVEL_OUTOF10: 0
PAINLEVEL_OUTOF10: 0
PAINLEVEL_OUTOF10: 1
PAINLEVEL_OUTOF10: 0

## 2022-03-16 NOTE — PROGRESS NOTES
Progress note: Infectious diseases    Patient - Aubrie Otto,  Age - 78 y.o.    - 1940      Room Number - 5K-10/010-A   MRN -  [id number]   Acct # - [de-identified]  Date of Admission -  3/14/2022  3:20 PM    SUBJECTIVE:   No new issues. Seen by plastic surgeon  OBJECTIVE   VITALS    height is 5' 4.02\" (1.626 m) and weight is 175 lb (79.4 kg). Her oral temperature is 98 °F (36.7 °C). Her blood pressure is 118/56 (abnormal) and her pulse is 69. Her respiration is 18 and oxygen saturation is 95%. Wt Readings from Last 3 Encounters:   22 175 lb (79.4 kg)   22 173 lb (78.5 kg)   03/10/22 173 lb 8 oz (78.7 kg)       I/O (24 Hours)    Intake/Output Summary (Last 24 hours) at 3/16/2022 1615  Last data filed at 3/15/2022 1835  Gross per 24 hour   Intake 480 ml   Output    Net 480 ml       General Appearance  Awake, alert, oriented,  not  In acute distress  HEENT - normocephalic, atraumatic, slighlty pale  conjunctiva,  anicteric sclera  steristrips noted  Neck - Supple, no mass.   Lungs -  Bilateral   air entry, no rhonchi, no wheeze  Cardiovascular - Heart sounds are normal.     Abdomen - soft, not distended, nontender,   Neurologic -oriented  Skin - No bruising or bleeding  Extremities - No edema, no cyanosis, clubbing     MEDICATIONS:      metoprolol tartrate  50 mg Oral BID    lisinopril  40 mg Oral Daily    hydroCHLOROthiazide  12.5 mg Oral Daily    melatonin  6 mg Oral Nightly    sodium chloride flush  5-40 mL IntraVENous 2 times per day    enoxaparin  40 mg SubCUTAneous Daily    vitamin C  1,000 mg Oral Daily    cefTRIAXone (ROCEPHIN) IV  1,000 mg IntraVENous Q24H      sodium chloride       sodium chloride flush, sodium chloride, ondansetron **OR** ondansetron, polyethylene glycol, acetaminophen **OR** acetaminophen      LABS:     CBC:   Recent Labs     22  1843 03/15/22  0432 03/16/22  0433   WBC 6.2 4.6* 5.1   HGB 11.8* 11.7* 11.8*    155 161     BMP:    Recent Labs     03/14/22  1843 03/15/22  0432 03/16/22  1358    140 138   K 4.2 4.0 4.3    108 102   CO2 20* 20* 23   BUN 37* 31* 24*   CREATININE 0.8 0.7 1.1   GLUCOSE 91 94 105     Calcium:  Recent Labs     03/16/22  1358   CALCIUM 9.2       Problem list of patient:     Patient Active Problem List   Diagnosis Code    Essential hypertension I10    Syncopal episodes R55    Primary osteoarthritis of right knee M17.11    Subdural hematoma ([address] S06.5X9A    Nonhealing surgical wound, initial encounter T81.89XA    Superficial postoperative wound infection T81.49XA    Disruption of external surgical wound T81. 31XA    Rupture of operation wound T81. 31XA         ASSESSMENT/PLAN   Scalp wound dehiscence: evaluated by Dr Tessa Palma current treatment.   Discussed with her family      Dmitriy Nina MD, MD, [address] 3/16/2022 4:15 PM

## 2022-03-16 NOTE — PROGRESS NOTES
Vitals were obtained and charted in flow sheets. Pt ambulated in hallway with Reed Moran RN. She is now in bed. Call light within reach and bed alarm on.

## 2022-03-16 NOTE — PROGRESS NOTES
PROGRESS NOTE      Patient:  Nga Werner      Unit/Bed:5K-10/010-A    YOB: 1940    MRN: [id number]       Acct: [de-identified]     PCP: Gustavo Alvarado MD    Date of Admission: 3/14/2022      Assessment/Plan:    Anticipated Discharge in : Pending clinical course    Active Hospital Problems    Diagnosis Date Noted    Rupture of operation wound Jose M Codding 03/14/2022    Essential hypertension [I10]      Wound dehiscence of right frontal cranium  Patient had evacuation of subdural hematoma on 1/21 with Dr. Man Cervantes  Sutures and staples were removed last week by Dr. Man Cervantes  I&D consulted  Rocephin started per I&D  CT Head shows small chronic mixed extra-axial fluid collection subjacent to the craniotomy with increased air collection. Neurosurgery following  I&D consulted plastic surgery for closure  Plastic, I&D and Neurosurgery to discuss plan for best strategy of closure. Will have decision tomorrow. Wound management  Follow labs     Hypertension, stable  Continue home meds      Chief Complaint: Wound Dehiscence     Hospital Course:   \"78 y.o. female who presented to [address] [place] [address] with Wound Dehiscence. She has a past medical history of hypertension.      Patient states she fell in January and hit her head. At that time she had a subdural hematoma. She underwent an evacuation with Dr. Man Cervantes at that time. She was admitted previously on 2/23/2022 for wound dehiscence with screw working its way out and had neurosurgical irrigation and closure of her surgical wound at that time. Last week patient follow-up with Dr. Isidoro Silva office to have staples and sutures removed. She had Steri-Strips placed at that time.     Patient states that this morning she was feeling well until she started noticing a \"grunting\" or swishing sound in her right cranium, similar to when she previously had a wound dehiscence.   She states she tried to get into Dr. Maura Yang and Dr. Man Cervantes' office today but she could not.  She went to her PCP who recommended she go to the ED. She denies pain, drainage, bleeding. She denies all other review of systems. She otherwise has no new complaints. Family is requesting Dr. Zoe Osborne consult. She was afebrile in the ED with BP stable. \"    Subjective:  Patient seen and examined bedside. States she has no complaints. Vital signs are stable. Plastic Surgery consulted, awaiting recs. Continues on Rocephin    Review of Systems   Constitutional: Negative for fatigue and fever. HENT: Negative for sore throat and trouble swallowing. Respiratory: Negative for cough and shortness of breath. Cardiovascular: Negative for chest pain and palpitations. Gastrointestinal: Negative for abdominal pain, diarrhea, nausea and vomiting. Genitourinary: Negative for decreased urine volume, frequency and urgency. Skin: Positive for wound. Neurological: Negative for light-headedness and headaches. Psychiatric/Behavioral: Negative for behavioral problems. Medications:  Reviewed    Infusion Medications    sodium chloride       Scheduled Medications    metoprolol tartrate  50 mg Oral BID    lisinopril  40 mg Oral Daily    hydroCHLOROthiazide  12.5 mg Oral Daily    melatonin  6 mg Oral Nightly    sodium chloride flush  5-40 mL IntraVENous 2 times per day    enoxaparin  40 mg SubCUTAneous Daily    vitamin C  1,000 mg Oral Daily    cefTRIAXone (ROCEPHIN) IV  1,000 mg IntraVENous Q24H     PRN Meds: sodium chloride flush, sodium chloride, ondansetron **OR** ondansetron, polyethylene glycol, acetaminophen **OR** acetaminophen      Intake/Output Summary (Last 24 hours) at 3/16/2022 1723  Last data filed at 3/15/2022 1835  Gross per 24 hour   Intake 240 ml   Output    Net 240 ml       Diet:  ADULT DIET;  Regular    Exam:  BP (!) 118/56   Pulse 69   Temp 98 °F (36.7 °C) (Oral)   Resp 18   Ht 5' 4.02\" (1.626 m)   Wt 175 lb (79.4 kg)   LMP  (LMP Unknown)   SpO2 95%   BMI 30.02 kg/m² Physical Exam  Constitutional:       General: She is not in acute distress. Appearance: Normal appearance. She is normal weight. She is not ill-appearing. HENT:      Head: Normocephalic and atraumatic. Nose: Nose normal.   Eyes:      General: No scleral icterus. Conjunctiva/sclera: Conjunctivae normal.   Cardiovascular:      Rate and Rhythm: Normal rate and regular rhythm. Heart sounds: Normal heart sounds. No murmur heard. No friction rub. No gallop. Pulmonary:      Effort: Pulmonary effort is normal.      Breath sounds: Normal breath sounds. No wheezing, rhonchi or rales. Chest:      Chest wall: No tenderness. Abdominal:      General: Abdomen is flat. Bowel sounds are normal. There is no distension. Palpations: Abdomen is soft. Tenderness: There is no abdominal tenderness. There is no guarding. Musculoskeletal:      Cervical back: Normal range of motion and neck supple. No rigidity or tenderness. Skin:     General: Skin is warm and dry. Capillary Refill: Capillary refill takes less than 2 seconds. Findings: Wound present. Comments: Post craniotomy wound without erythema on right temple region. Noted one area of tunneling. No discharge or bleeding. Neurological:      General: No focal deficit present. Mental Status: She is alert and oriented to person, place, and time. Mental status is at baseline. Motor: No weakness. Psychiatric:         Mood and Affect: Mood normal.         Behavior: Behavior normal.         Thought Content:  Thought content normal.         Judgment: Judgment normal.           Labs:   Recent Labs     03/14/22  1843 03/15/22  0432 03/16/22  0433   WBC 6.2 4.6* 5.1   HGB 11.8* 11.7* 11.8*   HCT 35.9* 35.6* 35.8*    155 161     Recent Labs     03/14/22  1843 03/15/22  0432 03/16/22  1358    140 138   K 4.2 4.0 4.3    108 102   CO2 20* 20* 23   BUN 37* 31* 24*   CREATININE 0.8 0.7 1.1   CALCIUM 9.4 8.8 9.2 No results for input(s): AST, ALT, BILIDIR, BILITOT, ALKPHOS in the last 72 hours. No results for input(s): INR in the last 72 hours. No results for input(s): Saira Cantu in the last 72 hours. Urinalysis:      Lab Results   Component Value Date    NITRU POSITIVE 01/18/2022    WBCUA 10-15W/CLUMPS 01/18/2022    BACTERIA MANY 01/18/2022    RBCUA 0-2 01/18/2022    RBCUA 0-2 07/02/2018    BLOODU neg 02/01/2022    BLOODU TRACE 01/18/2022    SPECGRAV 1.015 02/01/2022    SPECGRAV 1.010 01/18/2022    GLUCOSEU neg 02/01/2022    GLUCOSEU NEGATIVE 07/02/2018       Radiology:  CT HEAD WO CONTRAST   Final Result    Redemonstration of a small right frontoparietal craniotomy with persistent small chronic mixed extra-axial fluid collection subjacent to the craniotomy but with increased air within the collection compared to prior exam. This suggests possible    microcommunication of the collection with the external hemisphere through the craniotomy and adjacent scalp. Superinfection of the collection can't be excluded. **This report has been created using voice recognition software. It may contain minor errors which are inherent in voice recognition technology. **      Final report electronically signed by Dr. Maritza Laurent MD on 3/15/2022 8:55 AM          Diet: ADULT DIET; Regular    DVT prophylaxis: [x] Lovenox                                 [] SCDs                                 [] SQ Heparin                                 [] Encourage ambulation           [] Already on Anticoagulation     Disposition:    [x] Home       [] TCU       [] Rehab       [] Psych       [] SNF       [] [facility]       [] Other-    Code Status: Full Code    PT/OT Eval Status: N/A      Electronically signed by Letty De La Cruz DO on 3/16/2022 at 5:23 PM    This note was electronically signed. Parts of this note may have been dictated by use of voice recognition software and electronically transcribed.  The note may contain errors not detected in proofreading. Please refer to my supervising physician's documentation if my documentation differs.

## 2022-03-16 NOTE — PROGRESS NOTES
Comprehensive Nutrition Assessment    Type and Reason for Visit:  Initial,Positive Nutrition Screen,Wound    Nutrition Recommendations/Plan:   · Continue current diet as ordered- Regular. · Patient would like to have Charly BID to promote healing of surgical incision. Nutrition Assessment:   Pt. nutritionally compromised AEB need for increased protein need for healing. At risk for further nutrition compromise r/t underlying medical condition (arthritis, HTN, subdural hematoma). Malnutrition Assessment:  Malnutrition Status:  No malnutrition    Context:  Acute Illness     Findings of the 6 clinical characteristics of malnutrition:  Energy Intake:  No significant decrease in energy intake  Weight Loss:  No significant weight loss     Body Fat Loss:  No significant body fat loss     Muscle Mass Loss:  No significant muscle mass loss    Fluid Accumulation:  No significant fluid accumulation     Strength:  Not Performed    Estimated Daily Nutrient Needs:  Energy (kcal):  1588 kcal/day (20 kcal/kg); Weight Used for Energy Requirements:  Current, 79.4 kg     Protein (g):  82 gm/day (1.5 gm/kg); Weight Used for Protein Requirements:  Ideal, 54.5 kg          Nutrition Related Findings:       Pt. Report/Treatments/Miscellaneous: Admitted for wound check. Hx of right frontal craniotomy in January after fall and sustained subdural hematoma. Last month received right frontal lobe debridement and washout as the screw worked its way out. I&D and plastic surgery consulted. Writer met with patient this date. Patient reports a good appetite. States she drank charly BID while at home. Patient requested to receive charly BID during admit to promote healing of surgical incision. GI Status: No BM recorded since admit.    Pertinent Labs: (3/15) Co2 20, BUN 31, GFR 80; (3/16) Hgb 11.8  Pertinent Meds: vitamin C, rocephin, lovenox, hydrodiuril, prinivil, lopressor; PRN zofran, glycolax    Wounds:  Surgical Incision (2/24/22- right upper face surgical incision)       Current Nutrition Therapies:    ADULT DIET; Regular    Anthropometric Measures:  · Height: 5' 4.02\" (162.6 cm)  · Current Body Weight: 175 lb 0.7 oz (79.4 kg) (3/14- no edema noted)   · Admission Body Weight: 175 lb 0.7 oz (79.4 kg) (3/14- stated weight; no edema noted)    · Ideal Body Weight: 120 lbs; % Ideal Body Weight 145.9 %   · BMI: 30  · BMI Categories: Obese Class 1 (BMI 30.0-34. 9)       Nutrition Diagnosis:   · Increased nutrient needs related to increase demand for energy/nutrients as evidenced by  (surgical incision to right upper face)      Nutrition Interventions:   Food and/or Nutrient Delivery:  Continue Current Diet,Start Oral Nutrition Supplement (Patient would like Charly BID)  Nutrition Education/Counseling:  No recommendation at this time   Coordination of Nutrition Care:  Continue to monitor while inpatient    Goals:  Patient to consume 75% or more at meals daily throughout LOS       Nutrition Monitoring and Evaluation:   Behavioral-Environmental Outcomes:  None Identified   Food/Nutrient Intake Outcomes:  Food and Nutrient Intake,Supplement Intake  Physical Signs/Symptoms Outcomes:  Biochemical Data,Chewing or Swallowing,GI Status,Fluid Status or Edema,Weight,Skin,Nutrition Focused Physical Findings     Discharge Planning:     Too soon to determine     Electronically signed by Abagail Babinski, MS, RD, LD on 3/16/22 at 12:08 PM EDT    Contact: [phone]

## 2022-03-16 NOTE — PLAN OF CARE
Problem: Falls - Risk of:  Goal: Will remain free from falls  Description: Will remain free from falls  Outcome: Ongoing   Fall sign posted. Arm band in place. Non-skid slippers on. Bed alarm on. Patient agreeable to use of call light. Call light within reach. Bed in lowest position. Problem: Skin Integrity:  Goal: Will show no infection signs and symptoms  Description: Will show no infection signs and symptoms  Outcome: Ongoing   No new signs of infection or new skin break down so far this shift. Vital signs within normal limits. IV atb given as ordered. Problem: Pain:  Goal: Pain level will decrease  Description: Pain level will decrease  Outcome: Ongoing   Denying pain so far this shift. PRN tylenol available for pain control. Pain goal 0/10 and met this shift. Encouraged to rest and reposition for additional comfort measures. Problem: Discharge Planning:  Goal: Discharged to appropriate level of care  Description: Discharged to appropriate level of care  Outcome: Ongoing   Patient from home. Plans to return home at time of discharge. Unknown discharge date at this time. Care plan reviewed with patient. Patient verbalize understanding of the plan of care and contribute to goal setting.

## 2022-03-16 NOTE — PLAN OF CARE
See full note filed 3/16 at 12:59 PM.       Problem: Nutrition  Goal: Optimal nutrition therapy  Outcome: Ongoing     Nutrition Problem #1: Increased nutrient needs  Intervention: Food and/or Nutrient Delivery: Continue Current Diet,Start Oral Nutrition Supplement (Patient would like Charly BID)  Nutritional Goals: Patient to consume 75% or more at meals daily throughout LOS

## 2022-03-17 LAB
BASOPHILS # BLD: 1.5 %
BASOPHILS ABSOLUTE: 0.1 THOU/MM3 (ref 0–0.1)
EOSINOPHIL # BLD: 5.6 %
EOSINOPHILS ABSOLUTE: 0.2 THOU/MM3 (ref 0–0.4)
ERYTHROCYTE [DISTWIDTH] IN BLOOD BY AUTOMATED COUNT: 13.2 % (ref 11.5–14.5)
ERYTHROCYTE [DISTWIDTH] IN BLOOD BY AUTOMATED COUNT: 45.1 FL (ref 35–45)
HCT VFR BLD CALC: 36.2 % (ref 37–47)
HEMOGLOBIN: 11.6 GM/DL (ref 12–16)
IMMATURE GRANS (ABS): 0.01 THOU/MM3 (ref 0–0.07)
IMMATURE GRANULOCYTES: 0.2 %
LYMPHOCYTES # BLD: 31.2 %
LYMPHOCYTES ABSOLUTE: 1.3 THOU/MM3 (ref 1–4.8)
MCH RBC QN AUTO: 30.1 PG (ref 26–33)
MCHC RBC AUTO-ENTMCNC: 32 GM/DL (ref 32.2–35.5)
MCV RBC AUTO: 94 FL (ref 81–99)
MONOCYTES # BLD: 13.2 %
MONOCYTES ABSOLUTE: 0.5 THOU/MM3 (ref 0.4–1.3)
NUCLEATED RED BLOOD CELLS: 0 /100 WBC
PLATELET # BLD: 149 THOU/MM3 (ref 130–400)
PMV BLD AUTO: 10.6 FL (ref 9.4–12.4)
RBC # BLD: 3.85 MILL/MM3 (ref 4.2–5.4)
SEG NEUTROPHILS: 48.3 %
SEGMENTED NEUTROPHILS ABSOLUTE COUNT: 2 THOU/MM3 (ref 1.8–7.7)
WBC # BLD: 4.1 THOU/MM3 (ref 4.8–10.8)

## 2022-03-17 PROCEDURE — 85025 COMPLETE CBC W/AUTO DIFF WBC: CPT

## 2022-03-17 PROCEDURE — 1200000000 HC SEMI PRIVATE

## 2022-03-17 PROCEDURE — 2580000003 HC RX 258: Performed by: INTERNAL MEDICINE

## 2022-03-17 PROCEDURE — 6370000000 HC RX 637 (ALT 250 FOR IP)

## 2022-03-17 PROCEDURE — 99232 SBSQ HOSP IP/OBS MODERATE 35: CPT | Performed by: FAMILY MEDICINE

## 2022-03-17 PROCEDURE — 6360000002 HC RX W HCPCS: Performed by: INTERNAL MEDICINE

## 2022-03-17 PROCEDURE — 36415 COLL VENOUS BLD VENIPUNCTURE: CPT

## 2022-03-17 PROCEDURE — 2580000003 HC RX 258

## 2022-03-17 RX ADMIN — CEFTRIAXONE SODIUM 1000 MG: 1 INJECTION, POWDER, FOR SOLUTION INTRAMUSCULAR; INTRAVENOUS at 21:19

## 2022-03-17 RX ADMIN — ACETAMINOPHEN 650 MG: 325 TABLET ORAL at 21:24

## 2022-03-17 RX ADMIN — SODIUM CHLORIDE, PRESERVATIVE FREE 10 ML: 5 INJECTION INTRAVENOUS at 10:07

## 2022-03-17 RX ADMIN — METOPROLOL TARTRATE 50 MG: 50 TABLET, FILM COATED ORAL at 21:24

## 2022-03-17 RX ADMIN — METOPROLOL TARTRATE 50 MG: 50 TABLET, FILM COATED ORAL at 10:05

## 2022-03-17 RX ADMIN — SODIUM CHLORIDE, PRESERVATIVE FREE 10 ML: 5 INJECTION INTRAVENOUS at 21:25

## 2022-03-17 RX ADMIN — OXYCODONE HYDROCHLORIDE AND ACETAMINOPHEN 1000 MG: 500 TABLET ORAL at 13:52

## 2022-03-17 RX ADMIN — Medication 6 MG: at 21:24

## 2022-03-17 ASSESSMENT — PAIN SCALES - GENERAL
PAINLEVEL_OUTOF10: 3
PAINLEVEL_OUTOF10: 0

## 2022-03-17 NOTE — PROGRESS NOTES
Progress note: Infectious diseases    Patient - Meghan Stark,  Age - 78 y.o.    - 1940      Room Number - 5K-10/010-A   MRN -  [id number]   [facility]  Date of Admission -  3/14/2022  3:20 PM    SUBJECTIVE:   No new issues. Plan for surgery on Saturday by neurosurgeon and plastic  OBJECTIVE   VITALS    height is 5' 4.02\" (1.626 m) and weight is 175 lb (79.4 kg). Her oral temperature is 97.7 °F (36.5 °C). Her blood pressure is 116/59 (abnormal) and her pulse is 67. Her respiration is 16 and oxygen saturation is 97%.        Wt Readings from Last 3 Encounters:   22 175 lb (79.4 kg)   22 173 lb (78.5 kg)   03/10/22 173 lb 8 oz (78.7 kg)       I/O (24 Hours)    Intake/Output Summary (Last 24 hours) at 3/17/2022 1322  Last data filed at 3/17/2022 1015  Gross per 24 hour   Intake 680 ml   Output    Net 680 ml       General Appearance  Awake, alert, oriented,  not  In acute distress  HEENT - normocephalic, atraumatic, slighlty pale  conjunctiva,  anicteric sclera  Open scalp wound,  No drainage  steristrips applied    MEDICATIONS:      metoprolol tartrate  50 mg Oral BID    lisinopril  40 mg Oral Daily    hydroCHLOROthiazide  12.5 mg Oral Daily    melatonin  6 mg Oral Nightly    sodium chloride flush  5-40 mL IntraVENous 2 times per day    enoxaparin  40 mg SubCUTAneous Daily    vitamin C  1,000 mg Oral Daily    cefTRIAXone (ROCEPHIN) IV  1,000 mg IntraVENous Q24H      sodium chloride 25 mL (22)     sodium chloride flush, sodium chloride, ondansetron **OR** ondansetron, polyethylene glycol, acetaminophen **OR** acetaminophen      LABS:     CBC:   Recent Labs     03/15/22  0432 22  0433 22  0529   WBC 4.6* 5.1 4.1*   HGB 11.7* 11.8* 11.6*    161 149     BMP:    Recent Labs     22  1843 03/15/22  0432 22  1358    140 138   K 4.2 4.0 4.3    108 102   CO2 20* 20* 23   BUN 37* 31* 24*   CREATININE 0.8 0.7 1.1   GLUCOSE 91 94 105     Calcium:  Recent Labs     03/16/22  1358   CALCIUM 9.2       Problem list of patient:     Patient Active Problem List   Diagnosis Code    Essential hypertension I10    Syncopal episodes R55    Primary osteoarthritis of right knee M17.11    Subdural hematoma ([address] S06.5X9A    Nonhealing surgical wound, initial encounter T81.89XA    Superficial postoperative wound infection T81.49XA    Disruption of external surgical wound T81. 31XA    Rupture of operation wound T81. 31XA         ASSESSMENT/PLAN   Scalp wound dehiscence: for surgery on Saturday  Continue current treatment.   Discussed with her daughter      Fernando Harvey MD, MD, Hussein Guzmán 3/17/2022 1:22 PM

## 2022-03-17 NOTE — PROGRESS NOTES
PROGRESS NOTE      Patient:  Christoph Vuong      Unit/Bed:5K-10/010-A    YOB: 1940    MRN: [id number]       Acct: [de-identified]     PCP: Edvin Roblero MD    Date of Admission: 3/14/2022      Assessment/Plan:    Anticipated Discharge in : Pending clinical course    Active Hospital Problems    Diagnosis Date Noted    Rupture of operation wound Aj Sosa 03/14/2022    Essential hypertension [I10]      Wound dehiscence of right frontal cranium  Patient had evacuation of subdural hematoma on 1/21 with Dr. Crow Quintanilla  Sutures and staples were removed last week by Dr. Crow Quintanilla  I&D consulted  Rocephin started per I&D  CT Head shows small chronic mixed extra-axial fluid collection subjacent to the craniotomy with increased air collection. Neurosurgery following  I&D consulted plastic surgery for closure   Plan is for closure on Saturday per I&D  Wound management  Follow labs     Hypertension, stable  Continue home meds      Chief Complaint: Wound Dehiscence     Hospital Course:   \"78 y.o. female who presented to [place] [address] with Wound Dehiscence. She has a past medical history of hypertension.      Patient states she fell in January and hit her head. At that time she had a subdural hematoma. She underwent an evacuation with Dr. Crow Quintanilla at that time. She was admitted previously on 2/23/2022 for wound dehiscence with screw working its way out and had neurosurgical irrigation and closure of her surgical wound at that time. Last week patient follow-up with Dr. Hugh Mancuso office to have staples and sutures removed. She had Steri-Strips placed at that time.     Patient states that this morning she was feeling well until she started noticing a \"grunting\" or swishing sound in her right cranium, similar to when she previously had a wound dehiscence. She states she tried to get into Dr. Albino Faye and Dr. Crow Quintanilla' office today but she could not. She went to her PCP who recommended she go to the ED.   She denies pain, drainage, bleeding. She denies all other review of systems. She otherwise has no new complaints. Family is requesting Dr. Karthikeyan Oneal consult. She was afebrile in the ED with BP stable. \"    Subjective:  Patient seen and examined bedside. States she has no complaints. Vital signs are stable. Continues on Rocephin. Plan for surgery by plastic surgery on Saturday. Review of Systems   Constitutional: Negative for fatigue and fever. HENT: Negative for sore throat and trouble swallowing. Respiratory: Negative for cough and shortness of breath. Cardiovascular: Negative for chest pain and palpitations. Gastrointestinal: Negative for abdominal pain, diarrhea, nausea and vomiting. Genitourinary: Negative for decreased urine volume, frequency and urgency. Skin: Positive for wound. Neurological: Negative for light-headedness and headaches. Psychiatric/Behavioral: Negative for behavioral problems. Medications:  Reviewed    Infusion Medications    sodium chloride 25 mL (03/16/22 2058)     Scheduled Medications    metoprolol tartrate  50 mg Oral BID    lisinopril  40 mg Oral Daily    hydroCHLOROthiazide  12.5 mg Oral Daily    melatonin  6 mg Oral Nightly    sodium chloride flush  5-40 mL IntraVENous 2 times per day    enoxaparin  40 mg SubCUTAneous Daily    vitamin C  1,000 mg Oral Daily    cefTRIAXone (ROCEPHIN) IV  1,000 mg IntraVENous Q24H     PRN Meds: sodium chloride flush, sodium chloride, ondansetron **OR** ondansetron, polyethylene glycol, acetaminophen **OR** acetaminophen      Intake/Output Summary (Last 24 hours) at 3/17/2022 1727  Last data filed at 3/17/2022 1356  Gross per 24 hour   Intake 1270 ml   Output    Net 1270 ml       Diet:  ADULT DIET; Regular  ADULT ORAL NUTRITION SUPPLEMENT; Lunch, Dinner;  Wound Healing Oral Supplement    Exam:  /62   Pulse 62   Temp 97.7 °F (36.5 °C) (Oral)   Resp 16   Ht 5' 4.02\" (1.626 m)   Wt 175 lb (79.4 kg)   LMP  (LMP Unknown)   SpO2 97%   BMI 30.02 kg/m²     Physical Exam  Constitutional:       General: She is not in acute distress. Appearance: Normal appearance. She is normal weight. She is not ill-appearing. HENT:      Head: Normocephalic and atraumatic. Nose: Nose normal.   Eyes:      General: No scleral icterus. Conjunctiva/sclera: Conjunctivae normal.   Cardiovascular:      Rate and Rhythm: Normal rate and regular rhythm. Heart sounds: Normal heart sounds. No murmur heard. No friction rub. No gallop. Pulmonary:      Effort: Pulmonary effort is normal.      Breath sounds: Normal breath sounds. No wheezing, rhonchi or rales. Chest:      Chest wall: No tenderness. Abdominal:      General: Abdomen is flat. Bowel sounds are normal. There is no distension. Palpations: Abdomen is soft. Tenderness: There is no abdominal tenderness. There is no guarding. Musculoskeletal:      Cervical back: Normal range of motion and neck supple. No rigidity or tenderness. Skin:     General: Skin is warm and dry. Capillary Refill: Capillary refill takes less than 2 seconds. Findings: Wound present. Comments: Post craniotomy wound without erythema on right temple region. Noted one area of tunneling. No discharge or bleeding. Neurological:      General: No focal deficit present. Mental Status: She is alert and oriented to person, place, and time. Mental status is at baseline. Motor: No weakness. Psychiatric:         Mood and Affect: Mood normal.         Behavior: Behavior normal.         Thought Content:  Thought content normal.         Judgment: Judgment normal.           Labs:   Recent Labs     03/15/22  0432 03/16/22  0433 03/17/22  0529   WBC 4.6* 5.1 4.1*   HGB 11.7* 11.8* 11.6*   HCT 35.6* 35.8* 36.2*    161 149     Recent Labs     03/14/22  1843 03/15/22  0432 03/16/22  1358    140 138   K 4.2 4.0 4.3    108 102   CO2 20* 20* 23   BUN 37* 31* 24* CREATININE 0.8 0.7 1.1   CALCIUM 9.4 8.8 9.2     No results for input(s): AST, ALT, BILIDIR, BILITOT, ALKPHOS in the last 72 hours. No results for input(s): INR in the last 72 hours. No results for input(s): Chyrel Lions in the last 72 hours. Urinalysis:      Lab Results   Component Value Date    NITRU POSITIVE 01/18/2022    WBCUA 10-15W/CLUMPS 01/18/2022    BACTERIA MANY 01/18/2022    RBCUA 0-2 01/18/2022    RBCUA 0-2 07/02/2018    BLOODU neg 02/01/2022    BLOODU TRACE 01/18/2022    SPECGRAV 1.015 02/01/2022    SPECGRAV 1.010 01/18/2022    GLUCOSEU neg 02/01/2022    GLUCOSEU NEGATIVE 07/02/2018       Radiology:  CT HEAD WO CONTRAST   Final Result    Redemonstration of a small right frontoparietal craniotomy with persistent small chronic mixed extra-axial fluid collection subjacent to the craniotomy but with increased air within the collection compared to prior exam. This suggests possible    microcommunication of the collection with the external hemisphere through the craniotomy and adjacent scalp. Superinfection of the collection can't be excluded. **This report has been created using voice recognition software. It may contain minor errors which are inherent in voice recognition technology. **      Final report electronically signed by Dr. Marii Ge MD on 3/15/2022 8:55 AM          Diet: ADULT DIET; Regular  ADULT ORAL NUTRITION SUPPLEMENT; Lunch, Dinner;  Wound Healing Oral Supplement    DVT prophylaxis: [x] Lovenox                                 [] SCDs                                 [] SQ Heparin                                 [] Encourage ambulation           [] Already on Anticoagulation     Disposition:    [x] Home       [] TCU       [] Rehab       [] Psych       [] SNF       [] [place]       [] Other-    Code Status: Full Code    PT/OT Eval Status: N/A      Electronically signed by Pushpa Clark DO on 3/17/2022 at 5:27 PM    This note was electronically signed. Parts of this note may have been dictated by use of voice recognition software and electronically transcribed. The note may contain errors not detected in proofreading. Please refer to my supervising physician's documentation if my documentation differs.

## 2022-03-17 NOTE — ED PROVIDER NOTES
[facility]           Pt Name: Jorge Velasco  MRN: [id number]  [place] 1940  Date of evaluation: 3/14/2022  Treating Resident Physician: Linn Monroy DPM  Supervising Physician: Bassam Albarran DO     CHIEF COMPLAINT            Chief Complaint   Patient presents with    Post-op Problem      History obtained from the patient and patient's daughter.        HISTORY OF PRESENT ILLNESS    HPI  Jorge Velasco is a 78 y.o. female who presents to the emergency department for evaluation of right frontal cranial wound dehiscence. The patient presented to her PCP today for a wound check. The patient had a right frontal craniotomy in January after a fall and sustained a subdural hematoma. Last month, the screw had worked its way out and she had it removed and a new plate was inserted. The patient states that she hears a swish/grunt noise that is pulsating, which she heard initially too. She states that the sutures and staples were removed last week in Dr. Joe Sanchez office and steri strips were in place until today's visit with PCP. The patient and patient's daughters are highly concerned about infection and having her go home.  The patient denies any other concerns.      The patient has no other acute complaints at this time.                 REVIEW OF SYSTEMS   Review of Systems   Skin: Positive for wound.            PAST MEDICAL AND SURGICAL HISTORY      Past Medical History        Past Medical History:   Diagnosis Date    Arthritis      Hypertension      Subdural hematoma ([address] 01/2022         Past Surgical History         Past Surgical History:   Procedure Laterality Date    CATHETER REMOVAL Bilateral 11/2014    CRANIOPLASTY Right 2/24/2022     RIGHT FRONTAL WOUND DEBRIDEMENT AND WASHOUT performed by Yisel Glover MD at [address] [place] [address] Right 01/21/2022     RIGHT SIDED CRANIOTOMY FOR SUBDURAL HEMATOMA performed by Yisel Glover MD at [address] ELBOW SURGERY   07/04/2018    FOOT SURGERY Left 2012    KNEE SURGERY Right 07/09/2014     total    IA BX OF BREAST, NEEDLE CORE, IMAGE GUIDE Left 2016     benign    IA OFFICE/OUTPT VISIT,PROCEDURE ONLY Left 07/05/2018     ORIF LEFT ELBOW performed by Dedrick Amador MD at [address] [facility] [address] [address]     Current Medication   No current facility-administered medications for this encounter.     Current Outpatient Medications:     lisinopril (PRINIVIL;ZESTRIL) 40 MG tablet, Take 1 tablet by mouth daily, Disp: 90 tablet, Rfl: 1    hydroCHLOROthiazide (HYDRODIURIL) 12.5 MG tablet, Take 1 tablet by mouth daily, Disp: 90 tablet, Rfl: 1    melatonin 5 MG TABS tablet, Take 5 mg by mouth nightly, Disp: , Rfl:     vitamin C (ASCORBIC ACID) 500 MG tablet, Take 1,000 mg by mouth daily, Disp: , Rfl:     zinc 50 MG TABS tablet, Take 50 mg by mouth daily, Disp: , Rfl:     diphenhydrAMINE-APAP, sleep, (TYLENOL PM EXTRA STRENGTH PO), Take 2 tablets by mouth nightly, Disp: , Rfl:     metoprolol tartrate (LOPRESSOR) 50 MG tablet, TAKE 1 TABLET BY MOUTH TWICE DAILY, Disp: 180 tablet, Rfl: 1           SOCIAL HISTORY      Social History          Social History Narrative    Not on file      Social History      Tobacco Use    Smoking status: Never Smoker    Smokeless tobacco: Never Used   Vaping Use    Vaping Use: Never used   Substance Use Topics    Alcohol use: Not Currently       Comment: very very rare    Drug use:  No            ALLERGIES            Allergies   Allergen Reactions    Pcn [Penicillins] Itching       redness @ IM site            FAMILY HISTORY      Family History         Family History   Problem Relation Age of Onset    Cancer Mother           lymphoma    Cancer Father           prostate and bone    Heart Disease Sister      Heart Disease Brother      Atrial Fibrillation Daughter      Hypertension Daughter      Elevated Lipids Daughter      Breast Cancer Sister 78          PREVIOUS RECORDS   Previous records reviewed: The patient has other previous ED encounters.           PHYSICAL EXAM                ED Triage Vitals [03/14/22 1603]   BP Temp Temp Source Pulse Resp SpO2 Height Weight   (!) 161/63 98.5 °F (36.9 °C) Oral 77 18 95 % 5' 4\" (1.626 m) 175 lb (79.4 kg)      Initial vital signs and nursing assessment reviewed and normal. Body mass index is 30.04 kg/m². Pulsoximetry is normal per my interpretation.     Additional Vital Signs:      Vitals:     03/14/22 1603   BP: (!) 161/63   Pulse: 77   Resp: 18   Temp: 98.5 °F (36.9 °C)   SpO2: 95%         Physical Exam  HENT:      Head:     Skin:     Findings: Wound present. No lesion. Neurological:      Mental Status: She is alert.                MEDICAL DECISION MAKING   Initial Assessment:   1. Wound dehiscence; right frontal cranium . Plan:   · Patient seen and evaluated. · Applied steri strips, gauze, and tegaderm to wound dehiscence  · Patient and patient's daughter persistent on patient being admitted   · Hospitalist admitting patient   · Consult to Dr. Audra Boast for wound care management. · All of patient's and patient's daughter's questions and concerns addressed.             ED RESULTS   Laboratory results:  Labs Reviewed - No data to display     Radiologic studies results:  No orders to display         ED Medications administered this visit: Medications - No data to display        ED COURSE             MEDICATION CHANGES          New Prescriptions     No medications on file            FINAL DISPOSITION      Final diagnoses:   None      Condition: condition: good  Dispo: Admit to med/surg floor        This transcription was electronically signed. Parts of this transcriptions may have been dictated by use of voice recognition software and electronically transcribed, and parts may have been transcribed with the assistance of an ED scribe. The transcription may contain errors not detected in proofreading.   Please refer to my supervising physician's documentation if my documentation differs.     Electronically Signed: Jose M Whitten DPM, 03/14/22, 4:12 PM           Jose M Whitten DPM  Resident  03/14/22 4166                          Jose M Whitten DPM  Resident  03/17/22 1038

## 2022-03-17 NOTE — H&P
[address] [place] [address]                       PREOPERATIVE HISTORY AND PHYSICAL    PATIENT NAME: Elvira Mario                    :        1940  MED REC NO:   [id number]                           ROOM:       0010  ACCOUNT NO:   [de-identified]                           ADMIT DATE: 2022  PROVIDER:     NEIL Vick    CONSULTATION AND PREOPERATIVE HISTORY AND PHYSICAL NOTE    SUPERVISING PROVIDER:  Kerry Taveras MD    CHIEF COMPLAINT:  Wound dehiscence of the scalp, status post multiple  surgery. HISTORY OF PRESENT ILLNESS:  The patient is an 38-year-old female, who  reports she was standing on a stool changing a light bulb in her  bathroom when she lost her balance causing her to fall backwards. She  was transferred to [address] [place] [address] on 2022. With the  fall, she sustained an acute right-sided subdural hemorrhage. This  required a right-sided craniotomy, evacuation of right-sided subdural  hemorrhage with use of intraoperative neuronavigation and placement of  subdural drain connected to a close draining collecting system by Dr. Aryan Whalen on 2022. Following the operation, she recovered well and  was discharged to home on 2022. She presented to the emergency  room on 2022 with the report of dehiscence of her craniotomy  incision. At that time, the patient was evaluated by Dr. Jamari Hess who  recommended incision and debridement and closure with possible removal  of hardware. Dr. Aryan Whalen at that point took her back to the operating  room and performed a right frontal wound debridement with removal of  hardware and replaced with a smaller plating system. This was done on  2022. She was then discharged to home on 2022. Following  her discharge from the hospital, she was followed on an outpatient basis  by Dr. Jamari Hess.   When she followed Dr. Jamari Hess on 2022 for  evaluation of her wound, the incision had been healing well. The  patient had her sutures and staples removed. She then had another  dehiscence of the wound and was admitted to the hospital on 03/14/2022. The patient states that on the morning of her most recent admission she  felt as if she is starting to notice a grunting or squishing sound in  her right cranium similar to when she had a previous wound dehiscence. Dr. Francesca Aparicio was consulted for further treatment and recommendations. Dr. Saturnino Finley was consulted by Dr. Francesca Aparicio for evaluation and treatment  recommendation for a complex postoperative wound infection. PAST MEDICAL HISTORY:  Significant for arthritis, hypertension, subdural  hematoma. PAST SURGICAL HISTORY:  Craniotomy with subsequent wound debridements as  described in the HPI, elbow surgery, left foot surgery, right total knee  arthroplasty, left breast biopsy, open reduction internal fixation of  the left elbow. MEDICATIONS: Tylenol, melatonin, ascorbic acid, zinc, metoprolol, Rocephin, Lovenox, zofran, hydrochlorothiazide, lisinopril, lopressor, glycolax    ALLERGIES:  PENICILLIN. SOCIAL HISTORY:  She denies any tobacco use. FAMILY HISTORY:  Noncontributory. REVIEW OF SYSTEMS:  Constitutional:  Denies any fatigue or fever. Gastrointestinal:  Denies any nausea or vomiting. Cardiovascular:   Denies any chest pain, leg, or ankle swelling. Neurologic:  Denies any  headaches or lightheadedness. Integumentary:  Reports the wound of her  right forehead. PHYSICAL EXAMINATION:  GENERAL:  The patient is pleasant, well-developed, well-nourished, alert  and oriented x3 female, who is in no acute distress. She is  appropriately dressed, groomed and mannered, and appears well nourished,  well developed and in no acute distress. VITAL SIGNS:   Upon admission, her vital signs showed a temperature of  98.8, respiratory rate of 18, pulse 60, blood pressure 100/58.   HEART: Auscultation of her heart as reviewed from her admission H and P  shows regular rate and rhythm. Normal heart sounds. No murmur heard. LUNGS:  Auscultation of her lung has reviewed from her admission H and P  show normal breath sounds. No wheezing, rhonchi or rales. ABDOMEN:  Palpation of her abdomen as reviewed from her admission H and  P shows the abdomen is soft and there is no abdominal tenderness. INTEGUMENTARY:  Shows a complex wound that is open over the central  portion over the right craniotomy incision. There is an open area of  approximately 1 cm. There is no active drainage from this area. Currently, there is no exposed hardware. There is mild erythema  surrounding this area. CT of the head without contrast from 03/15/2022, There  is a persistent small chronic extra axial fluid collection, but  with increased air within the collection compared to prior exam.  This  suggest possible micro communication of the collection with the external  hemisphere through the craniotomy and adjacent scalp. Superinfection or  collection cannot be excluded. DIAGNOSIS:  Complex postoperative wound. PLAN:  Dr. Hoa Romna has discussed the case in great detail with both Dr. Raj Espino and Dr. Yamilka Aponte regarding the complex nature of this wound. The  patient would benefit from hardware removal of this area, debridement  and a revision adjacent tissue transfer be performed. This will be a  coordinated surgery between Dr. Hoa Roman and Dr. Yamilka Aponte. Dr. Yamilka Aponte will  perform a hardware removal and Dr. Hoa Roman will perform the revision  adjacent tissue transfer for wound closure. The operative procedure as  well as postoperative recovery has been discussed with the patient. All  her questions have been answered to her satisfaction. Risks of the  operation to include, but not limited to infection, anesthesia, and need  for further surgery. All of the patient's questions were answered to  her satisfaction.   An operative date of 03/19/2022 was chosen at [facility].         NEIL Rollins    D: 03/17/2022 12:18:50       T: 03/17/2022 15:10:39     [email or web address]  Job#: [id number]     Doc#: [id number]    CC:

## 2022-03-17 NOTE — PLAN OF CARE
Problem: Falls - Risk of:  Goal: Will remain free from falls  Description: Will remain free from falls  3/17/2022 0029 by Linnette Schrader RN  Outcome: Ongoing  Note: Pt free from falls this shift, non skid socks on when up, ambulates with steady gait, does not use ambulatory devices, uses call light for assistance, bed alarm zone 2, A&Ox4. Problem: Discharge Planning:  Goal: Discharged to appropriate level of care  Description: Discharged to appropriate level of care  3/17/2022 0029 by Linnette Schrader RN  Outcome: Ongoing  Note: Pt plans to return home alone with family support. Pt denies needs this shift. Problem: Skin Integrity:  Goal: Will show no infection signs and symptoms  Description: Will show no infection signs and symptoms  3/17/2022 0029 by Linnette Schrader RN  Outcome: Ongoing  Note: Right side laceration/surgical site to forehead with steri strips, clean, dry, intact, no drainage, no redness. Problem: Pain:  Goal: Pain level will decrease  Description: Pain level will decrease  3/17/2022 0029 by Linnette Schrader RN  Outcome: Ongoing  Note: Patient stated pain goal 0. Patient rating pain 0/10 on 0-10 pain scale, and currently reaching pain goal. Tylenol and rest given for pain. Care plan reviewed with patient and son. Patient and son verbalize understanding of the plan of care and contribute to goal setting.

## 2022-03-18 PROCEDURE — 2580000003 HC RX 258: Performed by: INTERNAL MEDICINE

## 2022-03-18 PROCEDURE — 99232 SBSQ HOSP IP/OBS MODERATE 35: CPT | Performed by: FAMILY MEDICINE

## 2022-03-18 PROCEDURE — 94761 N-INVAS EAR/PLS OXIMETRY MLT: CPT

## 2022-03-18 PROCEDURE — 2580000003 HC RX 258

## 2022-03-18 PROCEDURE — 1200000000 HC SEMI PRIVATE

## 2022-03-18 PROCEDURE — 6360000002 HC RX W HCPCS

## 2022-03-18 PROCEDURE — 6370000000 HC RX 637 (ALT 250 FOR IP)

## 2022-03-18 PROCEDURE — 6360000002 HC RX W HCPCS: Performed by: INTERNAL MEDICINE

## 2022-03-18 RX ADMIN — HYDROCHLOROTHIAZIDE 12.5 MG: 25 TABLET ORAL at 08:46

## 2022-03-18 RX ADMIN — Medication 6 MG: at 21:09

## 2022-03-18 RX ADMIN — OXYCODONE HYDROCHLORIDE AND ACETAMINOPHEN 1000 MG: 500 TABLET ORAL at 08:46

## 2022-03-18 RX ADMIN — SODIUM CHLORIDE, PRESERVATIVE FREE 10 ML: 5 INJECTION INTRAVENOUS at 21:13

## 2022-03-18 RX ADMIN — ACETAMINOPHEN 650 MG: 325 TABLET ORAL at 21:11

## 2022-03-18 RX ADMIN — SODIUM CHLORIDE, PRESERVATIVE FREE 10 ML: 5 INJECTION INTRAVENOUS at 08:46

## 2022-03-18 RX ADMIN — LISINOPRIL 40 MG: 40 TABLET ORAL at 08:46

## 2022-03-18 RX ADMIN — CEFTRIAXONE SODIUM 1000 MG: 1 INJECTION, POWDER, FOR SOLUTION INTRAMUSCULAR; INTRAVENOUS at 21:06

## 2022-03-18 ASSESSMENT — PAIN SCALES - GENERAL
PAINLEVEL_OUTOF10: 0
PAINLEVEL_OUTOF10: 3
PAINLEVEL_OUTOF10: 0

## 2022-03-18 NOTE — PROGRESS NOTES
Hospitalist Progress Note    Patient:  Cl Mcgowan      Unit/Bed:5K-10/010-A    YOB: 1940    MRN: [id number]       Acct: [de-identified]     PCP: Lon Angel MD    Date of Admission: 3/14/2022    Assessment/Plan:    Anticipated Discharge in :     K[facility]/Garrett Funez [facility] Problems    Diagnosis Date Noted    Rupture of operation wound Britany Victoriael 03/14/2022    Essential hypertension [I10]        Wound dehiscence of right frontal cranium  Patient had evacuation of subdural hematoma on 1/21 with Dr. Leeanna Cardona  Sutures and staples were removed last week by Dr. Leeanna Cardona  I&D and Plastic surgery consulted  Continue rocephin  CT Head shows small chronic mixed extra-axial fluid collection subjacent to the craniotomy with increased air collection. Plan for surgery on Saturday, 3/19/2022     Essential Hypertension, stable  Continue home meds     Chief Complaint:   Wound dehiscence    Hospital Course:   Per admission H&P:    \"81 y. o. female who presented to [address] [place] [address] with Wound Dehiscence.  She has a past medical history of hypertension.      Patient states she fell in January and hit her head.  At that time she had a subdural hematoma.  She underwent an evacuation with Dr. Hector Bennett that time. Pushpa Baker was admitted previously on 2/23/2022 for wound dehiscence with screw working its way out and had neurosurgical irrigation and closure of her surgical wound at that time.  Last week patient follow-up with Dr. Mesa Edu office to have staples and sutures removed.  She had Steri-Strips placed at that time.     Patient states that this morning she was feeling well until she started noticing a \"grunting\" or swishing sound in her right cranium, similar to when she previously had a wound dehiscence.  She states she tried to get into Dr. Melissa Simpson and Dr. Leeanna Cardona' office today but she could not.  She went to her PCP who recommended she go to the ED. Pushpa Baker denies pain, drainage, bleeding.  She denies all other review of extremities. Skin: Skin color, texture, turgor normal. Linear laceration noted on the right forehead  Neurologic:  Neurovascularly intact without any focal sensory/motor deficits. Cranial nerves: II-XII intact, grossly non-focal.  Psychiatric: Alert and oriented, thought content appropriate, normal insight  Capillary Refill: Brisk,< 3 seconds   Peripheral Pulses: +2 palpable, equal bilaterally       Labs:   Recent Labs     03/16/22  0433 03/17/22  0529   WBC 5.1 4.1*   HGB 11.8* 11.6*   HCT 35.8* 36.2*    149     Recent Labs     03/16/22  1358      K 4.3      CO2 23   BUN 24*   CREATININE 1.1   CALCIUM 9.2     No results for input(s): AST, ALT, BILIDIR, BILITOT, ALKPHOS in the last 72 hours. No results for input(s): INR in the last 72 hours. No results for input(s): Shanae Councilman in the last 72 hours. Urinalysis:      Lab Results   Component Value Date    NITRU POSITIVE 01/18/2022    WBCUA 10-15W/CLUMPS 01/18/2022    BACTERIA MANY 01/18/2022    RBCUA 0-2 01/18/2022    RBCUA 0-2 07/02/2018    BLOODU neg 02/01/2022    BLOODU TRACE 01/18/2022    SPECGRAV 1.015 02/01/2022    SPECGRAV 1.010 01/18/2022    GLUCOSEU neg 02/01/2022    GLUCOSEU NEGATIVE 07/02/2018       Radiology:  CT HEAD WO CONTRAST   Final Result    Redemonstration of a small right frontoparietal craniotomy with persistent small chronic mixed extra-axial fluid collection subjacent to the craniotomy but with increased air within the collection compared to prior exam. This suggests possible    microcommunication of the collection with the external hemisphere through the craniotomy and adjacent scalp. Superinfection of the collection can't be excluded. **This report has been created using voice recognition software. It may contain minor errors which are inherent in voice recognition technology. **      Final report electronically signed by Dr. Nancy Null MD on 3/15/2022 8:55 AM          Diet: ADULT DIET; Regular  ADULT ORAL NUTRITION SUPPLEMENT; Lunch, Dinner;  Wound Healing Oral Supplement    DVT prophylaxis: [] Lovenox                                 [] SCDs                                 [] SQ Heparin                                 [] Encourage ambulation           [] Already on Anticoagulation     Disposition:    [] Home       [] TCU       [] Rehab       [] Psych       [] SNF       [] [place]       [] Other-    Code Status: Full Code    PT/OT Eval Status:        Electronically signed by Santa Verdugo MD on 3/18/2022 at 2:28 PM

## 2022-03-18 NOTE — PLAN OF CARE
Problem: Falls - Risk of:  Goal: Will remain free from falls  Description: Will remain free from falls  Outcome: Met This Shift  Fall assessment completed. Patient using call light appropriately to call for assistance with ambulation to bathroom. Personal items within reach. Patient is also compliant with use of non-skid slippers. Problem: Falls - Risk of:  Goal: Absence of physical injury  Description: Absence of physical injury  Outcome: Met This Shift  No falls noted this shift. Patient ambulates with x1 staff assistance without difficulty. Family member at bedside, spent the day. Bed kept in low position. Safe environment maintained. Bedside table & call light in reach. Uses call light appropriately when needing assistance. Problem: Pain:  Goal: Pain level will decrease  Description: Pain level will decrease  Outcome: Met This Shift  Patient states pain relief from PRN pain medications. Pain reassessed one hour post PRN pain medication given. Patient rates pain 0 on REGGIE 0-10 scale. Problem: Discharge Planning:  Goal: Discharged to appropriate level of care  Description: Discharged to appropriate level of care  Outcome: Ongoing  Discharge plan is in process. Plan discharge home with family. Problem: Skin Integrity:  Goal: Will show no infection signs and symptoms  Description: Will show no infection signs and symptoms  Outcome: Ongoing     Problem: Skin Integrity:  Goal: Absence of new skin breakdown  Description: Absence of new skin breakdown  Outcome: Ongoing  No skin breakdown this shift. Patient being assisted with turning. Patients states understanding of repositioning every two hours. Surgical wound healing. Problem: Nutrition  Goal: Optimal nutrition therapy  Outcome: Ongoing  Patients appetite good. Continuing to encourage fluids. Care plan reviewed with patient and family. Patient and family verbalize understanding of the plan of care and contribute to goal setting.

## 2022-03-18 NOTE — CARE COORDINATION
3/18/22, 4:16 PM EDT    DISCHARGE ON GOING [address] day: 4  Location: -10/010-A Reason for admit: Wound dehiscence [T81.30XA]  Dehiscence of operative wound, initial encounter Mac Arce   Procedure: 3/19/2022 OR planned  Barriers to Discharge: Neurosurgery, ID and Plastics following, Rocephin, prn Tylenol and Zofran, SCD's, incentive spirometry, up with assistance. PCP: Melissa Hinojosa MD  Readmission Risk Score: 16.8 ( )%  Patient Goals/Plan/Treatment Preferences: Anil Oneal is from home with family support. She denies needs at discharge.

## 2022-03-18 NOTE — PROGRESS NOTES
Problem list of patient:     Patient Active Problem List   Diagnosis Code    Essential hypertension I10    Syncopal episodes R55    Primary osteoarthritis of right knee M17.11    Subdural hematoma ([address] S06.5X9A    Nonhealing surgical wound, initial encounter T81.89XA    Superficial postoperative wound infection T81.49XA    Disruption of external surgical wound T81. 31XA    Rupture of operation wound T81. 31XA         ASSESSMENT/PLAN   Scalp wound dehiscence: for surgery on Saturday  Continue current treatment.   Discussed with her family      Jodi Oneal MD, MD, [place] 3/18/2022 2:35 PM

## 2022-03-19 ENCOUNTER — ANESTHESIA (OUTPATIENT)
Dept: OPERATING ROOM | Age: 82
DRG: 904 | End: 2022-03-19
Payer: MEDICARE

## 2022-03-19 ENCOUNTER — ANESTHESIA EVENT (OUTPATIENT)
Dept: OPERATING ROOM | Age: 82
DRG: 904 | End: 2022-03-19
Payer: MEDICARE

## 2022-03-19 VITALS — TEMPERATURE: 94.8 F | DIASTOLIC BLOOD PRESSURE: 71 MMHG | SYSTOLIC BLOOD PRESSURE: 165 MMHG | OXYGEN SATURATION: 100 %

## 2022-03-19 PROCEDURE — 62140 CRNOP SKULL DEFECT<5 CM DIAM: CPT | Performed by: PHYSICIAN ASSISTANT

## 2022-03-19 PROCEDURE — 3600000014 HC SURGERY LEVEL 4 ADDTL 15MIN: Performed by: NEUROLOGICAL SURGERY

## 2022-03-19 PROCEDURE — 87205 SMEAR GRAM STAIN: CPT

## 2022-03-19 PROCEDURE — 6360000002 HC RX W HCPCS: Performed by: NURSE ANESTHETIST, CERTIFIED REGISTERED

## 2022-03-19 PROCEDURE — 2580000003 HC RX 258

## 2022-03-19 PROCEDURE — 3700000001 HC ADD 15 MINUTES (ANESTHESIA): Performed by: NEUROLOGICAL SURGERY

## 2022-03-19 PROCEDURE — 87070 CULTURE OTHR SPECIMN AEROBIC: CPT

## 2022-03-19 PROCEDURE — 2500000003 HC RX 250 WO HCPCS: Performed by: NURSE ANESTHETIST, CERTIFIED REGISTERED

## 2022-03-19 PROCEDURE — 2500000003 HC RX 250 WO HCPCS: Performed by: ANESTHESIOLOGY

## 2022-03-19 PROCEDURE — 2580000003 HC RX 258: Performed by: PHYSICIAN ASSISTANT

## 2022-03-19 PROCEDURE — 6370000000 HC RX 637 (ALT 250 FOR IP): Performed by: PHYSICIAN ASSISTANT

## 2022-03-19 PROCEDURE — 2709999900 HC NON-CHARGEABLE SUPPLY: Performed by: NEUROLOGICAL SURGERY

## 2022-03-19 PROCEDURE — 0NR10JZ REPLACEMENT OF FRONTAL BONE WITH SYNTHETIC SUBSTITUTE, OPEN APPROACH: ICD-10-PCS | Performed by: NEUROLOGICAL SURGERY

## 2022-03-19 PROCEDURE — 87102 FUNGUS ISOLATION CULTURE: CPT

## 2022-03-19 PROCEDURE — C1713 ANCHOR/SCREW BN/BN,TIS/BN: HCPCS | Performed by: NEUROLOGICAL SURGERY

## 2022-03-19 PROCEDURE — 6370000000 HC RX 637 (ALT 250 FOR IP): Performed by: NEUROLOGICAL SURGERY

## 2022-03-19 PROCEDURE — 2580000003 HC RX 258: Performed by: INTERNAL MEDICINE

## 2022-03-19 PROCEDURE — 88311 DECALCIFY TISSUE: CPT

## 2022-03-19 PROCEDURE — 2780000010 HC IMPLANT OTHER: Performed by: NEUROLOGICAL SURGERY

## 2022-03-19 PROCEDURE — 6370000000 HC RX 637 (ALT 250 FOR IP)

## 2022-03-19 PROCEDURE — 3700000000 HC ANESTHESIA ATTENDED CARE: Performed by: NEUROLOGICAL SURGERY

## 2022-03-19 PROCEDURE — 6360000002 HC RX W HCPCS: Performed by: INTERNAL MEDICINE

## 2022-03-19 PROCEDURE — 7100000000 HC PACU RECOVERY - FIRST 15 MIN: Performed by: NEUROLOGICAL SURGERY

## 2022-03-19 PROCEDURE — 3600000004 HC SURGERY LEVEL 4 BASE: Performed by: NEUROLOGICAL SURGERY

## 2022-03-19 PROCEDURE — 62140 CRNOP SKULL DEFECT<5 CM DIAM: CPT | Performed by: NEUROLOGICAL SURGERY

## 2022-03-19 PROCEDURE — 88305 TISSUE EXAM BY PATHOLOGIST: CPT

## 2022-03-19 PROCEDURE — 2500000003 HC RX 250 WO HCPCS: Performed by: SPECIALIST

## 2022-03-19 PROCEDURE — 6360000002 HC RX W HCPCS: Performed by: PHYSICIAN ASSISTANT

## 2022-03-19 PROCEDURE — 87075 CULTR BACTERIA EXCEPT BLOOD: CPT

## 2022-03-19 PROCEDURE — 1200000000 HC SEMI PRIVATE

## 2022-03-19 PROCEDURE — 6360000002 HC RX W HCPCS: Performed by: ANESTHESIOLOGY

## 2022-03-19 PROCEDURE — 2720000010 HC SURG SUPPLY STERILE: Performed by: NEUROLOGICAL SURGERY

## 2022-03-19 PROCEDURE — 99232 SBSQ HOSP IP/OBS MODERATE 35: CPT | Performed by: FAMILY MEDICINE

## 2022-03-19 PROCEDURE — 0NB10ZZ EXCISION OF FRONTAL BONE, OPEN APPROACH: ICD-10-PCS | Performed by: NEUROLOGICAL SURGERY

## 2022-03-19 PROCEDURE — 0HX0XZZ TRANSFER SCALP SKIN, EXTERNAL APPROACH: ICD-10-PCS | Performed by: NEUROLOGICAL SURGERY

## 2022-03-19 PROCEDURE — 7100000001 HC PACU RECOVERY - ADDTL 15 MIN: Performed by: NEUROLOGICAL SURGERY

## 2022-03-19 DEVICE — PLATE BNE L18MM THK03MM 6 H CRANIOMAXILLOFACIAL TI DBL Y FOR: Type: IMPLANTABLE DEVICE | Site: FRONTAL LOBE | Status: FUNCTIONAL

## 2022-03-19 DEVICE — SCREW BNE L4MM DIA1.5MM CRAN SELF DRL CRSS DRV THINFLAP: Type: IMPLANTABLE DEVICE | Site: FRONTAL LOBE | Status: FUNCTIONAL

## 2022-03-19 DEVICE — DURAGEN® SUTURABLE DURAL REGENERATION MATRIX, 3 IN X 3 IN (7.5 CM X 7.5 CM)
Type: IMPLANTABLE DEVICE | Site: FRONTAL LOBE | Status: FUNCTIONAL
Brand: DURAGEN® SUTURABLE

## 2022-03-19 DEVICE — SCREW BONE L5MM DIA1.5MM CORT MAXILLOMANDIBULAR GRN TI SELF: Type: IMPLANTABLE DEVICE | Site: FRONTAL LOBE | Status: FUNCTIONAL

## 2022-03-19 DEVICE — SCREW BNE L4MM DIA1.8MM CORT CRANIOMAXILLOFACIAL NONLOCKING: Type: IMPLANTABLE DEVICE | Site: FRONTAL LOBE | Status: FUNCTIONAL

## 2022-03-19 DEVICE — IMPLANTABLE DEVICE: Type: IMPLANTABLE DEVICE | Site: FRONTAL LOBE | Status: FUNCTIONAL

## 2022-03-19 RX ORDER — FENTANYL CITRATE 50 UG/ML
25 INJECTION, SOLUTION INTRAMUSCULAR; INTRAVENOUS EVERY 5 MIN PRN
Status: DISCONTINUED | OUTPATIENT
Start: 2022-03-19 | End: 2022-03-19 | Stop reason: HOSPADM

## 2022-03-19 RX ORDER — SODIUM CHLORIDE 0.9 % (FLUSH) 0.9 %
5-40 SYRINGE (ML) INJECTION PRN
Status: DISCONTINUED | OUTPATIENT
Start: 2022-03-19 | End: 2022-03-19 | Stop reason: HOSPADM

## 2022-03-19 RX ORDER — FENTANYL CITRATE 50 UG/ML
INJECTION, SOLUTION INTRAMUSCULAR; INTRAVENOUS PRN
Status: DISCONTINUED | OUTPATIENT
Start: 2022-03-19 | End: 2022-03-19 | Stop reason: SDUPTHER

## 2022-03-19 RX ORDER — SODIUM CHLORIDE 9 MG/ML
25 INJECTION, SOLUTION INTRAVENOUS PRN
Status: DISCONTINUED | OUTPATIENT
Start: 2022-03-19 | End: 2022-03-19 | Stop reason: HOSPADM

## 2022-03-19 RX ORDER — MEPERIDINE HYDROCHLORIDE 25 MG/ML
12.5 INJECTION INTRAMUSCULAR; INTRAVENOUS; SUBCUTANEOUS EVERY 5 MIN PRN
Status: DISCONTINUED | OUTPATIENT
Start: 2022-03-19 | End: 2022-03-19 | Stop reason: HOSPADM

## 2022-03-19 RX ORDER — SODIUM CHLORIDE 9 MG/ML
25 INJECTION, SOLUTION INTRAVENOUS PRN
Status: DISCONTINUED | OUTPATIENT
Start: 2022-03-19 | End: 2022-03-22 | Stop reason: HOSPADM

## 2022-03-19 RX ORDER — ROCURONIUM BROMIDE 10 MG/ML
INJECTION, SOLUTION INTRAVENOUS PRN
Status: DISCONTINUED | OUTPATIENT
Start: 2022-03-19 | End: 2022-03-19 | Stop reason: SDUPTHER

## 2022-03-19 RX ORDER — ONDANSETRON 2 MG/ML
4 INJECTION INTRAMUSCULAR; INTRAVENOUS
Status: DISCONTINUED | OUTPATIENT
Start: 2022-03-19 | End: 2022-03-19 | Stop reason: HOSPADM

## 2022-03-19 RX ORDER — SUCCINYLCHOLINE/SOD CL,ISO/PF 200MG/10ML
SYRINGE (ML) INTRAVENOUS PRN
Status: DISCONTINUED | OUTPATIENT
Start: 2022-03-19 | End: 2022-03-19 | Stop reason: SDUPTHER

## 2022-03-19 RX ORDER — CEFAZOLIN SODIUM 1 G/3ML
INJECTION, POWDER, FOR SOLUTION INTRAMUSCULAR; INTRAVENOUS PRN
Status: DISCONTINUED | OUTPATIENT
Start: 2022-03-19 | End: 2022-03-19 | Stop reason: SDUPTHER

## 2022-03-19 RX ORDER — FENTANYL CITRATE 50 UG/ML
50 INJECTION, SOLUTION INTRAMUSCULAR; INTRAVENOUS EVERY 5 MIN PRN
Status: DISCONTINUED | OUTPATIENT
Start: 2022-03-19 | End: 2022-03-19 | Stop reason: HOSPADM

## 2022-03-19 RX ORDER — SODIUM CHLORIDE 9 MG/ML
INJECTION, SOLUTION INTRAVENOUS CONTINUOUS
Status: DISCONTINUED | OUTPATIENT
Start: 2022-03-19 | End: 2022-03-22 | Stop reason: HOSPADM

## 2022-03-19 RX ORDER — DEXAMETHASONE SODIUM PHOSPHATE 10 MG/ML
INJECTION, EMULSION INTRAMUSCULAR; INTRAVENOUS PRN
Status: DISCONTINUED | OUTPATIENT
Start: 2022-03-19 | End: 2022-03-19 | Stop reason: SDUPTHER

## 2022-03-19 RX ORDER — SODIUM CHLORIDE 0.9 % (FLUSH) 0.9 %
5-40 SYRINGE (ML) INJECTION EVERY 12 HOURS SCHEDULED
Status: DISCONTINUED | OUTPATIENT
Start: 2022-03-19 | End: 2022-03-19 | Stop reason: HOSPADM

## 2022-03-19 RX ORDER — HYDROCODONE BITARTRATE AND ACETAMINOPHEN 5; 325 MG/1; MG/1
1 TABLET ORAL EVERY 4 HOURS PRN
Status: DISCONTINUED | OUTPATIENT
Start: 2022-03-19 | End: 2022-03-22 | Stop reason: HOSPADM

## 2022-03-19 RX ORDER — LIDOCAINE HYDROCHLORIDE AND EPINEPHRINE 10; 10 MG/ML; UG/ML
INJECTION, SOLUTION INFILTRATION; PERINEURAL PRN
Status: DISCONTINUED | OUTPATIENT
Start: 2022-03-19 | End: 2022-03-19 | Stop reason: ALTCHOICE

## 2022-03-19 RX ORDER — PROPOFOL 10 MG/ML
INJECTION, EMULSION INTRAVENOUS PRN
Status: DISCONTINUED | OUTPATIENT
Start: 2022-03-19 | End: 2022-03-19 | Stop reason: SDUPTHER

## 2022-03-19 RX ORDER — PHENYLEPHRINE HYDROCHLORIDE 10 MG/ML
INJECTION INTRAVENOUS PRN
Status: DISCONTINUED | OUTPATIENT
Start: 2022-03-19 | End: 2022-03-19 | Stop reason: SDUPTHER

## 2022-03-19 RX ORDER — HYDROCODONE BITARTRATE AND ACETAMINOPHEN 5; 325 MG/1; MG/1
2 TABLET ORAL EVERY 4 HOURS PRN
Status: DISCONTINUED | OUTPATIENT
Start: 2022-03-19 | End: 2022-03-22 | Stop reason: HOSPADM

## 2022-03-19 RX ORDER — SODIUM CHLORIDE 0.9 % (FLUSH) 0.9 %
5-40 SYRINGE (ML) INJECTION PRN
Status: DISCONTINUED | OUTPATIENT
Start: 2022-03-19 | End: 2022-03-22 | Stop reason: HOSPADM

## 2022-03-19 RX ORDER — SODIUM CHLORIDE 0.9 % (FLUSH) 0.9 %
5-40 SYRINGE (ML) INJECTION EVERY 12 HOURS SCHEDULED
Status: DISCONTINUED | OUTPATIENT
Start: 2022-03-19 | End: 2022-03-22 | Stop reason: HOSPADM

## 2022-03-19 RX ORDER — EPHEDRINE SULFATE/0.9% NACL/PF 50 MG/5 ML
SYRINGE (ML) INTRAVENOUS PRN
Status: DISCONTINUED | OUTPATIENT
Start: 2022-03-19 | End: 2022-03-19 | Stop reason: SDUPTHER

## 2022-03-19 RX ORDER — DOCUSATE SODIUM 100 MG/1
100 CAPSULE, LIQUID FILLED ORAL DAILY
Status: DISCONTINUED | OUTPATIENT
Start: 2022-03-19 | End: 2022-03-22 | Stop reason: HOSPADM

## 2022-03-19 RX ORDER — HYDROCODONE BITARTRATE AND ACETAMINOPHEN 5; 325 MG/1; MG/1
1 TABLET ORAL EVERY 6 HOURS PRN
Status: DISCONTINUED | OUTPATIENT
Start: 2022-03-19 | End: 2022-03-19 | Stop reason: SDUPTHER

## 2022-03-19 RX ORDER — MORPHINE SULFATE 2 MG/ML
2 INJECTION, SOLUTION INTRAMUSCULAR; INTRAVENOUS
Status: DISCONTINUED | OUTPATIENT
Start: 2022-03-19 | End: 2022-03-21

## 2022-03-19 RX ORDER — MORPHINE SULFATE 4 MG/ML
4 INJECTION, SOLUTION INTRAMUSCULAR; INTRAVENOUS
Status: DISCONTINUED | OUTPATIENT
Start: 2022-03-19 | End: 2022-03-21

## 2022-03-19 RX ORDER — ONDANSETRON 2 MG/ML
INJECTION INTRAMUSCULAR; INTRAVENOUS PRN
Status: DISCONTINUED | OUTPATIENT
Start: 2022-03-19 | End: 2022-03-19 | Stop reason: SDUPTHER

## 2022-03-19 RX ADMIN — CEFTRIAXONE SODIUM 1000 MG: 1 INJECTION, POWDER, FOR SOLUTION INTRAMUSCULAR; INTRAVENOUS at 20:15

## 2022-03-19 RX ADMIN — HYDROCODONE BITARTRATE AND ACETAMINOPHEN 1 TABLET: 5; 325 TABLET ORAL at 20:13

## 2022-03-19 RX ADMIN — ONDANSETRON 4 MG: 2 INJECTION INTRAMUSCULAR; INTRAVENOUS at 09:38

## 2022-03-19 RX ADMIN — FENTANYL CITRATE 50 MCG: 50 INJECTION, SOLUTION INTRAMUSCULAR; INTRAVENOUS at 09:07

## 2022-03-19 RX ADMIN — ROCURONIUM BROMIDE 20 MG: 50 INJECTION INTRAVENOUS at 09:29

## 2022-03-19 RX ADMIN — FENTANYL CITRATE 50 MCG: 50 INJECTION, SOLUTION INTRAMUSCULAR; INTRAVENOUS at 10:01

## 2022-03-19 RX ADMIN — MORPHINE SULFATE 4 MG: 4 INJECTION, SOLUTION INTRAMUSCULAR; INTRAVENOUS at 17:28

## 2022-03-19 RX ADMIN — FENTANYL CITRATE 50 MCG: 50 INJECTION, SOLUTION INTRAMUSCULAR; INTRAVENOUS at 09:30

## 2022-03-19 RX ADMIN — Medication 5 MG: at 09:47

## 2022-03-19 RX ADMIN — Medication 140 MG: at 09:07

## 2022-03-19 RX ADMIN — MORPHINE SULFATE 4 MG: 4 INJECTION, SOLUTION INTRAMUSCULAR; INTRAVENOUS at 15:16

## 2022-03-19 RX ADMIN — PHENYLEPHRINE HYDROCHLORIDE 100 MCG: 10 INJECTION INTRAVENOUS at 10:40

## 2022-03-19 RX ADMIN — MORPHINE SULFATE 4 MG: 4 INJECTION, SOLUTION INTRAMUSCULAR; INTRAVENOUS at 12:53

## 2022-03-19 RX ADMIN — SUGAMMADEX 200 MG: 100 INJECTION, SOLUTION INTRAVENOUS at 11:04

## 2022-03-19 RX ADMIN — SODIUM CHLORIDE, PRESERVATIVE FREE 10 ML: 5 INJECTION INTRAVENOUS at 20:14

## 2022-03-19 RX ADMIN — SODIUM CHLORIDE: 9 INJECTION, SOLUTION INTRAVENOUS at 11:12

## 2022-03-19 RX ADMIN — ROCURONIUM BROMIDE 10 MG: 50 INJECTION INTRAVENOUS at 09:57

## 2022-03-19 RX ADMIN — PROPOFOL 130 MG: 10 INJECTION, EMULSION INTRAVENOUS at 09:07

## 2022-03-19 RX ADMIN — Medication 6 MG: at 21:11

## 2022-03-19 RX ADMIN — ROCURONIUM BROMIDE 10 MG: 50 INJECTION INTRAVENOUS at 09:07

## 2022-03-19 RX ADMIN — DEXAMETHASONE SODIUM PHOSPHATE 8 MG: 10 INJECTION, EMULSION INTRAMUSCULAR; INTRAVENOUS at 09:38

## 2022-03-19 RX ADMIN — SODIUM CHLORIDE: 9 INJECTION, SOLUTION INTRAVENOUS at 22:28

## 2022-03-19 RX ADMIN — CEFAZOLIN 2000 MG: 1 INJECTION, POWDER, FOR SOLUTION INTRAMUSCULAR; INTRAVENOUS at 09:22

## 2022-03-19 ASSESSMENT — PULMONARY FUNCTION TESTS
PIF_VALUE: 15
PIF_VALUE: 16
PIF_VALUE: 14
PIF_VALUE: 15
PIF_VALUE: 15
PIF_VALUE: 0
PIF_VALUE: 15
PIF_VALUE: 3
PIF_VALUE: 15
PIF_VALUE: 15
PIF_VALUE: 14
PIF_VALUE: 15
PIF_VALUE: 16
PIF_VALUE: 20
PIF_VALUE: 14
PIF_VALUE: 16
PIF_VALUE: 15
PIF_VALUE: 17
PIF_VALUE: 4
PIF_VALUE: 14
PIF_VALUE: 20
PIF_VALUE: 15
PIF_VALUE: 2
PIF_VALUE: 15
PIF_VALUE: 16
PIF_VALUE: 14
PIF_VALUE: 15
PIF_VALUE: 20
PIF_VALUE: 14
PIF_VALUE: 15
PIF_VALUE: 1
PIF_VALUE: 15
PIF_VALUE: 14
PIF_VALUE: 11
PIF_VALUE: 15
PIF_VALUE: 20
PIF_VALUE: 20
PIF_VALUE: 15
PIF_VALUE: 16
PIF_VALUE: 16
PIF_VALUE: 14
PIF_VALUE: 15
PIF_VALUE: 19
PIF_VALUE: 15
PIF_VALUE: 15
PIF_VALUE: 10
PIF_VALUE: 19
PIF_VALUE: 1
PIF_VALUE: 16
PIF_VALUE: 14
PIF_VALUE: 15
PIF_VALUE: 15
PIF_VALUE: 14
PIF_VALUE: 14
PIF_VALUE: 16
PIF_VALUE: 20
PIF_VALUE: 15
PIF_VALUE: 10
PIF_VALUE: 15
PIF_VALUE: 14
PIF_VALUE: 15
PIF_VALUE: 15
PIF_VALUE: 14
PIF_VALUE: 15
PIF_VALUE: 15
PIF_VALUE: 20
PIF_VALUE: 17
PIF_VALUE: 20
PIF_VALUE: 14
PIF_VALUE: 15
PIF_VALUE: 3
PIF_VALUE: 15
PIF_VALUE: 15
PIF_VALUE: 1
PIF_VALUE: 15
PIF_VALUE: 15
PIF_VALUE: 0
PIF_VALUE: 15
PIF_VALUE: 14
PIF_VALUE: 14
PIF_VALUE: 15
PIF_VALUE: 16
PIF_VALUE: 15
PIF_VALUE: 15
PIF_VALUE: 14
PIF_VALUE: 15
PIF_VALUE: 14

## 2022-03-19 ASSESSMENT — PAIN SCALES - GENERAL
PAINLEVEL_OUTOF10: 7
PAINLEVEL_OUTOF10: 0
PAINLEVEL_OUTOF10: 7
PAINLEVEL_OUTOF10: 5
PAINLEVEL_OUTOF10: 0
PAINLEVEL_OUTOF10: 7
PAINLEVEL_OUTOF10: 5

## 2022-03-19 NOTE — PROGRESS NOTES
Patient was seen and examined in the pre op area in conjunction with neurosurgery JESUS ALBERTO Mcgee PA-C). There are no changes in patient symptoms and neurological exam since yesterday. Today I discussed with patient and her family again today planned/recommended surgical intervention as well as the associated risk and benefit and alternative. All questions and concerns were addressed and answered. Patient elected again to go with today planned surgical intervention.

## 2022-03-19 NOTE — PROGRESS NOTES
Patient arrived to pre op holding at 200. Nose swabs performed. Pre op temp is 97.4 temporally.  No further questions at this time

## 2022-03-19 NOTE — BRIEF OP NOTE
Brief Postoperative Note      Patient: Leeanna Mishra  YOB: 1940  MRN: [id number]    Date of Procedure: 3/19/2022    Pre-Op Diagnosis: RUPTUE OF OPERATION WOUND    Post-Op Diagnosis: Same       Procedure(s):  REEXPLORATION AND REVISION OF RIGHT FRONTAL LOBE WOUND PLUS REMOVAL OF RIGHT CRANIAL BONE FLAP AND CRANIOPLASTY  FLAP CLOSURE    Surgeon(s):  MD Daphne Lewis MD    Assistant:  Physician Assistant: Latisha Zimmer PA-C; Hillary Hartman PA-C    Anesthesia: General    Estimated Blood Loss (mL): Minimal    Complications: None    Specimens:   ID Type Source Tests Collected by Time Destination   A : RIGHT FRONTAL LOBE BONE FLAP Bone Head SURGICAL PATHOLOGY, CULTURE, FUNGUS, GRAM STAIN, CULTURE, ANAEROBIC AND AEROBIC Yasmany Rodriguez MD 3/19/2022 2229        Implants:  Implant Name Type Inv.  Item Serial No.  Lot No. LRB No. Used Action   GRAFT DURA Kathie Fadumo MTRX CLLGN BILAYER SUTURABLE - [id number]  GRAFT DURA Kathie Fadumo MTRX CLLGN BILAYER SUTURABLE  [employer]- [id number] Right 1 Implanted   OMN[employer] SURGICAL IMPLANT [facility] CRANIAL GRID IMPLANT     [id number] Right 1 Implanted         Drains: * No LDAs found *    Findings: wound revision with cranioplasty    Electronically signed by Latisha Zimmer PA-C on 3/19/2022 at 10:26 AM

## 2022-03-19 NOTE — ANESTHESIA PRE PROCEDURE
Department of Anesthesiology  Preprocedure Note       Name:  Raul Sharma   Age:  78 y.o.  :  1940                                          MRN:  [id number]         Date:  3/19/2022      Surgeon: Archie Peterson):  MD Mynor Doss MD    Procedure: Procedure(s):  WOUND REVISION RIGHT FRONTAL CRANIOPLASTY  FLAP CLOSURE    Medications prior to admission:   Prior to Admission medications    Medication Sig Start Date End Date Taking?  Authorizing Provider   lisinopril (PRINIVIL;ZESTRIL) 40 MG tablet Take 1 tablet by mouth daily 3/14/22 6/12/22 Yes Sundeep Pang MD   hydroCHLOROthiazide (HYDRODIURIL) 12.5 MG tablet Take 1 tablet by mouth daily 3/14/22  Yes Sundeep Pang MD   Multiple Vitamins-Minerals (THERAPEUTIC MULTIVITAMIN-MINERALS) tablet Take 1 tablet by mouth daily   Yes Historical Provider, MD   acetaminophen (TYLENOL) 325 MG tablet Take 650 mg by mouth at bedtime   Yes Historical Provider, MD   melatonin 5 MG TABS tablet Take 5 mg by mouth nightly   Yes Historical Provider, MD   vitamin C (ASCORBIC ACID) 500 MG tablet Take 1,000 mg by mouth daily   Yes Historical Provider, MD   zinc 50 MG TABS tablet Take 50 mg by mouth daily   Yes Historical Provider, MD   metoprolol tartrate (LOPRESSOR) 50 MG tablet TAKE 1 TABLET BY MOUTH TWICE DAILY 10/29/21  Yes Sundeep Pang MD       Current medications:    Current Facility-Administered Medications   Medication Dose Route Frequency Provider Last Rate Last Admin    metoprolol tartrate (LOPRESSOR) tablet 50 mg  50 mg Oral BID Vallie Robe Topper, DO   50 mg at 22    [Held by provider] lisinopril (PRINIVIL;ZESTRIL) tablet 40 mg  40 mg Oral Daily Vallie Robe Topper, DO   40 mg at 22 0846    [Held by provider] hydroCHLOROthiazide (HYDRODIURIL) tablet 12.5 mg  12.5 mg Oral Daily Vallie Robe Topper, DO   12.5 mg at 22 0846    melatonin tablet 6 mg  6 mg Oral Nightly Vallie Robe Topper, DO   6 mg at 22 7944    sodium chloride flush 0.9 % injection 5-40 mL  5-40 mL IntraVENous 2 times per day Tang Madrid, DO   10 mL at 03/18/22 2113    sodium chloride flush 0.9 % injection 10 mL  10 [facility]        0.9 % sodium chloride infusion  25 mL IntraVENous PRN [facility] Topper,  mL/hr at 03/16/22 2058 25 mL at 03/16/22 2058    enoxaparin (LOVENOX) injection 40 mg  40 mg SubCUTAneous Daily [facility], DO        ondansetron (ZOFRAN-ODT) disintegrating tablet 4 mg  4 mg Oral Q8H PRN [facility], DO        Or    [facility]etron [facility] injection 4 mg  4 mg IntraVENous Q6H PRN [facility] Topper, DO        polyethylene glycol (GLYCOLAX) packet 17 g  17 g Oral Daily PRN [facility] Topper, DO        acetaminophen (TYLENOL) tablet 650 mg  650 mg Oral Q6H PRN [facility] Topper, DO   650 mg at 03/18/22 2111    Or    acetaminophen (TYLENOL) suppository 650 mg  650 mg Rectal Q6H PRN [facility], DO        ascorbic acid (VITAMIN C) tablet 1,000 mg  1,000 mg Oral Daily [facility] Topper, DO   1,000 mg at 03/18/22 0846    cefTRIAXone (ROCEPHIN) 1000 mg IVPB in 50 mL D5W minibag  1,000 mg IntraVENous Q24H Uday Webber MD   Stopped at 03/18/22 2137       Allergies: Allergies   Allergen Reactions    Pcn [Penicillins] Itching     redness @ IM site       Problem List:    Patient Active Problem List   Diagnosis Code    Essential hypertension I10    Syncopal episodes R55    Primary osteoarthritis of right knee M17.11    Subdural hematoma ([address] S06.5X9A    Nonhealing surgical wound, initial encounter T81.89XA    Superficial postoperative wound infection T81.49XA    Disruption of external surgical wound T81. 31XA    Rupture of operation wound T81. 31XA       Past Medical History:        Diagnosis Date    Arthritis     Hypertension     Subdural hematoma ([address] 01/2022       Past Surgical History:        Procedure Laterality Date    CATHETER REMOVAL Bilateral 11/2014    CRANIOPLASTY Right 2/24/2022    RIGHT FRONTAL WOUND DEBRIDEMENT AND WASHOUT performed by Luz Elena Lorenzana MD at [address] [facility] Right 01/21/2022    RIGHT SIDED CRANIOTOMY FOR SUBDURAL HEMATOMA performed by Luz Elena Lorenzana MD at [address] [place] [address]   07/04/2018    FOOT SURGERY Left 2012    KNEE SURGERY Right 07/09/2014    total    MA BX OF BREAST, NEEDLE CORE, IMAGE GUIDE Left 2016    benign    MA OFFICE/OUTPT VISIT,PROCEDURE ONLY Left 07/05/2018    ORIF LEFT ELBOW performed by Moe Beasley MD at [facility] History:    Social History     Tobacco Use    Smoking status: Never Smoker    Smokeless tobacco: Never Used   Substance Use Topics    Alcohol use: Not Currently     Comment: very very rare                                Counseling given: Not Answered      Vital Signs (Current):   Vitals:    03/18/22 1555 03/18/22 2027 03/19/22 0324 03/19/22 0745   BP: (!) 94/59 115/62 (!) 141/63 (!) 104/51   Pulse: 65 72 72 69   Resp: 16 16 16 16   Temp: 97.5 °F (36.4 °C) 98.3 °F (36.8 °C) 98 °F (36.7 °C) 98.5 °F (36.9 °C)   TempSrc: Oral Oral Oral Oral   SpO2: 98% 100% 97% 95%   Weight:       Height:                                                  BP Readings from Last 3 Encounters:   03/19/22 (!) 104/51   03/14/22 (!) 145/82   03/10/22 132/66       NPO Status:                                                                                 BMI:   Wt Readings from Last 3 Encounters:   03/14/22 175 lb (79.4 kg)   03/14/22 173 lb (78.5 kg)   03/10/22 173 lb 8 oz (78.7 kg)     Body mass index is 30.02 kg/m².     CBC:   Lab Results   Component Value Date    WBC 4.1 03/17/2022    RBC 3.85 03/17/2022    RBC 4.73 04/23/2012    HGB 11.6 03/17/2022    HCT 36.2 03/17/2022    MCV 94.0 03/17/2022    RDW 12.5 01/18/2022     03/17/2022       CMP:   Lab Results   Component Value Date     03/16/2022    K 4.3 03/16/2022     03/16/2022    CO2 23 03/16/2022    BUN 24 03/16/2022    CREATININE 1.1 03/16/2022    LABGLOM 47 03/16/2022    GLUCOSE 105 03/16/2022    GLUCOSE 89 08/18/2016    PROT 7.6 01/18/2022    CALCIUM 9.2 03/16/2022    BILITOT 0.6 01/18/2022    BILITOT NEGATIVE 04/23/2012    ALKPHOS 73 01/18/2022    AST 27 01/18/2022    ALT 25 01/18/2022       POC Tests: No results for input(s): POCGLU, POCNA, POCK, POCCL, POCBUN, POCHEMO, POCHCT in the last 72 hours. Coags:   Lab Results   Component Value Date    INR 0.99 07/09/2014    APTT 35.9 01/20/2022       HCG (If Applicable): No results found for: PREGTESTUR, PREGSERUM, HCG, HCGQUANT     ABGs: No results found for: PHART, PO2ART, KSA1VNW, FEV1GLN, BEART, D6SEYFWL     Type & Screen (If Applicable):  Lab Results   Component Value Date    LABRH NEG 07/09/2014       Drug/Infectious Status (If Applicable):  No results found for: HIV, HEPCAB    COVID-19 Screening (If Applicable): No results found for: COVID19        Anesthesia Evaluation  Patient summary reviewed and Nursing notes reviewed no history of anesthetic complications:   Airway: Mallampati: II  TM distance: >3 FB   Neck ROM: full  Mouth opening: > = 3 FB Dental:          Pulmonary:Negative Pulmonary ROS and normal exam  breath sounds clear to auscultation                             Cardiovascular:  Exercise tolerance: good (>4 METS),   (+) hypertension:,                   Neuro/Psych:                ROS comment: H/o subdural hematoma - multiple surgeries for evacuation and infection GI/Hepatic/Renal:            ROS comment: obesity. Endo/Other: Negative Endo/Other ROS             Pt had no PAT visit       Abdominal:             Vascular: negative vascular ROS. Other Findings:             Anesthesia Plan      general     ASA 3       Induction: intravenous. MIPS: Postoperative opioids intended and Prophylactic antiemetics administered. Anesthetic plan and risks discussed with patient and child/children. Plan discussed with CRNA.                   [address],  3/19/2022

## 2022-03-19 NOTE — OP NOTE
[facility]    Patient name: Karen Frias  Medical Record Number: [id number]  Account Number: [de-identified]      Date of Procedure: 3/19/2022    Pre-operative Diagnosis:       · Recurrent right frontal wound dehiscence. 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 Post-operative Diagnosis: The same     PROCEDURE:         · Reexploration right frontal wound. · Right sided frontal wound  debridement and washout. · Reexploration right frontal bone flap. · Removal of previous right frontal bone flap. · Right frontal cranioplasty (3.5 x 3.5 cm by utilizing Omnipore surgical implant). · Repair of complex right frontal wound with an adjacent tissue transfer ( performed by Dr. Michelle Fields (plastic surgery). Anesthesia: General endotracheal     Surgeon: : Evelin Monsalve MD   Assistant: Jessica Hobbs PA-C     Estimated Blood Loss: Minimal     Drains: None     Blood Transfusions: None      Complications: none immediately appreciated     Specimens:      · The previous right frontal bone flap was sent for cultures. Intraoperative findings:     · Removal of a previous bone flap then and a successful  Right frontal cranioplasty (3.5 x 3.5 cm by utilizing Omnipore surgical implant) was performed. Indications for Procedure: This is a 66year old who underwent surgery on 1/21/2022  (right-sided mini craniectomy for evacuation of acute subdural hemorrhage). Patient did well after the surgery, however patient experienced a wound dehiscence on that required wound debridement and washout on 2/24/2022. Patient was being followed up by infectious disease and wound care however unfortunately patient presented to the ER again on 3/14/2022 after she experienced another wound dehiscence. There is no history of fever or obvious discharge from the wound.   However,  and after thoroughly discussion with infectious disease and plastic surgery (Dr. Michelle Feilds), we recommended for the patient a surgical intervention and in the form of revision of right frontal wound, wound debridement, washout,  removal patient's previous bone flap (because of the concern of possible underlying infection), cranioplasty and  primary wound closure by adjacent tissue transfer.  -This recommended surgical intervention was discussed in detail with patient and her family, as well as the associated the risks and benefits. All questions and concern were addressed and answered. Patient and her family agreed to proceed with the above recommended treatment plan and the surgical consent was signed. PROCEDURE:      Patient was brought to the OR. She was intubated by Anesthesia staff while in supine position. Time out was performed. Next, the  right frontal was prepped and draped in the usual neurosurgical fashion. Excisional debridement was performed for previous right frontal wound. Next, the previous bone flap was exposed and removed with the associated plates and screws and sent for cultures. No obvious pus collection was noticed. Some the old DuraGen materials were removed and I applied a new layer of DuraGen. Next, I performed about 3.5 x 3.5 cm cranioplasty for the bone defect by utilizing a piece of  Omnipore surgical implant and secured it with plates and screws. Next, I performed copious irrigation for the wound. Following that, Dr. Deb Orr from the plastic surgery team came down and close the wound primarily by adjacent tissue transfer technique (please see Dr. Deb Orr separate op note for this patient). At the end of surgery, sterile dressing was applied and the patient was then extubated and taken to physician anesthesia recovery in satisfactory condition. Patient tolerated the surgery very well without intraoperative complications.      Bowen Garcia MD, MD  Electronically signed by me on 3/19/2022 at 3:18 PM

## 2022-03-19 NOTE — PROGRESS NOTES
Progress note: Infectious diseases    Patient - Cl Mcgowan,  Age - 78 y.o.    - 1940      Room Number - 5K-10/010-A   MRN -  [id number]   Acct # - [de-identified]  Date of Admission -  3/14/2022  3:20 PM    SUBJECTIVE:   Operative note read  No fever   OBJECTIVE   VITALS    height is 5' 4.02\" (1.626 m) and weight is 175 lb (79.4 kg). Her oral temperature is 97.6 °F (36.4 °C). Her blood pressure is 137/60 and her pulse is 75. Her respiration is 16 and oxygen saturation is 97%.        Wt Readings from Last 3 Encounters:   22 175 lb (79.4 kg)   22 173 lb (78.5 kg)   03/10/22 173 lb 8 oz (78.7 kg)       I/O (24 Hours)    Intake/Output Summary (Last 24 hours) at 3/19/2022 1630  Last data filed at 3/19/2022 1330  Gross per 24 hour   Intake 1020 ml   Output 0 ml   Net 1020 ml       General Appearance  Awake, alert, oriented,    Dressed scalp   HEENT - normocephalic, atraumatic, slighlty pale  conjunctiva,  anicteric sclera  Lungs: bilateral air entry  CVS: regular  Abd: soft non tender  Ext: no edema  CNS: awake and oriented       MEDICATIONS:      sodium chloride flush  5-40 mL IntraVENous 2 times per day    docusate sodium  100 mg Oral Daily    metoprolol tartrate  50 mg Oral BID    [Held by provider] lisinopril  40 mg Oral Daily    [Held by provider] hydroCHLOROthiazide  12.5 mg Oral Daily    melatonin  6 mg Oral Nightly    enoxaparin  40 mg SubCUTAneous Daily    vitamin C  1,000 mg Oral Daily    cefTRIAXone (ROCEPHIN) IV  1,000 mg IntraVENous Q24H      sodium chloride 75 mL/hr at 22 1255    sodium chloride       sodium chloride flush, sodium chloride, morphine **OR** morphine, HYDROcodone 5 mg - acetaminophen **OR** HYDROcodone 5 mg - acetaminophen, ondansetron **OR** ondansetron, polyethylene glycol      LABS:     CBC:   Recent Labs     22  0529   WBC 4.1*   HGB 11.6*        BMP: No results for input(s): NA, K, CL, CO2, BUN, CREATININE, GLUCOSE in the last 72 hours. Calcium:  No results for input(s): CALCIUM in the last 72 hours. Problem list of patient:     Patient Active Problem List   Diagnosis Code    Essential hypertension I10    Syncopal episodes R55    Primary osteoarthritis of right knee M17.11    Subdural hematoma ([address] S06.5X9A    Nonhealing surgical wound, initial encounter T81.89XA    Superficial postoperative wound infection T81.49XA    Disruption of external surgical wound T81. 31XA    Rupture of operation wound T81. 31XA         ASSESSMENT/PLAN   Scalp wound dehiscence:she had surgery today with adjacent tissue transfer  Continue current treatment.   Discussed with her daughter      Dorian Madrigal MD, MD, [address] 3/19/2022 4:30 PM

## 2022-03-19 NOTE — OP NOTE
Operative Note    Patient name: Carmen Acosta             Medical Record Number: [id number]    Primary Care Physician: Svitlana Ochoa MD     1940    Date of Procedure: 3/19/2022    Pre-operative Diagnosis: 9cm2 complex wound of right temple s/p craniotomy    Post-operative Diagnosis: Same    Procedure Performed: Repair of complex right temple 9cm2 wound with an adjacent tissue transfer (120 cm2). Surgeons/Assistants: MD Kerri Tilley PA-C    Estimated Blood Loss: 3ml     Complications: none immediately appreciated    Procedure: With the patient lying in the supine position and under adequate anesthesia per the anesthesia team, the area was anesthetized with a total of 19 ml of 1% Lidocaine 1:100,000 with epinephrine solution. The area was then prepped and draped in the standard surgical fashion. The craniotomy bone and hardware was replaced by Dr. Hilary Mae. Details of his operation can be found per his operative summary. There was a 8cm length incision and 9cm2 defect, which could not be closed primarily due to tension. Therefore, a 120cm2 sum of defect/adjacent tissue transfer (rotation flap) was then designed, back cut across the right parietal area to postauricular location, elevated and inset with 4-0 Monocryl suture placed in interrupted buried fashion. The Burrow's triangles were resected as were the wound edges excised to healthy appearing tissue. Final closure was completed using 4-0 chromic, skin staples. Benzoin/steristrips were applied on the right temple/forehead skin, Mupirocin on the remainder and the entire head wrapped with bulky sterile head wrap. The patient tolerated the procedure quite well and remained hemodynamically stable throughout the procedure and was quite comfortable throughout the operative course.     Clinical staging for cancer cases:  Tanisha Landis MD  Electronically signed by me on 3/19/2022 at 11:05 AM  Operative Note      Patient: Leeanna Mishra  YOB: 1940  MRN: [id number]    Date of Procedure: 3/19/2022    Pre-Op Diagnosis: RUPTUE OF OPERATION WOUND    Post-Op Diagnosis: Same       Procedure(s):  REEXPLORATION AND REVISION OF RIGHT FRONTAL LOBE WOUND PLUS REMOVAL OF RIGHT CRANIAL BONE FLAP AND CRANIOPLASTY  FLAP CLOSURE    Surgeon(s):  MD Aravind Hanson MD    Assistant:   Physician Assistant: Latisha Zimmer PA-C; Isis Lucero PA-C    Anesthesia: General    Estimated Blood Loss (mL): Minimal    Complications: None    Specimens:   ID Type Source Tests Collected by Time Destination   A : RIGHT FRONTAL LOBE BONE FLAP Bone Head SURGICAL PATHOLOGY, CULTURE, FUNGUS, GRAM STAIN (Canceled), CULTURE, ANAEROBIC AND AEROBIC Yasmany Rodriguez MD 3/19/2022 1080        Implants:  Implant Name Type Inv. Item Serial No.  Lot No. LRB No. Used Action   GRAFT DURA [id number] REGEN MTRX CLLGN BILAYER SUTURABLE - [id number]  GRAFT DURA [id number] REGEN MTRX CLLGN BILAYER SUTURABLE  [employer] [id number] Right 1 Implanted   OMNIPORE SURGICAL IMPLANT BENDBLOCK CRANIAL GRID IMPLANT     [id number] Right 1 Implanted   PLATE BNE [id number] 6 H CRANIOMAXILLOFACIAL TI DBL Y FOR - [id number]  PLATE BNE [id number] 6 H CRANIOMAXILLOFACIAL TI DBL Y FOR  [employer]  Right 3 Implanted   PLATE BONE [id number] LNG DBL Y SPEC VALENTE - [id number]  PLATE BONE [id number] LNG DBL Y SPEC VALENTE  [employer]  Right 1 Implanted   SCREW BNE L4MM DIA1.5MM CRAN SELF DRL CRSS DRV THINFLAP - [id number]  SCREW BNE L4MM DIA1.5MM CRAN SELF DRL CRSS DRV THINFLAP  [employer]-WD  Right 19 Implanted   SCREW BNE L4MM DIA1. 8MM KARLENE CRANIOMAXILLOFACIAL NONLOCKING - [id number]  SCREW BNE L4MM DIA1. 8MM KARLENE CRANIOMAXILLOFACIAL NONLOCKING  [employer]CRO[employer]WD  Right 1 Implanted   SCREW BONE L5MM DIA1.5MM KARLENE MAXILLOMANDIBULAR GRN TI SELF - [id number]  SCREW BONE L5MM DIA1.5MM KARLENE MAXILLOMANDIBULAR GRN

## 2022-03-19 NOTE — PROGRESS NOTES
1110: pt arrives to pacu. Pt on room air. Pt alert and awake. VSS. Respirations unlabored. Pt denies pain   1115: pt resting in bed   1123: pt denies pain. VSS. Respirations unlabored   1130: pt resting in bed with eyes closed   1133: report called to [address]: pt meets discharge criteria from pacu.   1141:  Pt transported to [facility]

## 2022-03-19 NOTE — ANESTHESIA POSTPROCEDURE EVALUATION
Department of Anesthesiology  Postprocedure Note    Patient: Jamin Gong  MRN: [id number]  YOB: 1940  Date of evaluation: 3/19/2022  Time:  11:52 AM     Procedure Summary     Date: 03/19/22 [facility] [address] [facility]    Anesthesia Start: 0903 Anesthesia Stop: 1113    Procedures:       REEXPLORATION AND REVISION OF RIGHT FRONTAL LOBE WOUND PLUS REMOVAL OF RIGHT CRANIAL BONE FLAP AND CRANIOPLASTY (Right Head)      FLAP CLOSURE (N/A ) Diagnosis: (RUPTUE OF OPERATION WOUND)    Surgeons: Gwendolyn Krabbe, MD; Jane Carlos MD Responsible Provider: Marilou Donovan DO    Anesthesia Type: general ASA Status: 3          Anesthesia Type: general    Maureen Phase I: Maureen Score: 10    Maureen Phase II:      Last vitals: Reviewed and per EMR flowsheets.        Anesthesia Post Evaluation    Patient location during evaluation: PACU  Patient participation: complete - patient participated  Level of consciousness: sleepy but conscious, responsive to verbal stimuli and responsive to light touch  Pain score: 3  Airway patency: patent  Nausea & Vomiting: no nausea and no vomiting  Complications: no  Cardiovascular status: hemodynamically stable and blood pressure returned to baseline  Respiratory status: spontaneous ventilation, acceptable and nasal cannula  Hydration status: stable

## 2022-03-20 ENCOUNTER — APPOINTMENT (OUTPATIENT)
Dept: CT IMAGING | Age: 82
DRG: 904 | End: 2022-03-20
Payer: MEDICARE

## 2022-03-20 LAB
ANION GAP SERPL CALCULATED.3IONS-SCNC: 11 MEQ/L (ref 8–16)
BASOPHILS # BLD: 0.3 %
BASOPHILS ABSOLUTE: 0 THOU/MM3 (ref 0–0.1)
BUN BLDV-MCNC: 21 MG/DL (ref 7–22)
CALCIUM SERPL-MCNC: 8.5 MG/DL (ref 8.5–10.5)
CHLORIDE BLD-SCNC: 107 MEQ/L (ref 98–111)
CO2: 19 MEQ/L (ref 23–33)
CREAT SERPL-MCNC: 0.7 MG/DL (ref 0.4–1.2)
EOSINOPHIL # BLD: 0 %
EOSINOPHILS ABSOLUTE: 0 THOU/MM3 (ref 0–0.4)
ERYTHROCYTE [DISTWIDTH] IN BLOOD BY AUTOMATED COUNT: 13.1 % (ref 11.5–14.5)
ERYTHROCYTE [DISTWIDTH] IN BLOOD BY AUTOMATED COUNT: 44.8 FL (ref 35–45)
GFR SERPL CREATININE-BSD FRML MDRD: 80 ML/MIN/1.73M2
GLUCOSE BLD-MCNC: 108 MG/DL (ref 70–108)
HCT VFR BLD CALC: 33.5 % (ref 37–47)
HEMOGLOBIN: 10.8 GM/DL (ref 12–16)
IMMATURE GRANS (ABS): 0.03 THOU/MM3 (ref 0–0.07)
IMMATURE GRANULOCYTES: 0.4 %
LYMPHOCYTES # BLD: 9.8 %
LYMPHOCYTES ABSOLUTE: 0.8 THOU/MM3 (ref 1–4.8)
MCH RBC QN AUTO: 30 PG (ref 26–33)
MCHC RBC AUTO-ENTMCNC: 32.2 GM/DL (ref 32.2–35.5)
MCV RBC AUTO: 93.1 FL (ref 81–99)
MONOCYTES # BLD: 9.6 %
MONOCYTES ABSOLUTE: 0.8 THOU/MM3 (ref 0.4–1.3)
NUCLEATED RED BLOOD CELLS: 0 /100 WBC
PLATELET # BLD: 169 THOU/MM3 (ref 130–400)
PMV BLD AUTO: 10.5 FL (ref 9.4–12.4)
POTASSIUM SERPL-SCNC: 4.5 MEQ/L (ref 3.5–5.2)
RBC # BLD: 3.6 MILL/MM3 (ref 4.2–5.4)
SEG NEUTROPHILS: 79.9 %
SEGMENTED NEUTROPHILS ABSOLUTE COUNT: 6.3 THOU/MM3 (ref 1.8–7.7)
SODIUM BLD-SCNC: 137 MEQ/L (ref 135–145)
WBC # BLD: 7.9 THOU/MM3 (ref 4.8–10.8)

## 2022-03-20 PROCEDURE — 36415 COLL VENOUS BLD VENIPUNCTURE: CPT

## 2022-03-20 PROCEDURE — 6360000002 HC RX W HCPCS: Performed by: PHYSICIAN ASSISTANT

## 2022-03-20 PROCEDURE — 6360000002 HC RX W HCPCS: Performed by: INTERNAL MEDICINE

## 2022-03-20 PROCEDURE — 2580000003 HC RX 258: Performed by: INTERNAL MEDICINE

## 2022-03-20 PROCEDURE — 85025 COMPLETE CBC W/AUTO DIFF WBC: CPT

## 2022-03-20 PROCEDURE — 99232 SBSQ HOSP IP/OBS MODERATE 35: CPT | Performed by: FAMILY MEDICINE

## 2022-03-20 PROCEDURE — 6370000000 HC RX 637 (ALT 250 FOR IP)

## 2022-03-20 PROCEDURE — APPSS30 APP SPLIT SHARED TIME 16-30 MINUTES: Performed by: PHYSICIAN ASSISTANT

## 2022-03-20 PROCEDURE — 70450 CT HEAD/BRAIN W/O DYE: CPT

## 2022-03-20 PROCEDURE — 6370000000 HC RX 637 (ALT 250 FOR IP): Performed by: PHYSICIAN ASSISTANT

## 2022-03-20 PROCEDURE — 80048 BASIC METABOLIC PNL TOTAL CA: CPT

## 2022-03-20 PROCEDURE — 1200000000 HC SEMI PRIVATE

## 2022-03-20 PROCEDURE — 99024 POSTOP FOLLOW-UP VISIT: CPT | Performed by: NEUROLOGICAL SURGERY

## 2022-03-20 PROCEDURE — 94760 N-INVAS EAR/PLS OXIMETRY 1: CPT

## 2022-03-20 RX ADMIN — MORPHINE SULFATE 4 MG: 4 INJECTION, SOLUTION INTRAMUSCULAR; INTRAVENOUS at 09:42

## 2022-03-20 RX ADMIN — MORPHINE SULFATE 2 MG: 2 INJECTION, SOLUTION INTRAMUSCULAR; INTRAVENOUS at 06:29

## 2022-03-20 RX ADMIN — HYDROCODONE BITARTRATE AND ACETAMINOPHEN 1 TABLET: 5; 325 TABLET ORAL at 12:07

## 2022-03-20 RX ADMIN — LISINOPRIL 40 MG: 40 TABLET ORAL at 07:54

## 2022-03-20 RX ADMIN — HYDROCHLOROTHIAZIDE 12.5 MG: 25 TABLET ORAL at 07:54

## 2022-03-20 RX ADMIN — CEFTRIAXONE SODIUM 1000 MG: 1 INJECTION, POWDER, FOR SOLUTION INTRAMUSCULAR; INTRAVENOUS at 20:36

## 2022-03-20 RX ADMIN — DOCUSATE SODIUM 100 MG: 100 CAPSULE, LIQUID FILLED ORAL at 07:51

## 2022-03-20 RX ADMIN — METOPROLOL TARTRATE 50 MG: 50 TABLET, FILM COATED ORAL at 07:54

## 2022-03-20 RX ADMIN — HYDROCODONE BITARTRATE AND ACETAMINOPHEN 1 TABLET: 5; 325 TABLET ORAL at 04:48

## 2022-03-20 RX ADMIN — HYDROCODONE BITARTRATE AND ACETAMINOPHEN 1 TABLET: 5; 325 TABLET ORAL at 16:04

## 2022-03-20 RX ADMIN — HYDROCODONE BITARTRATE AND ACETAMINOPHEN 1 TABLET: 5; 325 TABLET ORAL at 20:05

## 2022-03-20 RX ADMIN — OXYCODONE HYDROCHLORIDE AND ACETAMINOPHEN 1000 MG: 500 TABLET ORAL at 07:53

## 2022-03-20 RX ADMIN — HYDROCODONE BITARTRATE AND ACETAMINOPHEN 1 TABLET: 5; 325 TABLET ORAL at 00:17

## 2022-03-20 RX ADMIN — Medication 6 MG: at 20:31

## 2022-03-20 ASSESSMENT — PAIN SCALES - GENERAL
PAINLEVEL_OUTOF10: 2
PAINLEVEL_OUTOF10: 4
PAINLEVEL_OUTOF10: 5
PAINLEVEL_OUTOF10: 7
PAINLEVEL_OUTOF10: 4
PAINLEVEL_OUTOF10: 5
PAINLEVEL_OUTOF10: 4

## 2022-03-20 ASSESSMENT — PAIN DESCRIPTION - DIRECTION: RADIATING_TOWARDS: INC

## 2022-03-20 ASSESSMENT — PAIN DESCRIPTION - FREQUENCY: FREQUENCY: CONTINUOUS

## 2022-03-20 ASSESSMENT — PAIN DESCRIPTION - PROGRESSION: CLINICAL_PROGRESSION: GRADUALLY IMPROVING

## 2022-03-20 ASSESSMENT — ENCOUNTER SYMPTOMS
SHORTNESS OF BREATH: 0
ABDOMINAL PAIN: 0
CHEST TIGHTNESS: 0
ABDOMINAL DISTENTION: 0
APNEA: 0

## 2022-03-20 ASSESSMENT — PAIN DESCRIPTION - LOCATION: LOCATION: HEAD

## 2022-03-20 ASSESSMENT — PAIN DESCRIPTION - DESCRIPTORS: DESCRIPTORS: ACHING

## 2022-03-20 ASSESSMENT — PAIN DESCRIPTION - PAIN TYPE
TYPE: SURGICAL PAIN
TYPE: SURGICAL PAIN

## 2022-03-20 ASSESSMENT — PAIN - FUNCTIONAL ASSESSMENT: PAIN_FUNCTIONAL_ASSESSMENT: ACTIVITIES ARE NOT PREVENTED

## 2022-03-20 ASSESSMENT — PAIN DESCRIPTION - ONSET: ONSET: ON-GOING

## 2022-03-20 NOTE — PROGRESS NOTES
Hospitalist Progress Note    Patient:  Cl Mcgowan      Unit/Bed:5K-10/010-A    YOB: 1940    MRN: [id number]       Acct: [de-identified]     PCP: Lon Angel MD    Date of Admission: 3/14/2022    Assessment/Plan:    Anticipated Discharge in :     KARIN/Garrett Funez [facility] Problems    Diagnosis Date Noted    Rupture of operation wound Britany Victoriael 03/14/2022    Essential hypertension [I10]        Wound dehiscence of right frontal cranium  Patient had evacuation of subdural hematoma on 1/21 with Dr. Leeanna Cardona  Sutures and staples were removed last week by Dr. Leeanna Cardona  I&D and Plastic surgery consulted  Continue rocephin  CT Head shows small chronic mixed extra-axial fluid collection subjacent to the craniotomy with increased air collection. Plan for surgery today     Essential Hypertension, stable  Continue home meds  hold Lisinopril and HCTZ AM dose prior to surgery     Chief Complaint:   Wound dehiscence    Hospital Course:   Per admission H&P:    \"81 y. o. female who presented to [address] [place] [address] with Wound Dehiscence.  She has a past medical history of hypertension.      Patient states she fell in January and hit her head.  At that time she had a subdural hematoma.  She underwent an evacuation with Dr. Hector Bennett that time. Pushpa Baker was admitted previously on 2/23/2022 for wound dehiscence with screw working its way out and had neurosurgical irrigation and closure of her surgical wound at that time.  Last week patient follow-up with Dr. Mesa Edu office to have staples and sutures removed.  She had Steri-Strips placed at that time.     Patient states that this morning she was feeling well until she started noticing a \"grunting\" or swishing sound in her right cranium, similar to when she previously had a wound dehiscence.  She states she tried to get into Dr. Melissa Simpson and Dr. Leeanna Cardona' office today but she could not.  She went to her PCP who recommended she go to the ED. Pushpa Baker denies pain, drainage, bleeding.  She denies all other review of systems.  She otherwise has no new complaints.  Family is requesting Dr. Isaías Pereira was afebrile in the ED with BP stable. \"     Subjective:   Patient seen and examined, appears comfortable in bed, without any signs of cardiorespiratory distress. VS stable, afebrile, saturating well on room air. Surgery planned this morning    Medications:  Reviewed    Infusion Medications    sodium chloride 75 mL/hr at 03/19/22 2228    sodium chloride       Scheduled Medications    sodium chloride flush  5-40 mL IntraVENous 2 times per day    docusate sodium  100 mg Oral Daily    metoprolol tartrate  50 mg Oral BID    lisinopril  40 mg Oral Daily    hydroCHLOROthiazide  12.5 mg Oral Daily    melatonin  6 mg Oral Nightly    enoxaparin  40 mg SubCUTAneous Daily    vitamin C  1,000 mg Oral Daily    cefTRIAXone (ROCEPHIN) IV  1,000 mg IntraVENous Q24H     PRN Meds: sodium chloride flush, sodium chloride, morphine **OR** morphine, HYDROcodone 5 mg - acetaminophen **OR** HYDROcodone 5 mg - acetaminophen, ondansetron **OR** ondansetron, polyethylene glycol      Intake/Output Summary (Last 24 hours) at 3/19/2022 2244  Last data filed at 3/19/2022 2014  Gross per 24 hour   Intake 1030 ml   Output 0 ml   Net 1030 ml       Diet:  ADULT DIET; Regular  ADULT ORAL NUTRITION SUPPLEMENT; Lunch, Dinner; Wound Healing Oral Supplement    Exam:  /60   Pulse 74   Temp 97.6 °F (36.4 °C) (Oral)   Resp 16   Ht 5' 4.02\" (1.626 m)   Wt 175 lb (79.4 kg)   LMP  (LMP Unknown)   SpO2 94%   BMI 30.02 kg/m²     General appearance: No apparent distress, appears stated age and cooperative. HEENT: Pupils equal, round, and reactive to light. Conjunctivae/corneas clear. Neck: Supple, with full range of motion. No jugular venous distention. Trachea midline. Respiratory:  Normal respiratory effort. Clear to auscultation, bilaterally without Rales/Wheezes/Rhonchi.   Cardiovascular: Regular rate and rhythm with normal S1/S2 without murmurs, rubs or gallops. Abdomen: Soft, non-tender, non-distended with normal bowel sounds. Musculoskeletal: passive and active ROM x 4 extremities. Skin: Skin color, texture, turgor normal. Linear laceration noted on the right forehead  Neurologic:  Neurovascularly intact without any focal sensory/motor deficits. Cranial nerves: II-XII intact, grossly non-focal.  Psychiatric: Alert and oriented, thought content appropriate, normal insight  Capillary Refill: Brisk,< 3 seconds   Peripheral Pulses: +2 palpable, equal bilaterally       Labs:   Recent Labs     03/17/22  0529   WBC 4.1*   HGB 11.6*   HCT 36.2*        No results for input(s): NA, K, CL, CO2, BUN, CREATININE, CALCIUM, PHOS in the last 72 hours. Invalid input(s): MAGNES  No results for input(s): AST, ALT, BILIDIR, BILITOT, ALKPHOS in the last 72 hours. No results for input(s): INR in the last 72 hours. No results for input(s): Juan Alberto Roseer in the last 72 hours. Urinalysis:      Lab Results   Component Value Date    NITRU POSITIVE 01/18/2022    WBCUA 10-15W/CLUMPS 01/18/2022    BACTERIA MANY 01/18/2022    RBCUA 0-2 01/18/2022    RBCUA 0-2 07/02/2018    BLOODU neg 02/01/2022    BLOODU TRACE 01/18/2022    SPECGRAV 1.015 02/01/2022    SPECGRAV 1.010 01/18/2022    GLUCOSEU neg 02/01/2022    GLUCOSEU NEGATIVE 07/02/2018       Radiology:  CT HEAD WO CONTRAST   Final Result    Redemonstration of a small right frontoparietal craniotomy with persistent small chronic mixed extra-axial fluid collection subjacent to the craniotomy but with increased air within the collection compared to prior exam. This suggests possible    microcommunication of the collection with the external hemisphere through the craniotomy and adjacent scalp. Superinfection of the collection can't be excluded. **This report has been created using voice recognition software.  It may contain minor errors which are inherent in voice recognition technology. **      Final report electronically signed by Dr. Binh Morris MD on 3/15/2022 8:55 AM          Diet: ADULT DIET; Regular  ADULT ORAL NUTRITION SUPPLEMENT; Lunch, Dinner;  Wound Healing Oral Supplement    DVT prophylaxis: [] Lovenox                                 [] SCDs                                 [] SQ Heparin                                 [] Encourage ambulation           [] Already on Anticoagulation     Disposition:    [] Home       [] TCU       [] Rehab       [] Psych       [] SNF       [] [place]       [] Other-    Code Status: Full Code    PT/OT Eval Status:        Electronically signed by Kelsy Boss MD on 3/19/2022 at 10:44 PM

## 2022-03-20 NOTE — PROGRESS NOTES
Attending Note:     Patient seen and examined in floor in conjunction with neurosurgery JESUS ALBERTO Schmidt PA-C). Discussed with patient,  her nurse and her family as well. All data and imaging reviewed by myself. I agree with examination assessment and plan as documented above. · Doing well. · Today brain CT showed expected postop changes. · Follow-up recommendation Dr. Grecia Kelly and infectious disease regarding patient's wound care. · Follow-up with neurosurgery outpatient clinic after 2 to 3 weeks. · From this time on, neurosurgery team will see this patient only as needed as long as she is in the hospital. Please call if you have any further questions or concerns regarding this patient. Roe Kohler MD      Neurosurgery Progress Note    Patient:  Radha Walters      Unit/Bed:[facility]/010-A    YOB: 1940    MRN: [id number]     Acct: [de-identified]     Admit date: 3/14/2022    Chief Complaint   Patient presents with    Post-op Problem       Patient Seen, Chart, Physician notes, Labs, Radiology studies reviewed. Subjective: The patient is seen and evaluated on the floor with evaluation exam findings reviewed discussed with Dr. Kay Echeverria, with nursing and with family. Patient is sitting up in a chair this morning is very comfortable with pain very well controlled. Past, Family, Social History unchanged from admission. Diet:  ADULT DIET; Regular  ADULT ORAL NUTRITION SUPPLEMENT; Lunch, Dinner;  Wound Healing Oral Supplement    Medications:  Scheduled Meds:   sodium chloride flush  5-40 mL IntraVENous 2 times per day    docusate sodium  100 mg Oral Daily    metoprolol tartrate  50 mg Oral BID    lisinopril  40 mg Oral Daily    hydroCHLOROthiazide  12.5 mg Oral Daily    melatonin  6 mg Oral Nightly    enoxaparin  40 mg SubCUTAneous Daily    vitamin C  1,000 mg Oral Daily    cefTRIAXone (ROCEPHIN) IV  1,000 mg IntraVENous Q24H     Continuous Infusions:   sodium chloride 75 mL/hr at 03/19/22 2228    sodium chloride       PRN Meds:sodium chloride flush, sodium chloride, morphine **OR** morphine, HYDROcodone 5 mg - acetaminophen **OR** HYDROcodone 5 mg - acetaminophen, ondansetron **OR** ondansetron, polyethylene glycol    Objective: The patient is observed sitting up in a chair, comfortably with pain very well controlled. Surgical incisions are flat and dry under intact dressings applied by plastic surgery. She remains otherwise stable and grossly neurologically intact to her baseline on exam this morning with no additional significant changes noted    Vitals: /60   Pulse 74   Temp 98.2 °F (36.8 °C) (Oral)   Resp 16   Ht 5' 4.02\" (1.626 m)   Wt 168 lb (76.2 kg)   LMP  (LMP Unknown)   SpO2 98%   BMI 28.82 kg/m²     Physical Exam   Patient remained stable and neurologically intact her baseline today on exam with no additional significant changes noted overnight. Physical Exam:  Alert and attentive. Language appropriate, with no aphasia. Pupils equal.  Facial strength symmetric. Review of Systems   Constitutional: Negative for activity change. Respiratory: Negative for apnea, chest tightness and shortness of breath. Cardiovascular: Negative for chest pain and leg swelling. Gastrointestinal: Negative for abdominal distention and abdominal pain. Musculoskeletal: Negative for neck pain. Neurological: Negative for dizziness, seizures, speech difficulty, weakness, numbness and headaches. Psychiatric/Behavioral: Negative for agitation, behavioral problems, confusion and decreased concentration. 24 hour intake/output:    Intake/Output Summary (Last 24 hours) at 3/20/2022 1238  Last data filed at 3/20/2022 0735  Gross per 24 hour   Intake 30 ml   Output 0 ml   Net 30 ml     Last 3 weights:   Wt Readings from Last 3 Encounters:   03/20/22 168 lb (76.2 kg)   03/14/22 173 lb (78.5 kg)   03/10/22 173 lb 8 oz (78.7 kg)         CBC:   Recent Labs 03/20/22  0536   WBC 7.9   HGB 10.8*        BMP:    Recent Labs     03/20/22  0536      K 4.5      CO2 19*   BUN 21   CREATININE 0.7   GLUCOSE 108     Calcium:  Recent Labs     03/20/22  0536   CALCIUM 8.5     Magnesium:No results for input(s): MG in the last 72 hours. Glucose:No results for input(s): POCGLU in the last 72 hours. HgbA1C: No results for input(s): LABA1C in the last 72 hours. Lipids: No results for input(s): CHOL, TRIG, HDL, LDL, LDLCALC in the last 72 hours. Radiology reports as per the Radiologist  Radiology: CT HEAD WO CONTRAST    Result Date: 3/15/2022  PROCEDURE: CT HEAD WO CONTRAST CLINICAL INFORMATION: post op follow up. COMPARISON: CT head dated 2/21/2022. TECHNIQUE: Noncontrast 5 mm axial images were obtained through the brain. Sagittal and coronal reconstructions were created. All CT scans at this facility use dose modulation, iterative reconstruction, and/or weight-based dosing when appropriate to reduce radiation dose to as low as reasonably achievable. FINDINGS: Redemonstration of a right frontoparietal craniotomy. Focal extra-axial hypodense fluid collection subjacent to the craniotomy plate is overall stable in size measuring 6 mm in greatest thickness. However, there is increased air within the collection compared to prior exam. There is stable minimal mass effect of the collection on the subjacent parenchyma. There are mild to moderate patchy areas of hypodensity in the periventricular, subcortical deep white matter as evidence for chronic microvascular angiopathy, stable compared to prior exam. Gray-white matter projection otherwise appears grossly preserved. No new intracranial hemorrhage is identified. The patient is status post bilateral lens extractions. Orbits are otherwise unremarkable. Visualized paranasal sinuses are clear. Mastoid air cells are clear.       Redemonstration of a small right frontoparietal craniotomy with persistent small chronic mixed extra-axial fluid collection subjacent to the craniotomy but with increased air within the collection compared to prior exam. This suggests possible microcommunication of the collection with the external hemisphere through the craniotomy and adjacent scalp. Superinfection of the collection can't be excluded. **This report has been created using voice recognition software. It may contain minor errors which are inherent in voice recognition technology. ** Final report electronically signed by Dr. Bonnie Treadwell MD on 3/15/2022 8:55 AM                   Assessment: Patient is status postoperative day #1 from reexploration and revision of craniotomy with concurrent cranioplasty performed by Dr. Dacia Purvis with closure performed by Dr. Morris/plastics. Principal Problem:    Rupture of operation wound  Active Problems:    Essential hypertension  Resolved Problems:    * No resolved hospital problems. *        Plan: Patient is seen and evaluated on the floor with evaluation exam findings reviewed discussed with Dr. Dacia Purvis, with nursing and with family. Patient is sitting up in a chair this morning comfortably following reexploration and revision of craniectomy with concurrent cranioplasty performed by Dr. Aliya Vee plus wound closure performed by Dr. Morris/plastic surgery. On exam this morning pain was very well controlled and the patient remains stable and intact neurologically to her baseline with no additional significant changes noted. Neurosurgery is cleared the patient for discharge with a follow-up with Dr. Morris/plastic surgery as indicated. A CT scan of the head to be imaged without contrast was ordered for today and will be reviewed when available. Neurosurgery to follow.       Electronically signed by Hector Funk PA-C on 3/20/2022 at 12:38 PM    Neurosurgery

## 2022-03-20 NOTE — PROGRESS NOTES
Hospitalist Progress Note    Patient:  Madyson Clark      Unit/Bed:5K-10/010-A    YOB: 1940    MRN: [id number]       Acct: [de-identified]     PCP: Elliott Park MD    Date of Admission: 3/14/2022    Assessment/Plan:    Anticipated Discharge in :     [facility] Problems    Diagnosis Date Noted    Elective surgery [Z41.9]     Wound dehiscence [T81.30XA] 03/14/2022    Essential hypertension [I10]        Wound dehiscence of right frontal cranium  S/p Repair of complex right temple wound with an adjacent tissue transfer 3/19/2022  Patient had evacuation of subdural hematoma on 1/21 with Dr. Radha Schuster  Sutures and staples were removed last week by Dr. Radha Schuster  I&D and Plastic surgery consulted  CT Head shows small chronic mixed extra-axial fluid collection subjacent to the craniotomy with increased air collection  Rpt CT head 3/20 showed Interval right frontal cranioplasty. Scalp skin staples are in place. Continue rocephin    Essential Hypertension, stable  Continue home meds  Resume Lisinopril and HCTZ after surgery    Disposition  Possible discharge tomorrow if cleared by specialists     Chief Complaint:   Wound dehiscence    Hospital Course:   Per admission H&P:    \"81 y. o. female who presented to [facility] with Wound Dehiscence.  She has a past medical history of hypertension.      Patient states she fell in January and hit her head.  At that time she had a subdural hematoma.  She underwent an evacuation with Dr. Salo Leyva that time. Giulia Eastman was admitted previously on 2/23/2022 for wound dehiscence with screw working its way out and had neurosurgical irrigation and closure of her surgical wound at that time.  Last week patient follow-up with Dr. Rickie Bullock office to have staples and sutures removed.  She had Steri-Strips placed at that time.     Patient states that this morning she was feeling well until she started noticing a \"grunting\" or swishing sound in her right cranium, similar to when she previously had a wound dehiscence.  She states she tried to get into Dr. Seymour Deras and Dr. Connor Garg' office today but she could not.  She went to her PCP who recommended she go to the ED. Norbert Landers denies pain, drainage, bleeding.  She denies all other review of systems.  She otherwise has no new complaints.  Family is requesting DrBrunilda Cardonae Moritz was afebrile in the ED with BP stable. \"     Subjective:   Patient seen and examined, appears comfortable in bed, without any signs of cardiorespiratory distress. VS stable, afebrile, saturating well on room air, no new complaints, pain is controlled with norco and prn morphine    Medications:  Reviewed    Infusion Medications    sodium chloride 75 mL/hr at 03/19/22 2228    sodium chloride       Scheduled Medications    sodium chloride flush  5-40 mL IntraVENous 2 times per day    docusate sodium  100 mg Oral Daily    metoprolol tartrate  50 mg Oral BID    lisinopril  40 mg Oral Daily    hydroCHLOROthiazide  12.5 mg Oral Daily    melatonin  6 mg Oral Nightly    enoxaparin  40 mg SubCUTAneous Daily    vitamin C  1,000 mg Oral Daily    cefTRIAXone (ROCEPHIN) IV  1,000 mg IntraVENous Q24H     PRN Meds: sodium chloride flush, sodium chloride, morphine **OR** morphine, HYDROcodone 5 mg - acetaminophen **OR** HYDROcodone 5 mg - acetaminophen, ondansetron **OR** ondansetron, polyethylene glycol      Intake/Output Summary (Last 24 hours) at 3/20/2022 1753  Last data filed at 3/20/2022 1330  Gross per 24 hour   Intake 370 ml   Output 0 ml   Net 370 ml       Diet:  ADULT DIET; Regular  ADULT ORAL NUTRITION SUPPLEMENT; Lunch, Dinner; Wound Healing Oral Supplement    Exam:  BP (!) 111/48   Pulse 65   Temp 97.6 °F (36.4 °C) (Oral)   Resp 16   Ht 5' 4.02\" (1.626 m)   Wt 168 lb (76.2 kg)   LMP  (LMP Unknown)   SpO2 100%   BMI 28.82 kg/m²     General appearance: No apparent distress, appears stated age and cooperative.   HEENT: Pupils equal, round, and reactive to light. Conjunctivae/corneas clear. Scalp wrapped in elastic bandage, clean and dry  Neck: Supple, with full range of motion. No jugular venous distention. Trachea midline. Respiratory:  Normal respiratory effort. Clear to auscultation, bilaterally without Rales/Wheezes/Rhonchi. Cardiovascular: Regular rate and rhythm with normal S1/S2 without murmurs, rubs or gallops. Abdomen: Soft, non-tender, non-distended with normal bowel sounds. Musculoskeletal: passive and active ROM x 4 extremities. Skin: Skin color, texture, turgor normal. Linear laceration noted on the right forehead  Neurologic:  Neurovascularly intact without any focal sensory/motor deficits. Cranial nerves: II-XII intact, grossly non-focal.  Psychiatric: Alert and oriented, thought content appropriate, normal insight  Capillary Refill: Brisk,< 3 seconds   Peripheral Pulses: +2 palpable, equal bilaterally       Labs:   Recent Labs     03/20/22  0536   WBC 7.9   HGB 10.8*   HCT 33.5*        Recent Labs     03/20/22  0536      K 4.5      CO2 19*   BUN 21   CREATININE 0.7   CALCIUM 8.5     No results for input(s): AST, ALT, BILIDIR, BILITOT, ALKPHOS in the last 72 hours. No results for input(s): INR in the last 72 hours. No results for input(s): Saira Alondra in the last 72 hours. Urinalysis:      Lab Results   Component Value Date    NITRU POSITIVE 01/18/2022    WBCUA 10-15W/CLUMPS 01/18/2022    BACTERIA MANY 01/18/2022    RBCUA 0-2 01/18/2022    RBCUA 0-2 07/02/2018    BLOODU neg 02/01/2022    BLOODU TRACE 01/18/2022    SPECGRAV 1.015 02/01/2022    SPECGRAV 1.010 01/18/2022    GLUCOSEU neg 02/01/2022    GLUCOSEU NEGATIVE 07/02/2018       Radiology:  CT HEAD WO CONTRAST   Final Result       1. Interval right frontal cranioplasty. Scalp skin staples are in place. 2. Stable moderate severity chronic small vessel ischemic changes. **This report has been created using voice recognition software.  It may contain minor errors which are inherent in voice recognition technology. **      Final report electronically signed by Dr. Derik Mares on 3/20/2022 11:49 AM      CT HEAD WO CONTRAST   Final Result    Redemonstration of a small right frontoparietal craniotomy with persistent small chronic mixed extra-axial fluid collection subjacent to the craniotomy but with increased air within the collection compared to prior exam. This suggests possible    microcommunication of the collection with the external hemisphere through the craniotomy and adjacent scalp. Superinfection of the collection can't be excluded. **This report has been created using voice recognition software. It may contain minor errors which are inherent in voice recognition technology. **      Final report electronically signed by Dr. Stanley Diego MD on 3/15/2022 8:55 AM          Diet: ADULT DIET; Regular  ADULT ORAL NUTRITION SUPPLEMENT; Lunch, Dinner;  Wound Healing Oral Supplement    DVT prophylaxis: [] Lovenox                                 [] SCDs                                 [] SQ Heparin                                 [] Encourage ambulation           [] Already on Anticoagulation     Disposition:    [] Home       [] TCU       [] Rehab       [] Psych       [] SNF       [] [place]       [] Other-    Code Status: Full Code    PT/OT Eval Status:        Electronically signed by Jin Delgado MD on 3/20/2022 at 5:53 PM

## 2022-03-21 PROCEDURE — 97535 SELF CARE MNGMENT TRAINING: CPT

## 2022-03-21 PROCEDURE — 6360000002 HC RX W HCPCS: Performed by: INTERNAL MEDICINE

## 2022-03-21 PROCEDURE — 97165 OT EVAL LOW COMPLEX 30 MIN: CPT

## 2022-03-21 PROCEDURE — 6370000000 HC RX 637 (ALT 250 FOR IP): Performed by: PHYSICIAN ASSISTANT

## 2022-03-21 PROCEDURE — 1200000000 HC SEMI PRIVATE

## 2022-03-21 PROCEDURE — 6370000000 HC RX 637 (ALT 250 FOR IP)

## 2022-03-21 PROCEDURE — 2580000003 HC RX 258: Performed by: PHYSICIAN ASSISTANT

## 2022-03-21 PROCEDURE — 99232 SBSQ HOSP IP/OBS MODERATE 35: CPT | Performed by: FAMILY MEDICINE

## 2022-03-21 PROCEDURE — 2500000003 HC RX 250 WO HCPCS: Performed by: INTERNAL MEDICINE

## 2022-03-21 RX ADMIN — OXYCODONE HYDROCHLORIDE AND ACETAMINOPHEN 1000 MG: 500 TABLET ORAL at 09:52

## 2022-03-21 RX ADMIN — LISINOPRIL 40 MG: 40 TABLET ORAL at 09:52

## 2022-03-21 RX ADMIN — HYDROCODONE BITARTRATE AND ACETAMINOPHEN 1 TABLET: 5; 325 TABLET ORAL at 00:06

## 2022-03-21 RX ADMIN — POLYETHYLENE GLYCOL 3350 17 G: 17 POWDER, FOR SOLUTION ORAL at 10:00

## 2022-03-21 RX ADMIN — MUPIROCIN: 20 OINTMENT TOPICAL at 20:10

## 2022-03-21 RX ADMIN — HYDROCHLOROTHIAZIDE 12.5 MG: 25 TABLET ORAL at 09:52

## 2022-03-21 RX ADMIN — SODIUM CHLORIDE, PRESERVATIVE FREE 5 ML: 5 INJECTION INTRAVENOUS at 21:12

## 2022-03-21 RX ADMIN — HYDROCODONE BITARTRATE AND ACETAMINOPHEN 1 TABLET: 5; 325 TABLET ORAL at 09:51

## 2022-03-21 RX ADMIN — DOCUSATE SODIUM 100 MG: 100 CAPSULE, LIQUID FILLED ORAL at 09:52

## 2022-03-21 RX ADMIN — CEFTRIAXONE 1000 MG: 10 INJECTION, POWDER, FOR SOLUTION INTRAVENOUS at 21:00

## 2022-03-21 RX ADMIN — MUPIROCIN: 20 OINTMENT TOPICAL at 15:06

## 2022-03-21 RX ADMIN — METOPROLOL TARTRATE 50 MG: 50 TABLET, FILM COATED ORAL at 09:51

## 2022-03-21 RX ADMIN — MUPIROCIN: 20 OINTMENT TOPICAL at 12:20

## 2022-03-21 RX ADMIN — Medication 6 MG: at 20:10

## 2022-03-21 ASSESSMENT — PAIN DESCRIPTION - PAIN TYPE: TYPE: SURGICAL PAIN

## 2022-03-21 ASSESSMENT — PAIN SCALES - GENERAL
PAINLEVEL_OUTOF10: 0
PAINLEVEL_OUTOF10: 4
PAINLEVEL_OUTOF10: 0
PAINLEVEL_OUTOF10: 0
PAINLEVEL_OUTOF10: 3

## 2022-03-21 ASSESSMENT — PAIN DESCRIPTION - LOCATION: LOCATION: HEAD

## 2022-03-21 NOTE — PROGRESS NOTES
HGB 10.8*        BMP:    Recent Labs     03/20/22  0536      K 4.5      CO2 19*   BUN 21   CREATININE 0.7   GLUCOSE 108     Calcium:  Recent Labs     03/20/22  0536   CALCIUM 8.5       Problem list of patient:     Patient Active Problem List   Diagnosis Code    Essential hypertension I10    Syncopal episodes R55    Primary osteoarthritis of right knee M17.11    Subdural hematoma ([address] S06.5X9A    Nonhealing surgical wound, initial encounter T81.89XA    Superficial postoperative wound infection T81.49XA    Disruption of external surgical wound T81. 31XA    Wound dehiscence T81.30XA    Elective surgery Z41.9         ASSESSMENT/PLAN   Scalp wound dehiscence:she had surgery with adjacent tissue transfer  Continue current treatment. She will be discharged home at am with oral keflex for two wks  Discussed with pastic surgeon.     Samm Saldivar MD, MD, FACP 3/21/2022 12:12 PM

## 2022-03-21 NOTE — H&P
[facility]  History and Physical Update    Pt Name: Leeanna Mishra  MRN: [id number]  YOB: 1940  Date of evaluation: 3/21/2022    I have examined the patient and reviewed the H&P/Consult and there are no changes to the patient or plans.       Aravind Jones MD  Electronically signed 3/21/2022 at 6:50 AM

## 2022-03-21 NOTE — PLAN OF CARE
Problem: Falls - Risk of:  Goal: Will remain free from falls  Description: Will remain free from falls  3/21/2022 1511 by Gustavo Lomax RN  Outcome: Ongoing  Note: No falls this shift. Bed alarm on, nonskid socks on, and call light in reach. At risk due to generalized weakness. Problem: Discharge Planning:  Goal: Discharged to appropriate level of care  Description: Discharged to appropriate level of care  3/21/2022 1511 by Gustavo Lomax RN  Outcome: Ongoing  Note: Planning to discharge home after antibiotic treatment. Possible discharge tomorrow 3/22. No barriers noted. Problem: Skin Integrity:  Goal: Will show no infection signs and symptoms  Description: Will show no infection signs and symptoms  3/21/2022 1511 by Gustavo Lomax RN  Outcome: Ongoing  Note: No new skin breakdown. Pt turned and repositioned. Will continue on going skin assessment. Problem: Pain:  Goal: Pain level will decrease  Description: Pain level will decrease  3/21/2022 1511 by Gustavo oLmax RN  Outcome: Ongoing  Note: Pain being controled by Norco. Patient rating pain 2/10. Will continue to monitor. Problem: Nutrition  Goal: Optimal nutrition therapy  Outcome: Ongoing  Note: Patient eating >75% of meals for all meals. Care plan reviewed with patient and daughter. Patient and daughter verbalize understanding of the plan of care and contribute to goal setting.

## 2022-03-21 NOTE — CARE COORDINATION
3/21/22, 3:49 PM EDT    DISCHARGE ON GOING Dru 71 day: 7  Location: -10/010-A Reason for admit: Wound dehiscence [T81.30XA]  Dehiscence of operative wound, initial encounter [T81.31XA]   Procedure:   · Reexploration right frontal wound. · Right sided frontal wound  debridement and washout. · Reexploration right frontal bone flap. · Removal of previous right frontal bone flap. · Right frontal cranioplasty (3.5 x 3.5 cm by utilizing Omnipore surgical implant). · Repair of complex right frontal wound with an adjacent tissue transfer ( performed by Dr. Randee Clark (plastic surgery  Barriers to Discharge: Neurosurgery, Plastics, and ID following, IV fluids, Rocephin, Lovenox, regular diet, ambulate, daily weights, incentive spirometry, SCD's, seizure precautions, up with assistance as tolerated. PCP: Wilner Gutierrez MD  Readmission Risk Score: 17.6 ( )%  Patient Goals/Plan/Treatment Preferences: Tobias Savage is from home with family support. She denies needs at discharge.

## 2022-03-21 NOTE — PLAN OF CARE
Problem: Falls - Risk of:  Goal: Will remain free from falls  Description: Will remain free from falls  Outcome: Ongoing   Patient bed in lowest position, wheels locked, 2/4 side rails up and alarm on. Call light and belongings within reach. Pathway clear. Nonskid footwear on. Patient rounded on hourly. Fall risk assessment complete. Problem: Discharge Planning:  Goal: Discharged to appropriate level of care  Description: Discharged to appropriate level of care  Outcome: Ongoing   Discharge plan ongoing. Problem: Skin Integrity:  Goal: Will show no infection signs and symptoms  Description: Will show no infection signs and symptoms  Outcome: Ongoing   Patient afebrile this shift. CHG bath complete. No s/s of infection. Problem: Pain:  Goal: Pain level will decrease  Description: Pain level will decrease  Outcome: Ongoing   Patient pain 4/10. PRN [place] administered q4 hours. Care plan reviewed with patient and family. Patient and family verbalize understanding of the plan of care and contribute to goal setting.

## 2022-03-21 NOTE — PROGRESS NOTES
S: Patient is eager to have head wrap removed. Pain reasonably controlled. O: Head wrap removed. Steri strips in place. Incision is well approximated. No evidence of infection. Flap shows excellent perfusion. There is a small abrasion medial to incision. Dr. Aamir Reyez discussed with patient and family that this is not related to incision. Possibly due to a removed 10x10 drape in OR? A: POD#2  P: mupirocin ointment is to be applied to portion of incision not covered with steri strips.        Norco prescription written       She will follow up with Dr. Aamir Reyez on Tuesday, 3/29/2022       Post operative instructions written

## 2022-03-21 NOTE — PROGRESS NOTES
Audelia Page 58  INPATIENT OCCUPATIONAL THERAPY  [facility] ONC MED 5K  EVALUATION    Time:   Time In: 1003  Time Out: 1034  Timed Code Treatment Minutes: 23 Minutes  Minutes: 31    Date: 3/21/2022  Patient Name: Lloyd Felipe,   Gender: female      MRN: [id number]  : 1940  (78 y.o.)  Referring Practitioner: Zoya Ruiz PA-C  Diagnosis: Wound dehiscence  Additional Pertinent Hx: Per H&P: \"78 y.o. female who presented to [facility] with Wound Dehiscence. She has a past medical history of hypertension. Patient states she fell in January and hit her head. At that time she had a subdural hematoma. She underwent an evacuation with Dr. Jonh Wooten at that time. She was admitted previously on 2022 for wound dehiscence with screw working its way out and had neurosurgical irrigation and closure of her surgical wound at that time. Last week patient follow-up with Dr. Valdo Bruce office to have staples and sutures removed. She had Steri-Strips placed at that time. Patient states that this morning she was feeling well until she started noticing a \"grunting\" or swishing sound in her right cranium, similar to when she previously had a wound dehiscence. She states she tried to get into Dr. Samra Vogel and Dr. Jonh Wooten' office today but she could not. She went to her PCP who recommended she go to the ED. She denies pain, drainage, bleeding. She denies all other review of systems. She otherwise has no new complaints. Family is requesting Dr. Vito Gomez consult. She was afebrile in the ED with BP stable. Pt is no\" w s/p REEXPLORATION AND REVISION OF RIGHT FRONTAL LOBE WOUND PLUS REMOVAL OF RIGHT CRANIAL BONE FLAP AND CRANIOPLASTYFLAP CLOSURE on 3/19 by Dr. Richardson Harvey and Dr. Jonh Wooten.     Restrictions/Precautions:  Restrictions/Precautions: General Precautions    Subjective  Chart Reviewed: Gagan Coates and Physical  Patient assessed for rehabilitation services?: Yes  Family / Caregiver Present: Yes    Subjective: RN approved OT session. Pt sitting up in bed with daughter present upon arrival, pleasant and agreeable to OT session. Pt reports she is eager to go home. Pain:  Pain Assessment  Patient Currently in Pain: Yes  Pain Level: 3    Vitals: Vitals not assessed per clinical judgement, see nursing flowsheet    Social/Functional History:  Lives With: Alone  Type of Home: House  Home Layout: One level  Home Access: Stairs to enter with rails  Entrance Stairs - Number of Steps: 3 UGO  Home Equipment:  (no AD PTA, but daughter states she can get AD if needed)   Bathroom Shower/Tub: Walk-in shower,Shower chair with back (with 2-3 inch threshold)  Bathroom Toilet: Standard  Bathroom Equipment: Grab bars in shower       ADL Assistance: [address] [place] [address]: Independent  Homemaking Responsibilities: Yes  Ambulation Assistance: Independent  Transfer Assistance: Independent    Active : No  Patient's  Info: Was driving prior to sx on 1/21, was to have driving eval in upcoming week. Additional Comments: Dc'd from [facility] therapies approx 4 weeks ago. Was getting back to doing own cooking, shares homemaking with daughter who is a nurse. Daughter states someone will be staying with pt at all times until they are comfortable with her being alone. VISION:Corrected has glasses    HEARING:  WNL    COGNITION: WFL    RANGE OF MOTION:  Bilateral Upper Extremity:  WFL    STRENGTH:  Bilateral Upper Extremity:  not formally tested, appears WFL during ADL completion    SENSATION:   WFL    ADL:   Grooming: Supervision. for oral cares and hand hygiene standing sinkside  Bathing: Supervision. for full body bathing standing sinkside  Upper Extremity Dressing: Supervision. to don hospital gown  Lower Extremity Dressing: Stand By Assistance. to doff and don underwear while standing while holding onto countertop  Toileting: Supervision.   including clothing management and pericares  Toilet Transfer: Supervision. to/from standard toilet. BALANCE:  Sitting Balance:  Supervision. Standing Balance: Supervision. BED MOBILITY:  Supine to Sit: Modified Independent, with head of bed raised      TRANSFERS:  Sit to Stand:  Supervision. Stand to Sit: Supervision. FUNCTIONAL MOBILITY:  Assistive Device: None  Assist Level:  Stand By Assistance. Distance: To and from bathroom  Steady pace, no LOB. Exercise:  Pt initially requesting BUE exercises for home, however pt requesting to visit with visitor who arrived at end of session. Activity Tolerance:  Patient tolerance of  treatment: good. Assessment:  Assessment: 80yo presenting s/p wound revision with cranioplasty now experiencing the following performance deficits. Patient currently requires SBA-supervision for all self care and functional transfer and mobility tasks. Patient would benefit from continued skilled services throughout admission and following discharge to increase indep in ADL completion. Performance deficits / Impairments: Decreased functional mobility ,Decreased strength,Decreased endurance,Decreased high-level IADLs  Prognosis: Good  REQUIRES OT FOLLOW UP: Yes    Treatment Initiated: Treatment and education initiated within context of evaluation. Evaluation time included review of current medical information, gathering information related to past medical, social and functional history, completion of standardized testing, formal and informal observation of tasks, assessment of data and development of plan of care and goals. Treatment time included skilled education and facilitation of tasks to increase safety and independence with ADL's for improved functional independence and quality of life.     Discharge Recommendations:  Home with assist PRN    Patient Education:  OT Education: OT Role,Plan of Care,IADL Safety    Equipment Recommendations:  Equipment Needed: No    Plan:  Times per week: 3-5x  Current Treatment Recommendations: Functional Mobility Training,Endurance Training,Self-Care / ADL,Balance Training,Strengthening,Safety Education & Training. See long-term goal time frame for expected duration of plan of care. If no long-term goals established, a short length of stay is anticipated. Goals:  Patient goals : To go home  Short term goals  Time Frame for Short term goals: By discharge  Short term goal 1: Patient to complete functional mobility to/from BR and HH distances with mod (I) to increase indep in accessing home environment. Short term goal 2: Patient to complete BADL routine with (S) and 0-2 cues for safety to increase indep in all ADL tasks. Short term goal 3: Patient to complete simple homemaking task with (S) to increase indep in meal prep  Short term goal 4: Patient to complete BUE mod resistive HEP with min cues for technique to increase strength and endurance needed for all functional mobility, ADL, and IADL tasks. Following session, patient left in safe position with all fall risk precautions in place.

## 2022-03-22 VITALS
RESPIRATION RATE: 16 BRPM | OXYGEN SATURATION: 97 % | BODY MASS INDEX: 30.54 KG/M2 | TEMPERATURE: 98.2 F | WEIGHT: 178.9 LBS | SYSTOLIC BLOOD PRESSURE: 119 MMHG | HEIGHT: 64 IN | DIASTOLIC BLOOD PRESSURE: 56 MMHG | HEART RATE: 70 BPM

## 2022-03-22 PROCEDURE — 6370000000 HC RX 637 (ALT 250 FOR IP): Performed by: PHYSICIAN ASSISTANT

## 2022-03-22 PROCEDURE — 2580000003 HC RX 258: Performed by: PHYSICIAN ASSISTANT

## 2022-03-22 PROCEDURE — 6370000000 HC RX 637 (ALT 250 FOR IP)

## 2022-03-22 PROCEDURE — 99238 HOSP IP/OBS DSCHRG MGMT 30/<: CPT | Performed by: FAMILY MEDICINE

## 2022-03-22 RX ORDER — HYDROCODONE BITARTRATE AND ACETAMINOPHEN 5; 325 MG/1; MG/1
1 TABLET ORAL EVERY 6 HOURS PRN
Qty: 12 TABLET | Refills: 0 | Status: SHIPPED | OUTPATIENT
Start: 2022-03-22 | End: 2022-03-25

## 2022-03-22 RX ORDER — CEPHALEXIN 500 MG/1
500 CAPSULE ORAL 3 TIMES DAILY
Qty: 42 CAPSULE | Refills: 0 | Status: SHIPPED | OUTPATIENT
Start: 2022-03-22 | End: 2022-04-05

## 2022-03-22 RX ORDER — DOCUSATE SODIUM 100 MG/1
100 CAPSULE, LIQUID FILLED ORAL DAILY
Qty: 7 CAPSULE | Refills: 0 | Status: SHIPPED | OUTPATIENT
Start: 2022-03-23 | End: 2022-03-30

## 2022-03-22 RX ADMIN — SODIUM CHLORIDE, PRESERVATIVE FREE 10 ML: 5 INJECTION INTRAVENOUS at 08:12

## 2022-03-22 RX ADMIN — MUPIROCIN: 20 OINTMENT TOPICAL at 08:11

## 2022-03-22 RX ADMIN — LISINOPRIL 40 MG: 40 TABLET ORAL at 08:13

## 2022-03-22 RX ADMIN — METOPROLOL TARTRATE 50 MG: 50 TABLET, FILM COATED ORAL at 08:11

## 2022-03-22 RX ADMIN — OXYCODONE HYDROCHLORIDE AND ACETAMINOPHEN 1000 MG: 500 TABLET ORAL at 08:13

## 2022-03-22 RX ADMIN — HYDROCHLOROTHIAZIDE 12.5 MG: 25 TABLET ORAL at 08:13

## 2022-03-22 RX ADMIN — DOCUSATE SODIUM 100 MG: 100 CAPSULE, LIQUID FILLED ORAL at 08:11

## 2022-03-22 ASSESSMENT — PAIN SCALES - GENERAL
PAINLEVEL_OUTOF10: 0
PAINLEVEL_OUTOF10: 0

## 2022-03-22 NOTE — CARE COORDINATION
Discharge orders on chart. Met with Tanvir Sylvester and her daughter present at bedside. Both are in agreement for discharge to home today with no services added/requested. Pt verbalized understanding and gave permission for possible discharge within 4 hours of receiving IMM. 3/22/22, 12:16 PM EDT    Patient goals/plan/ treatment preferences discussed by  and . Patient goals/plan/ treatment preferences reviewed with patient/ family. Patient/ family verbalize understanding of discharge plan and are in agreement with goal/plan/treatment preferences. Understanding was demonstrated using the teach back method. AVS provided by RN at time of discharge, which includes all necessary medical information pertaining to the patients current course of illness, treatment, post-discharge goals of care, and treatment preferences. IMM Letter  IMM Letter given to Patient/Family/Significant other/Guardian/POA/by[de-identified] Copy delivered to patient by mgr. Nelson  IMM Letter date given[de-identified] 03/22/22  IMM Letter time given[de-identified] Dru

## 2022-03-22 NOTE — PROGRESS NOTES
Patient discharged at this time. All IV's removed. Discharge instructions, medication changes and follow up appointments explained at this time. All questions answered at this time. AVS given to patient and paperwork signed with this RN. All patient belongings returned. Patient discharged home via daughter, Boo Downing. Chart broken down and placed in yellow bin.

## 2022-03-22 NOTE — DISCHARGE INSTR - COC
Continuity of Care Form    Patient Name: Raul Sharma   :  1940  MRN:  [id number]    Admit date:  3/14/2022  Discharge date:  ***    Code Status Order: Full Code   Advance Directives:      Admitting Physician:  Sanam Gonzalez MD  PCP: Sundeep Pang MD    Discharging Nurse: [facility] Unit/Room#: 5K-10/010-A  Discharging Unit Phone Number: ***    Emergency Contact:   Extended Emergency Contact Information  Primary Emergency Contact: Bautista Haven  Address: [address] [place] [address] Phone: [phone]  Mobile Phone: [phone]  Relation: Child  Secondary Emergency Contact: Pierre Gates  Address: [phone] [address] [place] [address] Phone: [phone]  Mobile Phone: [phone]  Relation: Child    Past Surgical History:  Past Surgical History:   Procedure Laterality Date    CATHETER REMOVAL Bilateral 2014    CRANIOPLASTY Right 2022    RIGHT FRONTAL WOUND DEBRIDEMENT AND WASHOUT performed by Maya Lopez MD at [address] [place] [address] Right 3/19/2022    REEXPLORATION AND REVISION OF RIGHT FRONTAL LOBE WOUND PLUS REMOVAL OF RIGHT CRANIAL BONE FLAP AND CRANIOPLASTY performed by Maya Lopez MD at [address] [place] [address] Right 2022    RIGHT SIDED CRANIOTOMY FOR SUBDURAL HEMATOMA performed by Maya Lopez MD at [address] [place] [address]  2018    FOOT SURGERY Left     KNEE SURGERY Right 2014    total    PA BX OF BREAST, NEEDLE CORE, IMAGE GUIDE Left     benign    PA OFFICE/OUTPT VISIT,PROCEDURE ONLY Left 2018    ORIF LEFT ELBOW performed by Arleth Boyle MD at [address] [place] [address] 3/19/2022    FLAP CLOSURE performed by Mynor Harris MD at [facility]       Immunization History:   Immunization History   Administered Date(s) Administered    COVID-19, Alistair Sosa, Primary or Immunocompromised, PF, 100mcg/0.5mL 2021, 2021, 11/01/2021    Influenza A (N5R3-45) Vaccine PF IM 01/19/2010    Influenza Virus Vaccine 10/01/2013, 09/22/2017, 10/09/2018, 10/17/2019    Influenza Whole 11/07/2008, 10/16/2009    Influenza, High Dose (Fluzone 65 yrs and older) 09/30/2015, 10/26/2016, 09/28/2020, 09/29/2021    Influenza, Quadv, adjuvanted, 65 yrs +, IM, PF (Fluad) 09/29/2021    Pneumococcal Conjugate 13-valent (Hfruxax08) 10/17/2019    Pneumococcal Polysaccharide (Fcyxbteup78) 10/01/2011    Tdap (Boostrix, Adacel) 06/30/2018, 01/18/2022    Zoster Live (Zostavax) 10/01/2011    Zoster Recombinant (Shingrix) 11/20/2019, 03/13/2020       Active Problems:  Patient Active Problem List   Diagnosis Code    Essential hypertension I10    Syncopal episodes R55    Primary osteoarthritis of right knee M17.11    Subdural hematoma ([address] S06.5X9A    Nonhealing surgical wound, initial encounter T81.89XA    Superficial postoperative wound infection T81.49XA    Disruption of external surgical wound T81. 31XA    Rupture of operation wound T81. 31XA    Elective surgery Z41.9       Isolation/Infection:   Isolation            No Isolation          Patient Infection Status       None to display            Nurse Assessment:  Last Vital Signs: BP (!) 119/56   Pulse 70   Temp 98.2 °F (36.8 °C) (Oral)   Resp 16   Ht 5' 4.02\" (1.626 m)   Wt 178 lb 14.4 oz (81.1 kg)   LMP  (LMP Unknown)   SpO2 97%   BMI 30.69 kg/m²     Last documented pain score (0-10 scale): Pain Level: 0  Last Weight:   Wt Readings from Last 1 Encounters:   03/21/22 178 lb 14.4 oz (81.1 kg)     Mental Status:  {IP PT MENTAL STATUS:85889}    IV Access:  { MARGUERITE IV ACCESS:563634582}    Nursing Mobility/ADLs:  Walking   {CHP DME FXOW:340743155}  Transfer  {CHP DME TAKC:764022862}  Bathing  {CHP DME XSEN:986416942}  Dressing  {P DME AGJD:868482480}  Toileting  {ANA CENTENO BDIR:922558593}  Feeding  {[facility] JACOBO PBUV:338315221}  Med Admin  {CURT CENTENO FEJQ:861405481}  Med Delivery   {[facility] MED Delivery:208998307}    Wound Care Documentation and Therapy:        Elimination:  Continence: Bowel: {YES / BY:71688}  Bladder: {YES / IS:09365}  Urinary Catheter: {Urinary Catheter:205742784}   Colostomy/Ileostomy/Ileal Conduit: {YES / EB:37817}       Date of Last BM: ***    Intake/Output Summary (Last 24 hours) at 3/22/2022 1124  Last data filed at 3/22/2022 6158  Gross per 24 hour   Intake 1015 ml   Output --   Net 1015 ml     I/O last 3 completed shifts: In: 2368 [P.O.:1600;  I.V.:5]  Out: -     Safety Concerns:     { MARGUERITE Safety Concerns:664656684}    Impairments/Disabilities:      { MARGUERITE Impairments/Disabilities:833376093}    Nutrition Therapy:  Current Nutrition Therapy:   [facility] Diet List:483941842}    Routes of Feeding: {CHP DME Other Feedings:358262321}  Liquids: {Slp liquid thickness:72827}  Daily Fluid Restriction: {CHP DME Yes amt example:120938808}  Last Modified Barium Swallow with Video (Video Swallowing Test): {Done Not Done DWJE:923682401}    Treatments at the Time of Hospital Discharge:   Respiratory Treatments: ***  Oxygen Therapy:  {Therapy; copd oxygen:53290}  Ventilator:    { CC Vent YNMW:382827471}    Rehab Therapies: {THERAPEUTIC INTERVENTION:0140080480}  Weight Bearing Status/Restrictions: [facility] Weight Bearin}  Other Medical Equipment (for information only, NOT a DME order):  {EQUIPMENT:028686868}  Other Treatments: ***    Patient's personal belongings (please select all that are sent with patient):  {P DME Belongings:395094650}    RN SIGNATURE:  {Esignature:424557271}    CASE MANAGEMENT/SOCIAL WORK SECTION    Inpatient Status Date: ***    Readmission Risk Assessment Score:  Readmission Risk              Risk of Unplanned Readmission:  16           Discharging to Facility/ Agency   Name:   Address:  Phone:  Fax:    Dialysis Facility (if applicable)   Name:  Address:  Dialysis Schedule:  Phone:  Fax:    / signature: {Esignature:691447773}    PHYSICIAN SECTION    Prognosis: {Prognosis:7589852258}    Condition at Discharge: [address] [facility]um Patient Condition:246039096}    Rehab Potential (if transferring to Rehab): {Prognosis:4376751795}    Recommended Labs or Other Treatments After Discharge: ***    Physician Certification: I certify the above information and transfer of Carmen Acosta  is necessary for the continuing treatment of the diagnosis listed and that she requires {Admit to Appropriate Level of Care:32910} for {GREATER/LESS:433976337} 30 days.      Update Admission H&P: {CHP DME Changes in FVNDB:981656587}    PHYSICIAN SIGNATURE:  {Esignature:197589455}

## 2022-03-22 NOTE — PROGRESS NOTES
Progress note: Infectious diseases    Patient - Jorge Velasco,  Age - 78 y.o.    - 1940      Room Number - 5K-10/010-A   MRN -  [id number]   Acct # - [de-identified]  Date of Admission -  3/14/2022  3:20 PM    SUBJECTIVE:   No new issues. OBJECTIVE   VITALS    height is 5' 4.02\" (1.626 m) and weight is 178 lb 14.4 oz (81.1 kg). Her oral temperature is 98.2 °F (36.8 °C). Her blood pressure is 119/56 (abnormal) and her pulse is 70. Her respiration is 16 and oxygen saturation is 97%. Wt Readings from Last 3 Encounters:   22 178 lb 14.4 oz (81.1 kg)   22 173 lb (78.5 kg)   03/10/22 173 lb 8 oz (78.7 kg)       I/O (24 Hours)    Intake/Output Summary (Last 24 hours) at 3/22/2022 0850  Last data filed at 3/22/2022 9971  Gross per 24 hour   Intake 1015 ml   Output    Net 1015 ml       General Appearance  Awake, alert, oriented.    Post surgical wound on the scalp.staples and steristrips noted  HEENT - normocephalic, atraumatic, slighlty pale  conjunctiva,  anicteric sclera  Lungs: bilateral air entry  CVS: regular  Abd: soft non tender  Ext: no edema  CNS: awake and oriented       MEDICATIONS:      mupirocin   Topical TID    cefTRIAXone (ROCEPHIN) IV  1,000 mg IntraVENous Q24H    sodium chloride flush  5-40 mL IntraVENous 2 times per day    docusate sodium  100 mg Oral Daily    metoprolol tartrate  50 mg Oral BID    lisinopril  40 mg Oral Daily    hydroCHLOROthiazide  12.5 mg Oral Daily    melatonin  6 mg Oral Nightly    enoxaparin  40 mg SubCUTAneous Daily    vitamin C  1,000 mg Oral Daily      sodium chloride 75 mL/hr at 228    sodium chloride       sodium chloride flush, sodium chloride, HYDROcodone 5 mg - acetaminophen **OR** HYDROcodone 5 mg - acetaminophen, ondansetron **OR** ondansetron, polyethylene glycol      LABS:     CBC:   Recent Labs     22  0536   WBC 7.9   HGB 10.8*      BMP:    Recent Labs     03/20/22  0536      K 4.5      CO2 19*   BUN 21   CREATININE 0.7   GLUCOSE 108     Calcium:  Recent Labs     03/20/22  0536   CALCIUM 8.5       Problem list of patient:     Patient Active Problem List   Diagnosis Code    Essential hypertension I10    Syncopal episodes R55    Primary osteoarthritis of right knee M17.11    Subdural hematoma ([address] S06.5X9A    Nonhealing surgical wound, initial encounter T81.89XA    Superficial postoperative wound infection T81.49XA    Disruption of external surgical wound T81. 31XA    Rupture of operation wound T81. 31XA    Elective surgery Z41.9         ASSESSMENT/PLAN   Scalp wound dehiscence:she had surgery with adjacent tissue transfer  [id number] [address]ama with discharge plan  Follow up two wks at the wound clinic  Prescription e=scribed to her pharmacy  Anita Mercer MD, MD, [address] 3/22/2022 8:50 AM

## 2022-03-22 NOTE — PROGRESS NOTES
This SN witnessed the PT ambulate throughout the halls with daughter several times this shift. No assistive devices were used and the PT remained in physical contact with daughter at all times when ambulating. Ambulation was tolerated very well. Pt is currently resting in bed with no concerns or pain voiced at this time. Call light within reach. RR even and unlabored.

## 2022-03-22 NOTE — PLAN OF CARE
Problem: Falls - Risk of:  Goal: Will remain free from falls  Description: Will remain free from falls  Outcome: Ongoing  Patient free from falls this shift. Patient uses call light appropriately, bed in lowest position, nonskid socks on, bed rails up x2, fall sign posted and call light in reach. Patient at risk due to impaired mobility. Problem: Skin Integrity:  Goal: Will show no infection signs and symptoms  Description: Will show no infection signs and symptoms  Outcome: Ongoing  No signs or symptoms of infection. Patient afebrile this shift. IV Rocephin administered per order. Problem: Skin Integrity:  Goal: Absence of new skin breakdown  Description: Absence of new skin breakdown  Outcome: Ongoing  No new skin breakdown. Pt turns and repositions self. Will continue on going skin assessment. Problem: Pain:  Goal: Pain level will decrease  Description: Pain level will decrease  Outcome: Ongoing  Patient denies pain this shift. Patient satisfied with repositioning and relaxation technique. Will continue to monitor. Problem: Discharge Planning:  Goal: Discharged to appropriate level of care  Description: Discharged to appropriate level of care  Outcome: Ongoing  Plans to go home with family support. Care plan reviewed with patient. Patient verbalized understanding of the plan of care and contribute to goal setting.

## 2022-03-22 NOTE — DISCHARGE SUMMARY
DISCHARGE SUMMARY      Patient Identification:   Hodan Hassan   : 1940  MRN: [id number]   Account: [de-identified]      Patient's PCP: Yohannes Kraft MD    Admit Date: 3/14/2022     Discharge Date: 3/22/2022      Admitting Physician: Nash Olivier MD     Discharge Physician: Sundeep Olmstead MD     Discharge Diagnoses: Active Hospital Problems    Diagnosis Date Noted    Elective surgery [Z41.9]     Rupture of operation wound Elizaboomthann Crater 2022    Essential hypertension [I10]      Wound dehiscence of right frontal cranium  S/p Repair of complex right temple wound with an adjacent tissue transfer 3/19/2022  Scalp skin staples are in place. Cephalexin x14 weeks  Follow-up Dr. Tomy Priest 3/29/2022  Wound clinic in 2 weeks follow-up neurology 2 to 3 weeks     Essential Hypertension, stable  Continue home meds    The patient was seen and examined on day of discharge and this discharge summary is in conjunction with any daily progress note from day of discharge. Hospital Course:   Hodan Hassan is a 78 y.o. female admitted to [facility] on 3/14/2022 for wound dehiscence of right frontal cranium. \"78 y. o. female who presented to [facility] with Wound Dehiscence. She has a past medical history of hypertension.      Patient states she fell in January and hit her head.  At that time she had a subdural hematoma.  She underwent an evacuation with Dr. Marissa Urbano that time. Mari Rivera was admitted previously on 2022 for wound dehiscence with screw working its way out and had neurosurgical irrigation and closure of her surgical wound at that time.  Last week patient follow-up with Dr. Brie Negro office to have staples and sutures removed.  She had Steri-Strips placed at that time.     Patient states that this morning she was feeling well until she started noticing a \"grunting\" or swishing sound in her right cranium, similar to when she previously had a wound dehiscence.  She states she tried to get into Dr. Melissa Simpson and Dr. Leeanna Cardona' office today but she could not.  She went to her PCP who recommended she go to the ED. Pushpa Baker denies pain, drainage, bleeding.  She denies all other review of systems.  She otherwise has no new complaints.  Family is requesting DrBrunilda WARNERZoniauyen Murphy was afebrile in the ED with BP stable. \"     3/19  Repair of right temple wound with adjacent tissue transfer  3/21  Discontinued morphine, pain controled with Norco    Patient presented for wound dehiscence of right frontal cranium. Seen by infectious disease and neurosurgery. Neurosurgery, Dr. Leeanna Cardona, performed reexploration and revision of craniectomy and cranioplasty performed. Wound was closed by Dr. Jeral Hatchet, plastic surgery, followed by antibiotic treatment by infectious disease DrBrunilda Bowman. Patient tolerated the procedure well and was discharged in stable condition with Norco for pain control and cephalexin for 2 weeks for antibiotic coverage. Patient to follow-up with PCP within 1 week, plastic surgery with Dr. Jeral Hatchet on 3/29/2022, wound clinic in 2 weeks, neurology follow-up in 2 to 3 weeks. Exam:     Vitals:  Vitals:    03/22/22 0000 03/22/22 0351 03/22/22 0515 03/22/22 0804   BP: (!) 140/65 (!) 183/77 (!) 148/68 (!) 119/56   Pulse: 70 67 72 70   Resp: 18 18  16   Temp: 97.7 °F (36.5 °C) 97.8 °F (36.6 °C)  98.2 °F (36.8 °C)   TempSrc: Oral Oral  Oral   SpO2: 98% 98%  97%   Weight:       Height:         Weight: Weight: 178 lb 14.4 oz (81.1 kg)     24 hour intake/output:    Intake/Output Summary (Last 24 hours) at 3/22/2022 1425  Last data filed at 3/22/2022 6344  Gross per 24 hour   Intake 1015 ml   Output    Net 1015 ml       Physical Exam  General appearance: No apparent distress, appears stated age and cooperative. HEENT: Pupils equal, round, and reactive to light. Conjunctivae/corneas clear. Surgical site with staples is clean, healing well and well approximated.    Neck: Supple, with full range of motion. No jugular venous distention. Trachea midline. Respiratory:  Normal respiratory effort. Clear to auscultation, bilaterally without Rales/Wheezes/Rhonchi. Cardiovascular: Regular rate and rhythm with normal S1/S2 without murmurs, rubs or gallops. Abdomen: Soft, non-tender, non-distended with normal bowel sounds. Musculoskeletal: passive and active ROM x 4 extremities. Skin: Skin color, texture, turgor normal.  No rashes or lesions. Neurologic:  Neurovascularly intact without any focal sensory/motor deficits. Cranial nerves: II-XII intact, grossly non-focal.  Psychiatric: Alert and oriented, thought content appropriate, normal insight  Capillary Refill: Brisk,< 3 seconds   Peripheral Pulses: +2 palpable, equal bilaterally       Labs: For convenience and continuity at follow-up the following most recent labs are provided:      CBC:    Lab Results   Component Value Date    WBC 7.9 03/20/2022    HGB 10.8 03/20/2022    HCT 33.5 03/20/2022     03/20/2022       Renal:    Lab Results   Component Value Date     03/20/2022    K 4.5 03/20/2022    K 4.3 03/16/2022     03/20/2022    CO2 19 03/20/2022    BUN 21 03/20/2022    CREATININE 0.7 03/20/2022    CALCIUM 8.5 03/20/2022         Significant Diagnostic Studies    Radiology:   CT HEAD WO CONTRAST   Final Result       1. Interval right frontal cranioplasty. Scalp skin staples are in place. 2. Stable moderate severity chronic small vessel ischemic changes. **This report has been created using voice recognition software. It may contain minor errors which are inherent in voice recognition technology. **      Final report electronically signed by Dr. Jessi Velazquez on 3/20/2022 11:49 AM      CT HEAD WO CONTRAST   Final Result    Redemonstration of a small right frontoparietal craniotomy with persistent small chronic mixed extra-axial fluid collection subjacent to the craniotomy but with increased air within the collection compared to prior exam. This suggests possible    microcommunication of the collection with the external hemisphere through the craniotomy and adjacent scalp. Superinfection of the collection can't be excluded. **This report has been created using voice recognition software. It may contain minor errors which are inherent in voice recognition technology. **      Final report electronically signed by Dr. Maritza Laurent MD on 3/15/2022 8:55 AM             Consults:     IP CONSULT TO INFECTIOUS DISEASES  IP CONSULT TO PLASTIC SURGERY  IP CONSULT TO DIETITIAN    Disposition:    [x] Home       [] TCU       [] Rehab       [] Psych       [] SNF       [] [place]       [] Other-    Condition at Discharge: Stable    Code Status:  Full Code     Patient Instructions: Activity: activity as tolerated  Diet: ADULT DIET; Regular  ADULT ORAL NUTRITION SUPPLEMENT; Lunch, Dinner; Wound Healing Oral Supplement      Follow-up visits:   LEONEL Elias - CNP  [address]  [facility]    On 4/11/2022  with new CT of the head, your appointment time is at 10:00a, Please arrive 15mins early, Bring insurance card    MD Leonid Zapata [address]  [phone]    Go on 3/29/2022  Appointment at 11:15am         Schedule an appointment as soon as possible for a visit  Make an appointment in 2 weeks         Discharge Medications:        Medication List      START taking these medications    cephALEXin 500 MG capsule  Commonly known as: KEFLEX  Take 1 capsule by mouth 3 times daily for 14 days     docusate sodium 100 MG capsule  Commonly known as: COLACE  Take 1 capsule by mouth daily for 7 days  Start taking on: March 23, 2022     HYDROcodone-acetaminophen 5-325 MG per tablet  Commonly known as: NORCO  Take 1 tablet by mouth every 6 hours as needed for Pain for up to 3 days.      mupirocin 2 % ointment  Commonly known as: BACTROBAN  Apply topically 3 times daily.        CONTINUE taking these medications    acetaminophen 325 MG tablet  Commonly known as: TYLENOL     hydroCHLOROthiazide 12.5 MG tablet  Commonly known as: HYDRODIURIL  Take 1 tablet by mouth daily     lisinopril 40 MG tablet  Commonly known as: PRINIVIL;ZESTRIL  Take 1 tablet by mouth daily     melatonin 5 MG Tabs tablet     metoprolol tartrate 50 MG tablet  Commonly known as: LOPRESSOR  TAKE 1 TABLET BY MOUTH TWICE DAILY     therapeutic multivitamin-minerals tablet     vitamin C 500 MG tablet  Commonly known as: ASCORBIC ACID     zinc 50 MG Tabs tablet           Where to Get Your Medications      These medications were sent to [place], [facility] [address] [facility], [address] [address]    Phone: [phone]   · cephALEXin 500 MG capsule  · docusate sodium 100 MG capsule  · mupirocin 2 % ointment     You can get these medications from any pharmacy    Bring a paper prescription for each of these medications  · HYDROcodone-acetaminophen 5-325 MG per tablet         Time Spent on discharge is more than 15 minutes in the examination, evaluation, counseling and review of medications and discharge plan. Signed: Thank you Lobito Luis MD for the opportunity to be involved in this patient's care.     Electronically signed by Maye De Luna MD on 3/22/2022 at 2:25 PM

## 2022-03-23 ENCOUNTER — CARE COORDINATION (OUTPATIENT)
Dept: CASE MANAGEMENT | Age: 82
End: 2022-03-23

## 2022-03-23 DIAGNOSIS — T81.31XA DISRUPTION OF EXTERNAL SURGICAL WOUND, INITIAL ENCOUNTER: Primary | ICD-10-CM

## 2022-03-23 PROCEDURE — 1111F DSCHRG MED/CURRENT MED MERGE: CPT | Performed by: FAMILY MEDICINE

## 2022-03-23 NOTE — CARE COORDINATION
Jad 45 Transitions Initial Follow Up Call    Call within 2 business days of discharge: Yes    Patient: Lloyd Felipe Patient : 1940   MRN: [id number]  Reason for Admission: Wound dehiscence of right frontal cranium  S/p Repair of complex right temple wound with an adjacent tissue transfer 3/19/2022  Discharge Date: 3/22/22 RARS: Readmission Risk Score: 18 ( )      Last Discharge [facility]       Complaint Diagnosis Description Type Department Provider    3/14/22 Post-op Problem Dehiscence of operative wound, initial encounter . .. ED to Hosp-Admission (Discharged) (ADMITTED) JAYE 5K Kelsy Boss MD; Maday Xavier... Transitions of Care Initial Call:  Explained the role of Care Transition Nurse and the Transition program, patient is agreeable to follow up calls Post discharge from the [place] [address] and spoke to patient. Patient states she is doing well. Not very much pain 1/10 - taking Tylenol with relief. Forehead Incision clean, dry and intact. Denies warmth, pain, redness, pus drainage, or swollen to area. No fever or chills. Patient does not smoke. Up independently but careful with no issues. Patient has fair appetite and is drinking adequate fluids. Normal bladder and bowel elimination patterns. Had BM last evening    Reviewed medications with Patient. Taking Antibiotics but only Tylenol for pain. Confirmed Patient obtained and is taking medications as directed. There are no questions concerning medications at this time. Medication education provided needed, questions answered, verbalizes understanding. Stressed importance of medication compliance. Advised contacting Pharmacy/MD for refill needs. Expresses understanding. 1111F Medication Reconciliation completed. Routed to PCP. Reviewed appointments with patient. Will see PCP . Neurology 2022 and wound clinic in 2 weeks. Explained the Transition to Home Program to patient.  Patient is called after discharge by CTN to make sure all needs are met and to see if patient has any questions or problems. Expresses understanding. Patient is aware of when to contact MD with any new or worsening symptoms. Advised to contact PCP  with any health concerns for early outpatient intervention in an effort to avoid hospitalization. Report any worsening symptoms to PCP and/or Call 911 and/or GO TO  EMERGENCY ROOM if symptoms are severe. Expresses understanding. Will continue to follow. Radha Re, LPN    [phone]  Alondra Wilson / Jad Wang Coordinator    Was this an external facility discharge? No Discharge Facility: [facility] [place] Discharged 3/22/22    Challenges to be reviewed by the provider   Additional needs identified to be addressed with provider No  none             Method of communication with provider : none      Advance Care Planning:   Does patient have an Advance Directive:  reviewed and current. Was this a readmission? Yes  Patient stated reason for admission: Wound dehiscence of right frontal cranium  S/p Repair of complex right temple wound with an adjacent tissue transfer 3/19/2022  Patients top risk factors for readmission: lack of knowledge about disease  medical condition-Wound dehiscence of right frontal cranium  medication management    Care Transition Nurse (CTN) contacted the patient by telephone to perform post hospital discharge assessment. Verified name and  with patient as identifiers. Provided introduction to self, and explanation of the CTN role. CTN reviewed discharge instructions, medical action plan and red flags with patient who verbalized understanding. Reviewed Possible Problems and Red flags pain that worsens, Nausea or vomiting you can't control, fever over 101 degrees or chills, or any signs of incisional infection or rupture.        Patient given an opportunity to ask questions and does not have any further questions or concerns at this time.     Were discharge instructions available to patient? Yes. Reviewed appropriate site of care based on symptoms and resources available to patient including: PCP  Specialist  When to call [address] The patient agrees to contact the PCP office for questions related to their healthcare. Medication reconciliation was performed with patient, who verbalizes understanding of administration of home medications. Advised obtaining a 90-day supply of all daily and as-needed medications. Reviewed and educated patient on any new and changed medications related to discharge diagnosis     Was patient discharged with a pulse oximeter? Yes If Yes - Discussed and confirmed pulse oximeter discharge instructions and when to notify provider or seek emergency care. LPN CC provided contact information. Plan for follow-up call in 5-7 days based on severity of symptoms and risk factors.          Non-face-to-face services provided:  Obtained and reviewed discharge summary and/or continuity of care documents    Care Transitions 24 Hour Call    Schedule Follow Up Appointment with PCP: Completed  Do you have any ongoing symptoms?: No  Do you have a copy of your discharge instructions?: Yes  Do you have all of your prescriptions and are they filled?: Yes  Have you been contacted by a [facility] Pharmacist?: No  Have you scheduled your follow up appointment?: Yes  How are you going to get to your appointment?: Car - family or friend to transport  Were you discharged with any Home Care or Post Acute Services: No  Do you feel like you have everything you need to keep you well at home?: Yes  Care Transitions Interventions  No Identified Needs         Follow Up  Future Appointments   Date Time Provider Nataliya Monaco   4/6/2022  8:15 AM Bradly Schulte   4/11/2022 10:00 AM Paola Sanchez [address] [place] [address]   5/9/2022  8:45 AM MD Ganesh Pepper [facility] [address] [facility] Hoa Sharma LPN

## 2022-03-24 LAB
AEROBIC CULTURE: NORMAL
ANAEROBIC CULTURE: NORMAL
GRAM STAIN RESULT: NORMAL

## 2022-03-31 ENCOUNTER — CARE COORDINATION (OUTPATIENT)
Dept: CASE MANAGEMENT | Age: 82
End: 2022-03-31

## 2022-03-31 NOTE — CARE COORDINATION
Jad 45 Transitions Follow Up Call    3/31/2022    Patient: Nga Werner  Patient : 1940   MRN: [id number]  Reason for Admission: Wound dehiscence of right frontal cranium  S/p Repair of complex right temple wound with an adjacent tissue transfer 3/19/2022  Discharge Date: 3/22/22 RARS: Readmission Risk Score: 18 ( )    Care Transitions Follow Up Call: Called and spoke with patient's daughter Kia Parker. Daughter states patient is doing well. Facial incision looks good. Going next week to get staples and steri strips off. Incision clean dry and intact. Very little if any pain noted. Patient Denies Transportation, Home, or Medication needs or concerns at this time. Saw Dr. Kylie Mejia 3/29/2022. Patient has no needs or concerns for writer at this time. Instructed patient that this is the Final Transition Call and to call their Specialist/PCP for any problems or issues that may occur. Expresses understanding. Jessica Magana LPN    [phone]  Kareem Moore / Jad Wang Coordinator      Needs to be reviewed by the provider   Additional needs identified to be addressed with provider No  none             Method of communication with provider : none      Care Transition Nurse (CTN) contacted the family by telephone to follow up after admission on 3/29/2022    Verified name and  with family as identifiers. Discussed follow-up appointments. If no appointment was previously scheduled, appointment scheduling offered: Yes   Is follow up appointment scheduled within 7 days of discharge? No    Advance Care Planning:   Does patient have an Advance Directive:  reviewed and current. CTN reviewed discharge instructions, medical action plan and red flags with family and discussed any barriers to care and/or understanding of plan of care after discharge.      Discussed appropriate site of care based on symptoms and resources available to patient including: PCP  Specialist  When to call [address] The family agrees to contact the PCP office for questions related to their healthcare. Patients top risk factors for readmission: lack of knowledge about disease  medical condition-pain  medication management  Interventions to address risk factors: Obtained and reviewed discharge summary and/or continuity of care documents    [facility] follow up appointment(s): 3/29/2022    CTN provided contact information for future needs. No further follow-up call identified based on severity of symptoms and risk factors. Plan for next call:              Care Transitions Subsequent and Final Call    Subsequent and Final Calls  Care Transitions Interventions  Other Interventions:            Follow Up  Future Appointments   Date Time Provider Nataliya Monaco   4/4/2022  8:15 AM Shira Medrano OT STRZ OT [place] AM [place] [facility]   4/6/2022  8:15 AM Tyrell Deluca MD [id number] [facility]   4/11/2022 10:00 AM LEONEL Hines - CNP N [facility]  [place]   5/9/2022  8:45 AM MD [facility] L[facility]

## 2022-04-04 ENCOUNTER — HOSPITAL ENCOUNTER (OUTPATIENT)
Dept: OCCUPATIONAL THERAPY | Age: 82
Setting detail: THERAPIES SERIES
Discharge: HOME | End: 2022-04-04
Payer: MEDICARE

## 2022-04-04 PROCEDURE — 97535 SELF CARE MNGMENT TRAINING: CPT

## 2022-04-04 PROCEDURE — 97165 OT EVAL LOW COMPLEX 30 MIN: CPT

## 2022-04-04 NOTE — PROGRESS NOTES
[address] THERAPY  DRIVING EVALUATION    PATIENT: Benton Medrano OF BIRTH:  1940  GENDER:  female  CSN: [id number]   REFERRING PHYSICIAN:   DAMIEN Hamilton  DIAGNOSIS:  Subdural hematoma  DRIVING HISTORY:    Arpit Connor suffered a fall in January resulting in a SDH. Underwent craniotomy for evacuation of hematoma. She is currently home, doing well, and she wants to return to driving. [place]. Denies any history of accident or traffic violations. PMH: Please see medical history questionnaire for past medical history, allergies, and medications. PATIENT GOALS:    Return to driving. OBJECTIVE  VISUAL SKILLS(using the [employer]Picaboo 2000 Visual Evaluator)       FAR VISUAL ACUITY:   Pass. 20/30 R and L     STEREO DEPTH:   Pass. FUSION:    Pass. PERIPHERAL VISION:  Pass. 3/3 identified bilaterally at 55, 70, and 85 degree peripheral angles       DYNAVISION TESTIN hits in 60 second self paced trial.  Considered WNL for driving. PHYSICAL SKILLS  RANGE OF MOTION:Within functional limits for driving  STRENGTH: Within functional limits for driving  TRANSFER IN/OUT OF SIMULATOR: Within functional limits for driving  AMBULATION: Within functional limits for driving    IN VEHICLE MANIPULATION SKILLS:  Steering Wheel:Within functional limits for driving   Gas Pedal:  Within functional limits for driving  Brake Pedal: Within functional limits for driving  Turn Signal: Within functional limits for driving  Seat Belt: Within functional limits for driving     COGNITIVE SKILLS  ORIENTATION:  Within functional limits for driving  ATTENTION SPAN:Within functional limits for driving  FRUSTRATION TOLERANCE:Within functional limits for driving  IMPULSIVITY:Within functional limits for driving  DIRECTION FOLLOWING:Within functional limits for driving  R/L DISCRIMINATION:Within functional limits for driving  MEMORY:Within functional limits for driving  JUDGMENT: Within functional limits for driving    COGNITIVE TESTING:     SHORT BLESSED TEST:   The Short Blessed Test is a screening tool to assess orientation, short term memory, long term memory, and reversal of learning. Overall this patient received a score of  0-4 which indicates normal cognition. Score of 0. No errors. Trail Making Test A - completed in 30 seconds, no errors. Clock Drawing Test - 4/4      SIMULATED DRIVING ASSESSMENT:  WARM-UP:    (54 MPH, [address] with several curves. Light on-coming traffic. There are no intersections, pedestrians, cross traffic, slow traffic, etc.)    Total off road crashes: 0 (Good performance is 0)   Total collisions with vehicles and roadway objects: 0 (Good performance is 0)   Percentage of time over the posted speed limit: 0 (Good performance is within +/- 5% of the posted speed limit.)   Percentage of time out of lanes: 0.6% (Good performance would be less than 5%, moderate is less than 10%, greater than 10% is considered poor.)      BRAKE REACTION TIME:  (The brake reaction time test consists of presenting a large stop sign in the center of the visual display. The appropriate response if for the  to release the gas and apply the brakes as quickly as possible.)     Total pedal reaction time: 0.7 seconds (Average value is below 0.7 seconds.)   Gas pedal reaction time: 0.4 seconds (For good performance, this should be less than 0.4 seconds; poor performance is above 0.55 seconds)   Stopping distance: 165 feet (Good performance is less than 175 feet, moderate is between 175 and 220 feet, greater than 220 feet is considered poor.)   Speed at stimulus: 50 MPH      SIMULATED DRIVING TREATMENT:  WIDE FIELD DIVIDED ATTENTION:  (In this task, the  will navigate a [address] [place], six mile highway with both curves and hills. There will be no obstacles in front of the  but there will be on-coming traffic.  Divided Attention (DA) events are presented, however they will only be presented on the side monitors thus forcing the  to scan the entire scene, not just the center monitor. The DA events include only left and right arrows. The  will be presented with sixteen different DA events. The DA events will appear on the outside half of the left and right monitors with eight symbols being shown in the upper quadrant and four will be shown in the lower quadrant. The  will also need to maintain their speed and zaira position during the drive.)     Total off road crashes: 0 (Good performance is 0)   Total collisions with vehicles and roadway objects: 0 (Good performance is 0)   Percentage of time over the posted speed limit: 0 (Good performance would be less than 5%, moderate would be less than 10% and anything greater than 10% would be poor)   Percentage of time out of lanes: 2% (Good performance would be less than 1%, moderate would be less than 2.5% and anything greater than 2.5% would be poor)   Total correct attention responses: 16/16. (Good performance is all symbols responded to correctly, moderate performance would be missing 1-2.)   Number of upper left symbols missed: 0/4   Number of lower left symbols missed: 0/4   Number of upper right symbols missed: 0/4   Number of lower right symbols missed: 0/4   Average speed: 50 mph (Good performance is +/- 5 MPH of the posted speed limit. Average speed on this drive is 50 MPH.)     BASIC VEHICLE CONTROL:  ([place], 40 MPH road. There are 4-way stops with minimal cross traffic and signalized intersections with lights that change from green to red.  There are some pedestrians at the final intersection.)   Total number of off road crashes: 0 (Good performance is 0.)   Total number of collisions with vehicles and other roadway objects: 0 (Good performance is 0.)   Total number of traffic light tickets: 0 (Good performance is 0.)   Total number of stop sign tickets: 0 (Good performance is 0.)   Percentage of time over the posted speed limit: 0 (Good performance would be less than 5%, moderate would be less than 10% and anything greater than 10% would be poor)   Percentage of time out of lanes: 0 (Good performance would be less than 1%, moderate would be less than 2.5% and anything greater than 2.5% would be poor)   Number of correctly negotiated intersections: 5   Number of incorrectly negotiated intersections: 0 (Good performance is 0.)     SUB[address]AN DRIVE:  ([address] and school zones  by curved roadway sections. Hazards include: pedestrians, cross traffic not stopping at unmarked intersections, and vehicles backing out of drives.)   Total number of off road crashes: 0 (Good performance is 0.)   Total number of collisions with vehicles and other roadway objects: 0 (Good performance is 0.)   Total number of traffic light tickets: 0 (Good performance is 0.)   Total number of stop sign tickets: 0 (Good performance is 0.)   Percentage of time over the posted speed limit: 0 (Good performance would be less than 5%, moderate would be less than 10% and anything greater than 10% would be poor)   Collision with cross traffic vehicles? No   Time to collision with  cross traffic vehicles: 21 seconds  (Average value is at least 1 second)   Collision with backing vehicle? No   Time to collision with backing vehicle: 2 seconds  (Average value is at least 1 second)   Collision with pedestrian or animal? No   Time to collision with pedestrian or animal: 16 seconds (Average value is at least 1 second)   Did the  exceed the posted speed limit in school zone? No       ASSESSMENT AND PLAN    RESULTS: Patient tested as safe to return to independent driving    RECOMMENDATIONS:     Patient has been instructed to contact referring physician to discuss results of this evaluation.  Patient has been informed that his or her physician is responsible for making the final decision regarding driving status.  Thank you for this referral.  .    Time in: 0810  Time out: 0910  Timed treatment: 30 minutes  Total time: 60 minutes          Jeffrey DENNIS/Whitley DE OLIVEIRA 6

## 2022-04-05 DIAGNOSIS — I10 ESSENTIAL HYPERTENSION: ICD-10-CM

## 2022-04-05 RX ORDER — HYDROCHLOROTHIAZIDE 12.5 MG/1
12.5 TABLET ORAL DAILY
Qty: 90 TABLET | Refills: 1 | Status: SHIPPED | OUTPATIENT
Start: 2022-04-05 | End: 2022-09-21 | Stop reason: SDUPTHER

## 2022-04-06 ENCOUNTER — HOSPITAL ENCOUNTER (OUTPATIENT)
Dept: WOUND CARE | Age: 82
Discharge: HOME | End: 2022-04-06
Payer: MEDICARE

## 2022-04-06 VITALS
TEMPERATURE: 97.7 F | RESPIRATION RATE: 16 BRPM | OXYGEN SATURATION: 96 % | DIASTOLIC BLOOD PRESSURE: 68 MMHG | SYSTOLIC BLOOD PRESSURE: 148 MMHG | HEART RATE: 66 BPM

## 2022-04-06 PROCEDURE — 99212 OFFICE O/P EST SF 10 MIN: CPT

## 2022-04-06 NOTE — PLAN OF CARE
Problem: Wound:  Goal: Will show signs of wound healing; wound closure and no evidence of infection  Description: Will show signs of wound healing; wound closure and no evidence of infection  Outcome: Met This Shift   Pt. Seen for scalp wound see AVS for new orders. Follow up as needed. Care plan reviewed with patient and daughter. Patient and daughter verbalize understanding of the plan of care and contribute to goal setting.

## 2022-04-06 NOTE — PROGRESS NOTES
[facility]          Progress Note and Procedure Note      [address] [place] [address], [facility] RECORD NUMBER:  [id number]  AGE: 78 y.o. GENDER: female  : 1940  EPISODE DATE:  2022    Subjective:     SUBJECTIVE     Chief Complaint   Patient presents with    Wound Check     scalp           HISTORY OF PRESENT ILLNESS      Darnell Barahona is a 78 y.o. female who presents today for wound/ulcer evaluation. She had closure of the wound by plastics. Today she was seen for follow-up visit she has no new complaints the scalp has healed. She has a scabbed wound on her forehead. Not clear how it developed may be related to tape. She has no drainage overall she is feeling much better.      PAST MEDICAL HISTORY         Diagnosis Date    Arthritis     Hypertension     Subdural hematoma ([address] 2022         PAST SURGICAL HISTORY     Past Surgical History:   Procedure Laterality Date    CATHETER REMOVAL Bilateral 2014    CRANIOPLASTY Right 2022    RIGHT FRONTAL WOUND DEBRIDEMENT AND WASHOUT performed by Catrina Tijerina MD at [address] Right 3/19/2022    REEXPLORATION AND REVISION OF RIGHT FRONTAL LOBE WOUND PLUS REMOVAL OF RIGHT CRANIAL BONE FLAP AND CRANIOPLASTY performed by Catrina Tijerina MD at [address] [facility] Right 2022    RIGHT SIDED CRANIOTOMY FOR SUBDURAL HEMATOMA performed by Catrina Tijerina MD at [address] [place] [address]   2018    FOOT SURGERY Left     KNEE SURGERY Right 2014    total    [place] BX OF BREAST, NEEDLE CORE, IMAGE GUIDE Left     benign    [place] OFFICE/OUTPT VISIT,PROCEDURE ONLY Left 2018    ORIF LEFT ELBOW performed by Melanie Newton MD at [facility] N/A 3/19/2022    FLAP CLOSURE performed by Jose Soni MD at [address] HISTORY         Family History   Problem Relation Age of Onset   [facility] Cancer Mother         lymphoma    Cancer Father         prostate and bone    Heart Disease Sister     Heart Disease Brother     Atrial Fibrillation Daughter     Hypertension Daughter     Elevated Lipids Daughter     Breast Cancer Sister 78     SOCIAL HISTORY       Social History     Tobacco Use    Smoking status: Never Smoker    Smokeless tobacco: Never Used   Vaping Use    Vaping Use: Never used   Substance Use Topics    Alcohol use: Not Currently     Comment: very very rare    Drug use: No     ALLERGIES         Allergies   Allergen Reactions    Pcn [Penicillins] Itching     redness @ IM site     MEDICATIONS         Current Outpatient Medications on File Prior to Encounter   Medication Sig Dispense Refill    hydroCHLOROthiazide (HYDRODIURIL) 12.5 MG tablet Take 1 tablet by mouth daily 90 tablet 1    mupirocin (BACTROBAN) 2 % ointment Apply topically 3 times daily. 1 each 0    lisinopril (PRINIVIL;ZESTRIL) 40 MG tablet Take 1 tablet by mouth daily 90 tablet 1    Multiple Vitamins-Minerals (THERAPEUTIC MULTIVITAMIN-MINERALS) tablet Take 1 tablet by mouth daily      acetaminophen (TYLENOL) 325 MG tablet Take 650 mg by mouth at bedtime      melatonin 5 MG TABS tablet Take 5 mg by mouth nightly      vitamin C (ASCORBIC ACID) 500 MG tablet Take 1,000 mg by mouth daily      zinc 50 MG TABS tablet Take 50 mg by mouth daily      metoprolol tartrate (LOPRESSOR) 50 MG tablet TAKE 1 TABLET BY MOUTH TWICE DAILY 180 tablet 1     No current facility-administered medications on file prior to encounter. REVIEW OF SYSTEMS:     Constitutional: no fever, no night sweats, no fatigue. Head: no head ache , no head injury, no migranes. Eye: no blurring of vision, no double vision.   Ears: no hearing difficulty, no tinnitus  Mouth/throat: no ulceration, dental caries , dysphagia  Lungs: no cough, no shortness of breath, no wheeze  CVS: no palpitation, no chest pain, no shortness of breath  GI: no abdominal pain, no nausea , no vomiting, no constipation  JAYLYN: no dysuria, frequency and urgency, no hematuria, no kidney Service during business hours: Monday through Friday 8:00 am - 4:30 pm  ([phone]). *Increase in pain   *Temperature over 101   *Increase in drainage from your wound or a foul odor   *Uncontrolled swelling   *Need for compression bandage changes due to slippage, breakthrough drainage    If you need medical attention outside of business hours, please contact your Primary Care Doctor or go to the nearest emergency room.                    Electronically signed by Caio Ko MD on 4/6/2022 at 8:32 AM

## 2022-04-11 ENCOUNTER — OFFICE VISIT (OUTPATIENT)
Dept: NEUROSURGERY | Age: 82
End: 2022-04-11

## 2022-04-11 VITALS
SYSTOLIC BLOOD PRESSURE: 124 MMHG | HEIGHT: 64 IN | BODY MASS INDEX: 30.39 KG/M2 | DIASTOLIC BLOOD PRESSURE: 72 MMHG | WEIGHT: 178 LBS

## 2022-04-11 DIAGNOSIS — Z98.890 HISTORY OF CRANIOPLASTY: ICD-10-CM

## 2022-04-11 DIAGNOSIS — S06.5XAA SUBDURAL HEMATOMA: Primary | ICD-10-CM

## 2022-04-11 PROCEDURE — 99024 POSTOP FOLLOW-UP VISIT: CPT

## 2022-04-11 ASSESSMENT — ENCOUNTER SYMPTOMS
COUGH: 0
TROUBLE SWALLOWING: 0
SHORTNESS OF BREATH: 0
COLOR CHANGE: 0
CONSTIPATION: 0
BACK PAIN: 0
NAUSEA: 0

## 2022-04-11 NOTE — PROGRESS NOTES
Neurosurgery Follow up Note    Date:4/11/2022         Patient Joelle Varela     YOB: 1940     Age:81 y.o. Reason for Follow up:  Hospital follow up post reexploration and revision of right frontal lobe wound plus removal of right cranial bone flap and cranioplasty      Chief Complaint:   Chief Complaint   Patient presents with    Follow-up        Subjective   Noe Pitts is a 78year old patient who presents with her daughter ambulating without assistive device. Patient underwent surgery on 3-19-22 with Dr. Adela Ortega for reexploration and revision of right frontal lobe wound plus removal of right cranial bone flap and cranioplasty. Patient stated that she is doing well overall. Patient's wound is healing as expected. Patient is following with plastics for wound care. Patient denies fever, chills, or activity change. Patient denies headache, blurred vision, or double vision. Patient denies any recent fall or trauma. Patient had her OT driving eval in past.  Patient stated that she has been driving and feels she is doing very well. Patient is happy with the results of her surgery. Review of Systems   Review of Systems   Constitutional: Negative for activity change, appetite change, fatigue and fever. HENT: Negative for trouble swallowing. Eyes: Negative for visual disturbance. Respiratory: Negative for cough and shortness of breath. Cardiovascular: Negative for chest pain. Gastrointestinal: Negative for constipation and nausea. Musculoskeletal: Negative for back pain and gait problem. Skin: Positive for wound (surgical incision flat and no drainage or erythema). Negative for color change and rash. Neurological: Negative for dizziness and weakness. Psychiatric/Behavioral: Negative for confusion and sleep disturbance. The patient is not nervous/anxious.         Medications     Current Outpatient Medications on File Prior to Visit   Medication Sig Dispense Refill    hydroCHLOROthiazide (HYDRODIURIL) 12.5 MG tablet Take 1 tablet by mouth daily 90 tablet 1    mupirocin (BACTROBAN) 2 % ointment Apply topically 3 times daily. 1 each 0    lisinopril (PRINIVIL;ZESTRIL) 40 MG tablet Take 1 tablet by mouth daily 90 tablet 1    Multiple Vitamins-Minerals (THERAPEUTIC MULTIVITAMIN-MINERALS) tablet Take 1 tablet by mouth daily      acetaminophen (TYLENOL) 325 MG tablet Take 650 mg by mouth at bedtime      melatonin 5 MG TABS tablet Take 5 mg by mouth nightly      vitamin C (ASCORBIC ACID) 500 MG tablet Take 1,000 mg by mouth daily      zinc 50 MG TABS tablet Take 50 mg by mouth daily      metoprolol tartrate (LOPRESSOR) 50 MG tablet TAKE 1 TABLET BY MOUTH TWICE DAILY 180 tablet 1     No current facility-administered medications on file prior to visit. Past History    Past Medical History:   has a past medical history of Arthritis, Hypertension, and Subdural hematoma ([address] Social History:   reports that she has never smoked. She has never used smokeless tobacco. She reports previous alcohol use. She reports that she does not use drugs. Family History:   Family History   Problem Relation Age of Onset   Meagan Mario Cancer Mother         lymphoma    Cancer Father         prostate and bone    Heart Disease Sister     Heart Disease Brother     Atrial Fibrillation Daughter     Hypertension Daughter     Elevated Lipids Daughter     Breast Cancer Sister 78       Physical Examination      Vitals:  /72 (Site: Right Upper Arm, Position: Sitting, Cuff Size: Large Adult)   Ht 5' 4.02\" (1.626 m)   Wt 178 lb (80.7 kg)   LMP  (LMP Unknown)   BMI 30.53 kg/m²       Physical Exam  Constitutional:       Appearance: Normal appearance. She is not ill-appearing. HENT:      Nose: Nose normal.      Mouth/Throat:      Mouth: Mucous membranes are moist.   Eyes:      Pupils: Pupils are equal, round, and reactive to light. Cardiovascular:      Rate and Rhythm: Normal rate. Pulses: Normal pulses. Pulmonary:      Effort: Pulmonary effort is normal.   Abdominal:      General: Bowel sounds are normal.   Musculoskeletal:         General: No tenderness. Cervical back: Normal range of motion. Skin:     General: Skin is warm and dry. Findings: No erythema. Comments: Well healed surgical incision, incision flat, no drainage or erythema   Neurological:      Mental Status: She is alert and oriented to person, place, and time. Sensory: No sensory deficit. Motor: No weakness. Psychiatric:         Mood and Affect: Mood normal.         Behavior: Behavior normal.         Thought Content: Thought content normal.         Judgment: Judgment normal.       Neurologic Exam     Mental Status   Oriented to person, place, and time. Cranial Nerves     CN III, IV, VI   Pupils are equal, round, and reactive to light. Imaging   Imaging last 30 days:  CT HEAD WO CONTRAST    Result Date: 3/20/2022   1. Interval right frontal cranioplasty. Scalp skin staples are in place. 2. Stable moderate severity chronic small vessel ischemic changes. **This report has been created using voice recognition software. It may contain minor errors which are inherent in voice recognition technology. ** Final report electronically signed by Dr. Hussain De La Cruz on 3/20/2022 11:49 AM    CT HEAD WO CONTRAST    Result Date: 3/15/2022   Redemonstration of a small right frontoparietal craniotomy with persistent small chronic mixed extra-axial fluid collection subjacent to the craniotomy but with increased air within the collection compared to prior exam. This suggests possible microcommunication of the collection with the external hemisphere through the craniotomy and adjacent scalp. Superinfection of the collection can't be excluded. **This report has been created using voice recognition software. It may contain minor errors which are inherent in voice recognition technology. ** Final report electronically signed by Dr. Jhon Aly MD on 3/15/2022 8:55 AM         Assessment and Plan:          1. Hospital follow up  2. CT of the head in 4 weeks  3. Continue to follow with plastics  4. Continue skin care per plastics reccomendations  5. Follow up in 4 weeks with new CT scan . 6. All patient questions answered. Pt voiced understanding.  Patient instructed to call the office with any questions or concerns    Electronically signed by LEONEL Rivers CNP on 4/11/22 at 10:13 AM EDT

## 2022-04-17 LAB
FUNGUS IDENTIFIED: NORMAL
FUNGUS SMEAR: NORMAL

## 2022-05-05 ASSESSMENT — ENCOUNTER SYMPTOMS
NAUSEA: 0
COLOR CHANGE: 0
EYE REDNESS: 0
EYE DISCHARGE: 0
VOMITING: 0
CONSTIPATION: 0
DIARRHEA: 0
SHORTNESS OF BREATH: 0

## 2022-05-05 NOTE — PROGRESS NOTES
[address]  [address] [place] [address]  Phone:  [phone]          Name: Colin Sosa  : 1940    Chief Complaint   Patient presents with    Medicare AW    6 Month Follow-Up       HPI:     Colin Sosa is a 78 y.o. female who presents today for follow-up of hypertension. She fell on 22 and sustained a subdural hematoma which required a craniotomy and evacuation of blood. The wound dehisced which required 2 subsequent surgeries. She is now feeling well. She's back to driving. Hypertension  This is a chronic problem. The current episode started more than 1 year ago. The problem has been gradually worsening since onset. The problem is uncontrolled. Pertinent negatives include no chest pain, headaches, palpitations, peripheral edema or shortness of breath. There are no associated agents to hypertension. Risk factors for coronary artery disease include post-menopausal state. Past treatments include beta blockers and ACE inhibitors. The current treatment provides moderate improvement. There are no compliance problems.       Lab Results   Component Value Date    CHOL 210 (H) 2021    TRIG 100 2021    HDL 67 2021    LDLCALC 123 2021     Lab Results   Component Value Date    ALT 25 2022    AST 27 2022        Lab Results   Component Value Date     2022    K 4.5 2022    K 4.3 2022     2022    CO2 19 2022    BUN 21 2022    CREATININE 0.7 2022    GLUCOSE 108 2022    GLUCOSE 89 2016    CALCIUM 8.5 2022          Current Outpatient Medications:     metoprolol tartrate (LOPRESSOR) 50 MG tablet, TAKE 1 TABLET BY MOUTH TWICE DAILY, Disp: 180 tablet, Rfl: 1    hydroCHLOROthiazide (HYDRODIURIL) 12.5 MG tablet, Take 1 tablet by mouth daily, Disp: 90 tablet, Rfl: 1    lisinopril (PRINIVIL;ZESTRIL) 40 MG tablet, Take 1 tablet by mouth daily, Disp: 90 tablet, Rfl: 1   Multiple Vitamins-Minerals (THERAPEUTIC MULTIVITAMIN-MINERALS) tablet, Take 1 tablet by mouth daily, Disp: , Rfl:     acetaminophen (TYLENOL) 325 MG tablet, Take 650 mg by mouth at bedtime, Disp: , Rfl:     melatonin 5 MG TABS tablet, Take 5 mg by mouth nightly, Disp: , Rfl:     vitamin C (ASCORBIC ACID) 500 MG tablet, Take 1,000 mg by mouth daily, Disp: , Rfl:     zinc 50 MG TABS tablet, Take 50 mg by mouth daily, Disp: , Rfl:     mupirocin (BACTROBAN) 2 % ointment, Apply topically 3 times daily. (Patient not taking: Reported on 5/9/2022), Disp: 1 each, Rfl: 0    Allergies   Allergen Reactions    Pcn [Penicillins] Itching     redness @ IM site       Subjective:      Review of Systems   Constitutional: Negative for chills and fever. HENT: Positive for hearing loss (popping in left ear). Eyes: Negative for discharge and redness. Respiratory: Negative for shortness of breath. Cardiovascular: Negative for chest pain and palpitations. Gastrointestinal: Negative for constipation, diarrhea, nausea and vomiting. Musculoskeletal: Positive for arthralgias and myalgias. Skin: Negative for color change and rash. Allergic/Immunologic: Negative for environmental allergies and food allergies. Neurological: Negative for dizziness and headaches. Hematological: Negative for adenopathy. Does not bruise/bleed easily. Objective:     /70 (Site: Right Upper Arm, Position: Sitting, Cuff Size: Medium Adult)   Pulse 54   Temp 98 °F (36.7 °C) (Temporal)   Resp 16   Wt 171 lb (77.6 kg)   LMP  (LMP Unknown)   BMI 29.33 kg/m²     Physical Exam  Vitals and nursing note reviewed. Constitutional:       General: She is not in acute distress. Appearance: She is well-developed. HENT:      Head: Normocephalic and atraumatic. Nose: Nose normal.   Eyes:      Conjunctiva/sclera: Conjunctivae normal.   Cardiovascular:      Rate and Rhythm: Normal rate and regular rhythm.    Pulmonary:      Effort: Pulmonary effort is normal. No respiratory distress. Breath sounds: Normal breath sounds. No wheezing. Abdominal:      General: Bowel sounds are normal. There is no distension. Palpations: Abdomen is soft. Tenderness: There is no abdominal tenderness. Musculoskeletal:      Cervical back: Normal range of motion and neck supple. Skin:     General: Skin is warm and dry. Neurological:      Mental Status: She is alert and oriented to person, place, and time. Assessment/Plan:     Margo Chino was seen today for medicare awv and 6 month follow-up. Diagnoses and all orders for this visit:    Essential hypertension  -     Her blood pressure has been well-controlled so will continue on current medications. -     metoprolol tartrate (LOPRESSOR) 50 MG tablet; TAKE 1 TABLET BY MOUTH TWICE DAILY        Return in 6 months (on 11/9/2022) for hypertension.     Electronically signed by Koby Powers MD on 5/9/2022 at 9:05 AM

## 2022-05-05 NOTE — PROGRESS NOTES
[address]  [address]  Phone:  [phone]       Medicare Annual Wellness Visit    Cris Loya is here for Medicare AWV and 6 Month Follow-Up    Assessment & Plan   Encounter for Medicare annual wellness exam        -    Routine healthcare maintenance was reviewed as below. Recommendations for Preventive Services Due: see orders and patient instructions/AVS.  Recommended screening schedule for the next 5-10 years is provided to the patient in written form: see Patient Instructions/AVS.          Patient's complete Health Risk Assessment and screening values have been reviewed and are found in Flowsheets. The following problems were reviewed today and where indicated follow up appointments were made and/or referrals ordered. Positive Risk Factor Screenings with Interventions:    Fall Risk:  Do you feel unsteady or are you worried about falling? : (!) yes  2 or more falls in past year?: no  Fall with injury in past year?: (!) yes     Fall Risk Interventions:    · Home safety tips provided    Cognitive:  Clock Drawing Test (CDT): Normal  Total Score Interpretation: Abnormal Mini-Cog    Cognitive Impairment Interventions:  · Patient declines any further evaluation/treatment for cognitive impairment             Hearing/Vision:  Do you or your family notice any trouble with your hearing that hasn't been managed with hearing aids?: (!) Yes  Do you have difficulty driving, watching TV, or doing any of your daily activities because of your eyesight?: No  Have you had an eye exam within the past year?: Yes  No exam data present          Objective   Vitals:    05/09/22 0849   BP: 120/70   Site: Right Upper Arm   Position: Sitting   Cuff Size: Medium Adult   Pulse: 54   Resp: 16   Temp: 98 °F (36.7 °C)   TempSrc: Temporal   Weight: 171 lb (77.6 kg)      Body mass index is 29.33 kg/m².         Allergies   Allergen Reactions    Pcn [Penicillins] Itching     redness @ IM site     Prior to Visit Medications    Medication Sig Taking? Authorizing Provider   hydroCHLOROthiazide (HYDRODIURIL) 12.5 MG tablet Take 1 tablet by mouth daily Yes Flakito Austin MD   lisinopril (PRINIVIL;ZESTRIL) 40 MG tablet Take 1 tablet by mouth daily Yes Flakito Austin MD   Multiple Vitamins-Minerals (THERAPEUTIC MULTIVITAMIN-MINERALS) tablet Take 1 tablet by mouth daily Yes Historical Provider, MD   acetaminophen (TYLENOL) 325 MG tablet Take 650 mg by mouth at bedtime Yes Historical Provider, MD   melatonin 5 MG TABS tablet Take 5 mg by mouth nightly Yes Historical Provider, MD   vitamin C (ASCORBIC ACID) 500 MG tablet Take 1,000 mg by mouth daily Yes Historical Provider, MD   zinc 50 MG TABS tablet Take 50 mg by mouth daily Yes Historical Provider, MD   metoprolol tartrate (LOPRESSOR) 50 MG tablet TAKE 1 TABLET BY MOUTH TWICE DAILY Yes Flakito Austin MD   mupirocin (BACTROBAN) 2 % ointment Apply topically 3 times daily.   Patient not taking: Reported on 5/9/2022  Ruby Guillermo MD       [facility] (Including outside providers/suppliers regularly involved in providing care):   Patient Care Team:  Flakito Austin MD as PCP - Alvarado Martinez MD as PCP - [facility] Empaneled Provider    Reviewed and updated this visit:  Tobacco  Allergies  Meds  Med Hx  Surg Hx  Soc Hx  Fam Hx

## 2022-05-06 ENCOUNTER — HOSPITAL ENCOUNTER (OUTPATIENT)
Dept: CT IMAGING | Age: 82
Discharge: HOME | End: 2022-05-06
Payer: MEDICARE

## 2022-05-06 DIAGNOSIS — S06.5XAA SUBDURAL HEMATOMA: ICD-10-CM

## 2022-05-06 PROCEDURE — 70450 CT HEAD/BRAIN W/O DYE: CPT

## 2022-05-09 ENCOUNTER — OFFICE VISIT (OUTPATIENT)
Dept: FAMILY MEDICINE CLINIC | Age: 82
End: 2022-05-09
Payer: MEDICARE

## 2022-05-09 VITALS
RESPIRATION RATE: 16 BRPM | HEART RATE: 54 BPM | TEMPERATURE: 98 F | BODY MASS INDEX: 29.33 KG/M2 | SYSTOLIC BLOOD PRESSURE: 120 MMHG | WEIGHT: 171 LBS | DIASTOLIC BLOOD PRESSURE: 70 MMHG

## 2022-05-09 DIAGNOSIS — Z00.00 ENCOUNTER FOR MEDICARE ANNUAL WELLNESS EXAM: Primary | ICD-10-CM

## 2022-05-09 DIAGNOSIS — I10 ESSENTIAL HYPERTENSION: ICD-10-CM

## 2022-05-09 PROCEDURE — G0439 PPPS, SUBSEQ VISIT: HCPCS | Performed by: FAMILY MEDICINE

## 2022-05-09 PROCEDURE — 4040F PNEUMOC VAC/ADMIN/RCVD: CPT | Performed by: FAMILY MEDICINE

## 2022-05-09 PROCEDURE — 1123F ACP DISCUSS/DSCN MKR DOCD: CPT | Performed by: FAMILY MEDICINE

## 2022-05-09 RX ORDER — METOPROLOL TARTRATE 50 MG/1
TABLET, FILM COATED ORAL
Qty: 180 TABLET | Refills: 1 | Status: SHIPPED | OUTPATIENT
Start: 2022-05-09 | End: 2022-10-03 | Stop reason: SDUPTHER

## 2022-05-09 ASSESSMENT — PATIENT HEALTH QUESTIONNAIRE - PHQ9
SUM OF ALL RESPONSES TO PHQ9 QUESTIONS 1 & 2: 0
SUM OF ALL RESPONSES TO PHQ QUESTIONS 1-9: 0
2. FEELING DOWN, DEPRESSED OR HOPELESS: 0
1. LITTLE INTEREST OR PLEASURE IN DOING THINGS: 0
SUM OF ALL RESPONSES TO PHQ QUESTIONS 1-9: 0

## 2022-05-09 ASSESSMENT — LIFESTYLE VARIABLES
HOW OFTEN DO YOU HAVE A DRINK CONTAINING ALCOHOL: MONTHLY OR LESS
HOW MANY STANDARD DRINKS CONTAINING ALCOHOL DO YOU HAVE ON A TYPICAL DAY: 1 OR 2

## 2022-05-09 NOTE — PATIENT INSTRUCTIONS
Personalized Preventive Plan for Darnell Barahona - 5/9/2022  Medicare offers a range of preventive health benefits. Some of the tests and screenings are paid in full while other may be subject to a deductible, co-insurance, and/or copay. Some of these benefits include a comprehensive review of your medical history including lifestyle, illnesses that may run in your family, and various assessments and screenings as appropriate. After reviewing your medical record and screening and assessments performed today your provider may have ordered immunizations, labs, imaging, and/or referrals for you. A list of these orders (if applicable) as well as your Preventive Care list are included within your After Visit Summary for your review. Other Preventive Recommendations:    · A preventive eye exam performed by an eye specialist is recommended every 1-2 years to screen for glaucoma; cataracts, macular degeneration, and other eye disorders. · A preventive dental visit is recommended every 6 months. · Try to get at least 150 minutes of exercise per week or 10,000 steps per day on a pedometer . · Order or download the FREE \"Exercise & Physical Activity: Your Everyday Guide\" from [employer]. Call 8-661.553.2025 or search [employer] online. · You need 4132-0534 mg of calcium and 9313-9795 IU of vitamin D per day. It is possible to meet your calcium requirement with diet alone, but a vitamin D supplement is usually necessary to meet this goal.  · When exposed to the sun, use a sunscreen that protects against both UVA and UVB radiation with an SPF of 30 or greater. Reapply every 2 to 3 hours or after sweating, drying off with a towel, or swimming. · Always wear a seat belt when traveling in a car. Always wear a helmet when riding a bicycle or motorcycle.

## 2022-05-13 ENCOUNTER — OFFICE VISIT (OUTPATIENT)
Dept: NEUROSURGERY | Age: 82
End: 2022-05-13
Payer: MEDICARE

## 2022-05-13 VITALS
SYSTOLIC BLOOD PRESSURE: 114 MMHG | WEIGHT: 171 LBS | DIASTOLIC BLOOD PRESSURE: 60 MMHG | HEIGHT: 64 IN | BODY MASS INDEX: 29.19 KG/M2

## 2022-05-13 DIAGNOSIS — S06.5XAA SUBDURAL HEMATOMA: Primary | ICD-10-CM

## 2022-05-13 PROBLEM — N18.30 CHRONIC RENAL DISEASE, STAGE III (HCC): Status: ACTIVE | Noted: 2022-05-13

## 2022-05-13 PROCEDURE — G8399 PT W/DXA RESULTS DOCUMENT: HCPCS

## 2022-05-13 PROCEDURE — 99213 OFFICE O/P EST LOW 20 MIN: CPT

## 2022-05-13 PROCEDURE — 1123F ACP DISCUSS/DSCN MKR DOCD: CPT

## 2022-05-13 PROCEDURE — 4040F PNEUMOC VAC/ADMIN/RCVD: CPT

## 2022-05-13 PROCEDURE — 1036F TOBACCO NON-USER: CPT

## 2022-05-13 PROCEDURE — G8417 CALC BMI ABV UP PARAM F/U: HCPCS

## 2022-05-13 PROCEDURE — G8427 DOCREV CUR MEDS BY ELIG CLIN: HCPCS

## 2022-05-13 PROCEDURE — 1090F PRES/ABSN URINE INCON ASSESS: CPT

## 2022-05-13 ASSESSMENT — ENCOUNTER SYMPTOMS
NAUSEA: 0
BACK PAIN: 0
COLOR CHANGE: 0
SHORTNESS OF BREATH: 0
CONSTIPATION: 0
COUGH: 0
TROUBLE SWALLOWING: 0

## 2022-05-13 NOTE — PROGRESS NOTES
Neurosurgery Follow up Note    Date:5/13/2022         Patient Tanya Rosenberg     YOB: 1940     Age:81 y.o. Reason for Follow up: Follow up with CT scan       Chief Complaint:   Chief Complaint   Patient presents with    Follow-up     CT        Subjective     Josue Martinez is a 78year old female who presents today with her daughter Ana Cintron. Patient is ambulating without assistive device. Patient stated that she  has not had any changes or concerns since her last office visit. Patient underwent surgery with Dr. Harsha Pike for reexploration and revision of right frontal lobe wound plus removal of right cranial bone flap and cranioplasty. Patient stated she is doing very well overall. Patient's surgical incision is well-healed. Patient denies fever, chills, or activity change. Patient denies headache, blurred vision, double vision. Patient  denies recent fall or trauma. Patient is happy with the results of her surgery. Review of Systems   Review of Systems   Constitutional: Negative for activity change, appetite change and fatigue. HENT: Negative for trouble swallowing. Eyes: Negative for visual disturbance. Respiratory: Negative for cough and shortness of breath. Cardiovascular: Negative for chest pain. Gastrointestinal: Negative for constipation and nausea. Genitourinary: Negative for difficulty urinating. Musculoskeletal: Negative for back pain and gait problem. Skin: Negative for color change, rash and wound (well healed surgical incision). Neurological: Negative for dizziness and weakness. Psychiatric/Behavioral: Negative for confusion and sleep disturbance. The patient is not nervous/anxious.         Medications     Current Outpatient Medications on File Prior to Visit   Medication Sig Dispense Refill    metoprolol tartrate (LOPRESSOR) 50 MG tablet TAKE 1 TABLET BY MOUTH TWICE DAILY 180 tablet 1    hydroCHLOROthiazide (HYDRODIURIL) 12.5 MG tablet Take 1 tablet by mouth daily 90 tablet 1    mupirocin (BACTROBAN) 2 % ointment Apply topically 3 times daily. 1 each 0    lisinopril (PRINIVIL;ZESTRIL) 40 MG tablet Take 1 tablet by mouth daily 90 tablet 1    Multiple Vitamins-Minerals (THERAPEUTIC MULTIVITAMIN-MINERALS) tablet Take 1 tablet by mouth daily      acetaminophen (TYLENOL) 325 MG tablet Take 650 mg by mouth at bedtime      melatonin 5 MG TABS tablet Take 5 mg by mouth nightly      vitamin C (ASCORBIC ACID) 500 MG tablet Take 1,000 mg by mouth daily      zinc 50 MG TABS tablet Take 50 mg by mouth daily       No current facility-administered medications on file prior to visit. Past History    Past Medical History:   has a past medical history of Arthritis, Hypertension, and Subdural hematoma ([address] Social History:   reports that she has never smoked. She has never used smokeless tobacco. She reports previous alcohol use. She reports that she does not use drugs. Family History:   Family History   Problem Relation Age of Onset   Granda Cancer Mother         lymphoma    Cancer Father         prostate and bone    Heart Disease Sister     Heart Disease Brother     Atrial Fibrillation Daughter     Hypertension Daughter     Elevated Lipids Daughter     Breast Cancer Sister 78       Physical Examination      Vitals:  /60 (Site: Right Upper Arm, Position: Sitting, Cuff Size: Large Adult)   Ht 5' 4\" (1.626 m)   Wt 171 lb (77.6 kg)   LMP  (LMP Unknown)   BMI 29.35 kg/m²       Physical Exam  Constitutional:       Appearance: Normal appearance. She is not ill-appearing. HENT:      Nose: Nose normal.      Mouth/Throat:      Mouth: Mucous membranes are moist.   Eyes:      Pupils: Pupils are equal, round, and reactive to light. Cardiovascular:      Rate and Rhythm: Normal rate. Pulses: Normal pulses.    Pulmonary:      Effort: Pulmonary effort is normal.   Abdominal:      General: Bowel sounds are normal.   Musculoskeletal: General: No tenderness. Cervical back: Normal range of motion. Skin:     General: Skin is warm and dry. Findings: No erythema. Comments: Well healed surgical incision   Neurological:      Mental Status: She is alert and oriented to person, place, and time. Sensory: No sensory deficit. Motor: No weakness. Psychiatric:         Mood and Affect: Mood normal.         Behavior: Behavior normal.         Thought Content: Thought content normal.         Judgment: Judgment normal.       Neurologic Exam     Mental Status   Oriented to person, place, and time. Cranial Nerves     CN III, IV, VI   Pupils are equal, round, and reactive to light. Imaging   Imaging last 30 days:  CT HEAD WO CONTRAST    Result Date: 5/6/2022   No residual subdural collection. Stable mild severity chronic small vessel ischemic changes. **This report has been created using voice recognition software. It may contain minor errors which are inherent in voice recognition technology. ** Final report electronically signed by Dr. Quiana Domingo on 5/6/2022 11:48 AM         Assessment and Plan:          1. 4 week follow up with CT scan   2. CT scan discussed with patient and daughter  3. Patient  doing very well overall  4. Fall precauitons  5. Follow up prn .  6. Call office is symptoms worsen or fail to improve  7. All patient questions answered. Pt voiced understanding.  Patient instructed to call the office with any questions or concerns      Electronically signed by LEONEL Nascimento CNP on 5/13/22 at 10:59 AM EDT

## 2022-05-18 NOTE — DISCHARGE SUMMARY
[facility] [address] NOTE  [address]    Date: 2022  Patient Name: Vladimir Frost        CSN: [id number]   : 1940  (78 y.o.)  Gender: female   Madison mis, Meera, APRN - CNP,    Traumatic subdural hemorrhage with loss of consciousness of unspecified duration, initial encounter [S06.5X9A],      Patient is discharged from Occupational Therapy services at this time. See last note for details related to results of therapy and goal achievement. Reason for discharge: One-time evaluation only. Tameka Granado OTR/L, CHT #[id number]

## 2022-05-31 ENCOUNTER — TELEPHONE (OUTPATIENT)
Dept: WOUND CARE | Age: 82
End: 2022-05-31

## 2022-05-31 NOTE — TELEPHONE ENCOUNTER
----- Message from Jorge Waddell sent at 5/31/2022  9:27 AM EDT -----  Aga  [phone] JOSE J(DAUGHTER) CALLED AND STATES THAT DR Tia Mehta FOR A SUBDURAL HEMATOMA. STATES THAT AFTER 2 FAILED ATTEMPTS TO CLOSE SUTURE LINE, BY DR Alexx Martinez, DR Jose J Moore WAS ABLE TO CLOSE IT. SHE NOW HAS A SCAB THAT IS OOZING AND DR IBARRA'S OFFICE SUGGESTED SEEING DR Jude Corley, SINCE DR MCNEAL WILL NOT BE BACK IN THE OFFICE UNTIL NEXT WEEK.  [address] [facility] ON 4/6/22

## 2022-05-31 NOTE — TELEPHONE ENCOUNTER
Returned call to daughter and offered appointment for patient tomorrow. She states they were able to get an appointment with Dr. Claudetta Mc and she would like to wait and see what he says first. No further concerns.

## 2022-06-08 ENCOUNTER — HOSPITAL ENCOUNTER (OUTPATIENT)
Dept: WOUND CARE | Age: 82
Discharge: HOME | End: 2022-06-08
Payer: MEDICARE

## 2022-06-08 VITALS
TEMPERATURE: 97.5 F | OXYGEN SATURATION: 97 % | SYSTOLIC BLOOD PRESSURE: 138 MMHG | HEART RATE: 56 BPM | RESPIRATION RATE: 16 BRPM | DIASTOLIC BLOOD PRESSURE: 60 MMHG

## 2022-06-08 PROCEDURE — 99212 OFFICE O/P EST SF 10 MIN: CPT

## 2022-06-08 RX ORDER — SULFAMETHOXAZOLE AND TRIMETHOPRIM 800; 160 MG/1; MG/1
1 TABLET ORAL 2 TIMES DAILY
COMMUNITY

## 2022-06-08 NOTE — PROGRESS NOTES
[facility]          Progress Note and Procedure Note      Off [facility] [address], [facility]/s Dr RECORD NUMBER:  [id number]  AGE: 78 y.o. GENDER: female  : 1940  EPISODE DATE:  2022    Subjective:     SUBJECTIVE     Chief Complaint   Patient presents with    Wound Check           HISTORY OF PRESENT ILLNESS      Radha Sandoval is a 78 y.o. female who presents today for wound/ulcer evaluation. She noted a draiange from the scalp wound and was advised to see me. She denies any fever or chills. When the scab was removed by Dr Misty Bernheim, there was no draiange noted. She denies any fever or chills.  No headache      PAST MEDICAL HISTORY         Diagnosis Date    Arthritis     Hypertension     Subdural hematoma ([address] 2022         PAST SURGICAL HISTORY     Past Surgical History:   Procedure Laterality Date    CATHETER REMOVAL Bilateral 2014    CRANIOPLASTY Right 2022    RIGHT FRONTAL WOUND DEBRIDEMENT AND WASHOUT performed by Nneka Kevin MD at [address] Right 3/19/2022    REEXPLORATION AND REVISION OF RIGHT FRONTAL LOBE WOUND PLUS REMOVAL OF RIGHT CRANIAL BONE FLAP AND CRANIOPLASTY performed by Nneka Kevin MD at [address] [facility] Right 2022    RIGHT SIDED CRANIOTOMY FOR SUBDURAL HEMATOMA performed by Nneka Kevin MD at [address] [place] [address]  2018    FOOT SURGERY Left     KNEE SURGERY Right 2014    total    UT BX OF BREAST, NEEDLE CORE, IMAGE GUIDE Left     benign    UT OFFICE/OUTPT VISIT,PROCEDURE ONLY Left 2018    ORIF LEFT ELBOW performed by Ever Goss MD at [facility] N/A 3/19/2022    FLAP CLOSURE performed by Hortencia King MD at [address] HISTORY         Family History   Problem Relation Age of Onset   Tonyjason Nas Cancer Mother         lymphoma    Cancer Father         prostate and bone    Heart Disease Sister     Heart Disease Brother     Atrial Fibrillation Daughter     Hypertension Daughter    Tonyjason Lovell Elevated Lipids Daughter     Breast Cancer Sister 78     SOCIAL HISTORY       Social History     Tobacco Use    Smoking status: Never Smoker    Smokeless tobacco: Never Used   Vaping Use    Vaping Use: Never used   Substance Use Topics    Alcohol use: Not Currently     Comment: very very rare    Drug use: No     ALLERGIES         Allergies   Allergen Reactions    Pcn [Penicillins] Itching     redness @ IM site     MEDICATIONS         Current Outpatient Medications on File Prior to Encounter   Medication Sig Dispense Refill    sulfamethoxazole-trimethoprim (BACTRIM DS;SEPTRA DS) 800-160 MG per tablet Take 1 tablet by mouth 2 times daily      metoprolol tartrate (LOPRESSOR) 50 MG tablet TAKE 1 TABLET BY MOUTH TWICE DAILY 180 tablet 1    hydroCHLOROthiazide (HYDRODIURIL) 12.5 MG tablet Take 1 tablet by mouth daily 90 tablet 1    lisinopril (PRINIVIL;ZESTRIL) 40 MG tablet Take 1 tablet by mouth daily 90 tablet 1    Multiple Vitamins-Minerals (THERAPEUTIC MULTIVITAMIN-MINERALS) tablet Take 1 tablet by mouth daily      acetaminophen (TYLENOL) 325 MG tablet Take 650 mg by mouth at bedtime      melatonin 5 MG TABS tablet Take 5 mg by mouth nightly      vitamin C (ASCORBIC ACID) 500 MG tablet Take 1,000 mg by mouth daily      zinc 50 MG TABS tablet Take 50 mg by mouth daily       No current facility-administered medications on file prior to encounter. REVIEW OF SYSTEMS:     Constitutional: no fever, no night sweats, no fatigue. Head: no head ache , no head injury, no migranes. Eye: no blurring of vision, no double vision.   Ears: no hearing difficulty, no tinnitus  Mouth/throat: no ulceration, dental caries , dysphagia  Lungs: no cough, no shortness of breath, no wheeze  CVS: no palpitation, no chest pain, no shortness of breath  GI: no abdominal pain, no nausea , no vomiting, no constipation  JAYLYN: no dysuria, frequency and urgency, no hematuria, no kidney stones  Musculoskeletal: no joint pain, swelling , stiffness,  Endocrine: no polyuria, polydipsia, no cold or heat intolerance  Hematology: no anemia, no easy brusing or bleeding, no hx of clotting disorder  Dermatology: no skin rash, no eczema, no pruritis,  Psychiatry: no depression, no anxiety,no panic attacks, no suicide ideation        PHYSICAL EXAM      temperature is 97.5 °F (36.4 °C). Her blood pressure is 138/60 and her pulse is 56. Her respiration is 16 and oxygen saturation is 97%. Wt Readings from Last 3 Encounters:   05/13/22 171 lb (77.6 kg)   05/09/22 171 lb (77.6 kg)   04/11/22 178 lb (80.7 kg)          General Appearance  Awake, alert, oriented,  not  In acute distress  HEENT - normocephalic, atraumatic, pink conjunctiva,  anicteric sclera well healed wound on the right forehead. No drainage noted  Neck - Supple, no mass   Abdomen - soft, not distended, nontender, no organomegally,  Neurologic - oriented  Skin - No bruising or bleeding  Extremities - No edema, no cyanosis, clubbing             Assessment:    post surgical wound : at this time the scalp wound is healing well, no drainage noted. Advised to contact me if it starts to drain. Will do further work up if draiange recurs including CT scan to rule out any fluid collection  Patient Active Problem List   Diagnosis Code    Essential hypertension I10    Syncopal episodes R55    Primary osteoarthritis of right knee M17.11    Subdural hematoma ([address] S06.5X9A    Nonhealing surgical wound, initial encounter T81.89XA    Superficial postoperative wound infection T81.49XA    Disruption of external surgical wound T81. 31XA    Rupture of operation wound T81. 31XA    Elective surgery Z41.9    Chronic renal disease, stage III [facility]) [[id number]] N18.30            Plan:     Patient examined and evaluated     Antibiotics: on Bactrim for one wk     Please see attached Discharge Instructions    Written patient dismissal instructions given to patient and signed by patient or POA. Discharge Instructions       Visit Discharge/Physician Orders:  - Complete the one week course of Bactrim antibiotic.  - No more antibiotic after that. - Call for appointment if it starts draining again. Wound Location: Scalp - healed    Follow up visit:  As needed    Keep next scheduled appointment. Please give 24 hour notice if unable to keep appointment. [phone]    If you experience any of the following, please call the Wound Care Service during business hours: Monday through Friday 8:00 am - 4:30 pm  ([phone]). *Increase in pain   *Temperature over 101   *Increase in drainage from your wound or a foul odor   *Uncontrolled swelling   *Need for compression bandage changes due to slippage, breakthrough drainage    If you need medical attention outside of business hours, please contact your Primary Care Doctor or go to the nearest emergency room.                    Electronically signed by Queta Rubalcava MD on 6/8/2022 at 11:25 AM

## 2022-06-08 NOTE — PLAN OF CARE
Problem: Wound:  Goal: Will show signs of wound healing; wound closure and no evidence of infection  Description: Will show signs of wound healing; wound closure and no evidence of infection  Outcome: Completed   Patient presents to wound clinic for evaluation of scalp. Patient to complete one week course of Bactrim antibiotic. No more antibiotic after that. Instructed patient to call for appointment if it starts draining again. Follow up visit:  As needed. Care plan reviewed with patient and daughter. Patient and daughter verbalize understanding of the plan of care and contribute to goal setting.

## 2022-06-22 ENCOUNTER — HOSPITAL ENCOUNTER (OUTPATIENT)
Dept: MAMMOGRAPHY | Age: 82
Discharge: HOME | End: 2022-06-22
Payer: MEDICARE

## 2022-06-22 DIAGNOSIS — Z12.39 BREAST SCREENING: ICD-10-CM

## 2022-06-22 PROCEDURE — 77063 BREAST TOMOSYNTHESIS BI: CPT

## 2022-06-30 DIAGNOSIS — I10 ESSENTIAL HYPERTENSION: ICD-10-CM

## 2022-06-30 RX ORDER — LISINOPRIL 40 MG/1
40 TABLET ORAL DAILY
Qty: 90 TABLET | Refills: 1 | Status: SHIPPED | OUTPATIENT
Start: 2022-06-30 | End: 2022-09-28

## 2022-07-25 DIAGNOSIS — H93.13 CLICKING TINNITUS OF BOTH EARS: Primary | ICD-10-CM

## 2022-08-24 RX ORDER — PREDNISONE 20 MG/1
40 TABLET ORAL DAILY
Qty: 10 TABLET | Refills: 0 | Status: SHIPPED | OUTPATIENT
Start: 2022-08-24 | End: 2022-09-10 | Stop reason: SDUPTHER

## 2022-09-10 ENCOUNTER — TELEPHONE (OUTPATIENT)
Dept: FAMILY MEDICINE CLINIC | Age: 82
End: 2022-09-10

## 2022-09-10 RX ORDER — PREDNISONE 20 MG/1
40 TABLET ORAL DAILY
Qty: 10 TABLET | Refills: 0 | Status: SHIPPED | OUTPATIENT
Start: 2022-09-10

## 2022-09-21 DIAGNOSIS — I10 ESSENTIAL HYPERTENSION: ICD-10-CM

## 2022-09-21 RX ORDER — HYDROCHLOROTHIAZIDE 12.5 MG/1
12.5 TABLET ORAL DAILY
Qty: 90 TABLET | Refills: 1 | Status: SHIPPED | OUTPATIENT
Start: 2022-09-21

## 2022-10-03 DIAGNOSIS — I10 ESSENTIAL HYPERTENSION: ICD-10-CM

## 2022-10-03 RX ORDER — METOPROLOL TARTRATE 50 MG/1
TABLET, FILM COATED ORAL
Qty: 180 TABLET | Refills: 1 | Status: SHIPPED | OUTPATIENT
Start: 2022-10-03

## 2022-12-15 DIAGNOSIS — I10 ESSENTIAL HYPERTENSION: ICD-10-CM

## 2022-12-15 RX ORDER — LISINOPRIL 40 MG/1
TABLET ORAL
Qty: 90 TABLET | Refills: 0 | Status: SHIPPED | OUTPATIENT
Start: 2022-12-15

## 2023-02-16 ENCOUNTER — TELEPHONE (OUTPATIENT)
Dept: FAMILY MEDICINE CLINIC | Age: 83
End: 2023-02-16

## 2023-02-16 RX ORDER — PREDNISONE 20 MG/1
40 TABLET ORAL DAILY
Qty: 10 TABLET | Refills: 0 | Status: SHIPPED | OUTPATIENT
Start: 2023-02-16

## 2023-02-20 RX ORDER — PREDNISONE 20 MG/1
40 TABLET ORAL DAILY
Qty: 10 TABLET | Refills: 0 | Status: SHIPPED | OUTPATIENT
Start: 2023-02-20

## 2023-03-05 ENCOUNTER — TELEPHONE (OUTPATIENT)
Dept: FAMILY MEDICINE CLINIC | Age: 83
End: 2023-03-05

## 2023-03-05 DIAGNOSIS — M10.072 ACUTE IDIOPATHIC GOUT INVOLVING TOE OF LEFT FOOT: Primary | ICD-10-CM

## 2023-03-05 RX ORDER — PREDNISONE 20 MG/1
40 TABLET ORAL DAILY
Qty: 10 TABLET | Refills: 0 | Status: SHIPPED | OUTPATIENT
Start: 2023-03-05

## 2023-03-09 ENCOUNTER — NURSE ONLY (OUTPATIENT)
Dept: LAB | Age: 83
End: 2023-03-09

## 2023-03-09 DIAGNOSIS — M10.072 ACUTE IDIOPATHIC GOUT INVOLVING TOE OF LEFT FOOT: ICD-10-CM

## 2023-03-10 LAB — URATE SERPL-MCNC: 8 MG/DL (ref 2.4–5.7)

## 2023-03-15 DIAGNOSIS — I10 ESSENTIAL HYPERTENSION: ICD-10-CM

## 2023-03-15 RX ORDER — LISINOPRIL 40 MG/1
TABLET ORAL
Qty: 90 TABLET | Refills: 1 | Status: SHIPPED | OUTPATIENT
Start: 2023-03-15

## 2023-03-15 RX ORDER — METOPROLOL TARTRATE 50 MG/1
TABLET, FILM COATED ORAL
Qty: 180 TABLET | Refills: 1 | Status: SHIPPED | OUTPATIENT
Start: 2023-03-15

## 2023-03-15 RX ORDER — HYDROCHLOROTHIAZIDE 12.5 MG/1
TABLET ORAL
Qty: 90 TABLET | Refills: 1 | Status: SHIPPED | OUTPATIENT
Start: 2023-03-15

## 2023-03-16 PROBLEM — N18.30 CHRONIC RENAL DISEASE, STAGE III (HCC): Status: RESOLVED | Noted: 2022-05-13 | Resolved: 2023-03-16

## 2023-03-16 ASSESSMENT — ENCOUNTER SYMPTOMS
CONSTIPATION: 0
NAUSEA: 0
EYE REDNESS: 0
SHORTNESS OF BREATH: 0
EYE DISCHARGE: 0
COLOR CHANGE: 0
VOMITING: 0
DIARRHEA: 0

## 2023-03-16 NOTE — PROGRESS NOTES
Conjunctiva/sclera: Conjunctivae normal.   Cardiovascular:      Rate and Rhythm: Normal rate and regular rhythm. Pulmonary:      Effort: Pulmonary effort is normal. No respiratory distress. Breath sounds: Normal breath sounds. No wheezing. Abdominal:      General: Bowel sounds are normal. There is no distension. Palpations: Abdomen is soft. Tenderness: There is no abdominal tenderness. Musculoskeletal:      Cervical back: Normal range of motion and neck supple. Skin:     General: Skin is warm and dry. Neurological:      Mental Status: She is alert and oriented to person, place, and time. Assessment/Plan:     Jose Alberto Gupta was seen today for follow-up. Diagnoses and all orders for this visit:    Essential hypertension        -     Her blood pressure has been controlled so will continue on current medications. Acute idiopathic gout involving toe of left foot  -      Will start on Allopurinol to help prevent gout flares. -      allopurinol (ZYLOPRIM) 100 MG tablet; Take 1 tablet by mouth daily      Return in about 6 months (around 9/20/2023) for Medicare AWV.     Electronically signed by Josefina Ruiz MD on 3/20/2023 at 9:14 AM

## 2023-03-20 ENCOUNTER — OFFICE VISIT (OUTPATIENT)
Dept: FAMILY MEDICINE CLINIC | Age: 83
End: 2023-03-20
Payer: MEDICARE

## 2023-03-20 VITALS
TEMPERATURE: 97 F | OXYGEN SATURATION: 95 % | WEIGHT: 184 LBS | RESPIRATION RATE: 16 BRPM | DIASTOLIC BLOOD PRESSURE: 82 MMHG | HEART RATE: 57 BPM | SYSTOLIC BLOOD PRESSURE: 128 MMHG | BODY MASS INDEX: 31.58 KG/M2

## 2023-03-20 DIAGNOSIS — I10 ESSENTIAL HYPERTENSION: Primary | ICD-10-CM

## 2023-03-20 DIAGNOSIS — M10.072 ACUTE IDIOPATHIC GOUT INVOLVING TOE OF LEFT FOOT: ICD-10-CM

## 2023-03-20 PROCEDURE — 3074F SYST BP LT 130 MM HG: CPT | Performed by: FAMILY MEDICINE

## 2023-03-20 PROCEDURE — G8427 DOCREV CUR MEDS BY ELIG CLIN: HCPCS | Performed by: FAMILY MEDICINE

## 2023-03-20 PROCEDURE — G8484 FLU IMMUNIZE NO ADMIN: HCPCS | Performed by: FAMILY MEDICINE

## 2023-03-20 PROCEDURE — 1036F TOBACCO NON-USER: CPT | Performed by: FAMILY MEDICINE

## 2023-03-20 PROCEDURE — 3079F DIAST BP 80-89 MM HG: CPT | Performed by: FAMILY MEDICINE

## 2023-03-20 PROCEDURE — G8399 PT W/DXA RESULTS DOCUMENT: HCPCS | Performed by: FAMILY MEDICINE

## 2023-03-20 PROCEDURE — 1123F ACP DISCUSS/DSCN MKR DOCD: CPT | Performed by: FAMILY MEDICINE

## 2023-03-20 PROCEDURE — 99213 OFFICE O/P EST LOW 20 MIN: CPT | Performed by: FAMILY MEDICINE

## 2023-03-20 PROCEDURE — G8417 CALC BMI ABV UP PARAM F/U: HCPCS | Performed by: FAMILY MEDICINE

## 2023-03-20 PROCEDURE — 1090F PRES/ABSN URINE INCON ASSESS: CPT | Performed by: FAMILY MEDICINE

## 2023-03-20 RX ORDER — PREDNISONE 20 MG/1
40 TABLET ORAL DAILY
Qty: 10 TABLET | Refills: 0 | Status: SHIPPED | OUTPATIENT
Start: 2023-03-20

## 2023-03-20 RX ORDER — ALLOPURINOL 100 MG/1
100 TABLET ORAL DAILY
Qty: 90 TABLET | Refills: 1 | Status: SHIPPED | OUTPATIENT
Start: 2023-03-20

## 2023-03-20 SDOH — ECONOMIC STABILITY: FOOD INSECURITY: WITHIN THE PAST 12 MONTHS, THE FOOD YOU BOUGHT JUST DIDN'T LAST AND YOU DIDN'T HAVE MONEY TO GET MORE.: NEVER TRUE

## 2023-03-20 SDOH — ECONOMIC STABILITY: FOOD INSECURITY: WITHIN THE PAST 12 MONTHS, YOU WORRIED THAT YOUR FOOD WOULD RUN OUT BEFORE YOU GOT MONEY TO BUY MORE.: NEVER TRUE

## 2023-03-20 SDOH — ECONOMIC STABILITY: HOUSING INSECURITY
IN THE LAST 12 MONTHS, WAS THERE A TIME WHEN YOU DID NOT HAVE A STEADY PLACE TO SLEEP OR SLEPT IN A SHELTER (INCLUDING NOW)?: NO

## 2023-03-20 SDOH — ECONOMIC STABILITY: INCOME INSECURITY: HOW HARD IS IT FOR YOU TO PAY FOR THE VERY BASICS LIKE FOOD, HOUSING, MEDICAL CARE, AND HEATING?: NOT HARD AT ALL

## 2023-03-20 ASSESSMENT — PATIENT HEALTH QUESTIONNAIRE - PHQ9
SUM OF ALL RESPONSES TO PHQ QUESTIONS 1-9: 0
SUM OF ALL RESPONSES TO PHQ9 QUESTIONS 1 & 2: 0
SUM OF ALL RESPONSES TO PHQ QUESTIONS 1-9: 0
SUM OF ALL RESPONSES TO PHQ QUESTIONS 1-9: 0
2. FEELING DOWN, DEPRESSED OR HOPELESS: 0
1. LITTLE INTEREST OR PLEASURE IN DOING THINGS: 0
SUM OF ALL RESPONSES TO PHQ QUESTIONS 1-9: 0

## 2023-04-30 ENCOUNTER — TELEPHONE (OUTPATIENT)
Dept: FAMILY MEDICINE CLINIC | Age: 83
End: 2023-04-30

## 2023-04-30 RX ORDER — SCOLOPAMINE TRANSDERMAL SYSTEM 1 MG/1
1 PATCH, EXTENDED RELEASE TRANSDERMAL
Qty: 5 PATCH | Refills: 0 | Status: SHIPPED | OUTPATIENT
Start: 2023-04-30

## 2023-04-30 RX ORDER — ONDANSETRON 4 MG/1
4 TABLET, ORALLY DISINTEGRATING ORAL 3 TIMES DAILY PRN
Qty: 21 TABLET | Refills: 0 | Status: SHIPPED | OUTPATIENT
Start: 2023-04-30

## 2023-04-30 RX ORDER — AZITHROMYCIN 250 MG/1
TABLET, FILM COATED ORAL
Qty: 6 TABLET | Refills: 0 | Status: SHIPPED | OUTPATIENT
Start: 2023-04-30

## 2023-04-30 RX ORDER — PREDNISONE 20 MG/1
40 TABLET ORAL DAILY
Qty: 10 TABLET | Refills: 0 | Status: SHIPPED | OUTPATIENT
Start: 2023-04-30

## 2023-05-21 ENCOUNTER — TELEPHONE (OUTPATIENT)
Dept: FAMILY MEDICINE CLINIC | Age: 83
End: 2023-05-21

## 2023-05-21 DIAGNOSIS — M10.072 ACUTE IDIOPATHIC GOUT INVOLVING TOE OF LEFT FOOT: Primary | ICD-10-CM

## 2023-05-21 RX ORDER — ALLOPURINOL 100 MG/1
100 TABLET ORAL DAILY
Qty: 90 TABLET | Refills: 1 | Status: SHIPPED | OUTPATIENT
Start: 2023-05-21

## 2023-08-18 ENCOUNTER — TELEPHONE (OUTPATIENT)
Dept: FAMILY MEDICINE CLINIC | Age: 83
End: 2023-08-18

## 2023-08-18 RX ORDER — PREDNISONE 20 MG/1
40 TABLET ORAL DAILY
Qty: 10 TABLET | Refills: 2 | Status: SHIPPED | OUTPATIENT
Start: 2023-08-18

## 2023-08-30 DIAGNOSIS — M10.072 ACUTE IDIOPATHIC GOUT INVOLVING TOE OF LEFT FOOT: ICD-10-CM

## 2023-08-30 RX ORDER — PREDNISONE 20 MG/1
40 TABLET ORAL DAILY
Qty: 10 TABLET | Refills: 2 | Status: SHIPPED | OUTPATIENT
Start: 2023-08-30

## 2023-08-30 RX ORDER — ALLOPURINOL 300 MG/1
300 TABLET ORAL DAILY
Qty: 90 TABLET | Refills: 1 | Status: SHIPPED | OUTPATIENT
Start: 2023-08-30

## 2023-08-30 RX ORDER — INDOMETHACIN 50 MG/1
CAPSULE ORAL
Qty: 60 CAPSULE | Refills: 1 | Status: SHIPPED | OUTPATIENT
Start: 2023-08-30

## 2023-09-13 ENCOUNTER — OFFICE VISIT (OUTPATIENT)
Dept: FAMILY MEDICINE CLINIC | Age: 83
End: 2023-09-13
Payer: MEDICARE

## 2023-09-13 VITALS
SYSTOLIC BLOOD PRESSURE: 100 MMHG | TEMPERATURE: 97.8 F | WEIGHT: 181 LBS | HEART RATE: 63 BPM | RESPIRATION RATE: 16 BRPM | DIASTOLIC BLOOD PRESSURE: 50 MMHG | OXYGEN SATURATION: 95 % | BODY MASS INDEX: 30.9 KG/M2 | HEIGHT: 64 IN

## 2023-09-13 DIAGNOSIS — Z00.00 ENCOUNTER FOR MEDICARE ANNUAL WELLNESS EXAM: Primary | ICD-10-CM

## 2023-09-13 DIAGNOSIS — Z00.00 MEDICARE ANNUAL WELLNESS VISIT, SUBSEQUENT: ICD-10-CM

## 2023-09-13 DIAGNOSIS — I10 ESSENTIAL HYPERTENSION: ICD-10-CM

## 2023-09-13 PROCEDURE — 3078F DIAST BP <80 MM HG: CPT | Performed by: FAMILY MEDICINE

## 2023-09-13 PROCEDURE — 1123F ACP DISCUSS/DSCN MKR DOCD: CPT | Performed by: FAMILY MEDICINE

## 2023-09-13 PROCEDURE — G0439 PPPS, SUBSEQ VISIT: HCPCS | Performed by: FAMILY MEDICINE

## 2023-09-13 PROCEDURE — 3074F SYST BP LT 130 MM HG: CPT | Performed by: FAMILY MEDICINE

## 2023-09-13 RX ORDER — METOPROLOL TARTRATE 50 MG/1
50 TABLET, FILM COATED ORAL 2 TIMES DAILY
Qty: 180 TABLET | Refills: 1 | Status: SHIPPED | OUTPATIENT
Start: 2023-09-13

## 2023-09-13 RX ORDER — HYDROCHLOROTHIAZIDE 12.5 MG/1
12.5 TABLET ORAL DAILY
Qty: 90 TABLET | Refills: 1 | Status: SHIPPED | OUTPATIENT
Start: 2023-09-13

## 2023-09-13 RX ORDER — LISINOPRIL 40 MG/1
40 TABLET ORAL DAILY
Qty: 90 TABLET | Refills: 1 | Status: SHIPPED | OUTPATIENT
Start: 2023-09-13

## 2023-09-13 ASSESSMENT — PATIENT HEALTH QUESTIONNAIRE - PHQ9
SUM OF ALL RESPONSES TO PHQ QUESTIONS 1-9: 0
SUM OF ALL RESPONSES TO PHQ9 QUESTIONS 1 & 2: 0
SUM OF ALL RESPONSES TO PHQ QUESTIONS 1-9: 0
2. FEELING DOWN, DEPRESSED OR HOPELESS: 0
1. LITTLE INTEREST OR PLEASURE IN DOING THINGS: 0

## 2023-09-13 ASSESSMENT — LIFESTYLE VARIABLES
HOW OFTEN DO YOU HAVE A DRINK CONTAINING ALCOHOL: NEVER
HOW MANY STANDARD DRINKS CONTAINING ALCOHOL DO YOU HAVE ON A TYPICAL DAY: PATIENT DOES NOT DRINK

## 2023-09-21 RX ORDER — AZITHROMYCIN 250 MG/1
TABLET, FILM COATED ORAL
Qty: 6 TABLET | Refills: 0 | Status: SHIPPED | OUTPATIENT
Start: 2023-09-21

## 2023-11-29 NOTE — PROGRESS NOTES
[facility]  PHYSICAL THERAPY MISSED TREATMENT NOTE  [facility] OR    Date: 2022  Patient Name: Evie Manuel        MRN: [id number]   : 1940  (78 y.o.)  Gender: female   Referring Practitioner: Keyon Negro PA-C  Diagnosis: Subdural hematoma         REASON FOR MISSED TREATMENT:  Patient at testing and/or off unit. Pt having surgery today for craniotomy. Will monitor for orders to resume activity. [employer].  [place], [place] [address] Detail Level: Detailed Depth Of Biopsy: dermis Was A Bandage Applied: Yes Size Of Lesion In Cm: 0.5 X Size Of Lesion In Cm: 0.4 Biopsy Type: H and E Biopsy Method: 10 blade Anesthesia Type: 1% lidocaine with epinephrine Additional Anesthesia Volume In Cc (Will Not Render If 0): 0 Hemostasis: Drysol and Electrocautery Wound Care: Petrolatum Dressing: no dressing applied Destruction After The Procedure: No Type Of Destruction Used: Curettage Curettage Text: The wound bed was treated with curettage after the biopsy was performed. Cryotherapy Text: The wound bed was treated with cryotherapy after the biopsy was performed. Electrodesiccation Text: The wound bed was treated with electrodesiccation after the biopsy was performed. Electrodesiccation And Curettage Text: The wound bed was treated with electrodesiccation and curettage after the biopsy was performed. Silver Nitrate Text: The wound bed was treated with silver nitrate after the biopsy was performed. Lab: [id number] Lab Facility: [id number] Consent: Written consent was obtained and risks were reviewed including but not limited to scarring, infection, bleeding, scabbing, incomplete removal, nerve damage and allergy to anesthesia. Post-Care Instructions: I reviewed with the patient in detail post-care instructions. Patient is to keep the biopsy site dry overnight, and then apply bacitracin twice daily until healed. Patient may apply hydrogen peroxide soaks to remove any crusting. Notification Instructions: Patient will be notified of biopsy results. However, patient instructed to call the office if not contacted within 2 weeks. Billing Type: Third-Party Bill Information: Selecting Yes will display possible errors in your note based on the variables you have selected. This validation is only offered as a suggestion for you. PLEASE NOTE THAT THE VALIDATION TEXT WILL BE REMOVED WHEN YOU FINALIZE YOUR NOTE. IF YOU WANT TO FAX A PRELIMINARY NOTE YOU WILL NEED TO TOGGLE THIS TO 'NO' IF YOU DO NOT WANT IT IN YOUR FAXED NOTE.

## 2023-12-07 DIAGNOSIS — M10.072 ACUTE IDIOPATHIC GOUT INVOLVING TOE OF LEFT FOOT: ICD-10-CM

## 2023-12-07 RX ORDER — ALLOPURINOL 300 MG/1
TABLET ORAL
Qty: 90 TABLET | Refills: 1 | Status: SHIPPED | OUTPATIENT
Start: 2023-12-07

## 2023-12-07 RX ORDER — ALLOPURINOL 300 MG/1
300 TABLET ORAL DAILY
Qty: 90 TABLET | Refills: 1 | Status: SHIPPED | OUTPATIENT
Start: 2023-12-07

## 2024-01-10 ENCOUNTER — OFFICE VISIT (OUTPATIENT)
Dept: FAMILY MEDICINE CLINIC | Age: 84
End: 2024-01-10
Payer: MEDICARE

## 2024-01-10 VITALS
TEMPERATURE: 97.1 F | HEART RATE: 63 BPM | DIASTOLIC BLOOD PRESSURE: 70 MMHG | WEIGHT: 179 LBS | RESPIRATION RATE: 16 BRPM | SYSTOLIC BLOOD PRESSURE: 122 MMHG | BODY MASS INDEX: 30.71 KG/M2

## 2024-01-10 DIAGNOSIS — R21 RASH AND NONSPECIFIC SKIN ERUPTION: Primary | ICD-10-CM

## 2024-01-10 PROCEDURE — 99213 OFFICE O/P EST LOW 20 MIN: CPT | Performed by: FAMILY MEDICINE

## 2024-01-10 PROCEDURE — G8417 CALC BMI ABV UP PARAM F/U: HCPCS | Performed by: FAMILY MEDICINE

## 2024-01-10 PROCEDURE — 1036F TOBACCO NON-USER: CPT | Performed by: FAMILY MEDICINE

## 2024-01-10 PROCEDURE — 1123F ACP DISCUSS/DSCN MKR DOCD: CPT | Performed by: FAMILY MEDICINE

## 2024-01-10 PROCEDURE — 3074F SYST BP LT 130 MM HG: CPT | Performed by: FAMILY MEDICINE

## 2024-01-10 PROCEDURE — G8484 FLU IMMUNIZE NO ADMIN: HCPCS | Performed by: FAMILY MEDICINE

## 2024-01-10 PROCEDURE — 3078F DIAST BP <80 MM HG: CPT | Performed by: FAMILY MEDICINE

## 2024-01-10 PROCEDURE — G8399 PT W/DXA RESULTS DOCUMENT: HCPCS | Performed by: FAMILY MEDICINE

## 2024-01-10 PROCEDURE — G8427 DOCREV CUR MEDS BY ELIG CLIN: HCPCS | Performed by: FAMILY MEDICINE

## 2024-01-10 PROCEDURE — 1090F PRES/ABSN URINE INCON ASSESS: CPT | Performed by: FAMILY MEDICINE

## 2024-01-10 RX ORDER — PREDNISONE 20 MG/1
40 TABLET ORAL DAILY
Qty: 10 TABLET | Refills: 2 | Status: SHIPPED | OUTPATIENT
Start: 2024-01-10

## 2024-01-10 ASSESSMENT — PATIENT HEALTH QUESTIONNAIRE - PHQ9
SUM OF ALL RESPONSES TO PHQ QUESTIONS 1-9: 0
SUM OF ALL RESPONSES TO PHQ QUESTIONS 1-9: 0
2. FEELING DOWN, DEPRESSED OR HOPELESS: 0
SUM OF ALL RESPONSES TO PHQ QUESTIONS 1-9: 0
SUM OF ALL RESPONSES TO PHQ9 QUESTIONS 1 & 2: 0
SUM OF ALL RESPONSES TO PHQ QUESTIONS 1-9: 0
1. LITTLE INTEREST OR PLEASURE IN DOING THINGS: 0

## 2024-01-10 ASSESSMENT — ENCOUNTER SYMPTOMS: COLOR CHANGE: 1

## 2024-01-10 NOTE — PROGRESS NOTES
[facility]  [address]  [place] [address]  Phone:  [phone]          Name: Samantha Holm  : 1940    Chief Complaint   Patient presents with    Rash     On back        HPI:     Samantha Holm is a 83 y.o. female who presents today for evaluation of a rash on her back that she noticed overnight.  It's not painful but is very itchy; it's actually itched for a few days.  No new lotions, soaps, detergents, etc.  She has been under a lot of stress this week.        Current Outpatient Medications:     predniSONE (DELTASONE) 20 MG tablet, Take 2 tablets by mouth daily, Disp: 10 tablet, Rfl: 2    allopurinol (ZYLOPRIM) 300 MG tablet, TAKE 1 TO 2 TABLETS BY MOUTH ONCE DAILY, Disp: 90 tablet, Rfl: 1    lisinopril (PRINIVIL;ZESTRIL) 40 MG tablet, Take 1 tablet by mouth daily, Disp: 90 tablet, Rfl: 1    hydroCHLOROthiazide (HYDRODIURIL) 12.5 MG tablet, Take 1 tablet by mouth daily, Disp: 90 tablet, Rfl: 1    metoprolol tartrate (LOPRESSOR) 50 MG tablet, Take 1 tablet by mouth 2 times daily, Disp: 180 tablet, Rfl: 1    indomethacin (INDOCIN) 50 MG capsule, Take 1 capsule every 8 hours for 3-5 days as needed for gout flares., Disp: 60 capsule, Rfl: 1    Multiple Vitamins-Minerals (THERAPEUTIC MULTIVITAMIN-MINERALS) tablet, Take 1 tablet by mouth daily, Disp: , Rfl:     acetaminophen (TYLENOL) 325 MG tablet, Take 2 tablets by mouth at bedtime, Disp: , Rfl:     melatonin 5 MG TABS tablet, Take 1 tablet by mouth nightly, Disp: , Rfl:     vitamin C (ASCORBIC ACID) 500 MG tablet, Take 2 tablets by mouth daily, Disp: , Rfl:     zinc 50 MG TABS tablet, Take 1 tablet by mouth daily, Disp: , Rfl:     Allergies   Allergen Reactions    Pcn [Penicillins] Itching     redness @ IM site       Subjective:      Review of Systems   Skin:  Positive for color change and rash.       Objective:     /70 (Site: Right Upper Arm, Position: Sitting, Cuff Size: Large Adult)   Pulse 63   Temp 97.1 °F (36.2 °C)

## 2024-03-08 DIAGNOSIS — I10 ESSENTIAL HYPERTENSION: ICD-10-CM

## 2024-03-08 RX ORDER — HYDROCHLOROTHIAZIDE 12.5 MG/1
12.5 TABLET ORAL DAILY
Qty: 90 TABLET | Refills: 1 | Status: SHIPPED | OUTPATIENT
Start: 2024-03-08

## 2024-03-08 RX ORDER — LISINOPRIL 40 MG/1
40 TABLET ORAL DAILY
Qty: 90 TABLET | Refills: 1 | Status: SHIPPED | OUTPATIENT
Start: 2024-03-08

## 2024-03-08 RX ORDER — METOPROLOL TARTRATE 50 MG/1
50 TABLET, FILM COATED ORAL 2 TIMES DAILY
Qty: 180 TABLET | Refills: 1 | Status: SHIPPED | OUTPATIENT
Start: 2024-03-08

## 2024-05-10 ENCOUNTER — HOSPITAL ENCOUNTER (EMERGENCY)
Age: 84
Discharge: HOME | End: 2024-05-10
Attending: EMERGENCY MEDICINE
Payer: MEDICARE

## 2024-05-10 ENCOUNTER — APPOINTMENT (OUTPATIENT)
Dept: CT IMAGING | Age: 84
End: 2024-05-10
Attending: EMERGENCY MEDICINE
Payer: MEDICARE

## 2024-05-10 VITALS
HEIGHT: 64 IN | HEART RATE: 68 BPM | OXYGEN SATURATION: 97 % | TEMPERATURE: 97.8 F | WEIGHT: 175 LBS | BODY MASS INDEX: 29.88 KG/M2 | RESPIRATION RATE: 18 BRPM | DIASTOLIC BLOOD PRESSURE: 59 MMHG | SYSTOLIC BLOOD PRESSURE: 157 MMHG

## 2024-05-10 DIAGNOSIS — S09.90XA CLOSED HEAD INJURY, INITIAL ENCOUNTER: Primary | ICD-10-CM

## 2024-05-10 PROCEDURE — 99284 EMERGENCY DEPT VISIT MOD MDM: CPT

## 2024-05-10 PROCEDURE — 70450 CT HEAD/BRAIN W/O DYE: CPT

## 2024-05-10 NOTE — ED TRIAGE NOTES
Pt. Presents ambulatory to ED accompanied per daughter with c/o fall at home in kitchen.  Pt. Reports she stumbled over feet and fell, voices struck left posterior shoulder on cupboard door and then hit back of head.  Pt. Denies LOC. Ambulatory to exam 1, gait steady, pt. Declines w/c.

## 2024-05-10 NOTE — ED NOTES
AVS rev'd with pt. And family and copy given. Pulse regular. Extremities warm. Respirations regular and quiet.  Mucous membranes pink & moist. Alert and oriented times 3.  No nausea or vomiting. Range of motion within patient's limits. Skin pink, warm and dry.  Calm and cooperative.

## 2024-05-10 NOTE — DISCHARGE INSTR - COC
Continuity of Care Form    Patient Name: Samantha Holm   :  1940  MRN:  [id number]    Admit date:  5/10/2024  Discharge date:  ***    Code Status Order: Prior   Advance Directives:     Admitting Physician:  No admitting provider for patient encounter.  PCP: Mel Stein MD    Discharging Nurse: ***  Discharging Hospital Unit/Room#: E1/E1  Discharging Unit Phone Number: ***    Emergency Contact:   Extended Emergency Contact Information  Primary Emergency Contact: Tessie Garcia Poa  Address: [phone] [place] [address] [facility]  Home Phone: [phone]  Mobile Phone: [phone]  Relation: Child  Secondary Emergency Contact: Hortencia Macias Poa  Address: [phone] [place] [address] [facility]  Home Phone: [phone]  Mobile Phone: [phone]  Relation: Child    Past Surgical History:  Past Surgical History:   Procedure Laterality Date    CATHETER REMOVAL Bilateral 2014    CRANIOPLASTY Right 2022    RIGHT FRONTAL WOUND DEBRIDEMENT AND WASHOUT [facility]    CRANIOPLASTY Right 3/19/2022    REEXPLORATION AND REVISION OF RIGHT FRONTAL LOBE WOUND PLUS REMOVAL OF RIGHT CRANIAL BONE FLAP AND CRANIOPLASTY performed by [facility]asmany Rodriguez MD [facility]    CRANIOTOMY Right 2022    RIGHT SIDED CRANIOTOMY FOR SUBDURAL HEMATOMA performed by [facility]asm[facility] Rodriguez MD at [facility]    ELBOW SURGERY  2018    FOOT SURGERY Left     KNEE SURGERY Right 2014    total    ID BX OF BREAST, NEEDLE CORE, IMAGE GUIDE Left     benign    ID OFFICE/OUTPT VISIT,PROCEDURE ONLY Left 2018    ORIF LEFT ELBOW performed by Kiran Tellez MD at [facility]    SCALP SURGERY N/A 3/19/2022    FLAP CLOSURE performed by Yosef Morris MD [facility]       Immunization History:   Immunization History   Administered Date(s) Administered    COVID-19, MODERNA BLUE border, Primary or Immunocompromised, (age 12y+), IM, 100  ADLs:182278540}  Dressing  {CHP DME ADLs:093816423}  Toileting  {CHP DME ADLs:159830788}  Feeding  {CHP DME ADLs:575636547}  Med Admin  {P DME ADLs:665454152}  Med Delivery   { MARGUERITE MED Delivery:497490391}    Wound Care Documentation and Therapy:        Elimination:  Continence:   Bowel: {YES / NO:}  Bladder: {YES / NO:}  Urinary Catheter: {Urinary Catheter:241623731}   Colostomy/Ileostomy/Ileal Conduit: {YES / NO:}       Date of Last BM: ***  No intake or output data in the 24 hours ending 05/10/24 1812  No intake/output data recorded.    Safety Concerns:     { MARGUERITE Safety Concerns:826807073}    Impairments/Disabilities:      { MARGUERITE Impairments/Disabilities:271557846}    Nutrition Therapy:  Current Nutrition Therapy:   {[facility] Diet List:428340886}    Routes of Feeding: {[facility] DME Other Feedings:977589312}  Liquids: {Slp liquid thickness:97993}  Daily Fluid Restriction: {P DME Yes amt example:900990223}  Last Modified Barium Swallow with Video (Video Swallowing Test): {Done Not Done Date:}    Treatments at the Time of Hospital Discharge:   Respiratory Treatments: ***  Oxygen Therapy:  {Therapy; copd oxygen:44098}  Ventilator:    {[facility] Vent List:398289408}    Rehab Therapies: {THERAPEUTIC INTERVENTION:2731152281}  Weight Bearing Status/Restrictions: {[facility] Weight Bearin}  Other Medical Equipment (for information only, NOT a DME order):  {EQUIPMENT:801103513}  Other Treatments: ***    Patient's personal belongings (please select all that are sent with patient):  {[facility] DME Belongings:374350468}    RN SIGNATURE:  {Esignature:857891702}    CASE MANAGEMENT/SOCIAL WORK SECTION    Inpatient Status Date: ***    Readmission Risk Assessment Score:  Readmission Risk              Risk of Unplanned Readmission:  0           Discharging to Facility/ Agency   Name:   Address:  Phone:  Fax:    Dialysis Facility (if applicable)   Name:  Address:  Dialysis Schedule:  Phone:  Fax:    Case

## 2024-05-10 NOTE — ED PROVIDER NOTES
[facility]  [address] [place] [address]  Phone: [phone]  EMERGENCY DEPARTMENT ENCOUNTER      Pt Name: Samantha Holm  MRN: [id number]  Birthdate 1940  Date of evaluation: 5/10/2024  Provider: Daniel Robledo MD    CHIEF COMPLAINT       Chief Complaint   Patient presents with    Fall    Head Injury         HISTORY OF PRESENT ILLNESS      Samantha Holm is a 83 y.o. female who presents to the emergency department with above noted complaint.  Patient was doing fine she lost balance fell hit her head.  Ended up hitting the cabinet door cracking it.  She has some occipital pain.  Denies other injuries loss of consciousness neck pain chest pain abdominal pain or other problems.  She has had prior subdural with surgery in the past        REVIEW OF SYSTEMS     Positive for head injury.  No neck chest or abdominal pain.  No syncope  Review of Systems  All systems negative except as marked.     PAST MEDICAL HISTORY     Past Medical History:   Diagnosis Date    Arthritis     Hypertension     Subdural hematoma (HCC) 01/2022         SURGICAL HISTORY       Past Surgical History:   Procedure Laterality Date    CATHETER REMOVAL Bilateral 11/2014    CRANIOPLASTY Right 2/24/2022    RIGHT FRONTAL WOUND DEBRIDEMENT AND WASHOUT performed by Yasmany Rodriguez MD at [facility] OR    CRANIOPLASTY Right 3/19/2022    REEXPLORATION AND REVISION OF RIGHT FRONTAL LOBE WOUND PLUS REMOVAL OF RIGHT CRANIAL BONE FLAP AND CRANIOPLASTY performed by Yasmany Rodriguez MD at [facility] OR    CRANIOTOMY Right 01/21/2022    RIGHT SIDED CRANIOTOMY FOR SUBDURAL HEMATOMA performed by Yasmany Rodriguez MD at [facility] OR    ELBOW SURGERY  07/04/2018    FOOT SURGERY Left 2012    KNEE SURGERY Right 07/09/2014    total    NH BX OF BREAST, NEEDLE CORE, IMAGE GUIDE Left 2016    benign    NH OFFICE/OUTPT VISIT,PROCEDURE ONLY Left 07/05/2018    ORIF LEFT ELBOW performed by Kiran Tellez MD at [facility] OR    SCALP SURGERY N/A 3/19/2022    FLAP CLOSURE

## 2024-05-10 NOTE — DISCHARGE INSTRUCTIONS
Use Tylenol for pain.  Monitor for worsening issues or problems.  Do not be surprised to be sore tomorrow including upper back and head.  Use ice to any sore swollen areas.  Then go to heat.  Be careful not to burn yourself.  Follow-up with primary care

## 2024-06-07 DIAGNOSIS — R21 RASH AND NONSPECIFIC SKIN ERUPTION: ICD-10-CM

## 2024-06-07 RX ORDER — PREDNISONE 20 MG/1
40 TABLET ORAL DAILY
Qty: 10 TABLET | Refills: 2 | Status: SHIPPED | OUTPATIENT
Start: 2024-06-07

## 2024-09-01 ENCOUNTER — APPOINTMENT (OUTPATIENT)
Dept: GENERAL RADIOLOGY | Age: 84
End: 2024-09-01
Attending: STUDENT IN AN ORGANIZED HEALTH CARE EDUCATION/TRAINING PROGRAM
Payer: MEDICARE

## 2024-09-01 ENCOUNTER — APPOINTMENT (OUTPATIENT)
Dept: CT IMAGING | Age: 84
End: 2024-09-01
Attending: STUDENT IN AN ORGANIZED HEALTH CARE EDUCATION/TRAINING PROGRAM
Payer: MEDICARE

## 2024-09-01 ENCOUNTER — HOSPITAL ENCOUNTER (EMERGENCY)
Age: 84
Discharge: HOME | End: 2024-09-01
Attending: STUDENT IN AN ORGANIZED HEALTH CARE EDUCATION/TRAINING PROGRAM
Payer: MEDICARE

## 2024-09-01 VITALS
HEART RATE: 58 BPM | SYSTOLIC BLOOD PRESSURE: 153 MMHG | TEMPERATURE: 97.6 F | OXYGEN SATURATION: 94 % | DIASTOLIC BLOOD PRESSURE: 86 MMHG | RESPIRATION RATE: 18 BRPM

## 2024-09-01 DIAGNOSIS — S01.81XA FACIAL LACERATION, INITIAL ENCOUNTER: ICD-10-CM
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
## 2024-09-01 DIAGNOSIS — S00.83XA CONTUSION OF FACE, INITIAL ENCOUNTER: ICD-10-CM

## 2024-09-01 LAB
AMORPH SED URNS QL MICRO: ABNORMAL
BACTERIA URNS QL MICRO: ABNORMAL
BILIRUB UR QL STRIP.AUTO: NEGATIVE
CASTS #/AREA URNS LPF: ABNORMAL /LPF
CHARACTER UR: CLEAR
COLOR UR AUTO: YELLOW
CRYSTALS VITF MICRO: ABNORMAL
EPI CELLS #/AREA URNS HPF: ABNORMAL /HPF
GLUCOSE UR QL STRIP.AUTO: NEGATIVE MG/DL
HGB UR STRIP.AUTO-MCNC: NEGATIVE MG/L
KETONES UR QL STRIP.AUTO: NEGATIVE
LEUKOCYTE ESTERASE UR QL STRIP.AUTO: ABNORMAL
MUCOUS THREADS URNS QL MICRO: ABNORMAL
NITRITE UR QL STRIP.AUTO: NEGATIVE
PH UR STRIP.AUTO: 7 [PH] (ref 5–9)
PROT UR STRIP.AUTO-MCNC: NEGATIVE MG/DL
RBC #/AREA URNS HPF: ABNORMAL /HPF
REFLEX TO URINE C & S: ABNORMAL
SP GR UR STRIP.AUTO: 1.01 (ref 1–1.03)
UROBILINOGEN UR STRIP-ACNC: 0.2 EU/DL (ref 0–1)
WBC # UR STRIP.AUTO: ABNORMAL /HPF

## 2024-09-01 PROCEDURE — 81001 URINALYSIS AUTO W/SCOPE: CPT

## 2024-09-01 PROCEDURE — 70450 CT HEAD/BRAIN W/O DYE: CPT

## 2024-09-01 PROCEDURE — 70486 CT MAXILLOFACIAL W/O DYE: CPT

## 2024-09-01 PROCEDURE — 72170 X-RAY EXAM OF PELVIS: CPT

## 2024-09-01 PROCEDURE — 6370000000 HC RX 637 (ALT 250 FOR IP): Performed by: STUDENT IN AN ORGANIZED HEALTH CARE EDUCATION/TRAINING PROGRAM

## 2024-09-01 PROCEDURE — 71046 X-RAY EXAM CHEST 2 VIEWS: CPT

## 2024-09-01 PROCEDURE — 99284 EMERGENCY DEPT VISIT MOD MDM: CPT

## 2024-09-01 PROCEDURE — 12011 RPR F/E/E/N/L/M 2.5 CM/<: CPT

## 2024-09-01 PROCEDURE — 72125 CT NECK SPINE W/O DYE: CPT

## 2024-09-01 RX ORDER — ACETAMINOPHEN 500 MG
1000 TABLET ORAL ONCE
Status: COMPLETED | OUTPATIENT
Start: 2024-09-01 | End: 2024-09-01

## 2024-09-01 RX ADMIN — ACETAMINOPHEN 1000 MG: 500 TABLET ORAL at 17:40

## 2024-09-01 ASSESSMENT — PAIN SCALES - GENERAL
PAINLEVEL_OUTOF10: 3
PAINLEVEL_OUTOF10: 6

## 2024-09-01 ASSESSMENT — PAIN DESCRIPTION - LOCATION: LOCATION: NOSE

## 2024-09-01 ASSESSMENT — PAIN - FUNCTIONAL ASSESSMENT: PAIN_FUNCTIONAL_ASSESSMENT: 0-10

## 2024-09-01 NOTE — ED NOTES
Discharge teaching and instructions for condition explained to patient.  AVS reviewed.  Patient voiced understanding regarding prescriptions, follow up appointments and care of self at home.  Pt discharged to home in stable condition per self with daughter.

## 2024-09-01 NOTE — ED NOTES
Patient presents to the ED via private auto with complaints of fall.  States that she tripped over a rug at home and hit a kitchen chair on her way down.  Denies loss of consciousness.  Denies use of blood thinners but has a history of subdural hematoma.  Patient has small laceration to the bridge of her nose and on the upper lip.  Skin tear noted to the left forearm near the wrist.

## 2024-09-01 NOTE — ED PROVIDER NOTES
Disposition         ED Reassessment: See below clinical course         Case discussed with consulting clinician:           Shared Decision-Making was performed and disposition discussed with the        Patient/Family and questions answered          Social determinants of health impacting treatment or disposition:           Code Status: Full code      Summary of Patient Presentation:      MDM  Number of Diagnoses or Management Options  Contusion of face, initial encounter  Facial laceration, initial encounter  Fall, initial encounter  Diagnosis management comments: 83-year-old patient with history of hypertension, subdural hematoma and osteoarthritis that presents to the emergency department after mechanical fall, she fell forward hitting face head and neck, and arriving she is complaining of facial pain, no loss of conscious.  Physical examination remarkable for small laceration and swelling on the nasal bridge, presents laceration on inner mucosa of upper lip, no intraoral lesions, no other signs of high-energy trauma on face neck head thorax or abdomen, also patient presents some skin tears on left upper extremity.  CT head CT face and CT neck scan with no emergent findings.  Patient got laceration repaired with glue and strips.  Daughter was concerned about urinary tract infection reason why we got the urine sample with negative results.  Consider patient to be DC with alarm signs recommendation follow-up by primary care physician.  Decision shared with patient and family member present in the room    /  ED Course as of 09/01/24 1846   Sun Sep 01, 2024   1843 UA negative for UTI, CT C-spine no emergent findings, CT facial bones no facial bone fractures.  CT scan with no acute findings, pelvis x-ray and chest x-ray with no acute findings.  Patient tolerated laceration repair, currently denies headache neck pain or facial pain.  Consider at this point patient could be DC with onsite recommendation follow-up by

## 2024-09-10 DIAGNOSIS — I10 ESSENTIAL HYPERTENSION: ICD-10-CM

## 2024-09-10 RX ORDER — LISINOPRIL 40 MG/1
40 TABLET ORAL DAILY
Qty: 90 TABLET | Refills: 1 | Status: SHIPPED | OUTPATIENT
Start: 2024-09-10 | End: 2024-09-15 | Stop reason: SDUPTHER

## 2024-09-10 RX ORDER — HYDROCHLOROTHIAZIDE 12.5 MG/1
12.5 TABLET ORAL DAILY
Qty: 90 TABLET | Refills: 1 | Status: SHIPPED | OUTPATIENT
Start: 2024-09-10 | End: 2024-09-15 | Stop reason: SDUPTHER

## 2024-09-10 RX ORDER — METOPROLOL TARTRATE 50 MG
50 TABLET ORAL 2 TIMES DAILY
Qty: 180 TABLET | Refills: 1 | Status: SHIPPED | OUTPATIENT
Start: 2024-09-10 | End: 2024-09-15 | Stop reason: SDUPTHER

## 2024-09-15 ENCOUNTER — TELEPHONE (OUTPATIENT)
Dept: FAMILY MEDICINE CLINIC | Age: 84
End: 2024-09-15

## 2024-09-15 DIAGNOSIS — I10 ESSENTIAL HYPERTENSION: Primary | ICD-10-CM

## 2024-09-15 RX ORDER — HYDROCHLOROTHIAZIDE 12.5 MG/1
12.5 TABLET ORAL DAILY
Qty: 90 TABLET | Refills: 1 | Status: SHIPPED | OUTPATIENT
Start: 2024-09-15

## 2024-09-15 RX ORDER — LISINOPRIL 40 MG/1
40 TABLET ORAL DAILY
Qty: 90 TABLET | Refills: 1 | Status: SHIPPED | OUTPATIENT
Start: 2024-09-15

## 2024-09-15 RX ORDER — METOPROLOL TARTRATE 50 MG
50 TABLET ORAL 2 TIMES DAILY
Qty: 180 TABLET | Refills: 1 | Status: SHIPPED | OUTPATIENT
Start: 2024-09-15

## 2024-09-17 DIAGNOSIS — M10.072 ACUTE IDIOPATHIC GOUT INVOLVING TOE OF LEFT FOOT: ICD-10-CM

## 2024-09-17 RX ORDER — ALLOPURINOL 300 MG/1
300 TABLET ORAL DAILY
Qty: 90 TABLET | Refills: 1 | Status: SHIPPED | OUTPATIENT
Start: 2024-09-17

## 2024-10-25 ENCOUNTER — TELEPHONE (OUTPATIENT)
Dept: FAMILY MEDICINE CLINIC | Age: 84
End: 2024-10-25

## 2024-10-25 DIAGNOSIS — R21 RASH AND NONSPECIFIC SKIN ERUPTION: Primary | ICD-10-CM

## 2024-10-25 RX ORDER — PREDNISONE 20 MG/1
40 TABLET ORAL DAILY
Qty: 10 TABLET | Refills: 2 | Status: SHIPPED | OUTPATIENT
Start: 2024-10-25

## 2024-12-17 DIAGNOSIS — R21 RASH AND NONSPECIFIC SKIN ERUPTION: ICD-10-CM

## 2024-12-17 RX ORDER — PREDNISONE 20 MG/1
40 TABLET ORAL DAILY
Qty: 10 TABLET | Refills: 2 | Status: SHIPPED | OUTPATIENT
Start: 2024-12-17

## 2024-12-20 ENCOUNTER — TELEPHONE (OUTPATIENT)
Dept: FAMILY MEDICINE CLINIC | Age: 84
End: 2024-12-20

## 2024-12-20 RX ORDER — AZITHROMYCIN 250 MG/1
TABLET, FILM COATED ORAL
Qty: 6 TABLET | Refills: 0 | Status: SHIPPED | OUTPATIENT
Start: 2024-12-20 | End: 2024-12-30

## 2025-01-20 NOTE — PROGRESS NOTES
[facility]  [address]  [place] [address]  Phone:  [phone]     Medicare Annual Wellness Visit    Samantha Holm is here for Medicare AWV    Assessment & Plan   Encounter for Medicare annual wellness exam  -     Routine healthcare maintenance was reviewed as below.  -     Lipid Panel; Future  -     Comprehensive Metabolic Panel; Future  -     CBC with Auto Differential; Future    Essential hypertension  -     Lipid Panel; Future       Return in about 6 months (around 7/23/2025) for hypertension.       Subjective   She has been doing well overall.  Last week she didn't feel well so checked her BP and it was 170-180 but has been controlled since.  No chest pain or palpitations.    Lab Results   Component Value Date     03/20/2022    K 4.5 03/20/2022     03/20/2022    CO2 19 (L) 03/20/2022    BUN 21 03/20/2022    CREATININE 0.7 03/20/2022    GLUCOSE 108 03/20/2022    CALCIUM 8.5 03/20/2022    BILITOT 0.6 01/18/2022    ALKPHOS 73 01/18/2022    AST 27 01/18/2022    ALT 25 01/18/2022    LABGLOM 80 (A) 03/20/2022     Lab Results   Component Value Date    CHOL 210 (H) 11/23/2021    TRIG 100 11/23/2021    HDL 67 11/23/2021     Lab Results   Component Value Date    ALT 25 01/18/2022    AST 27 01/18/2022          Patient's complete Health Risk Assessment and screening values have been reviewed and are found in Flowsheets. The following problems were reviewed today and where indicated follow up appointments were made and/or referrals ordered.    Positive Risk Factor Screenings with Interventions:    Fall Risk:  Do you feel unsteady or are you worried about falling? : no  2 or more falls in past year?: no  Fall with injury in past year?: (!) yes     Interventions:    Reviewed medications, home hazards, visual acuity, and co-morbidities that can increase risk for falls                            Objective   Vitals:    01/23/25 0904   BP: (!) 122/58   Site: Right Upper Arm   Position:

## 2025-01-23 ENCOUNTER — OFFICE VISIT (OUTPATIENT)
Dept: FAMILY MEDICINE CLINIC | Age: 85
End: 2025-01-23

## 2025-01-23 VITALS
BODY MASS INDEX: 28.68 KG/M2 | HEART RATE: 55 BPM | SYSTOLIC BLOOD PRESSURE: 122 MMHG | WEIGHT: 168 LBS | OXYGEN SATURATION: 96 % | DIASTOLIC BLOOD PRESSURE: 58 MMHG | TEMPERATURE: 97 F | RESPIRATION RATE: 18 BRPM | HEIGHT: 64 IN

## 2025-01-23 DIAGNOSIS — I10 ESSENTIAL HYPERTENSION: ICD-10-CM

## 2025-01-23 DIAGNOSIS — L29.9 ITCHING: ICD-10-CM

## 2025-01-23 DIAGNOSIS — Z00.00 MEDICARE ANNUAL WELLNESS VISIT, SUBSEQUENT: ICD-10-CM

## 2025-01-23 DIAGNOSIS — Z00.00 ENCOUNTER FOR MEDICARE ANNUAL WELLNESS EXAM: Primary | ICD-10-CM

## 2025-01-23 PROBLEM — S06.5XAA SUBDURAL HEMATOMA: Status: RESOLVED | Noted: 2022-01-18 | Resolved: 2025-01-23

## 2025-01-23 RX ORDER — HYDROXYZINE HYDROCHLORIDE 10 MG/1
10 TABLET, FILM COATED ORAL NIGHTLY
Qty: 30 TABLET | Refills: 5 | Status: SHIPPED | OUTPATIENT
Start: 2025-01-23 | End: 2025-02-22

## 2025-01-23 SDOH — ECONOMIC STABILITY: FOOD INSECURITY: WITHIN THE PAST 12 MONTHS, YOU WORRIED THAT YOUR FOOD WOULD RUN OUT BEFORE YOU GOT MONEY TO BUY MORE.: NEVER TRUE

## 2025-01-23 SDOH — ECONOMIC STABILITY: FOOD INSECURITY: WITHIN THE PAST 12 MONTHS, THE FOOD YOU BOUGHT JUST DIDN'T LAST AND YOU DIDN'T HAVE MONEY TO GET MORE.: NEVER TRUE

## 2025-01-23 ASSESSMENT — PATIENT HEALTH QUESTIONNAIRE - PHQ9
SUM OF ALL RESPONSES TO PHQ9 QUESTIONS 1 & 2: 0
SUM OF ALL RESPONSES TO PHQ QUESTIONS 1-9: 0
2. FEELING DOWN, DEPRESSED OR HOPELESS: NOT AT ALL
SUM OF ALL RESPONSES TO PHQ QUESTIONS 1-9: 0
SUM OF ALL RESPONSES TO PHQ QUESTIONS 1-9: 0
1. LITTLE INTEREST OR PLEASURE IN DOING THINGS: NOT AT ALL
SUM OF ALL RESPONSES TO PHQ QUESTIONS 1-9: 0

## 2025-01-29 ENCOUNTER — TELEPHONE (OUTPATIENT)
Dept: FAMILY MEDICINE CLINIC | Age: 85
End: 2025-01-29

## 2025-01-29 DIAGNOSIS — R19.7 DIARRHEA OF PRESUMED INFECTIOUS ORIGIN: Primary | ICD-10-CM

## 2025-01-30 DIAGNOSIS — R19.7 DIARRHEA OF PRESUMED INFECTIOUS ORIGIN: Primary | ICD-10-CM

## 2025-01-31 ENCOUNTER — HOSPITAL ENCOUNTER (OUTPATIENT)
Age: 85
Setting detail: SPECIMEN
Discharge: HOME | End: 2025-01-31
Payer: MEDICARE

## 2025-01-31 DIAGNOSIS — R19.7 DIARRHEA OF PRESUMED INFECTIOUS ORIGIN: ICD-10-CM

## 2025-01-31 LAB
C CAYETANENSIS DNA SPEC QL NAA+PROBE: NOT DETECTED
C DIFF GDH STL QL: NEGATIVE
CAMPY SP DNA.DIARRHEA STL QL NAA+PROBE: NOT DETECTED
CRYPTOSP DNA SPEC QL NAA+PROBE: NOT DETECTED
E COLI O157H7 DNA SPEC QL NAA+PROBE: NORMAL
E HISTOLYT DNA SPEC QL NAA+PROBE: NOT DETECTED
EAEC PAA PLAS AGGR+AATA ST NAA+NON-PRB: NOT DETECTED
EC STX1+STX2 + H7 FLIC SPEC NAA+PROBE: NOT DETECTED
EPEC EAE GENE STL QL NAA+NON-PROBE: NOT DETECTED
ETEC LTA+ST1A+ST1B TOX ST NAA+NON-PROBE: NOT DETECTED
G LAMBLIA DNA SPEC QL NAA+PROBE: NOT DETECTED
HADV DNA SPEC QL NAA+PROBE: NOT DETECTED
HASTV RNA SPEC QL NAA+PROBE: NOT DETECTED
NOROVIRUS GI + GII RNA STL NAA+PROBE: NOT DETECTED
P SHIGELLOIDES DNA STL QL NAA+PROBE: NOT DETECTED
RV RNA SPEC QL NAA+PROBE: NOT DETECTED
SALMONELLA DNA SPEC QL NAA+PROBE: NOT DETECTED
SAPOVIRUS RNA SPEC QL NAA+PROBE: NOT DETECTED
SHIGELLA SP+EIEC IPAH ST NAA+NON-PROBE: NOT DETECTED
V CHOLERAE DNA SPEC QL NAA+PROBE: NOT DETECTED
VIBRIO DNA SPEC NAA+PROBE: NOT DETECTED
Y ENTERO RECN STL QL NAA+PROBE: NOT DETECTED

## 2025-01-31 PROCEDURE — 87507 IADNA-DNA/RNA PROBE TQ 12-25: CPT

## 2025-01-31 PROCEDURE — 87449 NOS EACH ORGANISM AG IA: CPT

## 2025-02-04 ENCOUNTER — TELEPHONE (OUTPATIENT)
Dept: FAMILY MEDICINE CLINIC | Age: 85
End: 2025-02-04

## 2025-02-04 DIAGNOSIS — R19.7 DIARRHEA, UNSPECIFIED TYPE: Primary | ICD-10-CM

## 2025-02-04 NOTE — TELEPHONE ENCOUNTER
Patients daughter Tessie states she is still having diarrhea. She advised her to eat a bland diet and water. Patient is a big coffee drinker as well. She will talk with her tonight and will call the office with an update tomorrow.

## 2025-02-04 NOTE — TELEPHONE ENCOUNTER
----- Message from Dr. Mel Stein MD sent at 2/4/2025 11:52 AM EST -----  Regarding: Diarrhea  Her c. Diff and stool pathogen testing were negative meaning a bacterial cause of her diarrhea is less likely.  Please call patient to see how she's doing.  Has she noticed increased diarrhea with eating gluten/wheat?  With consuming dairy?

## 2025-02-05 ENCOUNTER — LAB (OUTPATIENT)
Dept: LAB | Age: 85
End: 2025-02-05

## 2025-02-05 DIAGNOSIS — R19.7 DIARRHEA, UNSPECIFIED TYPE: ICD-10-CM

## 2025-02-05 DIAGNOSIS — Z00.00 ENCOUNTER FOR MEDICARE ANNUAL WELLNESS EXAM: ICD-10-CM

## 2025-02-05 DIAGNOSIS — R19.7 DIARRHEA, UNSPECIFIED TYPE: Primary | ICD-10-CM

## 2025-02-05 DIAGNOSIS — I10 ESSENTIAL HYPERTENSION: ICD-10-CM

## 2025-02-05 LAB
ALBUMIN SERPL BCG-MCNC: 3.8 G/DL (ref 3.5–5.1)
ALP SERPL-CCNC: 60 U/L (ref 38–126)
ALT SERPL W/O P-5'-P-CCNC: 12 U/L (ref 11–66)
ANION GAP SERPL CALC-SCNC: 12 MEQ/L (ref 8–16)
AST SERPL-CCNC: 20 U/L (ref 5–40)
BASOPHILS ABSOLUTE: 0.1 THOU/MM3 (ref 0–0.1)
BASOPHILS NFR BLD AUTO: 1.1 %
BILIRUB SERPL-MCNC: 0.3 MG/DL (ref 0.3–1.2)
BUN SERPL-MCNC: 29 MG/DL (ref 7–22)
CALCIUM SERPL-MCNC: 10.4 MG/DL (ref 8.5–10.5)
CHLORIDE SERPL-SCNC: 101 MEQ/L (ref 98–111)
CHOLEST SERPL-MCNC: 197 MG/DL (ref 100–199)
CO2 SERPL-SCNC: 26 MEQ/L (ref 23–33)
CREAT SERPL-MCNC: 1.6 MG/DL (ref 0.4–1.2)
DEPRECATED RDW RBC AUTO: 48.1 FL (ref 35–45)
EOSINOPHIL NFR BLD AUTO: 5.2 %
EOSINOPHILS ABSOLUTE: 0.3 THOU/MM3 (ref 0–0.4)
ERYTHROCYTE [DISTWIDTH] IN BLOOD BY AUTOMATED COUNT: 14.3 % (ref 11.5–14.5)
GFR SERPL CREATININE-BSD FRML MDRD: 31 ML/MIN/1.73M2
GLUCOSE SERPL-MCNC: 89 MG/DL (ref 70–108)
HCT VFR BLD AUTO: 35 % (ref 37–47)
HDLC SERPL-MCNC: 60 MG/DL
HGB BLD-MCNC: 11.6 GM/DL (ref 12–16)
IMM GRANULOCYTES # BLD AUTO: 0.01 THOU/MM3 (ref 0–0.07)
IMM GRANULOCYTES NFR BLD AUTO: 0.2 %
LDLC SERPL CALC-MCNC: 118 MG/DL
LYMPHOCYTES ABSOLUTE: 1.3 THOU/MM3 (ref 1–4.8)
LYMPHOCYTES NFR BLD AUTO: 23.6 %
MCH RBC QN AUTO: 30.7 PG (ref 26–33)
MCHC RBC AUTO-ENTMCNC: 33.1 GM/DL (ref 32.2–35.5)
MCV RBC AUTO: 92.6 FL (ref 81–99)
MONOCYTES ABSOLUTE: 0.6 THOU/MM3 (ref 0.4–1.3)
MONOCYTES NFR BLD AUTO: 10.7 %
NEUTROPHILS ABSOLUTE: 3.3 THOU/MM3 (ref 1.8–7.7)
NEUTROPHILS NFR BLD AUTO: 59.2 %
NRBC BLD AUTO-RTO: 0 /100 WBC
PLATELET # BLD AUTO: 205 THOU/MM3 (ref 130–400)
PMV BLD AUTO: 11.5 FL (ref 9.4–12.4)
POTASSIUM SERPL-SCNC: 4.6 MEQ/L (ref 3.5–5.2)
PROT SERPL-MCNC: 6.3 G/DL (ref 6.1–8)
RBC # BLD AUTO: 3.78 MILL/MM3 (ref 4.2–5.4)
SODIUM SERPL-SCNC: 139 MEQ/L (ref 135–145)
TRIGL SERPL-MCNC: 96 MG/DL (ref 0–199)
WBC # BLD AUTO: 5.6 THOU/MM3 (ref 4.8–10.8)

## 2025-02-06 LAB
GLIADIN PEPTIDE IGA SER IA-ACNC: < 0.72 FLU (ref 0–4.99)
TTG IGA SER IA-ACNC: < 1.02 FLU (ref 0–4.99)

## 2025-02-07 LAB
GLIADIN PEPTIDE IGG SER IA-ACNC: < 0.56 FLU (ref 0–4.99)
TTG IGG SER IA-ACNC: < 0.82 FLU (ref 0–4.99)

## 2025-02-10 ENCOUNTER — HOSPITAL ENCOUNTER (OUTPATIENT)
Dept: CT IMAGING | Age: 85
Discharge: HOME | End: 2025-02-10
Attending: FAMILY MEDICINE
Payer: MEDICARE

## 2025-02-10 DIAGNOSIS — K82.8 GALLBLADDER SLUDGE: Primary | ICD-10-CM

## 2025-02-10 DIAGNOSIS — R19.7 DIARRHEA, UNSPECIFIED TYPE: ICD-10-CM

## 2025-02-10 PROCEDURE — 74176 CT ABD & PELVIS W/O CONTRAST: CPT

## 2025-02-13 ENCOUNTER — HOSPITAL ENCOUNTER (OUTPATIENT)
Dept: ULTRASOUND IMAGING | Age: 85
Discharge: HOME | End: 2025-02-13
Payer: MEDICARE

## 2025-02-13 DIAGNOSIS — K82.8 GALLBLADDER SLUDGE: ICD-10-CM

## 2025-02-13 DIAGNOSIS — R15.2 INCONTINENCE OF FECES WITH FECAL URGENCY: Primary | ICD-10-CM

## 2025-02-13 DIAGNOSIS — R15.9 INCONTINENCE OF FECES WITH FECAL URGENCY: Primary | ICD-10-CM

## 2025-02-13 PROCEDURE — 76705 ECHO EXAM OF ABDOMEN: CPT

## 2025-02-13 NOTE — PROGRESS NOTES
[facility]  [address]  [place] [address]  Phone:  [phone]          Name: Samantha Holm  : 1940    Chief Complaint   Patient presents with    Follow-up     Diarrhea       HPI:     Samantha Holm is a 84 y.o. female who presents today for follow up of fecal incontinence.  She has been having episodes where she'll get the urge to defecate then can't make it to the bathroom in time.  This has happened at home and while out with friends.  It's happened 5-6x in the past month.  She had a CT abdomen/pelvis on 2/10/25 that showed gallbladder sludge versus small stones so had a gallbladder US on 25 that was normal.  She has never had a colonoscopy.  She has no family history of colon cancer.  She saw GI yesterday who ordered further stool studies and increased her Lomotil dose to 4x/day.  She may need a colonoscopy pending her stool tests.    Yesterday she was playing cards with her siblings and felt lightheaded.  She realized she hadn't ate much or drank all day with her GI appointment.  Her pulse at the time was 60 bpm.  She felt better after drinking water and Gatorade.    She had blood work done on 25 and her creatinine jumped from 0.7 to 1.6.  She thinks it may be from dehydration.      Current Outpatient Medications:     Calcium Carb-Cholecalciferol (CALCIUM 1000 + D PO), Take 1 tablet by mouth in the morning and at bedtime, Disp: , Rfl:     hydrOXYzine HCl (ATARAX) 10 MG tablet, Take 1 tablet by mouth at bedtime, Disp: 30 tablet, Rfl: 5    diphenoxylate-atropine (LOMOTIL) 2.5-0.025 MG per tablet, Take 1 tablet by mouth daily for 30 days. Max Daily Amount: 1 tablet, Disp: 30 tablet, Rfl: 2    Multiple Vitamins-Minerals (THERAPEUTIC MULTIVITAMIN-MINERALS) tablet, Take 1 tablet by mouth daily, Disp: , Rfl:     vitamin C (ASCORBIC ACID) 500 MG tablet, Take 2 tablets by mouth daily, Disp: , Rfl:     zinc 50 MG TABS tablet, Take 1 tablet by mouth daily, Disp: , Rfl:     
Yes

## 2025-02-14 DIAGNOSIS — R15.9 INCONTINENCE OF FECES WITH FECAL URGENCY: Primary | ICD-10-CM

## 2025-02-14 DIAGNOSIS — R15.2 INCONTINENCE OF FECES WITH FECAL URGENCY: Primary | ICD-10-CM

## 2025-02-14 DIAGNOSIS — L29.9 ITCHING: ICD-10-CM

## 2025-02-14 RX ORDER — HYDROXYZINE HYDROCHLORIDE 10 MG/1
10 TABLET, FILM COATED ORAL NIGHTLY
Qty: 30 TABLET | Refills: 5 | Status: SHIPPED | OUTPATIENT
Start: 2025-02-14 | End: 2025-03-16

## 2025-02-14 RX ORDER — DIPHENOXYLATE HYDROCHLORIDE AND ATROPINE SULFATE 2.5; .025 MG/1; MG/1
1 TABLET ORAL DAILY
Qty: 30 TABLET | Refills: 2 | Status: SHIPPED | OUTPATIENT
Start: 2025-02-14 | End: 2025-03-16

## 2025-02-18 ENCOUNTER — OFFICE VISIT (OUTPATIENT)
Dept: FAMILY MEDICINE CLINIC | Age: 85
End: 2025-02-18

## 2025-02-18 ENCOUNTER — HOSPITAL ENCOUNTER (OUTPATIENT)
Age: 85
Setting detail: SPECIMEN
Discharge: HOME | End: 2025-02-18
Payer: MEDICARE

## 2025-02-18 ENCOUNTER — LAB (OUTPATIENT)
Dept: LAB | Age: 85
End: 2025-02-18

## 2025-02-18 VITALS
HEIGHT: 64 IN | WEIGHT: 164.46 LBS | TEMPERATURE: 97.4 F | DIASTOLIC BLOOD PRESSURE: 52 MMHG | BODY MASS INDEX: 28.08 KG/M2 | HEART RATE: 59 BPM | OXYGEN SATURATION: 97 % | SYSTOLIC BLOOD PRESSURE: 108 MMHG | RESPIRATION RATE: 16 BRPM

## 2025-02-18 DIAGNOSIS — R15.2 INCONTINENCE OF FECES WITH FECAL URGENCY: Primary | ICD-10-CM

## 2025-02-18 DIAGNOSIS — R15.9 INCONTINENCE OF FECES WITH FECAL URGENCY: Primary | ICD-10-CM

## 2025-02-18 DIAGNOSIS — M10.072 ACUTE IDIOPATHIC GOUT INVOLVING TOE OF LEFT FOOT: ICD-10-CM

## 2025-02-18 DIAGNOSIS — R79.89 ELEVATED SERUM CREATININE: ICD-10-CM

## 2025-02-18 DIAGNOSIS — I10 ESSENTIAL HYPERTENSION: ICD-10-CM

## 2025-02-18 LAB — WBC STL QL MICRO: NORMAL

## 2025-02-18 PROCEDURE — 82705 FATS/LIPIDS FECES QUAL: CPT

## 2025-02-18 PROCEDURE — 83520 IMMUNOASSAY QUANT NOS NONAB: CPT

## 2025-02-18 PROCEDURE — 89055 LEUKOCYTE ASSESSMENT FECAL: CPT

## 2025-02-18 PROCEDURE — 83993 ASSAY FOR CALPROTECTIN FECAL: CPT

## 2025-02-18 RX ORDER — ALLOPURINOL 300 MG/1
300 TABLET ORAL DAILY
Qty: 90 TABLET | Refills: 1 | Status: SHIPPED | OUTPATIENT
Start: 2025-02-18

## 2025-02-18 RX ORDER — LISINOPRIL 40 MG/1
40 TABLET ORAL DAILY
Qty: 90 TABLET | Refills: 1 | Status: SHIPPED | OUTPATIENT
Start: 2025-02-18

## 2025-02-18 RX ORDER — HYDROCHLOROTHIAZIDE 12.5 MG/1
12.5 TABLET ORAL DAILY
Qty: 90 TABLET | Refills: 1 | Status: SHIPPED | OUTPATIENT
Start: 2025-02-18 | End: 2025-02-20 | Stop reason: ALTCHOICE

## 2025-02-18 RX ORDER — METOPROLOL TARTRATE 50 MG
50 TABLET ORAL 2 TIMES DAILY
Qty: 180 TABLET | Refills: 1 | Status: SHIPPED | OUTPATIENT
Start: 2025-02-18

## 2025-02-19 DIAGNOSIS — N18.32 CKD STAGE 3B, GFR 30-44 ML/MIN (HCC): Primary | ICD-10-CM

## 2025-02-19 LAB
ANION GAP SERPL CALC-SCNC: 12 MEQ/L (ref 8–16)
BUN SERPL-MCNC: 41 MG/DL (ref 7–22)
CALCIUM SERPL-MCNC: 10.3 MG/DL (ref 8.2–9.6)
CHLORIDE SERPL-SCNC: 99 MEQ/L (ref 98–111)
CO2 SERPL-SCNC: 23 MEQ/L (ref 23–33)
CREAT SERPL-MCNC: 1.4 MG/DL (ref 0.4–1.2)
GFR SERPL CREATININE-BSD FRML MDRD: 37 ML/MIN/1.73M2
GLUCOSE SERPL-MCNC: 98 MG/DL (ref 70–108)
POTASSIUM SERPL-SCNC: 5.2 MEQ/L (ref 3.5–5.2)
SODIUM SERPL-SCNC: 134 MEQ/L (ref 135–145)

## 2025-02-20 ENCOUNTER — TELEPHONE (OUTPATIENT)
Dept: FAMILY MEDICINE CLINIC | Age: 85
End: 2025-02-20

## 2025-02-20 ENCOUNTER — TELEPHONE (OUTPATIENT)
Dept: NEPHROLOGY | Age: 85
End: 2025-02-20

## 2025-02-20 ENCOUNTER — OFFICE VISIT (OUTPATIENT)
Dept: NEPHROLOGY | Age: 85
End: 2025-02-20
Payer: MEDICARE

## 2025-02-20 VITALS
SYSTOLIC BLOOD PRESSURE: 124 MMHG | HEIGHT: 64 IN | BODY MASS INDEX: 27.55 KG/M2 | WEIGHT: 161.4 LBS | HEART RATE: 58 BPM | OXYGEN SATURATION: 96 % | DIASTOLIC BLOOD PRESSURE: 58 MMHG

## 2025-02-20 DIAGNOSIS — E86.0 DEHYDRATION: ICD-10-CM

## 2025-02-20 DIAGNOSIS — E87.1 HYPONATREMIA: ICD-10-CM

## 2025-02-20 DIAGNOSIS — N18.32 STAGE 3B CHRONIC KIDNEY DISEASE (HCC): Primary | ICD-10-CM

## 2025-02-20 DIAGNOSIS — E83.52 HYPERCALCEMIA: ICD-10-CM

## 2025-02-20 DIAGNOSIS — K82.8 GALLBLADDER SLUDGE: ICD-10-CM

## 2025-02-20 DIAGNOSIS — I10 PRIMARY HYPERTENSION: ICD-10-CM

## 2025-02-20 PROCEDURE — G8427 DOCREV CUR MEDS BY ELIG CLIN: HCPCS | Performed by: INTERNAL MEDICINE

## 2025-02-20 PROCEDURE — G8419 CALC BMI OUT NRM PARAM NOF/U: HCPCS | Performed by: INTERNAL MEDICINE

## 2025-02-20 PROCEDURE — 1159F MED LIST DOCD IN RCRD: CPT | Performed by: INTERNAL MEDICINE

## 2025-02-20 PROCEDURE — 1036F TOBACCO NON-USER: CPT | Performed by: INTERNAL MEDICINE

## 2025-02-20 PROCEDURE — 1123F ACP DISCUSS/DSCN MKR DOCD: CPT | Performed by: INTERNAL MEDICINE

## 2025-02-20 PROCEDURE — 99204 OFFICE O/P NEW MOD 45 MIN: CPT | Performed by: INTERNAL MEDICINE

## 2025-02-20 PROCEDURE — 1090F PRES/ABSN URINE INCON ASSESS: CPT | Performed by: INTERNAL MEDICINE

## 2025-02-20 RX ORDER — METHYLCELLULOSE 500 MG/1
1000 TABLET ORAL DAILY
COMMUNITY

## 2025-02-20 NOTE — TELEPHONE ENCOUNTER
Drugs to Avoid with Chronic Kidney Disease    Non-steroidal anti-inflammatory drugs (NSAIDS)    Potential complication and side effects of NSAIDS include:  Kidney injury and worsening kidney function   Risk of stomach ulcer and intestinal bleeding  Fluid retention and edema  Increased Blood Pressure    Non-Steroidal Anti-Inflammatory Drugs  Flurbiprofen (Asaid)  Celecoxib (Celebrex)  Diclofenac (Voltaren, Arthrotec, Cataflam, Cambia, Voltaren-XR, Zipsor)  Diflunisal (Dolobid)  Etodolac (Lodine XL, Lodine)  Fenoprofen (Nalfon, Nalfon 200)  Ibuprofen (Advil, Motrin, Midol, Nuprin, Genpril, Dolgesic,Profen,, Vicoprofen,Combunox, Actiprofen, Addaprin, Caldolor, Haltran, Q-Profen,Ibren, Menadol, Rufen,Saleto-200, Meng,Ultraprin, Uni-Pro,Wal-Profen)  Indomethacin (Indocin, Indomethegan, Indo-John)   Ketoprofen (Orudis  KT, Oruvail, Actron)  Ketorolac (Toradol, Sprix)  Meloxicam (Mobic)  Naproxen (Aleve, Anaprox, Naprosyn, EC-Naprosyn, Naprelan, All Day Pain Relief, Aflaxen, Anaprox-DS, Midol Extended Relief, Naprelan,Prevacid NapraPac, Naprapac, Vimovo)  Nabumetone (Relafen)  Oxaprozin (Daypro)  Piroxicam (Feldene)  Rofecoxib (Vioxx)  Sulindac (Clinoril)  Tolmetin (Tolectin)  Valdecoxib (Bextra)  Mefenamic Acid (Ponstel)  Famotidine and Ibuprofen (Duexis)  Meclofenamate (Meclomen)    Antibiotics  Bactrim  Gentamycin  Tobramycin  Vancomycin  Tetracycline  Macrobid    Other                  IV contrast dye

## 2025-02-20 NOTE — TELEPHONE ENCOUNTER
Approved today by [employer]  CaseId:[id number];Status:Approved;Review Type:Prior Auth;Coverage Start Date:01/21/2025;Coverage End Date:02/20/2026;  Effective Date: 1/20/2025  Authorization Expiration Date: 2/19/2026

## 2025-02-20 NOTE — TELEPHONE ENCOUNTER
Hydroxyzine     Prior auth initiated through covermymeds  Insurance response time is 7-14 business days.

## 2025-02-20 NOTE — PROGRESS NOTES
She has recently had issues with significant diarrhea. She has never had a colonoscopy or an EGD. She saw GI on Monday. She just had stool studies. No results yet. She will have both colonoscopy and EGD in near future.   
SCALP SURGERY N/A 3/19/2022    FLAP CLOSURE performed by Yosef Morris MD at [facility] OR       Family History   Problem Relation Age of Onset    Cancer Mother         lymphoma    Cancer Father         prostate and bone    Heart Disease Sister     Heart Disease Brother     Atrial Fibrillation Daughter     Hypertension Daughter     Elevated Lipids Daughter     Breast Cancer Sister 79        reports that she has never smoked. She has never used smokeless tobacco. She reports that she does not currently use alcohol. She reports that she does not use drugs.    Allergies:  Pcn [penicillins]    Current Medications:    Current Outpatient Medications   Medication Sig Dispense Refill    TART THOMPSON PO Take 1,200 mg by mouth      methylcellulose (CITRUCEL) 500 MG TABS tablet Take 2 tablets by mouth daily      hydroCHLOROthiazide 12.5 MG tablet Take 1 tablet by mouth daily 90 tablet 1    allopurinol (ZYLOPRIM) 300 MG tablet Take 1 tablet by mouth daily 90 tablet 1    metoprolol tartrate (LOPRESSOR) 50 MG tablet Take 1 tablet by mouth 2 times daily 180 tablet 1    lisinopril (PRINIVIL;ZESTRIL) 40 MG tablet Take 1 tablet by mouth daily 90 tablet 1    hydrOXYzine HCl (ATARAX) 10 MG tablet Take 1 tablet by mouth at bedtime 30 tablet 5    diphenoxylate-atropine (LOMOTIL) 2.5-0.025 MG per tablet Take 1 tablet by mouth daily for 30 days. Max Daily Amount: 1 tablet (Patient taking differently: Take 1 tablet by mouth 4 times daily. 2/20/25- she is currently taking it BID) 30 tablet 2    Multiple Vitamins-Minerals (THERAPEUTIC MULTIVITAMIN-MINERALS) tablet Take 1 tablet by mouth daily      vitamin C (ASCORBIC ACID) 500 MG tablet Take 2 tablets by mouth daily      zinc 50 MG TABS tablet Take 1 tablet by mouth daily      Calcium Carb-Cholecalciferol (CALCIUM 1000 + D PO) Take 1 tablet by mouth in the morning and at bedtime (Patient not taking: Reported on 2/20/2025)      acetaminophen (TYLENOL) 325 MG tablet Take 2 tablets by mouth at

## 2025-02-21 ENCOUNTER — TELEPHONE (OUTPATIENT)
Dept: FAMILY MEDICINE CLINIC | Age: 85
End: 2025-02-21

## 2025-02-21 LAB
CALPROTECTIN STL-MCNT: 82 UG/G
ELASTASE PANC STL-MCNT: 85 UG/G
FECAL FAT, QUALITATIVE: NORMAL

## 2025-03-19 ENCOUNTER — LAB (OUTPATIENT)
Dept: LAB | Age: 85
End: 2025-03-19

## 2025-03-19 DIAGNOSIS — N18.32 STAGE 3B CHRONIC KIDNEY DISEASE (HCC): ICD-10-CM

## 2025-03-19 DIAGNOSIS — E83.52 HYPERCALCEMIA: ICD-10-CM

## 2025-03-19 DIAGNOSIS — I10 PRIMARY HYPERTENSION: ICD-10-CM

## 2025-03-19 DIAGNOSIS — N18.32 STAGE 3B CHRONIC KIDNEY DISEASE (HCC): Primary | ICD-10-CM

## 2025-03-20 LAB
25(OH)D3 SERPL-MCNC: 51 NG/ML (ref 30–100)
ANION GAP SERPL CALC-SCNC: 10 MEQ/L (ref 8–16)
BUN SERPL-MCNC: 26 MG/DL (ref 8–23)
CALCIUM SERPL-MCNC: 9.4 MG/DL (ref 8.8–10.2)
CHLORIDE SERPL-SCNC: 105 MEQ/L (ref 98–111)
CO2 SERPL-SCNC: 25 MEQ/L (ref 22–29)
CREAT SERPL-MCNC: 1.2 MG/DL (ref 0.5–0.9)
GFR SERPL CREATININE-BSD FRML MDRD: 44 ML/MIN/1.73M2
GLUCOSE SERPL-MCNC: 102 MG/DL (ref 74–109)
POTASSIUM SERPL-SCNC: 4.6 MEQ/L (ref 3.5–5.2)
SODIUM SERPL-SCNC: 140 MEQ/L (ref 135–145)

## 2025-03-26 ENCOUNTER — OFFICE VISIT (OUTPATIENT)
Dept: NEPHROLOGY | Age: 85
End: 2025-03-26
Payer: MEDICARE

## 2025-03-26 VITALS
SYSTOLIC BLOOD PRESSURE: 138 MMHG | HEART RATE: 58 BPM | BODY MASS INDEX: 27.31 KG/M2 | HEIGHT: 64 IN | DIASTOLIC BLOOD PRESSURE: 54 MMHG | OXYGEN SATURATION: 95 % | WEIGHT: 160 LBS

## 2025-03-26 DIAGNOSIS — I10 PRIMARY HYPERTENSION: ICD-10-CM

## 2025-03-26 DIAGNOSIS — N18.32 STAGE 3B CHRONIC KIDNEY DISEASE (HCC): Primary | ICD-10-CM

## 2025-03-26 DIAGNOSIS — E55.9 VITAMIN D DEFICIENCY: ICD-10-CM

## 2025-03-26 PROCEDURE — 99213 OFFICE O/P EST LOW 20 MIN: CPT | Performed by: INTERNAL MEDICINE

## 2025-03-26 PROCEDURE — G8419 CALC BMI OUT NRM PARAM NOF/U: HCPCS | Performed by: INTERNAL MEDICINE

## 2025-03-26 PROCEDURE — 1159F MED LIST DOCD IN RCRD: CPT | Performed by: INTERNAL MEDICINE

## 2025-03-26 PROCEDURE — 1123F ACP DISCUSS/DSCN MKR DOCD: CPT | Performed by: INTERNAL MEDICINE

## 2025-03-26 PROCEDURE — 1036F TOBACCO NON-USER: CPT | Performed by: INTERNAL MEDICINE

## 2025-03-26 PROCEDURE — 1090F PRES/ABSN URINE INCON ASSESS: CPT | Performed by: INTERNAL MEDICINE

## 2025-03-26 PROCEDURE — G8427 DOCREV CUR MEDS BY ELIG CLIN: HCPCS | Performed by: INTERNAL MEDICINE

## 2025-03-26 RX ORDER — HYDROXYZINE HYDROCHLORIDE 10 MG/1
10 TABLET, FILM COATED ORAL NIGHTLY
COMMUNITY
Start: 2025-03-11

## 2025-03-26 NOTE — PROGRESS NOTES
Renal Progress Note    Assessment and Plan:      Diagnosis Orders   1. Stage 3b chronic kidney disease (HCC)        2. Primary hypertension        3. Vitamin D deficiency                  PLAN:  Serum creatinine is improved to 1.2 mg/dL from 1.6 mg/dL and 1.4 mg /dl respectively last month.  Hypertension is controlled and stable   Vitamin D level is good at 51  Medications reviewed  Will not change anything  Return visit in 6 months with labs and as needed thereafter if no changes.          Patient Active Problem List   Diagnosis    Essential hypertension    Syncopal episodes    Primary osteoarthritis of right knee    Nonhealing surgical wound, initial encounter    Superficial postoperative wound infection    Disruption of external surgical wound    Rupture of operation wound    Elective surgery           Subjective:   Chief complaint:  Chief Complaint   Patient presents with    Follow-up     1 month FU for CKD 3b      HPI:This is a follow up visit for Ms. Samantha Holm here today for return appointment.  I saw her in the initial office appointment about 4 weeks ago for elevated serum creatinine.  She was on hydrochlorothiazide which we advised her to discontinue.  She also had ongoing diarrhea then.  We thought she was volume contracted and advised her to drink plenty of fluid.  Serum creatinine was 1.4 mg/dL.  Today it is 1.2 mg/dL.  BUN was 41 mg/dL.  Today it is 26 mg/dL.  Serum calcium was high at 10.3 mg/dL which we thought was from volume contraction.  Today it is 9.4 mg/dL.  No chest pain.  No shortness of breath.  Appetite is good.  No diarrhea.    ROS:  Pertinent positives stated above in HPI. All other systems were reviewed and were negative.  Medications:     Current Outpatient Medications   Medication Sig Dispense Refill    TART THOMPSON PO Take 1,200 mg by mouth      methylcellulose (CITRUCEL) 500 MG TABS tablet Take 2 tablets by mouth daily      Calcium Carb-Cholecalciferol (CALCIUM 1000 + D PO) Take 1

## 2025-05-20 NOTE — PROGRESS NOTES
PROGRESS NOTE      Patient:  Mari [facility]      Unit/Bed:5K-10/010-A    YOB: 1940    MRN: [id number]       Acct: [de-identified]     PCP: Jes Pro MD    Date of Admission: 3/14/2022      Assessment/Plan:    Anticipated Discharge in :     C/Garrett Funez 1106 Problems    Diagnosis Date Noted    Elective surgery [Z41.9]     Wound dehiscence [T81.30XA] 03/14/2022    Essential hypertension [I10]      Wound dehiscence of right frontal cranium  S/p Repair of complex right temple wound with an adjacent tissue transfer 3/19/2022  Patient had evacuation of subdural hematoma on 1/21 with Dr. Lisha Aguirre  Sutures and staples were removed last week by Dr. Lisha Aguirre  Infectious disease and Plastic surgery following  CT Head: small chronic mixed extra-axial fluid collection subjacent to the craniotomy with increased air collection  Rpt CT head 3/20 showed Interval right frontal cranioplasty. Scalp skin staples are in place. Continue rocephin     Essential Hypertension, stable  Continue home meds  Resume Lisinopril and HCTZ after surgery    Chief Complaint: Wound dehiscence    Hospital Course:   Per admission H&P:     \"78 y. o. female who presented to [address] [place] [address] with Wound Dehiscence.  She has a past medical history of hypertension.     Patient states she fell in January and hit her head.  At that time she had a subdural hematoma.  She underwent an evacuation with Dr. Pema Gonzalez that time. Dimas Mota was admitted previously on 2/23/2022 for wound dehiscence with screw working its way out and had neurosurgical irrigation and closure of her surgical wound at that time.  Last week patient follow-up with Dr. Zamzam Barnes office to have staples and sutures removed.  She had Steri-Strips placed at that time.     Patient states that this morning she was feeling well until she started noticing a \"grunting\" or swishing sound in her right cranium, similar to when she previously had a wound dehiscence.  She states she tried to get into Dr. Mary Urena and Dr. Stephania Bowles' office today but she could not.  She went to her PCP who recommended she go to the ED. Temo Camarena denies pain, drainage, bleeding.  She denies all other review of systems.  She otherwise has no new complaints.  Family is requesting Dr. Metz Kelli was afebrile in the ED with BP stable. \"    3/19  Repair of right temple wound with adjacent tissue transfer  3/21  Discontinued morphine, pain controled with Norco    Subjective:  Patient seen and examined, appears comfortable in bed, daughter at bedside. Patient evaluated without any signs of cardiorespiratory distress. Head wrapped well and tolerated by patient. VS stable, afebrile, saturating well on room air, no new complaints  Pain controlled well with norco.  Tolerating oral diet, discontinued IV fluids    Medications:  Reviewed    Infusion Medications    sodium chloride 75 mL/hr at 03/19/22 2228    sodium chloride       Scheduled Medications    sodium chloride flush  5-40 mL IntraVENous 2 times per day    docusate sodium  100 mg Oral Daily    metoprolol tartrate  50 mg Oral BID    lisinopril  40 mg Oral Daily    hydroCHLOROthiazide  12.5 mg Oral Daily    melatonin  6 mg Oral Nightly    enoxaparin  40 mg SubCUTAneous Daily    vitamin C  1,000 mg Oral Daily    cefTRIAXone (ROCEPHIN) IV  1,000 mg IntraVENous Q24H     PRN Meds: sodium chloride flush, sodium chloride, morphine **OR** morphine, HYDROcodone 5 mg - acetaminophen **OR** HYDROcodone 5 mg - acetaminophen, ondansetron **OR** ondansetron, polyethylene glycol      Intake/Output Summary (Last 24 hours) at 3/21/2022 8414  Last data filed at 3/21/2022 0544  Gross per 24 hour   Intake 960 ml   Output 0 ml   Net 960 ml       Diet:  ADULT DIET; Regular  ADULT ORAL NUTRITION SUPPLEMENT; Lunch, Dinner;  Wound Healing Oral Supplement    Exam:  BP (!) 156/79   Pulse 67   Temp 98 °F (36.7 °C) (Oral)   Resp 16   Ht 5' 4.02\" (1.626 m)   Wt 178 lb 14.4 oz (81.1 kg) LMP  (LMP Unknown)   SpO2 97%   BMI 30.69 kg/m²     Physical exam:  General appearance: No apparent distress, appears stated age and cooperative. HEENT: Pupils equal, round, and reactive to light. Conjunctivae/corneas clear. Head wrapped in bandages that are clean and dry. Neck: Supple, with full range of motion. Respiratory:  Normal respiratory effort. Clear to auscultation, bilaterally without Rales/Wheezes/Rhonchi. Cardiovascular: Regular rate and rhythm with normal S1/S2 without murmurs, rubs or gallops. Abdomen: Soft, non-tender, non-distended with normal bowel sounds. Musculoskeletal: passive and active ROM x 4 extremities. Skin: Skin color, texture, turgor normal.  No rashes or lesions. Neurologic: Alert and oriented, thought content appropriate, normal insight. Neurovascularly intact without any focal sensory/motor deficits. Labs:   Recent Labs     03/20/22  0536   WBC 7.9   HGB 10.8*   HCT 33.5*        Recent Labs     03/20/22  0536      K 4.5      CO2 19*   BUN 21   CREATININE 0.7   CALCIUM 8.5     No results for input(s): AST, ALT, BILIDIR, BILITOT, ALKPHOS in the last 72 hours. No results for input(s): INR in the last 72 hours. No results for input(s): Gurwinder Killings in the last 72 hours. Urinalysis:      Lab Results   Component Value Date    NITRU POSITIVE 01/18/2022    WBCUA 10-15W/CLUMPS 01/18/2022    BACTERIA MANY 01/18/2022    RBCUA 0-2 01/18/2022    RBCUA 0-2 07/02/2018    BLOODU neg 02/01/2022    BLOODU TRACE 01/18/2022    SPECGRAV 1.015 02/01/2022    SPECGRAV 1.010 01/18/2022    GLUCOSEU neg 02/01/2022    GLUCOSEU NEGATIVE 07/02/2018       Radiology:  CT HEAD WO CONTRAST   Final Result       1. Interval right frontal cranioplasty. Scalp skin staples are in place. 2. Stable moderate severity chronic small vessel ischemic changes. **This report has been created using voice recognition software.  It may contain minor errors which are inherent in voice recognition technology. **      Final report electronically signed by Dr. Christian Flores on 3/20/2022 11:49 AM      CT HEAD WO CONTRAST   Final Result    Redemonstration of a small right frontoparietal craniotomy with persistent small chronic mixed extra-axial fluid collection subjacent to the craniotomy but with increased air within the collection compared to prior exam. This suggests possible    microcommunication of the collection with the external hemisphere through the craniotomy and adjacent scalp. Superinfection of the collection can't be excluded. **This report has been created using voice recognition software. It may contain minor errors which are inherent in voice recognition technology. **      Final report electronically signed by Dr. Roberta Cary MD on 3/15/2022 8:55 AM          Diet: ADULT DIET; Regular  ADULT ORAL NUTRITION SUPPLEMENT; Lunch, Dinner; Wound Healing Oral Supplement    DVT prophylaxis: [x] Lovenox                                 [] SCDs                                 [] SQ Heparin                                 [] Encourage ambulation           [] Already on Anticoagulation     Disposition:    [x] Home       [] TCU       [] Rehab       [] Psych       [] SNF       [] [place]       [] Other-    Code Status: Full Code    PT/OT Eval Status:      Electronically signed by Richard Andrew MD on 3/21/2022 at 8:08 AM    This note was electronically signed. Parts of this note may have been dictated by use of voice recognition software and electronically transcribed. The note may contain errors not detected in proofreading. Please refer to my supervising physician's documentation if my documentation differs. venodynes

## 2025-07-13 NOTE — PROGRESS NOTES
[facility]  [address]  [place] [address]  Phone:  [phone]          Name: Samantha Holm  : 1940    Chief Complaint   Patient presents with    6 Month Follow-Up       HPI:     Samantha Holm is a 84 y.o. female who presents today for follow up of hypertension.  She's been under a lot of stress as her son Juan passed away last week from anaplastic thyroid cancer.      She's had swelling in her left elbow for months.  She is right-handed so doesn't use the left arm much.  It doesn't hurt or bother her.  It hasn't changed over time.  No fevers, redness, drainage.      She has exocrine pancreatic insufficiency so follows with GI who has her an Zenpep which has resolved her diarrhea.    Lab Results   Component Value Date     2025    K 4.6 2025     2025    CO2 25 2025    BUN 26 (H) 2025    CREATININE 1.2 (H) 2025    GLUCOSE 102 2025    CALCIUM 9.4 2025    BILITOT 0.3 2025    ALKPHOS 60 2025    AST 20 2025    ALT 12 2025    LABGLOM 44 (A) 2025     Lab Results   Component Value Date    CHOL 197 2025    TRIG 96 2025    HDL 60 2025     Lab Results   Component Value Date    ALT 12 2025    AST 20 2025            Current Outpatient Medications:     allopurinol (ZYLOPRIM) 300 MG tablet, Take 1 tablet by mouth daily, Disp: 90 tablet, Rfl: 1    metoprolol tartrate (LOPRESSOR) 50 MG tablet, Take 1 tablet by mouth 2 times daily, Disp: 180 tablet, Rfl: 1    lisinopril (PRINIVIL;ZESTRIL) 40 MG tablet, Take 1 tablet by mouth daily, Disp: 90 tablet, Rfl: 1    hydrOXYzine HCl (ATARAX) 10 MG tablet, Take 1 tablet by mouth nightly at bedtime., Disp: , Rfl:     Pancrelipase, Lip-Prot-Amyl, (ZENPEP PO), Take by mouth 3 times daily (with meals) 3-4 TIMES A DAY WITH MEALS/ SNACK AT BEDTIME, Disp: , Rfl:     TART CHERRY PO, Take 1,200 mg by mouth, Disp: , Rfl:     methylcellulose

## 2025-07-17 ENCOUNTER — OFFICE VISIT (OUTPATIENT)
Dept: FAMILY MEDICINE CLINIC | Age: 85
End: 2025-07-17

## 2025-07-17 VITALS
SYSTOLIC BLOOD PRESSURE: 126 MMHG | TEMPERATURE: 97.3 F | OXYGEN SATURATION: 96 % | DIASTOLIC BLOOD PRESSURE: 84 MMHG | RESPIRATION RATE: 16 BRPM | HEART RATE: 52 BPM | BODY MASS INDEX: 28.17 KG/M2 | WEIGHT: 165 LBS | HEIGHT: 64 IN

## 2025-07-17 DIAGNOSIS — M70.22 OLECRANON BURSITIS OF LEFT ELBOW: Primary | ICD-10-CM

## 2025-07-17 DIAGNOSIS — M10.072 ACUTE IDIOPATHIC GOUT INVOLVING TOE OF LEFT FOOT: ICD-10-CM

## 2025-07-17 DIAGNOSIS — I10 ESSENTIAL HYPERTENSION: ICD-10-CM

## 2025-07-17 RX ORDER — METOPROLOL TARTRATE 50 MG
50 TABLET ORAL 2 TIMES DAILY
Qty: 180 TABLET | Refills: 1 | Status: SHIPPED | OUTPATIENT
Start: 2025-07-17

## 2025-07-17 RX ORDER — ALLOPURINOL 300 MG/1
300 TABLET ORAL DAILY
Qty: 90 TABLET | Refills: 1 | Status: SHIPPED | OUTPATIENT
Start: 2025-07-17

## 2025-07-17 RX ORDER — LISINOPRIL 40 MG/1
40 TABLET ORAL DAILY
Qty: 90 TABLET | Refills: 1 | Status: SHIPPED | OUTPATIENT
Start: 2025-07-17

## 2025-08-03 DIAGNOSIS — R19.7 DIARRHEA OF PRESUMED INFECTIOUS ORIGIN: Primary | ICD-10-CM

## 2025-08-04 ENCOUNTER — HOSPITAL ENCOUNTER (OUTPATIENT)
Age: 85
Setting detail: SPECIMEN
Discharge: HOME | End: 2025-08-04
Payer: MEDICARE

## 2025-08-04 DIAGNOSIS — R19.7 DIARRHEA OF PRESUMED INFECTIOUS ORIGIN: ICD-10-CM

## 2025-08-04 PROCEDURE — 87045 FECES CULTURE AEROBIC BACT: CPT

## 2025-08-04 PROCEDURE — 87427 SHIGA-LIKE TOXIN AG IA: CPT

## 2025-08-06 LAB — BACTERIA STL CULT: NORMAL

## (undated) DEVICE — BANDAGE COMPR E SGL LAYERED CLSR BGE W/ CLP W4INXL15FT

## (undated) DEVICE — BASIC SINGLE BASIN BTC-LF: Brand: MEDLINE INDUSTRIES, INC.

## (undated) DEVICE — PIN ADLT MAYFIELD RIGID MOLD FINGER

## (undated) DEVICE — DISPOSABLE STANDARD BIPOLAR FORCEPS, NON-STICK,: Brand: SPETZLER-MALIS

## (undated) DEVICE — PACK-MAJOR

## (undated) DEVICE — SUTURE PERMA-HAND 4-0 L18IN NONABSORBABLE BLK L13MM TF 1/2 M104T

## (undated) DEVICE — AGENT HEMOSTATIC SURGIFLOW MATRIX KIT W/THROMBIN

## (undated) DEVICE — TTL1LYR 16FR10ML 100%SIL TMPST TR: Brand: MEDLINE

## (undated) DEVICE — 1016 S-DRAPE IRRIG POUCH 10/BOX: Brand: STERI-DRAPE™

## (undated) DEVICE — DRAPE C ARM W36XL30IN RECTANG BND BG AND TAPE

## (undated) DEVICE — CARBIDE MATCH HEAD

## (undated) DEVICE — 2.7MM SHORT DRILL BIT W/QUICK CONNECT: Brand: PERI-LOC

## (undated) DEVICE — AGENT HEMSTAT W3XL4IN OXIDIZED REGENERATED CELOS ABSRB FOR

## (undated) DEVICE — AGENT HEMSTAT W2XL14IN OXIDIZED REGENERATED CELOS ABSRB FOR

## (undated) DEVICE — GOWN,SIRUS,NONRNF,SETINSLV,XL,20/CS: Brand: MEDLINE

## (undated) DEVICE — PENCIL SMK EVAC ALL IN 1 DSGN ENH VISIBILITY IMPROVED AIR

## (undated) DEVICE — CODMAN® SURGICAL PATTIES 1/2" X 1/2" (1.27CM X 1.27CM): Brand: CODMAN®

## (undated) DEVICE — MICRO TIP WIPE: Brand: DEVON

## (undated) DEVICE — ACCUDRAIN® EXTERNAL CSF DRAINAGE SYSTEM WITH ANTI-REFLUX VALVE: Brand: ACCUDRAIN®

## (undated) DEVICE — GLOVE ORANGE PI 8   MSG9080

## (undated) DEVICE — PLATE BNE L15MM THK0.3MM 2 H CRANIOMAXILLOFACIAL TI STR FOR
Type: IMPLANTABLE DEVICE | Site: FRONTAL LOBE | Status: NON-FUNCTIONAL
Removed: 2022-03-19

## (undated) DEVICE — HEAD POSITIONER, SURGICAL: Brand: DEROYAL

## (undated) DEVICE — SUTURE PERMAHAND SZ 2-0 L18IN NONABSORBABLE BLK L26MM SH C012D

## (undated) DEVICE — CRADLE POS 3X5X24IN RASPBERRY ARM PRONE FOAM DISP

## (undated) DEVICE — ADDG DRILL BIT

## (undated) DEVICE — 3M™ IOBAN™ 2 ANTIMICROBIAL INCISE DRAPE 6650EZ: Brand: IOBAN™ 2

## (undated) DEVICE — 4-PORT MANIFOLD: Brand: NEPTUNE 2

## (undated) DEVICE — SPONGE GZ W4XL4IN COT 12 PLY TYP VII WVN C FLD DSGN

## (undated) DEVICE — SCALPFIX STERILE: Brand: AESCULAP

## (undated) DEVICE — TOWEL,OR,DSP,ST,BLUE,STD,4/PK,20PK/CS: Brand: MEDLINE

## (undated) DEVICE — WAX SURG 2.5GM HEMSTAT BNE BEESWAX PARAFFIN ISO PALMITATE

## (undated) DEVICE — C-ARM: Brand: UNBRANDED

## (undated) DEVICE — CODMAN® SURGICAL PATTIES 1/2" X1 1/2" (1.27CM X 3.81CM): Brand: CODMAN®

## (undated) DEVICE — TIDISHIELD SURGICAL PATIENT DRAPE WITH INVASIVE APERTURES BLUE STERILE UNIVERSAL NAVIGATIONAL CRANIOTOMY 8 PER CASE: Brand: TIDISHIELD

## (undated) DEVICE — 3.0MM TAPERED ROUTER

## (undated) DEVICE — SPONGE LAP W18XL18IN WHT COT 4 PLY FLD STRUNG RADPQ DISP ST

## (undated) DEVICE — GARMENT,MEDLINE,DVT,INT,CALF,MED, GEN2: Brand: MEDLINE

## (undated) DEVICE — SUTURE VCRL SZ 2-0 L18IN ABSRB VLT L26MM SH 1/2 CIR J775D

## (undated) DEVICE — SUREFIT, DUAL DISPERSIVE ELECTRODE, CONTACT QUALITY MONITOR: Brand: SUREFIT

## (undated) DEVICE — CODMAN® BACTISEAL® EVD CATHETER SET (1 PACK): Brand: CODMAN® BACTISEAL®

## (undated) DEVICE — BATTERY DRVR FOR INTELLIGENT SYS

## (undated) DEVICE — LOOP VES W25MM THK1MM MAXI RED SIL FLD REPELLENT 100 PER

## (undated) DEVICE — Device

## (undated) DEVICE — INTENDED FOR TISSUE SEPARATION, AND OTHER PROCEDURES THAT REQUIRE A SHARP SURGICAL BLADE TO PUNCTURE OR CUT.: Brand: BARD-PARKER ® CARBON RIB-BACK BLADES

## (undated) DEVICE — PAD,ABDOMINAL,5"X9",ST,LF,25/BX: Brand: MEDLINE INDUSTRIES, INC.

## (undated) DEVICE — Z DISCONTINUED BY MEDLINE USE 2711682 TRAY SKIN PREP DRY W/ PREM GLV

## (undated) DEVICE — SOLUTION SURG PREP POV IOD 7.5% 4 OZ

## (undated) DEVICE — PADDING CAST W6INXL4YD COT LO LINTING WYTEX

## (undated) DEVICE — GLOVE ORANGE PI 8 1/2   MSG9085

## (undated) DEVICE — MARKER,SKIN,WI/RULER AND LABELS: Brand: MEDLINE

## (undated) DEVICE — PACK PROCEDURE SURG PLAS SC MIN SRHP LF

## (undated) DEVICE — AGENT HEMSTAT W2XL4IN OXIDIZED REGENERATED CELOS ABSRB SFT

## (undated) DEVICE — 3M™ COBAN™ NL STERILE NON-LATEX SELF-ADHERENT WRAP, 2084S, 4 IN X 5 YD (10 CM X 4,5 M), 18 ROLLS/CASE: Brand: 3M™ COBAN™

## (undated) DEVICE — COVER ARMBRD W13XL28.5IN IMPERV BLU FOR OP RM

## (undated) DEVICE — 35 ML SYRINGE LUER-LOCK TIP: Brand: MONOJECT

## (undated) DEVICE — OIL CARTRIDGE: Brand: CORE, MAESTRO

## (undated) DEVICE — 2.3MM SPIRAL ROUTER

## (undated) DEVICE — SYRINGE IRRIG 60ML SFT PLIABLE BLB EZ TO GRP 1 HND USE W/

## (undated) DEVICE — SYRINGE MED 10ML LUERLOCK TIP W/O SFTY DISP

## (undated) DEVICE — TUBING, SUCTION, 1/4" X 20', STRAIGHT: Brand: MEDLINE INDUSTRIES, INC.

## (undated) DEVICE — STOCKINETTE ORTH W9XL36IN COT 2 PLY HLLW FOR HANDLING LMB

## (undated) DEVICE — ROYAL SILK SURGICAL GOWN, XXL: Brand: CONVERTORS

## (undated) DEVICE — CRANIALMASK TRACKER: Brand: CRANIALMASK TRACKER

## (undated) DEVICE — 1010 S-DRAPE TOWEL DRAPE 10/BX: Brand: STERI-DRAPE™

## (undated) DEVICE — DIFFUSER: Brand: CORE, MAESTRO

## (undated) DEVICE — PACK PROCEDURE SURG SET UP SRMC

## (undated) DEVICE — SEALANT FIBRIN 10 CC FRZN PRE FILLED SYR TISSEEL

## (undated) DEVICE — LOOP VES W13MM THK09MM MINI RED SIL FLD REPELLENT

## (undated) DEVICE — HYPODERMIC SAFETY NEEDLE: Brand: MAGELLAN

## (undated) DEVICE — SUTURE NRLN SZ 4-0 L18IN NONABSORBABLE BLK L17MM RB-1 1/2 C554D

## (undated) DEVICE — LINER GLOVEXL RED CUT RESIST STRL REPEL LT

## (undated) DEVICE — GLOVE ORANGE PI 7   MSG9070

## (undated) DEVICE — 1.1MM X 6.0MM STRAIGHT ROUTER

## (undated) DEVICE — BIPOLAR CORD SET, DISPOSABLE

## (undated) DEVICE — BANDAGE,GAUZE,4.5"X4.1YD,STERILE,LF: Brand: MEDLINE

## (undated) DEVICE — CHLORAPREP 26ML ORANGE

## (undated) DEVICE — SPLINT ORTH W4XL30IN WHT FBRGLS 1 SIDE FELT PD CNFRM LO

## (undated) DEVICE — GOWN,SIRUS,NON REINFRCD,LARGE,SET IN SL: Brand: MEDLINE

## (undated) DEVICE — SKIN AFFIX SURG ADHESIVE 72/CS 0.55ML: Brand: MEDLINE

## (undated) DEVICE — 2.0MM DRILL BIT W/QUICK CONNECT: Brand: PERI-LOC

## (undated) DEVICE — SOLUTION IV IRRIG POUR BRL 0.9% SODIUM CHL 2F7124

## (undated) DEVICE — CATHETER IV 16 GAX32.5 IN TRPL BVL LUERLOCK TIP ANGIOCATH

## (undated) DEVICE — BANDAGE COMPR REINF 10 YDX4 IN REB ELASTIC STRL LF

## (undated) DEVICE — MICRO KOVER: Brand: UNBRANDED

## (undated) DEVICE — GLOVE SURG SZ 8 L11.77IN FNGR THK9.8MIL STRW LTX POLYMER

## (undated) DEVICE — PREP SOL PVP IODINE 4%  4 OZ/BTL

## (undated) DEVICE — BIT DRILL 2.7

## (undated) DEVICE — CATHETER,URETHRAL,REDRUBBER,STRL,10FR: Brand: MEDLINE INDUSTRIES, INC.

## (undated) DEVICE — DRESSING,GAUZE,XEROFORM,CURAD,5"X9",ST: Brand: CURAD

## (undated) DEVICE — PADDING,UNDERCAST,COTTON, 4"X4YD STERILE: Brand: MEDLINE

## (undated) DEVICE — STERILE COTTON BALLS LARGE 5/P: Brand: MEDLINE

## (undated) DEVICE — SUTURE VCRL SZ 3-0 L18IN ABSRB VLT L26MM SH 1/2 CIR J774D